# Patient Record
Sex: MALE | Race: BLACK OR AFRICAN AMERICAN | NOT HISPANIC OR LATINO
[De-identification: names, ages, dates, MRNs, and addresses within clinical notes are randomized per-mention and may not be internally consistent; named-entity substitution may affect disease eponyms.]

---

## 2023-01-27 ENCOUNTER — APPOINTMENT (OUTPATIENT)
Dept: VASCULAR SURGERY | Facility: CLINIC | Age: 63
End: 2023-01-27
Payer: MEDICARE

## 2023-01-27 VITALS
HEART RATE: 90 BPM | WEIGHT: 157 LBS | HEIGHT: 67 IN | BODY MASS INDEX: 24.64 KG/M2 | OXYGEN SATURATION: 97 % | DIASTOLIC BLOOD PRESSURE: 73 MMHG | SYSTOLIC BLOOD PRESSURE: 170 MMHG

## 2023-01-27 PROCEDURE — 93926 LOWER EXTREMITY STUDY: CPT

## 2023-01-27 PROCEDURE — 99204 OFFICE O/P NEW MOD 45 MIN: CPT

## 2023-01-27 NOTE — ADDENDUM
[FreeTextEntry1] : This note was written by Art Redmond, acting as a scribe for Dr. Clarisse Lucas.  I, Dr. Clarisse Lucas, have read and attest that all the information, medical decision-making, and discharge instructions within are true and accurate.\par \par I, Dr. Clarisse Lucas, personally performed the evaluation and management (E/M) services for this new patient.  That E/M includes conducting the initial examination, assessing all conditions, and establishing the plan of care.  Today, my ACP, Art Redmond, was here to observe my evaluation and management services for this patient to be followed going forward.

## 2023-01-27 NOTE — PROCEDURE
[FreeTextEntry1] : Arterial duplex of the LLE performed to evaluate for LE perfusion reveals a widely patent CFA/SFA, >75% pop stenosis, occluded PTA, patent CESILIA/peroneal arteries w/adequate flow to foot.

## 2023-01-27 NOTE — HISTORY OF PRESENT ILLNESS
[FreeTextEntry1] : 62yoM w/chronic h/o [id number] (poorly controlled), HTN, HLD, s/p multiple L toe amputations 2y prior which resulted in lateral deviation of the L 1/2nd toes and subsequent ulceration of the medial aspect of the 1st MT head, presents for consultation for treatment.  Pt was admitted to a hospital in [place] for infection of the foot that required 8wks of IV abx via PICC line, he also underwent LLE angiogram/angioplasty of the LLE.\par \par Anupam, he denies drainage from the L foot, and has only been dressing the site w/dry gauze.  Denies fevers/chills/malaise.

## 2023-02-02 ENCOUNTER — RESULT CHARGE (OUTPATIENT)
Age: 63
End: 2023-02-02

## 2023-02-03 ENCOUNTER — OUTPATIENT (OUTPATIENT)
Dept: OUTPATIENT SERVICES | Facility: HOSPITAL | Age: 63
LOS: 1 days | End: 2023-02-03
Payer: MEDICARE

## 2023-02-03 ENCOUNTER — APPOINTMENT (OUTPATIENT)
Dept: VASCULAR SURGERY | Facility: CLINIC | Age: 63
End: 2023-02-03
Payer: MEDICARE

## 2023-02-03 DIAGNOSIS — Z01.818 ENCOUNTER FOR OTHER PREPROCEDURAL EXAMINATION: ICD-10-CM

## 2023-02-03 LAB
ALBUMIN SERPL ELPH-MCNC: 2.9 G/DL — LOW (ref 3.3–5)
ALP SERPL-CCNC: 110 U/L — SIGNIFICANT CHANGE UP (ref 40–120)
ALT FLD-CCNC: 6 U/L — LOW (ref 10–45)
ANION GAP SERPL CALC-SCNC: 6 MMOL/L — SIGNIFICANT CHANGE UP (ref 5–17)
APTT BLD: 31.1 SEC — SIGNIFICANT CHANGE UP (ref 27.5–35.5)
AST SERPL-CCNC: 7 U/L — LOW (ref 10–40)
BASOPHILS # BLD AUTO: 0.06 K/UL — SIGNIFICANT CHANGE UP (ref 0–0.2)
BASOPHILS NFR BLD AUTO: 0.6 % — SIGNIFICANT CHANGE UP (ref 0–2)
BILIRUB SERPL-MCNC: 0.3 MG/DL — SIGNIFICANT CHANGE UP (ref 0.2–1.2)
BUN SERPL-MCNC: 20 MG/DL — SIGNIFICANT CHANGE UP (ref 7–23)
CALCIUM SERPL-MCNC: 8.6 MG/DL — SIGNIFICANT CHANGE UP (ref 8.4–10.5)
CHLORIDE SERPL-SCNC: 94 MMOL/L — LOW (ref 96–108)
CO2 SERPL-SCNC: 28 MMOL/L — SIGNIFICANT CHANGE UP (ref 22–31)
CREAT SERPL-MCNC: 1.22 MG/DL — SIGNIFICANT CHANGE UP (ref 0.5–1.3)
EGFR: 67 ML/MIN/1.73M2 — SIGNIFICANT CHANGE UP
EOSINOPHIL # BLD AUTO: 0.15 K/UL — SIGNIFICANT CHANGE UP (ref 0–0.5)
EOSINOPHIL NFR BLD AUTO: 1.4 % — SIGNIFICANT CHANGE UP (ref 0–6)
GLUCOSE SERPL-MCNC: 381 MG/DL — HIGH (ref 70–99)
HCT VFR BLD CALC: 29 % — LOW (ref 39–50)
HGB BLD-MCNC: 9 G/DL — LOW (ref 13–17)
IMM GRANULOCYTES NFR BLD AUTO: 0.5 % — SIGNIFICANT CHANGE UP (ref 0–0.9)
INR BLD: 1.17 — HIGH (ref 0.88–1.16)
LYMPHOCYTES # BLD AUTO: 29.3 % — SIGNIFICANT CHANGE UP (ref 13–44)
LYMPHOCYTES # BLD AUTO: 3.18 K/UL — SIGNIFICANT CHANGE UP (ref 1–3.3)
MCHC RBC-ENTMCNC: 26.5 PG — LOW (ref 27–34)
MCHC RBC-ENTMCNC: 31 GM/DL — LOW (ref 32–36)
MCV RBC AUTO: 85.5 FL — SIGNIFICANT CHANGE UP (ref 80–100)
MONOCYTES # BLD AUTO: 0.85 K/UL — SIGNIFICANT CHANGE UP (ref 0–0.9)
MONOCYTES NFR BLD AUTO: 7.8 % — SIGNIFICANT CHANGE UP (ref 2–14)
NEUTROPHILS # BLD AUTO: 6.56 K/UL — SIGNIFICANT CHANGE UP (ref 1.8–7.4)
NEUTROPHILS NFR BLD AUTO: 60.4 % — SIGNIFICANT CHANGE UP (ref 43–77)
NRBC # BLD: 0 /100 WBCS — SIGNIFICANT CHANGE UP (ref 0–0)
PLATELET # BLD AUTO: 518 K/UL — HIGH (ref 150–400)
POTASSIUM SERPL-MCNC: 4.4 MMOL/L — SIGNIFICANT CHANGE UP (ref 3.5–5.3)
POTASSIUM SERPL-SCNC: 4.4 MMOL/L — SIGNIFICANT CHANGE UP (ref 3.5–5.3)
PROT SERPL-MCNC: 6.2 G/DL — SIGNIFICANT CHANGE UP (ref 6–8.3)
PROTHROM AB SERPL-ACNC: 13.9 SEC — HIGH (ref 10.5–13.4)
RBC # BLD: 3.39 M/UL — LOW (ref 4.2–5.8)
RBC # FLD: 16.8 % — HIGH (ref 10.3–14.5)
SARS-COV-2 N GENE NPH QL NAA+PROBE: NOT DETECTED
SODIUM SERPL-SCNC: 128 MMOL/L — LOW (ref 135–145)
WBC # BLD: 10.85 K/UL — HIGH (ref 3.8–10.5)
WBC # FLD AUTO: 10.85 K/UL — HIGH (ref 3.8–10.5)

## 2023-02-03 PROCEDURE — 85610 PROTHROMBIN TIME: CPT

## 2023-02-03 PROCEDURE — 93005 ELECTROCARDIOGRAM TRACING: CPT

## 2023-02-03 PROCEDURE — 85730 THROMBOPLASTIN TIME PARTIAL: CPT

## 2023-02-03 PROCEDURE — 99214 OFFICE O/P EST MOD 30 MIN: CPT

## 2023-02-03 PROCEDURE — 80053 COMPREHEN METABOLIC PANEL: CPT

## 2023-02-03 PROCEDURE — 85025 COMPLETE CBC W/AUTO DIFF WBC: CPT

## 2023-02-03 PROCEDURE — 93010 ELECTROCARDIOGRAM REPORT: CPT

## 2023-02-03 RX ORDER — INSULIN LISPRO 100 [IU]/ML
100 INJECTION, SOLUTION INTRAVENOUS; SUBCUTANEOUS
Refills: 0 | Status: ACTIVE | COMMUNITY

## 2023-02-03 RX ORDER — HYDROCORTISONE 1 %
12 CREAM (GRAM) TOPICAL TWICE DAILY
Qty: 1 | Refills: 0 | Status: ACTIVE | COMMUNITY
Start: 2023-02-03 | End: 1900-01-01

## 2023-02-03 RX ORDER — DOCUSATE SODIUM 100 MG/1
100 CAPSULE, LIQUID FILLED ORAL
Refills: 0 | Status: ACTIVE | COMMUNITY

## 2023-02-03 NOTE — ASSESSMENT
[FreeTextEntry1] : 62yoM w/chronic h/o [id number] (poorly controlled), HTN, HLD, s/p multiple L toe amputations 2y prior which resulted in lateral deviation of the L 1/2nd toes and subsequent ulceration of the medial aspect of the 1st MT head, presents for consultation for treatment.  Pt was admitted to a hospital in [place] for infection of the foot that required 8wks of IV abx via PICC line, he also underwent LLE angiogram/angioplasty of the LLE.  Currently, he notes drainage from the L foot, and has only been dressing the site w/dry gauze.  Dr. Lucas placed an unna boot on the LE 1wk prior w/no improvement.  Denies fevers/chills/malaise.\par \par Large, tracking ulcer noted at the L medial forefoot w/exposure of the 1st MT head and MTP joint w/drainage and crepitus.  Wound cleansed w/chlorhexidine/NS and packed w/chlorhexidine wet-->dry, wrapped w/kerlix and ACE.  Discussed options w/pt to undergo LLE angio/PTA/stent in cath lab and plan to amputate the L great toe w/podiatry during this admission.

## 2023-02-03 NOTE — PHYSICAL EXAM
[Normal Thyroid] : the thyroid was normal [Normal Breath Sounds] : Normal breath sounds [Respiratory Effort] : normal respiratory effort [Normal Heart Sounds] : normal heart sounds [Normal Rate and Rhythm] : normal rate and rhythm [2+] : right 2+ [0] : left 0 [1+] : left 1+ [Ankle Swelling (On Exam)] : present [Ankle Swelling On The Left] : of the left ankle [Ankle Swelling On The Right] : mild [Skin Ulcer] : ulcer [Alert] : alert [Calm] : calm [JVD] : no jugular venous distention  [Carotid Bruits] : no carotid bruits [Right Carotid Bruit] : no bruit heard over the right carotid [Left Carotid Bruit] : no bruit heard over the left carotid [Varicose Veins Of Lower Extremities] : not present [] : not present [Abdomen Masses] : No abdominal masses [Abdomen Tenderness] : ~T ~M No abdominal tenderness [Purpura] : no purpura  [Petechiae] : no petechiae [Skin Induration] : no induration [de-identified] : Healthy appearance, NAD [de-identified] : NC/AT, anicteric [de-identified] : FROM throughout, strength 5/5x4, no palpable cords in LEs, no atrophy noted [de-identified] : Open wound at the L medial forefoot w/exposure of the 1st MTP joint, +crepitus, +drainage

## 2023-02-03 NOTE — PROCEDURE
[FreeTextEntry1] : Wound cleansed w/chlorhexidine/NS and packed w/chlorhexidine wet-->dry, wrapped w/kerlix and ACE

## 2023-02-03 NOTE — HISTORY OF PRESENT ILLNESS
[FreeTextEntry1] : 62yoM w/chronic h/o [id number] (poorly controlled), HTN, HLD, s/p multiple L toe amputations 2y prior which resulted in lateral deviation of the L 1/2nd toes and subsequent ulceration of the medial aspect of the 1st MT head, presents for consultation for treatment.  Pt was admitted to a hospital in [place] for infection of the foot that required 8wks of IV abx via PICC line, he also underwent LLE angiogram/angioplasty of the LLE.\par \par Currently, he notes drainage from the L foot, and has only been dressing the site w/dry gauze.  Dr. Lucas placed an unna boot on the LE 1wk prior w/no improvement.  Denies fevers/chills/malaise.

## 2023-02-05 ENCOUNTER — TRANSCRIPTION ENCOUNTER (OUTPATIENT)
Age: 63
End: 2023-02-05

## 2023-02-06 ENCOUNTER — APPOINTMENT (OUTPATIENT)
Dept: VASCULAR SURGERY | Facility: HOSPITAL | Age: 63
End: 2023-02-06

## 2023-02-06 ENCOUNTER — TRANSCRIPTION ENCOUNTER (OUTPATIENT)
Age: 63
End: 2023-02-06

## 2023-02-06 ENCOUNTER — INPATIENT (INPATIENT)
Facility: HOSPITAL | Age: 63
LOS: 7 days | Discharge: HOME HEALTH | DRG: 617 | End: 2023-02-14
Attending: SURGERY | Admitting: SURGERY
Payer: MEDICARE

## 2023-02-06 VITALS — HEIGHT: 67 IN | WEIGHT: 160.94 LBS

## 2023-02-06 LAB
ANION GAP SERPL CALC-SCNC: 11 MMOL/L — SIGNIFICANT CHANGE UP (ref 5–17)
BUN SERPL-MCNC: 19 MG/DL — SIGNIFICANT CHANGE UP (ref 7–23)
CALCIUM SERPL-MCNC: 9.1 MG/DL — SIGNIFICANT CHANGE UP (ref 8.4–10.5)
CHLORIDE SERPL-SCNC: 100 MMOL/L — SIGNIFICANT CHANGE UP (ref 96–108)
CO2 SERPL-SCNC: 25 MMOL/L — SIGNIFICANT CHANGE UP (ref 22–31)
CREAT SERPL-MCNC: 1.16 MG/DL — SIGNIFICANT CHANGE UP (ref 0.5–1.3)
EGFR: 71 ML/MIN/1.73M2 — SIGNIFICANT CHANGE UP
GLUCOSE BLDC GLUCOMTR-MCNC: 107 MG/DL — HIGH (ref 70–99)
GLUCOSE BLDC GLUCOMTR-MCNC: 190 MG/DL — HIGH (ref 70–99)
GLUCOSE BLDC GLUCOMTR-MCNC: 288 MG/DL — HIGH (ref 70–99)
GLUCOSE BLDC GLUCOMTR-MCNC: 350 MG/DL — HIGH (ref 70–99)
GLUCOSE SERPL-MCNC: 332 MG/DL — HIGH (ref 70–99)
HCT VFR BLD CALC: 30.1 % — LOW (ref 39–50)
HGB BLD-MCNC: 9.3 G/DL — LOW (ref 13–17)
INR BLD: 1.27 — HIGH (ref 0.88–1.16)
ISTAT ACTK (ACTIVATED CLOTTING TIME KAOLIN): 143 SEC — HIGH (ref 74–137)
ISTAT ACTK (ACTIVATED CLOTTING TIME KAOLIN): 173 SEC — HIGH (ref 74–137)
ISTAT ACTK (ACTIVATED CLOTTING TIME KAOLIN): 179 SEC — HIGH (ref 74–137)
ISTAT ACTK (ACTIVATED CLOTTING TIME KAOLIN): 215 SEC — HIGH (ref 74–137)
MCHC RBC-ENTMCNC: 26.5 PG — LOW (ref 27–34)
MCHC RBC-ENTMCNC: 30.9 GM/DL — LOW (ref 32–36)
MCV RBC AUTO: 85.8 FL — SIGNIFICANT CHANGE UP (ref 80–100)
NRBC # BLD: 0 /100 WBCS — SIGNIFICANT CHANGE UP (ref 0–0)
PLATELET # BLD AUTO: 491 K/UL — HIGH (ref 150–400)
POTASSIUM SERPL-MCNC: 4.4 MMOL/L — SIGNIFICANT CHANGE UP (ref 3.5–5.3)
POTASSIUM SERPL-SCNC: 4.4 MMOL/L — SIGNIFICANT CHANGE UP (ref 3.5–5.3)
PROTHROM AB SERPL-ACNC: 15.2 SEC — HIGH (ref 10.5–13.4)
RBC # BLD: 3.51 M/UL — LOW (ref 4.2–5.8)
RBC # FLD: 18 % — HIGH (ref 10.3–14.5)
SODIUM SERPL-SCNC: 136 MMOL/L — SIGNIFICANT CHANGE UP (ref 135–145)
WBC # BLD: 9.76 K/UL — SIGNIFICANT CHANGE UP (ref 3.8–10.5)
WBC # FLD AUTO: 9.76 K/UL — SIGNIFICANT CHANGE UP (ref 3.8–10.5)

## 2023-02-06 PROCEDURE — 73620 X-RAY EXAM OF FOOT: CPT | Mod: 26,LT

## 2023-02-06 PROCEDURE — 93010 ELECTROCARDIOGRAM REPORT: CPT

## 2023-02-06 PROCEDURE — 75710 ARTERY X-RAYS ARM/LEG: CPT | Mod: 26,59,GC

## 2023-02-06 PROCEDURE — 75625 CONTRAST EXAM ABDOMINL AORTA: CPT | Mod: 26,GC

## 2023-02-06 PROCEDURE — 76937 US GUIDE VASCULAR ACCESS: CPT | Mod: 26,GC

## 2023-02-06 PROCEDURE — 37226: CPT | Mod: GC

## 2023-02-06 RX ORDER — ASPIRIN/CALCIUM CARB/MAGNESIUM 324 MG
81 TABLET ORAL DAILY
Refills: 0 | Status: DISCONTINUED | OUTPATIENT
Start: 2023-02-07 | End: 2023-02-14

## 2023-02-06 RX ORDER — SODIUM CHLORIDE 9 MG/ML
1000 INJECTION, SOLUTION INTRAVENOUS
Refills: 0 | Status: DISCONTINUED | OUTPATIENT
Start: 2023-02-06 | End: 2023-02-07

## 2023-02-06 RX ORDER — INSULIN LISPRO 100/ML
VIAL (ML) SUBCUTANEOUS ONCE
Refills: 0 | Status: COMPLETED | OUTPATIENT
Start: 2023-02-06 | End: 2023-02-06

## 2023-02-06 RX ORDER — PIPERACILLIN AND TAZOBACTAM 4; .5 G/20ML; G/20ML
3.38 INJECTION, POWDER, LYOPHILIZED, FOR SOLUTION INTRAVENOUS ONCE
Refills: 0 | Status: COMPLETED | OUTPATIENT
Start: 2023-02-06 | End: 2023-02-06

## 2023-02-06 RX ORDER — PIPERACILLIN AND TAZOBACTAM 4; .5 G/20ML; G/20ML
3.38 INJECTION, POWDER, LYOPHILIZED, FOR SOLUTION INTRAVENOUS ONCE
Refills: 0 | Status: DISCONTINUED | OUTPATIENT
Start: 2023-02-06 | End: 2023-02-06

## 2023-02-06 RX ORDER — HEPARIN SODIUM 5000 [USP'U]/ML
5000 INJECTION INTRAVENOUS; SUBCUTANEOUS EVERY 8 HOURS
Refills: 0 | Status: DISCONTINUED | OUTPATIENT
Start: 2023-02-06 | End: 2023-02-08

## 2023-02-06 RX ORDER — DEXTROSE 50 % IN WATER 50 %
25 SYRINGE (ML) INTRAVENOUS ONCE
Refills: 0 | Status: DISCONTINUED | OUTPATIENT
Start: 2023-02-06 | End: 2023-02-07

## 2023-02-06 RX ORDER — DEXTROSE 50 % IN WATER 50 %
15 SYRINGE (ML) INTRAVENOUS ONCE
Refills: 0 | Status: DISCONTINUED | OUTPATIENT
Start: 2023-02-06 | End: 2023-02-07

## 2023-02-06 RX ORDER — GLUCAGON INJECTION, SOLUTION 0.5 MG/.1ML
1 INJECTION, SOLUTION SUBCUTANEOUS ONCE
Refills: 0 | Status: DISCONTINUED | OUTPATIENT
Start: 2023-02-06 | End: 2023-02-07

## 2023-02-06 RX ORDER — DEXTROSE 50 % IN WATER 50 %
12.5 SYRINGE (ML) INTRAVENOUS ONCE
Refills: 0 | Status: DISCONTINUED | OUTPATIENT
Start: 2023-02-06 | End: 2023-02-07

## 2023-02-06 RX ORDER — CLOPIDOGREL BISULFATE 75 MG/1
75 TABLET, FILM COATED ORAL ONCE
Refills: 0 | Status: COMPLETED | OUTPATIENT
Start: 2023-02-06 | End: 2023-02-06

## 2023-02-06 RX ORDER — INSULIN LISPRO 100/ML
VIAL (ML) SUBCUTANEOUS
Refills: 0 | Status: DISCONTINUED | OUTPATIENT
Start: 2023-02-06 | End: 2023-02-07

## 2023-02-06 RX ORDER — ASPIRIN/CALCIUM CARB/MAGNESIUM 324 MG
81 TABLET ORAL ONCE
Refills: 0 | Status: COMPLETED | OUTPATIENT
Start: 2023-02-06 | End: 2023-02-06

## 2023-02-06 RX ORDER — CHLORHEXIDINE GLUCONATE 213 G/1000ML
1 SOLUTION TOPICAL ONCE
Refills: 0 | Status: COMPLETED | OUTPATIENT
Start: 2023-02-06 | End: 2023-02-08

## 2023-02-06 RX ORDER — CLOPIDOGREL BISULFATE 75 MG/1
75 TABLET, FILM COATED ORAL DAILY
Refills: 0 | Status: DISCONTINUED | OUTPATIENT
Start: 2023-02-07 | End: 2023-02-14

## 2023-02-06 RX ORDER — INSULIN LISPRO 100/ML
VIAL (ML) SUBCUTANEOUS AT BEDTIME
Refills: 0 | Status: DISCONTINUED | OUTPATIENT
Start: 2023-02-06 | End: 2023-02-07

## 2023-02-06 RX ORDER — PIPERACILLIN AND TAZOBACTAM 4; .5 G/20ML; G/20ML
3.38 INJECTION, POWDER, LYOPHILIZED, FOR SOLUTION INTRAVENOUS EVERY 8 HOURS
Refills: 0 | Status: DISCONTINUED | OUTPATIENT
Start: 2023-02-06 | End: 2023-02-10

## 2023-02-06 RX ADMIN — CLOPIDOGREL BISULFATE 75 MILLIGRAM(S): 75 TABLET, FILM COATED ORAL at 12:49

## 2023-02-06 RX ADMIN — Medication 6: at 12:41

## 2023-02-06 RX ADMIN — Medication 8: at 10:41

## 2023-02-06 RX ADMIN — PIPERACILLIN AND TAZOBACTAM 200 GRAM(S): 4; .5 INJECTION, POWDER, LYOPHILIZED, FOR SOLUTION INTRAVENOUS at 17:06

## 2023-02-06 RX ADMIN — PIPERACILLIN AND TAZOBACTAM 25 GRAM(S): 4; .5 INJECTION, POWDER, LYOPHILIZED, FOR SOLUTION INTRAVENOUS at 21:07

## 2023-02-06 RX ADMIN — Medication 81 MILLIGRAM(S): at 12:49

## 2023-02-06 RX ADMIN — HEPARIN SODIUM 5000 UNIT(S): 5000 INJECTION INTRAVENOUS; SUBCUTANEOUS at 22:19

## 2023-02-06 NOTE — PROGRESS NOTE ADULT - SUBJECTIVE AND OBJECTIVE BOX
Vascular Surgery Post-Op Note    Procedure: LLE Angio with SFA/Pop Stent    Diagnosis/Indication: PAD    Surgeon: Dr. Avendano    S: Pt has no complaints. Denies CP, SOB, DOMINGUEZ, calf tenderness. Pain controlled with medication. Left foot dressed with kerlix and ACE wrap by podiatry for chronic wound.    O:                              9.3    9.76  )-----------( 491      ( 06 Feb 2023 09:45 )             30.1     02-06    136  |  100  |  19  ----------------------------<  332<H>  4.4   |  25  |  1.16    Ca    9.1      06 Feb 2023 09:45        Gen: NAD, resting comfortably in bed laying flat  C/V: NSR  Pulm: Nonlabored breathing, no respiratory distress  Abd: soft, NT/ND  Extrem: WWP, no calf edema, bilateral biphasic DP and triphasic PT pulses (dressing taken down on left side and re-wrapped). Groin soft, no evidence of hematoma, no drainage, induration.       A/P: 62yMale s/p LLE Angio with SFA/Pop Stent  NPO until 7:30pm then reg diet  Pain/nausea control  DVT ppx: SQH, ASA/Plavix  Dispo plan: [facility] Post-Op Note    Procedure: LLE Angio with SFA/Pop Stent    Diagnosis/Indication: PAD    Surgeon: Dr. Avendano    S: Pt has no complaints. Denies CP, SOB, DOMINGUEZ, calf tenderness. Pain controlled with medication. Left foot dressed with kerlix and ACE wrap by podiatry for chronic wound.    O:  ICU Vital Signs Last 24 Hrs  T(C): --  T(F): --  HR: 84 (06 Feb 2023 18:00) (68 - 84)  BP: 170/87 (06 Feb 2023 18:00) (157/74 - 170/87)  BP(mean): 119 (06 Feb 2023 18:00) (107 - 119)  ABP: --  ABP(mean): --  RR: 17 (06 Feb 2023 18:00) (17 - 18)  SpO2: 97% (06 Feb 2023 18:00) (97% - 98%)    O2 Parameters below as of 06 Feb 2023 18:00  Patient On (Oxygen Delivery Method): room air                                    9.3    9.76  )-----------( 491      ( 06 Feb 2023 09:45 )             30.1     02-06    136  |  100  |  19  ----------------------------<  332<H>  4.4   |  25  |  1.16    Ca    9.1      06 Feb 2023 09:45        Gen: NAD, resting comfortably in bed laying flat  C/V: NSR  Pulm: Nonlabored breathing, no respiratory distress  Abd: soft, NT/ND  Extrem: WWP, no calf edema, bilateral biphasic DP and triphasic PT pulses (dressing taken down on left side and re-wrapped). Groin soft, no evidence of hematoma, no drainage, induration.       A/P: 62yMale s/p LLE Angio with SFA/Pop Stent  NPO until 7:30pm then reg diet  Pain/nausea control  DVT ppx: SQH, ASA/Plavix  Dispo plan: [facility]

## 2023-02-06 NOTE — H&P ADULT - ASSESSMENT
62M with PMH of HTN, HLD, DM, PAD s/p multiple L toe amputations 2 years prior (c/b medial deviation of 1st/2nd toes resulting in ulceration of medial metatarsal head) presents for LLE Angiogram    - s/p LLE Angiogram, SFA/Pop stent  - Admit for LLE wound care  - 6Fr sheath in R groin, f/u 1PM ACT  - Local wound care

## 2023-02-06 NOTE — H&P ADULT - HISTORY OF PRESENT ILLNESS
62M with PMH of HTN, HLD, DM, PAD s/p multiple L toe amputations 2 years prior (c/b medial deviation of 1st/2nd toes resulting in ulceration of medial metatarsal head) presents for LLE Angiogram. He was recently admitted to an OSH where he received 8 week of IV Abx via a PICC line. He was seen in the office and noted drainage form his L foot. Denies fevers/chills, CP/SOB. ROS otherwise negative.

## 2023-02-06 NOTE — BRIEF OPERATIVE NOTE - OPERATION/FINDINGS
R groin CFA access via micropuncture technique under ultrasound guidance. Normal aortoiliac vessels. patent CFA/SFA/PFA. 75% stenosis of distal SFA/proximal popliteal artery. below the knee, AT down to the foot. Peroneal to the ankle. PT does not reach foot. SFA lesion ballooned with 4x40mm [employer] balloon, then stented with [employer] PTX, then ballooned with [employer] 6x40mm balloon. Resolution of stenosis on completion. R groin sheath sutured in place. AT/DP palpable, triphasic PT.

## 2023-02-06 NOTE — PRE-ANESTHESIA EVALUATION ADULT - NSANTHADDINFOFT_GEN_ALL_CORE
Discussed with Dr. Avendano that patients FSG elevated in setting of holding his home insulin and oral meds.  Given that patient with known poorly controlled diabetes, case will be sedation, and patient can be admitted post op, will give insulin/check glucose and proceed.

## 2023-02-06 NOTE — H&P ADULT - NSHPPHYSICALEXAM_GEN_ALL_CORE
Gen: NAD  Resp: Normal respiratory effort  Abd: Soft, NTND  Ext: WWP; L 1st toe large medial metatarsal head ulcer, with loose 1st toe; no purulence, drainage, or cellulitis; post-operativley 2+ AP pulse, triphasic PT, biphasic DP

## 2023-02-06 NOTE — CONSULT NOTE ADULT - ASSESSMENT
62M with PMH of HTN, HLD, DM, PAD s/p multiple L toe amputations 2 years prior (c/b medial deviation of 1st/2nd toes resulting in ulceration of medial metatarsal head) presents for LLE Angiogram. Podiatry consulted to evaluate Left foot wound.    Osteomyelitis of 1st met head     Plan:       Plan pending discussion with attending 62M with PMH of HTN, HLD, DM, PAD s/p multiple L toe amputations 2 years prior (c/b medial deviation of 1st/2nd toes resulting in ulceration of medial metatarsal head) presents for LLE Angiogram. Podiatry consulted to evaluate Left foot wound.    Osteomyelitis of Left 1st met head     Plan:   - Reccommend MRI of Left foot to determine extent of OM  - Will plan for amputation of Left foot (possible TMA, pending MRI results)  - Reviewed x-rays  - C/w antibiotics per primary team  - Pt can heel WBAT to LLE  - Rest of care per primary team    Plan discussed w/ attending & podiatry following

## 2023-02-06 NOTE — CONSULT NOTE ADULT - SUBJECTIVE AND OBJECTIVE BOX
Attending: Dr. Pope     Patient is a 62y old  Male who presents with a chief complaint of LLE Angiogram (06 Feb 2023 12:06)      HPI:  62M with PMH of HTN, HLD, DM, PAD s/p multiple L toe amputations 2 years prior (c/b medial deviation of 1st/2nd toes resulting in ulceration of medial metatarsal head) presents for LLE Angiogram. Podiatry consulted to evaluate Left foot wound. Patient states left foot wound has been present for years and has never healed. He notes occasional drainage. Denies any recent pustular drainage. States he had recent amputation of his left 2nd digit and partial 5th ray a few months ago. He does not recall which hospital. He was discharged with 8 weeks of IV antibiotics. He does not currently follow with a podiatrist. He went to see his vascular doctor (Dr. Avendano) Friday who recommended he gets an amputation following his angiogram due to patient's x-rays showing OM of the Left 1st met head.         PAST MEDICAL & SURGICAL HISTORY:    Home Medications:  Aspirin Enteric Coated 81 mg oral delayed release tablet: 1 tab(s) orally once a day (06 Feb 2023 12:44)  docusate sodium 100 mg oral tablet: 1 tab(s) orally 3 times a day (06 Feb 2023 12:44)  HumaLOG KwikPen 100 units/mL injectable solution:  (06 Feb 2023 12:44)  Tresiba 100 units/mL subcutaneous solution: 50 unit(s) subcutaneous once a day (06 Feb 2023 12:44)    Allergies    Allergy Status Unknown    Intolerances      FAMILY HISTORY:    Social History:       LABS                        9.3    9.76  )-----------( 491      ( 06 Feb 2023 09:45 )             30.1     02-06    136  |  100  |  19  ----------------------------<  332<H>  4.4   |  25  |  1.16    Ca    9.1      06 Feb 2023 09:45      PT/INR - ( 06 Feb 2023 09:59 )   PT: 15.2 sec;   INR: 1.27              Vital Signs Last 24 Hrs  T(C): --  T(F): --  HR: --  BP: --  BP(mean): --  RR: --  SpO2: --        PHYSICAL EXAM  General: NAD, AA0x3    Left Lower Extremity Focused:  Vasc: DP/PT 2/4m, No edema   Derm: 2.7 cm x 2 cm wound with exposed metatarsal head and fibrotic slough with 30% granular 70% fibrotic base, serous drainage with macerated borders, (+)PTB, undermining circumferentially tracks medially and proximally 2 cm   Neuro: Protective sensation diminished  MSK: s/p 2nd digit and 5th partial ray amp, Laterally deviated 1st, 3rd, and 4th digit    RADIOLOGY  < from: Xray Foot AP + Lateral, Left (02.06.23 @ 13:18) >  IMPRESSION: Frontal and lateral views of the left foot demonstrates   dislocation of the first MTP joint. Deformity of the second metatarsal   shaft. Deformity of the third, fourth and fifth metatarsal shafts   extending to the MTP joints. Pes planus. Hindfoot valgus. Arterial   vascular calcification. Fragmentation, sclerosis calcification adjacent   to the base of the fifth possible due to underlying chronic osteomyelitis.    MRI is more sensitive examination for detection of osteomyelitis.    < end of copied text >                         Attending: Dr. Pope     Patient is a 62y old  Male who presents with a chief complaint of LLE Angiogram (06 Feb 2023 12:06)      HPI:  62M with PMH of HTN, HLD, DM, PAD s/p multiple L toe amputations 2 years prior (c/b medial deviation of 1st/2nd toes resulting in ulceration of medial metatarsal head) presents for LLE Angiogram. Podiatry consulted to evaluate Left foot wound. Patient states left foot wound has been present for years and has never healed. He notes occasional drainage. Denies any recent pustular drainage. States he had recent amputation of his left 2nd digit and partial 5th ray a few months ago. He does not recall which hospital. He was discharged with 8 weeks of IV antibiotics. He does not currently follow with a podiatrist. He went to see his vascular doctor (Dr. Avendano) Friday who recommended he gets an amputation following his angiogram due to patient's x-rays showing OM of the Left 1st met head.         PAST MEDICAL & SURGICAL HISTORY:    Home Medications:  Aspirin Enteric Coated 81 mg oral delayed release tablet: 1 tab(s) orally once a day (06 Feb 2023 12:44)  docusate sodium 100 mg oral tablet: 1 tab(s) orally 3 times a day (06 Feb 2023 12:44)  HumaLOG KwikPen 100 units/mL injectable solution:  (06 Feb 2023 12:44)  Tresiba 100 units/mL subcutaneous solution: 50 unit(s) subcutaneous once a day (06 Feb 2023 12:44)    Allergies    Allergy Status Unknown    Intolerances      FAMILY HISTORY:    Social History:       LABS                        9.3    9.76  )-----------( 491      ( 06 Feb 2023 09:45 )             30.1     02-06    136  |  100  |  19  ----------------------------<  332<H>  4.4   |  25  |  1.16    Ca    9.1      06 Feb 2023 09:45      PT/INR - ( 06 Feb 2023 09:59 )   PT: 15.2 sec;   INR: 1.27              Vital Signs Last 24 Hrs  T(C): --  T(F): --  HR: --  BP: --  BP(mean): --  RR: --  SpO2: --        PHYSICAL EXAM  General: NAD, AA0x3    Left Lower Extremity Focused:  Vasc: DP/PT 2/4, No edema or erythema, temp gradient warm to warm   Derm: 2.7 cm x 2 cm wound with exposed metatarsal head and fibrotic slough with 30% granular 70% fibrotic base, serous drainage with macerated borders, (+)PTB, undermining circumferentially tracks medially and proximally 2 cm   Neuro: Protective sensation diminished  MSK: s/p 2nd digit and 5th partial ray amp, Laterally deviated 1st, 3rd, and 4th digit    RADIOLOGY  < from: Xray Foot AP + Lateral, Left (02.06.23 @ 13:18) >  IMPRESSION: Frontal and lateral views of the left foot demonstrates   dislocation of the first MTP joint. Deformity of the second metatarsal   shaft. Deformity of the third, fourth and fifth metatarsal shafts   extending to the MTP joints. Pes planus. Hindfoot valgus. Arterial   vascular calcification. Fragmentation, sclerosis calcification adjacent   to the base of the fifth possible due to underlying chronic osteomyelitis.    MRI is more sensitive examination for detection of osteomyelitis.    < end of copied text >                         Attending: Dr. Lamar    Patient is a 62y old  Male who presents with a chief complaint of LLE Angiogram (06 Feb 2023 12:06)      HPI:  62M with PMH of HTN, HLD, DM, PAD s/p multiple L toe amputations 2 years prior (c/b medial deviation of 1st/2nd toes resulting in ulceration of medial metatarsal head) presents for LLE Angiogram. Podiatry consulted to evaluate Left foot wound. Patient states left foot wound has been present for years and has never healed. He notes occasional drainage. Denies any recent pustular drainage. States he had recent amputation of his left 2nd digit and partial 5th ray a few months ago. He does not recall which hospital. He was discharged with 8 weeks of IV antibiotics. He does not currently follow with a podiatrist. He went to see his vascular doctor (Dr. Avendano) Friday who recommended he gets an amputation following his angiogram due to patient's x-rays showing OM of the Left 1st met head.         PAST MEDICAL & SURGICAL HISTORY:    Home Medications:  Aspirin Enteric Coated 81 mg oral delayed release tablet: 1 tab(s) orally once a day (06 Feb 2023 12:44)  docusate sodium 100 mg oral tablet: 1 tab(s) orally 3 times a day (06 Feb 2023 12:44)  HumaLOG KwikPen 100 units/mL injectable solution:  (06 Feb 2023 12:44)  Tresiba 100 units/mL subcutaneous solution: 50 unit(s) subcutaneous once a day (06 Feb 2023 12:44)    Allergies    Allergy Status Unknown    Intolerances      FAMILY HISTORY:    Social History:       LABS                        9.3    9.76  )-----------( 491      ( 06 Feb 2023 09:45 )             30.1     02-06    136  |  100  |  19  ----------------------------<  332<H>  4.4   |  25  |  1.16    Ca    9.1      06 Feb 2023 09:45      PT/INR - ( 06 Feb 2023 09:59 )   PT: 15.2 sec;   INR: 1.27              Vital Signs Last 24 Hrs  T(C): --  T(F): --  HR: --  BP: --  BP(mean): --  RR: --  SpO2: --        PHYSICAL EXAM  General: NAD, AA0x3    Left Lower Extremity Focused:  Vasc: DP/PT 2/4, No edema or erythema, temp gradient warm to warm   Derm: 2.7 cm x 2 cm wound with exposed metatarsal head and fibrotic slough with 30% granular 70% fibrotic base, serous drainage with macerated borders, (+)PTB, undermining circumferentially tracks medially and proximally 2 cm   Neuro: Protective sensation diminished  MSK: s/p 2nd digit and 5th partial ray amp, Laterally deviated 1st, 3rd, and 4th digit    RADIOLOGY  < from: Xray Foot AP + Lateral, Left (02.06.23 @ 13:18) >  IMPRESSION: Frontal and lateral views of the left foot demonstrates   dislocation of the first MTP joint. Deformity of the second metatarsal   shaft. Deformity of the third, fourth and fifth metatarsal shafts   extending to the MTP joints. Pes planus. Hindfoot valgus. Arterial   vascular calcification. Fragmentation, sclerosis calcification adjacent   to the base of the fifth possible due to underlying chronic osteomyelitis.    MRI is more sensitive examination for detection of osteomyelitis.    < end of copied text >

## 2023-02-07 ENCOUNTER — TRANSCRIPTION ENCOUNTER (OUTPATIENT)
Age: 63
End: 2023-02-07

## 2023-02-07 DIAGNOSIS — E11.51 TYPE 2 DIABETES MELLITUS WITH DIABETIC PERIPHERAL ANGIOPATHY WITHOUT GANGRENE: ICD-10-CM

## 2023-02-07 DIAGNOSIS — I10 ESSENTIAL (PRIMARY) HYPERTENSION: ICD-10-CM

## 2023-02-07 DIAGNOSIS — E78.5 HYPERLIPIDEMIA, UNSPECIFIED: ICD-10-CM

## 2023-02-07 DIAGNOSIS — E11.65 TYPE 2 DIABETES MELLITUS WITH HYPERGLYCEMIA: ICD-10-CM

## 2023-02-07 DIAGNOSIS — E11.628 TYPE 2 DIABETES MELLITUS WITH OTHER SKIN COMPLICATIONS: ICD-10-CM

## 2023-02-07 LAB
A1C WITH ESTIMATED AVERAGE GLUCOSE RESULT: 10.9 % — HIGH (ref 4–5.6)
ANION GAP SERPL CALC-SCNC: 8 MMOL/L — SIGNIFICANT CHANGE UP (ref 5–17)
BUN SERPL-MCNC: 19 MG/DL — SIGNIFICANT CHANGE UP (ref 7–23)
CALCIUM SERPL-MCNC: 8.6 MG/DL — SIGNIFICANT CHANGE UP (ref 8.4–10.5)
CHLORIDE SERPL-SCNC: 101 MMOL/L — SIGNIFICANT CHANGE UP (ref 96–108)
CO2 SERPL-SCNC: 24 MMOL/L — SIGNIFICANT CHANGE UP (ref 22–31)
CREAT SERPL-MCNC: 1.25 MG/DL — SIGNIFICANT CHANGE UP (ref 0.5–1.3)
CRP SERPL-MCNC: 31.4 MG/L — HIGH (ref 0–4)
EGFR: 65 ML/MIN/1.73M2 — SIGNIFICANT CHANGE UP
ERYTHROCYTE [SEDIMENTATION RATE] IN BLOOD: >140 MM/HR — HIGH
ESTIMATED AVERAGE GLUCOSE: 266 MG/DL — HIGH (ref 68–114)
GLUCOSE BLDC GLUCOMTR-MCNC: 172 MG/DL — HIGH (ref 70–99)
GLUCOSE BLDC GLUCOMTR-MCNC: 270 MG/DL — HIGH (ref 70–99)
GLUCOSE BLDC GLUCOMTR-MCNC: 284 MG/DL — HIGH (ref 70–99)
GLUCOSE BLDC GLUCOMTR-MCNC: 290 MG/DL — HIGH (ref 70–99)
GLUCOSE BLDC GLUCOMTR-MCNC: 301 MG/DL — HIGH (ref 70–99)
GLUCOSE SERPL-MCNC: 290 MG/DL — HIGH (ref 70–99)
HCT VFR BLD CALC: 27.5 % — LOW (ref 39–50)
HCV AB S/CO SERPL IA: 0.17 S/CO — SIGNIFICANT CHANGE UP
HCV AB SERPL-IMP: SIGNIFICANT CHANGE UP
HGB BLD-MCNC: 8.5 G/DL — LOW (ref 13–17)
INR BLD: 1.25 — HIGH (ref 0.88–1.16)
MAGNESIUM SERPL-MCNC: 2 MG/DL — SIGNIFICANT CHANGE UP (ref 1.6–2.6)
MCHC RBC-ENTMCNC: 26.6 PG — LOW (ref 27–34)
MCHC RBC-ENTMCNC: 30.9 GM/DL — LOW (ref 32–36)
MCV RBC AUTO: 86.2 FL — SIGNIFICANT CHANGE UP (ref 80–100)
NRBC # BLD: 0 /100 WBCS — SIGNIFICANT CHANGE UP (ref 0–0)
PHOSPHATE SERPL-MCNC: 3.2 MG/DL — SIGNIFICANT CHANGE UP (ref 2.5–4.5)
PLATELET # BLD AUTO: 573 K/UL — HIGH (ref 150–400)
POTASSIUM SERPL-MCNC: 4.5 MMOL/L — SIGNIFICANT CHANGE UP (ref 3.5–5.3)
POTASSIUM SERPL-SCNC: 4.5 MMOL/L — SIGNIFICANT CHANGE UP (ref 3.5–5.3)
PROTHROM AB SERPL-ACNC: 14.9 SEC — HIGH (ref 10.5–13.4)
RBC # BLD: 3.19 M/UL — LOW (ref 4.2–5.8)
RBC # FLD: 17.2 % — HIGH (ref 10.3–14.5)
SARS-COV-2 RNA SPEC QL NAA+PROBE: NEGATIVE — SIGNIFICANT CHANGE UP
SODIUM SERPL-SCNC: 133 MMOL/L — LOW (ref 135–145)
WBC # BLD: 9.62 K/UL — SIGNIFICANT CHANGE UP (ref 3.8–10.5)
WBC # FLD AUTO: 9.62 K/UL — SIGNIFICANT CHANGE UP (ref 3.8–10.5)

## 2023-02-07 PROCEDURE — 93010 ELECTROCARDIOGRAM REPORT: CPT

## 2023-02-07 PROCEDURE — 71045 X-RAY EXAM CHEST 1 VIEW: CPT | Mod: 26

## 2023-02-07 PROCEDURE — 99222 1ST HOSP IP/OBS MODERATE 55: CPT

## 2023-02-07 PROCEDURE — 99232 SBSQ HOSP IP/OBS MODERATE 35: CPT

## 2023-02-07 PROCEDURE — 73720 MRI LWR EXTREMITY W/O&W/DYE: CPT | Mod: 26,LT

## 2023-02-07 RX ORDER — SODIUM CHLORIDE 9 MG/ML
1000 INJECTION, SOLUTION INTRAVENOUS
Refills: 0 | Status: DISCONTINUED | OUTPATIENT
Start: 2023-02-07 | End: 2023-02-14

## 2023-02-07 RX ORDER — INSULIN LISPRO 100/ML
VIAL (ML) SUBCUTANEOUS
Refills: 0 | Status: DISCONTINUED | OUTPATIENT
Start: 2023-02-07 | End: 2023-02-14

## 2023-02-07 RX ORDER — GLUCAGON INJECTION, SOLUTION 0.5 MG/.1ML
1 INJECTION, SOLUTION SUBCUTANEOUS ONCE
Refills: 0 | Status: DISCONTINUED | OUTPATIENT
Start: 2023-02-07 | End: 2023-02-14

## 2023-02-07 RX ORDER — POLYETHYLENE GLYCOL 3350 17 G/17G
17 POWDER, FOR SOLUTION ORAL DAILY
Refills: 0 | Status: DISCONTINUED | OUTPATIENT
Start: 2023-02-07 | End: 2023-02-14

## 2023-02-07 RX ORDER — AMLODIPINE BESYLATE 2.5 MG/1
10 TABLET ORAL DAILY
Refills: 0 | Status: DISCONTINUED | OUTPATIENT
Start: 2023-02-07 | End: 2023-02-14

## 2023-02-07 RX ORDER — DEXTROSE 50 % IN WATER 50 %
15 SYRINGE (ML) INTRAVENOUS ONCE
Refills: 0 | Status: DISCONTINUED | OUTPATIENT
Start: 2023-02-07 | End: 2023-02-14

## 2023-02-07 RX ORDER — INSULIN LISPRO 100/ML
6 VIAL (ML) SUBCUTANEOUS
Refills: 0 | Status: DISCONTINUED | OUTPATIENT
Start: 2023-02-07 | End: 2023-02-08

## 2023-02-07 RX ORDER — INSULIN GLARGINE 100 [IU]/ML
15 INJECTION, SOLUTION SUBCUTANEOUS AT BEDTIME
Refills: 0 | Status: DISCONTINUED | OUTPATIENT
Start: 2023-02-07 | End: 2023-02-08

## 2023-02-07 RX ORDER — LISINOPRIL 2.5 MG/1
20 TABLET ORAL DAILY
Refills: 0 | Status: DISCONTINUED | OUTPATIENT
Start: 2023-02-07 | End: 2023-02-09

## 2023-02-07 RX ORDER — ATORVASTATIN CALCIUM 80 MG/1
40 TABLET, FILM COATED ORAL AT BEDTIME
Refills: 0 | Status: DISCONTINUED | OUTPATIENT
Start: 2023-02-07 | End: 2023-02-14

## 2023-02-07 RX ORDER — DEXTROSE 50 % IN WATER 50 %
25 SYRINGE (ML) INTRAVENOUS ONCE
Refills: 0 | Status: DISCONTINUED | OUTPATIENT
Start: 2023-02-07 | End: 2023-02-14

## 2023-02-07 RX ORDER — INSULIN GLARGINE 100 [IU]/ML
10 INJECTION, SOLUTION SUBCUTANEOUS AT BEDTIME
Refills: 0 | Status: DISCONTINUED | OUTPATIENT
Start: 2023-02-07 | End: 2023-02-07

## 2023-02-07 RX ORDER — LABETALOL HCL 100 MG
10 TABLET ORAL ONCE
Refills: 0 | Status: COMPLETED | OUTPATIENT
Start: 2023-02-07 | End: 2023-02-07

## 2023-02-07 RX ORDER — DEXTROSE 50 % IN WATER 50 %
12.5 SYRINGE (ML) INTRAVENOUS ONCE
Refills: 0 | Status: DISCONTINUED | OUTPATIENT
Start: 2023-02-07 | End: 2023-02-14

## 2023-02-07 RX ADMIN — Medication 6: at 11:57

## 2023-02-07 RX ADMIN — Medication 10 MILLIGRAM(S): at 13:17

## 2023-02-07 RX ADMIN — CLOPIDOGREL BISULFATE 75 MILLIGRAM(S): 75 TABLET, FILM COATED ORAL at 11:16

## 2023-02-07 RX ADMIN — PIPERACILLIN AND TAZOBACTAM 25 GRAM(S): 4; .5 INJECTION, POWDER, LYOPHILIZED, FOR SOLUTION INTRAVENOUS at 13:25

## 2023-02-07 RX ADMIN — ATORVASTATIN CALCIUM 40 MILLIGRAM(S): 80 TABLET, FILM COATED ORAL at 22:15

## 2023-02-07 RX ADMIN — Medication 8: at 16:55

## 2023-02-07 RX ADMIN — PIPERACILLIN AND TAZOBACTAM 25 GRAM(S): 4; .5 INJECTION, POWDER, LYOPHILIZED, FOR SOLUTION INTRAVENOUS at 05:27

## 2023-02-07 RX ADMIN — INSULIN GLARGINE 15 UNIT(S): 100 INJECTION, SOLUTION SUBCUTANEOUS at 22:15

## 2023-02-07 RX ADMIN — PIPERACILLIN AND TAZOBACTAM 25 GRAM(S): 4; .5 INJECTION, POWDER, LYOPHILIZED, FOR SOLUTION INTRAVENOUS at 21:06

## 2023-02-07 RX ADMIN — LISINOPRIL 20 MILLIGRAM(S): 2.5 TABLET ORAL at 16:50

## 2023-02-07 RX ADMIN — Medication 6: at 18:22

## 2023-02-07 RX ADMIN — AMLODIPINE BESYLATE 10 MILLIGRAM(S): 2.5 TABLET ORAL at 13:55

## 2023-02-07 RX ADMIN — Medication 6: at 07:05

## 2023-02-07 RX ADMIN — Medication 81 MILLIGRAM(S): at 11:16

## 2023-02-07 RX ADMIN — Medication 6 UNIT(S): at 18:21

## 2023-02-07 NOTE — PROGRESS NOTE ADULT - SUBJECTIVE AND OBJECTIVE BOX
O/N: SQRESHMA, eat at 7:30pm               ---------------------------------------------------------------------------  PLEASE CHECK WHEN PRESENT:  [  ] Heart Failure  [  ] Acute  [  ] Acute on Chronic  [  ] Chronic  -------------------------------------------------------------------  [  ]Diastolic [HFpEF]  [  ]Systolic [HFrEF]  [  ]Combined [HFpEF & HFrEF]  [  ] afib  [  ] hypertensive heart disease  [  ]Other:  -------------------------------------------------------------------  [ ] Respiratory failure  [ ] Acute cor pulmonale  [ ] Asthma/COPD Exacerbation  [ ] Pleural effusion  [ ] Aspiration pneumonia  -------------------------------------------------------------------  [  ]LEANNE  [  ]ATN  [  ]Reneal Medullary Necrosis  [  ]Renal Cortical Necrosis  [  ]Other Pathological Lesions:    [  ]CKD 1  [  ]CKD 2  [  ]CKD 3  [  ]CKD 4  [  ]CKD 5  [  ]Other  -------------------------------------------------------------------  [  ]Diabetes  [  ] Diabetic PVD Ulcer  [  ] Neuropathic ulcer to DM  [  ] Diabetes with Nephropathy  [  ] Osteomyelitis due to diabetes  --------------------------------------------------------------------  [  ]Malnutrition: See Nutrition Note  [  ]Cachexia  [  ]Other:   [  ]Supplement Ordered:  [  ]Morbid Obesity (BMI >=40]  ---------------------------------------------------------------------  [ ] Sepsis/severe sepsis/septic shock  [ ] UTI  [ ] Pneumonia  -----------------------------------------------------------------------  [ ] Acidosis/alkalosis  [ ] Fluid overload  [ ] Hypokalemia  [ ] Hyperkalemia  [ ] Hypomagnesemia  [ ] Hypophosphatemia  [ ] Hyperphosphatemia  ------------------------------------------------------------------------  [ ] Acute blood loss anemia  [ ] Post op blood loss anemia  [ ] Iron deficiency anemia  [ ] Anemia due to chronic disease  [ ] Hypercoagulable state  ----------------------------------------------------------------------  [ ] Cerebral infarction  [ ] Transient ischemia attack  [ ] Encephalopathy                Assessment and Plan:   Assessment:  · Assessment	  62M with PMH of HTN, HLD, DM, PAD s/p multiple L toe amputations 2 years prior (c/b medial deviation of 1st/2nd toes resulting in ulceration of medial metatarsal head) presents for LLE Angiogram    - s/p LLE Angiogram, SFA/Pop stent  - Admit for LLE wound care  - 6Fr sheath in R groin, f/u 1PM ACT  - Local wound care     O/N: SQH, eat at 7:30pm       Subjective:  Denies right groin swelling or bleeding   Denies pain in right foot at rest     ROS:   Denies Headache, blurred vision, Chest Pain, SOB, Abdominal pain, nausea or vomiting     Social   piperacillin/tazobactam IVPB.. 3.375  aspirin  chewable 81  clopidogrel Tablet 75  heparin   Injectable 5000  piperacillin/tazobactam IVPB.. 3.375      Allergies    Allergy Status Unknown    Intolerances        Vital Signs Last 24 Hrs  T(C): 37 (07 Feb 2023 05:00), Max: 37.3 (06 Feb 2023 18:30)  T(F): 98.6 (07 Feb 2023 05:00), Max: 99.2 (06 Feb 2023 22:26)  HR: 79 (07 Feb 2023 05:23) (68 - 98)  BP: 144/62 (07 Feb 2023 05:23) (144/62 - 174/83)  BP(mean): 96 (07 Feb 2023 05:23) (95 - 119)  RR: 18 (07 Feb 2023 05:23) (17 - 18)  SpO2: 96% (07 Feb 2023 05:23) (95% - 98%)    Parameters below as of 07 Feb 2023 05:23  Patient On (Oxygen Delivery Method): room air      I&O's Summary    06 Feb 2023 07:01  -  07 Feb 2023 07:00  --------------------------------------------------------  IN: 440 mL / OUT: 400 mL / NET: 40 mL        Physical Exam:  General: NAD  Pulmonary: b/l breath sounds   Cardiovascular: s1s2 reg  Abdominal: soft   Extremities: Left toes dressing clean and dry. Left DP (B) PT(T)   right groin soft no hematoma       LABS:                        9.3    9.76  )-----------( 491      ( 06 Feb 2023 09:45 )             30.1     02-06    136  |  100  |  19  ----------------------------<  332<H>  4.4   |  25  |  1.16    Ca    9.1      06 Feb 2023 09:45      PT/INR - ( 06 Feb 2023 09:59 )   PT: 15.2 sec;   INR: 1.27              Radiology and Additional Studies:          ---------------------------------------------------------------------------  PLEASE CHECK WHEN PRESENT:  [  ] Heart Failure  [  ] Acute  [  ] Acute on Chronic  [  ] Chronic  -------------------------------------------------------------------  [  ]Diastolic [HFpEF]  [  ]Systolic [HFrEF]  [  ]Combined [HFpEF & HFrEF]  [  ] afib  [  ] hypertensive heart disease  [  ]Other:  -------------------------------------------------------------------  [ ] Respiratory failure  [ ] Acute cor pulmonale  [ ] Asthma/COPD Exacerbation  [ ] Pleural effusion  [ ] Aspiration pneumonia  -------------------------------------------------------------------  [  ]LEANNE  [  ]ATN  [  ]Reneal Medullary Necrosis  [  ]Renal Cortical Necrosis  [  ]Other Pathological Lesions:    [  ]CKD 1  [  ]CKD 2  [  ]CKD 3  [  ]CKD 4  [  ]CKD 5  [  ]Other  -------------------------------------------------------------------  [  ]Diabetes  [  x] Diabetic PVD Ulcer  [  ] Neuropathic ulcer to DM  [  ] Diabetes with Nephropathy  [  ] Osteomyelitis due to diabetes  --------------------------------------------------------------------  [  ]Malnutrition: See Nutrition Note  [  ]Cachexia  [  ]Other:   [  ]Supplement Ordered:  [  ]Morbid Obesity (BMI >=40]  ---------------------------------------------------------------------  [ ] Sepsis/severe sepsis/septic shock  [ ] UTI  [ ] Pneumonia  -----------------------------------------------------------------------  [ ] Acidosis/alkalosis  [ ] Fluid overload  [ ] Hypokalemia  [ ] Hyperkalemia  [ ] Hypomagnesemia  [ ] Hypophosphatemia  [ ] Hyperphosphatemia  [ x] hyponatremia   ------------------------------------------------------------------------  [ ] Acute blood loss anemia  [ ] Post op blood loss anemia  [ ] Iron deficiency anemia  [x ] Anemia due to chronic disease  [ ] Hypercoagulable state  ----------------------------------------------------------------------  [ ] Cerebral infarction  [ ] Transient ischemia attack  [ ] Encephalopathy                Assessment and Plan:   Assessment:  · Assessment	  62M with PMH of HTN, HLD, DM, PAD s/p multiple L toe amputations 2 years prior (c/b medial deviation of 1st/2nd toes resulting in ulceration of medial metatarsal head) presents for LLE Angiogram      Vascular/PAD:  -Continue ASA + Plavix Plavix new med)   -s/p Left lisa and stent     HTN/HLD:  -consider starting statin   -No home meds for BP       DM:  -Awaiting HgA1c today   - Holding Tresiba   -Holding home Insulin, continue ISS       Anemia:   -will get Iron studies   -will trend Hgb     ID:  -Left foot wounds noted   -appreciate podiatry recs   -MRI pending   -Continue Zosyn       Diet: CCD     Activity:  as tolerated     DVTPPx: SQH     Dispo: pending MRI and podiatry surgical plans

## 2023-02-07 NOTE — CONSULT NOTE ADULT - ASSESSMENT
62 year old man with HTN, HLD, Type 2 DM complicated by PAD, s/p multiple left toe amputations 2 years ago, admitted for LLE angiogram. S/p angiogram on Feb 6th which showed  75% stenosis of distal SFA/proximal popliteal artery. SFA lesion ballooned, then stented.   X ray of left foot also suspicious for osteomyelitis. MRI pending.     # Diabetic foot wound   # Likely osteomyelitis   [ ] f/u MRI, f/u podiatry recs;   [ ] Antibiotics per primary team and podiatry.     # Type 2 Diabetes complicated by Peripheral Artery Disease   Per patient, takes metformin only intermittently and takes tresiba ~once a week even though it's prescribed as a daily medication.   [ ] Lantus 10 units qHS + Sliding scale insulin for now   [ ] f/u total insulin requirement tomorrow, titrate basal-bolus regimen based on daily insulin requirement     # Peripheral artery disease s/p stent   # Post -operative state  Antiplatelet per primary team - on DAPT   OOTB to chair   Encourage incentive spirometry   Recommend miralax qd for bowel regimen    # HLD - takes rosuvastatin 10mg qd per surescripts; atorva 40mg qd while inpatient   # HTN -   Takes amlodipine - benazepril 10/40 at home.   continue amlodipine 10mg qd   [ ] therapeutic interchange for benazepril --> 40mg lisinopril; Recommend starting with lisinopril 20mg qd , can increase to full dose of 40mg qd lisinopril if BP requires.     DVT ppx  - heparin subq   62 year old man with HTN, HLD, Type 2 DM complicated by PAD, s/p multiple left toe amputations 2 years ago, admitted for LLE angiogram. S/p angiogram on Feb 6th which showed  75% stenosis of distal SFA/proximal popliteal artery. SFA lesion ballooned, then stented.   X ray of left foot also suspicious for osteomyelitis. MRI pending.     # Diabetic foot wound   # Likely osteomyelitis   [ ] f/u MRI, f/u podiatry recs;   [ ] Antibiotics per primary team and podiatry.     # Type 2 Diabetes complicated by Peripheral Artery Disease   Per patient, takes metformin only intermittently and takes tresiba ~once a week even though it's prescribed as a daily medication.   [ ] Lantus 10 units qHS + Sliding scale insulin for now   [ ] f/u total insulin requirement tomorrow, titrate basal-bolus regimen based on daily insulin requirement     # Peripheral artery disease s/p stent   # Post -operative state  Antiplatelet per primary team - on DAPT   OOTB to chair   Encourage incentive spirometry   Recommend miralax qd for bowel regimen    # HLD - takes rosuvastatin 10mg qd per surescripts; atorva 40mg qd while inpatient   # HTN -   Prescribed amlodipine/benazepril 10/40 at home (Recently increased from amlodipine/benazepril - 5/40). However, does not take daily, 'i take it every other day'.   continue amlodipine 10mg qd   [ ] therapeutic interchange for benazepril --> 40mg lisinopril; However, given only intermittent compliance outpatient, recommend starting with lisinopril 10mg qd, can give additional 10mg of lisinopril if BP remains elevated 6 hours after first dose of lisinopril. Can eventually increase to full dose of 40mg qd lisinopril if BP requires.     DVT ppx  - heparin subq   62 year old man with HTN, HLD, Type 2 DM complicated by PAD, s/p multiple left toe amputations 2 years ago, admitted for LLE angiogram. S/p angiogram on Feb 6th which showed  75% stenosis of distal SFA/proximal popliteal artery. SFA lesion ballooned, then stented.   X ray of left foot also suspicious for osteomyelitis. MRI pending.     # Diabetic foot wound   # Likely osteomyelitis   [ ] f/u MRI, f/u podiatry recs;   [ ] Antibiotics per primary team and podiatry.     # Type 2 Diabetes complicated by Peripheral Artery Disease   Per patient, takes metformin only intermittently and takes tresiba 50 units qHS ~once a week even though it's prescribed as a daily medication. Takes 10units lispro TID with meals daily.   Outpatient regimen is 80 units of insulin total qd; however, given limited compliance with his home regimen, recommend erring on the side of giving lower dose for now.   [ ] Lantus 15 units qHS + lispro 6 units with meals, + moderate dose Sliding scale insulin for now   [ ] f/u total insulin requirement tomorrow, titrate basal-bolus regimen based on daily insulin requirement     # Peripheral artery disease s/p stent   # Post -operative state  Antiplatelet per primary team - on DAPT   OOTB to chair   Encourage incentive spirometry   Recommend miralax qd for bowel regimen    # HLD - takes rosuvastatin 10mg qd per surescripts; atorva 40mg qd while inpatient   # HTN -   Prescribed amlodipine/benazepril 10/40 at home (Recently increased from amlodipine/benazepril - 5/40). However, does not take daily, 'i take it every other day'.   continue amlodipine 10mg qd   [ ] therapeutic interchange for benazepril --> 40mg lisinopril; However, given only intermittent compliance outpatient, recommend starting with lisinopril 10mg qd, can give additional 10mg of lisinopril if BP remains elevated 6 hours after first dose of lisinopril. Can eventually increase to full dose of 40mg qd lisinopril if BP requires.     DVT ppx  - heparin subq

## 2023-02-07 NOTE — CONSULT NOTE ADULT - SUBJECTIVE AND OBJECTIVE BOX
Admission H&P:  HPI:  62M with PMH of HTN, HLD, DM, PAD s/p multiple L toe amputations 2 years prior (c/b medial deviation of 1st/2nd toes resulting in ulceration of medial metatarsal head) presents for LLE Angiogram. He was recently admitted to an OSH where he received 8 week of IV Abx via a PICC line. He was seen in the office and noted drainage form his L foot. Denies fevers/chills, CP/SOB. ROS otherwise negative. (06 Feb 2023 12:06)    Underwent angiogram on Feb 6th which showed  75% stenosis of distal SFA/proximal popliteal artery. SFA lesion ballooned, then stented.   Admitted to telemetry for further management   At time of evaluation by internal medicine service today, was without nausea, without dysuria. Last BM was on 2/3/2023    PAST MEDICAL & SURGICAL HISTORY:    Home Medications:  amLODIPine 10 mg oral tablet: 1 tab(s) orally once a day (07 Feb 2023 13:18)  Aspirin Enteric Coated 81 mg oral delayed release tablet: 1 tab(s) orally once a day (07 Feb 2023 13:18)  docusate sodium 100 mg oral tablet: 1 tab(s) orally 3 times a day (07 Feb 2023 13:18)  HumaLOG KwikPen 100 units/mL injectable solution:  (07 Feb 2023 13:18)  rosuvastatin 10 mg oral tablet: 1 tab(s) orally once a day (07 Feb 2023 13:18)  Tresiba 100 units/mL subcutaneous solution: 50 unit(s) subcutaneous once a day (07 Feb 2023 13:18) ----- Does not take qd at home, per patient, only takes ~once a week.     Allergies  Allergy Status Unknown    Intolerances    REVIEW OF SYSTEMS:  RESPIRATORY: No cough, No dyspnea  CARDIOVASCULAR: No chest pain,  GASTROINTESTINAL: no diarrhea,   GENITOURINARY: No dysuria,   NEUROLOGICAL: No numbness, or tremors  ALLERGY AND IMMUNOLOGIC: No hives or eczema    Diet, Regular:   Consistent Carbohydrate Evening Snack (CSTCHOSN) (02-06-23 @ 18:07) [Active]      CURRENT MEDICATIONS:   amLODIPine   Tablet 10 milliGRAM(s) Oral daily  aspirin  chewable 81 milliGRAM(s) Oral daily  atorvastatin 40 milliGRAM(s) Oral at bedtime  chlorhexidine 4% Liquid 1 Application(s) Topical once  clopidogrel Tablet 75 milliGRAM(s) Oral daily  dextrose 5%. 1000 milliLiter(s) IV Continuous <Continuous>  dextrose 5%. 1000 milliLiter(s) IV Continuous <Continuous>  dextrose 5%. 1000 milliLiter(s) IV Continuous <Continuous>  dextrose 5%. 1000 milliLiter(s) IV Continuous <Continuous>  dextrose 50% Injectable 25 Gram(s) IV Push Once  dextrose 50% Injectable 12.5 Gram(s) IV Push Once  dextrose 50% Injectable 25 Gram(s) IV Push Once  dextrose 50% Injectable 25 Gram(s) IV Push Once  dextrose 50% Injectable 12.5 Gram(s) IV Push Once  dextrose 50% Injectable 25 Gram(s) IV Push Once  dextrose Oral Gel 15 Gram(s) Oral Once PRN  dextrose Oral Gel 15 Gram(s) Oral Once PRN  glucagon  Injectable 1 milliGRAM(s) IntraMuscular Once  glucagon  Injectable 1 milliGRAM(s) IntraMuscular Once  heparin   Injectable 5000 Unit(s) SubCutaneous every 8 hours  insulin glargine Injectable (LANTUS) 10 Unit(s) SubCutaneous at bedtime  insulin lispro (ADMELOG) corrective regimen sliding scale   SubCutaneous three times a day before meals  insulin lispro (ADMELOG) corrective regimen sliding scale   SubCutaneous at bedtime  piperacillin/tazobactam IVPB.. 3.375 Gram(s) IV Intermittent every 8 hours  polyethylene glycol 3350 17 Gram(s) Oral daily    VITAL SIGNS, INS/OUTS (last 24 hours):  Vital Signs Last 24 Hrs  T(C): 36.6 (07 Feb 2023 13:46), Max: 37.3 (06 Feb 2023 18:30)  T(F): 97.9 (07 Feb 2023 13:46), Max: 99.2 (06 Feb 2023 22:26)  HR: 76 (07 Feb 2023 13:55) (68 - 98)  BP: 175/81 (07 Feb 2023 13:55) (144/62 - 186/96)  BP(mean): 116 (07 Feb 2023 13:55) (95 - 120)  RR: 19 (07 Feb 2023 12:43) (17 - 20)  SpO2: 98% (07 Feb 2023 12:43) (95% - 99%)    Parameters below as of 07 Feb 2023 12:43  Patient On (Oxygen Delivery Method): room air      I&O's Summary    06 Feb 2023 07:01  -  07 Feb 2023 07:00  --------------------------------------------------------  IN: 440 mL / OUT: 400 mL / NET: 40 mL    07 Feb 2023 07:01  -  07 Feb 2023 15:09  --------------------------------------------------------  IN: 180 mL / OUT: 550 mL / NET: -370 mL      PHYSICAL EXAM:  Gen: Reclining in bed at time of exam, appears stated age  HEENT: NCAT, MMM, clear OP  Neck: supple, trachea at midline  CV: RRR, +S1/S2  Pulm: adequate respiratory effort, no increase in work of breathing  Abd: soft, ND  Skin: warm and dry,  Ext: Left foot dressed in clean, dry, dressing, wrapped in ACE bandage   right foot WWP  Neuro: AOx3, speaking in full sentences   Psych: affect and behavior appropriate, pleasant at time of interview    BASIC LABS:                        8.5    9.62  )-----------( 573      ( 07 Feb 2023 11:27 )             27.5     02-07    133<L>  |  101  |  19  ----------------------------<  290<H>  4.5   |  24  |  1.25    Ca    8.6      07 Feb 2023 11:27  Phos  3.2     02-07  Mg     2.0     02-07      PT/INR - ( 06 Feb 2023 09:59 )   PT: 15.2 sec;   INR: 1.27              CAPILLARY BLOOD GLUCOSE      POCT Blood Glucose.: 290 mg/dL (07 Feb 2023 11:52)  POCT Blood Glucose.: 270 mg/dL (07 Feb 2023 06:45)  POCT Blood Glucose.: 190 mg/dL (06 Feb 2023 22:04)  POCT Blood Glucose.: 107 mg/dL (06 Feb 2023 16:25)      OTHER LABS:        MICRODATA:      IMAGING:    EKG:    #Diet - Diet, Regular:   Consistent Carbohydrate Evening Snack (CSTCHOSN) (02-06-23 @ 18:07) [Active]        #DVT PPx -

## 2023-02-07 NOTE — PROGRESS NOTE ADULT - SUBJECTIVE AND OBJECTIVE BOX
Subjective: NILAY ON. Pt seen and examined at bedside. Pt not amenable to TMA, only amenable to partial 1st ray amp. Risks and benefits were discussed, however pt threatened to leave if we tried to discuss TMA.     ROS:   Denies Headache, blurred vision, Chest Pain, SOB, Abdominal pain, nausea or vomiting     Social   piperacillin/tazobactam IVPB.. 3.375  amLODIPine   Tablet 10  aspirin  chewable 81  clopidogrel Tablet 75  heparin   Injectable 5000  piperacillin/tazobactam IVPB.. 3.375      Allergies    Allergy Status Unknown    Intolerances        Vital Signs Last 24 Hrs  T(C): 36.2 (07 Feb 2023 10:13), Max: 37.3 (06 Feb 2023 18:30)  T(F): 97.2 (07 Feb 2023 10:13), Max: 99.2 (06 Feb 2023 22:26)  HR: 83 (07 Feb 2023 12:43) (68 - 98)  BP: 169/73 (07 Feb 2023 12:43) (144/62 - 174/83)  BP(mean): 119 (07 Feb 2023 12:43) (95 - 119)  RR: 19 (07 Feb 2023 12:43) (17 - 20)  SpO2: 98% (07 Feb 2023 12:43) (95% - 99%)    Parameters below as of 07 Feb 2023 12:43  Patient On (Oxygen Delivery Method): room air      I&O's Summary    06 Feb 2023 07:01  -  07 Feb 2023 07:00  --------------------------------------------------------  IN: 440 mL / OUT: 400 mL / NET: 40 mL    07 Feb 2023 07:01  -  07 Feb 2023 13:42  --------------------------------------------------------  IN: 180 mL / OUT: 550 mL / NET: -370 mL        Physical Exam:  General: NAD, AA0x3  Left Lower Extremity Focused:  Vasc: DP/PT 2/4, No edema or erythema, temp gradient warm to warm   Derm: 2.7 cm x 2 cm wound with exposed metatarsal head and fibrotic slough with 30% granular 70% fibrotic base, serous drainage with macerated borders, (+)PTB, undermining circumferentially tracks medially and proximally 2 cm   Neuro: Protective sensation diminished  MSK: s/p 2nd digit and 5th partial ray amp, Laterally deviated 1st, 3rd, and 4th digit      LABS:                        8.5    9.62  )-----------( 573      ( 07 Feb 2023 11:27 )             27.5     02-07    133<L>  |  101  |  19  ----------------------------<  290<H>  4.5   |  24  |  1.25    Ca    8.6      07 Feb 2023 11:27  Phos  3.2     02-07  Mg     2.0     02-07      PT/INR - ( 06 Feb 2023 09:59 )   PT: 15.2 sec;   INR: 1.27              Radiology:  < from: Xray Foot AP + Lateral, Left (02.06.23 @ 13:18) >  IMPRESSION: Frontal and lateral views of the left foot demonstrates   dislocation of the first MTP joint. Deformity of the second metatarsal   shaft. Deformity of the third, fourth and fifth metatarsal shafts   extending to the MTP joints. Pes planus. Hindfoot valgus. Arterial   vascular calcification. Fragmentation, sclerosis calcification adjacent   to the base of the fifth possible due to underlying chronic osteomyelitis.    MRI is more sensitive examination for detection of osteomyelitis.    < end of copied text >      Microbiology:

## 2023-02-07 NOTE — PROGRESS NOTE ADULT - ASSESSMENT
62M with PMH of HTN, HLD, DM, PAD s/p multiple L toe amputations 2 years prior (c/b medial deviation of 1st/2nd toes resulting in ulceration of medial metatarsal head) presents for LLE Angiogram. Podiatry consulted to evaluate Left foot wound. Xrays demonstrate dislocation of 1st MTPJ and deformity of the 2nd met shaft. Also notes deformity of the 3rd, 4th and 5th met shafts extending to the MTPJ's. Sclerosis of the base of the 5th may be d/t chronic OM.     Osteomyelitis of Left 1st met head     Plan:   - Recommend MRI of Left foot to determine extent of OM  - Will plan for amputation of Left foot (possible TMA, pending MRI results)  - C/w antibiotics per primary team  - Pt can heel WBAT to LLE  - Rest of care per primary team    Plan discussed w/ Attending & Podiatry following

## 2023-02-08 ENCOUNTER — RESULT REVIEW (OUTPATIENT)
Age: 63
End: 2023-02-08

## 2023-02-08 ENCOUNTER — TRANSCRIPTION ENCOUNTER (OUTPATIENT)
Age: 63
End: 2023-02-08

## 2023-02-08 LAB
ANION GAP SERPL CALC-SCNC: 10 MMOL/L — SIGNIFICANT CHANGE UP (ref 5–17)
APPEARANCE UR: CLEAR — SIGNIFICANT CHANGE UP
APTT BLD: 29.9 SEC — SIGNIFICANT CHANGE UP (ref 27.5–35.5)
BACTERIA # UR AUTO: PRESENT /HPF
BILIRUB UR-MCNC: NEGATIVE — SIGNIFICANT CHANGE UP
BLD GP AB SCN SERPL QL: NEGATIVE — SIGNIFICANT CHANGE UP
BLD GP AB SCN SERPL QL: NEGATIVE — SIGNIFICANT CHANGE UP
BUN SERPL-MCNC: 17 MG/DL — SIGNIFICANT CHANGE UP (ref 7–23)
CALCIUM SERPL-MCNC: 8.4 MG/DL — SIGNIFICANT CHANGE UP (ref 8.4–10.5)
CHLORIDE SERPL-SCNC: 105 MMOL/L — SIGNIFICANT CHANGE UP (ref 96–108)
CO2 SERPL-SCNC: 23 MMOL/L — SIGNIFICANT CHANGE UP (ref 22–31)
COLOR SPEC: YELLOW — SIGNIFICANT CHANGE UP
CREAT SERPL-MCNC: 1.25 MG/DL — SIGNIFICANT CHANGE UP (ref 0.5–1.3)
DIFF PNL FLD: ABNORMAL
EGFR: 65 ML/MIN/1.73M2 — SIGNIFICANT CHANGE UP
EPI CELLS # UR: SIGNIFICANT CHANGE UP /HPF (ref 0–5)
GLUCOSE BLDC GLUCOMTR-MCNC: 131 MG/DL — HIGH (ref 70–99)
GLUCOSE BLDC GLUCOMTR-MCNC: 132 MG/DL — HIGH (ref 70–99)
GLUCOSE BLDC GLUCOMTR-MCNC: 133 MG/DL — HIGH (ref 70–99)
GLUCOSE BLDC GLUCOMTR-MCNC: 157 MG/DL — HIGH (ref 70–99)
GLUCOSE BLDC GLUCOMTR-MCNC: 175 MG/DL — HIGH (ref 70–99)
GLUCOSE BLDC GLUCOMTR-MCNC: 290 MG/DL — HIGH (ref 70–99)
GLUCOSE SERPL-MCNC: 245 MG/DL — HIGH (ref 70–99)
GLUCOSE UR QL: 100
GRAM STN FLD: SIGNIFICANT CHANGE UP
GRAM STN FLD: SIGNIFICANT CHANGE UP
HCT VFR BLD CALC: 27.3 % — LOW (ref 39–50)
HGB BLD-MCNC: 8.6 G/DL — LOW (ref 13–17)
KETONES UR-MCNC: NEGATIVE — SIGNIFICANT CHANGE UP
LEUKOCYTE ESTERASE UR-ACNC: NEGATIVE — SIGNIFICANT CHANGE UP
MAGNESIUM SERPL-MCNC: 2 MG/DL — SIGNIFICANT CHANGE UP (ref 1.6–2.6)
MCHC RBC-ENTMCNC: 26.4 PG — LOW (ref 27–34)
MCHC RBC-ENTMCNC: 31.5 GM/DL — LOW (ref 32–36)
MCV RBC AUTO: 83.7 FL — SIGNIFICANT CHANGE UP (ref 80–100)
NITRITE UR-MCNC: NEGATIVE — SIGNIFICANT CHANGE UP
NRBC # BLD: 0 /100 WBCS — SIGNIFICANT CHANGE UP (ref 0–0)
PH UR: 6.5 — SIGNIFICANT CHANGE UP (ref 5–8)
PHOSPHATE SERPL-MCNC: 3.8 MG/DL — SIGNIFICANT CHANGE UP (ref 2.5–4.5)
PLATELET # BLD AUTO: 539 K/UL — HIGH (ref 150–400)
POTASSIUM SERPL-MCNC: 4.4 MMOL/L — SIGNIFICANT CHANGE UP (ref 3.5–5.3)
POTASSIUM SERPL-SCNC: 4.4 MMOL/L — SIGNIFICANT CHANGE UP (ref 3.5–5.3)
PROT UR-MCNC: 100 MG/DL
RBC # BLD: 3.26 M/UL — LOW (ref 4.2–5.8)
RBC # FLD: 17.2 % — HIGH (ref 10.3–14.5)
RBC CASTS # UR COMP ASSIST: < 5 /HPF — SIGNIFICANT CHANGE UP
RH IG SCN BLD-IMP: POSITIVE — SIGNIFICANT CHANGE UP
RH IG SCN BLD-IMP: POSITIVE — SIGNIFICANT CHANGE UP
SODIUM SERPL-SCNC: 138 MMOL/L — SIGNIFICANT CHANGE UP (ref 135–145)
SP GR SPEC: 1.02 — SIGNIFICANT CHANGE UP (ref 1–1.03)
SPECIMEN SOURCE: SIGNIFICANT CHANGE UP
SPECIMEN SOURCE: SIGNIFICANT CHANGE UP
UROBILINOGEN FLD QL: 1 E.U./DL — SIGNIFICANT CHANGE UP
WBC # BLD: 7.9 K/UL — SIGNIFICANT CHANGE UP (ref 3.8–10.5)
WBC # FLD AUTO: 7.9 K/UL — SIGNIFICANT CHANGE UP (ref 3.8–10.5)
WBC UR QL: < 5 /HPF — SIGNIFICANT CHANGE UP

## 2023-02-08 PROCEDURE — 88311 DECALCIFY TISSUE: CPT | Mod: 26

## 2023-02-08 PROCEDURE — 88307 TISSUE EXAM BY PATHOLOGIST: CPT | Mod: 26

## 2023-02-08 PROCEDURE — 88304 TISSUE EXAM BY PATHOLOGIST: CPT | Mod: 26

## 2023-02-08 RX ORDER — INSULIN LISPRO 100/ML
8 VIAL (ML) SUBCUTANEOUS
Refills: 0 | Status: DISCONTINUED | OUTPATIENT
Start: 2023-02-08 | End: 2023-02-14

## 2023-02-08 RX ORDER — HYDRALAZINE HCL 50 MG
5 TABLET ORAL ONCE
Refills: 0 | Status: COMPLETED | OUTPATIENT
Start: 2023-02-08 | End: 2023-02-08

## 2023-02-08 RX ORDER — HEPARIN SODIUM 5000 [USP'U]/ML
5000 INJECTION INTRAVENOUS; SUBCUTANEOUS EVERY 8 HOURS
Refills: 0 | Status: DISCONTINUED | OUTPATIENT
Start: 2023-02-09 | End: 2023-02-09

## 2023-02-08 RX ORDER — LISINOPRIL 2.5 MG/1
20 TABLET ORAL ONCE
Refills: 0 | Status: COMPLETED | OUTPATIENT
Start: 2023-02-08 | End: 2023-02-08

## 2023-02-08 RX ORDER — SODIUM CHLORIDE 9 MG/ML
1000 INJECTION INTRAMUSCULAR; INTRAVENOUS; SUBCUTANEOUS
Refills: 0 | Status: DISCONTINUED | OUTPATIENT
Start: 2023-02-08 | End: 2023-02-08

## 2023-02-08 RX ORDER — HYDROMORPHONE HYDROCHLORIDE 2 MG/ML
1 INJECTION INTRAMUSCULAR; INTRAVENOUS; SUBCUTANEOUS ONCE
Refills: 0 | Status: DISCONTINUED | OUTPATIENT
Start: 2023-02-08 | End: 2023-02-09

## 2023-02-08 RX ORDER — LISINOPRIL 2.5 MG/1
40 TABLET ORAL DAILY
Refills: 0 | Status: DISCONTINUED | OUTPATIENT
Start: 2023-02-09 | End: 2023-02-14

## 2023-02-08 RX ORDER — INSULIN GLARGINE 100 [IU]/ML
24 INJECTION, SOLUTION SUBCUTANEOUS AT BEDTIME
Refills: 0 | Status: DISCONTINUED | OUTPATIENT
Start: 2023-02-08 | End: 2023-02-14

## 2023-02-08 RX ADMIN — INSULIN GLARGINE 24 UNIT(S): 100 INJECTION, SOLUTION SUBCUTANEOUS at 22:06

## 2023-02-08 RX ADMIN — PIPERACILLIN AND TAZOBACTAM 25 GRAM(S): 4; .5 INJECTION, POWDER, LYOPHILIZED, FOR SOLUTION INTRAVENOUS at 05:06

## 2023-02-08 RX ADMIN — Medication 5 MILLIGRAM(S): at 16:06

## 2023-02-08 RX ADMIN — ATORVASTATIN CALCIUM 40 MILLIGRAM(S): 80 TABLET, FILM COATED ORAL at 22:07

## 2023-02-08 RX ADMIN — SODIUM CHLORIDE 75 MILLILITER(S): 9 INJECTION INTRAMUSCULAR; INTRAVENOUS; SUBCUTANEOUS at 06:19

## 2023-02-08 RX ADMIN — CHLORHEXIDINE GLUCONATE 1 APPLICATION(S): 213 SOLUTION TOPICAL at 13:31

## 2023-02-08 RX ADMIN — LISINOPRIL 20 MILLIGRAM(S): 2.5 TABLET ORAL at 05:11

## 2023-02-08 RX ADMIN — Medication 6: at 06:54

## 2023-02-08 RX ADMIN — PIPERACILLIN AND TAZOBACTAM 25 GRAM(S): 4; .5 INJECTION, POWDER, LYOPHILIZED, FOR SOLUTION INTRAVENOUS at 13:42

## 2023-02-08 RX ADMIN — PIPERACILLIN AND TAZOBACTAM 25 GRAM(S): 4; .5 INJECTION, POWDER, LYOPHILIZED, FOR SOLUTION INTRAVENOUS at 20:48

## 2023-02-08 RX ADMIN — AMLODIPINE BESYLATE 10 MILLIGRAM(S): 2.5 TABLET ORAL at 05:11

## 2023-02-08 RX ADMIN — Medication 2: at 13:29

## 2023-02-08 RX ADMIN — LISINOPRIL 20 MILLIGRAM(S): 2.5 TABLET ORAL at 21:26

## 2023-02-08 NOTE — DIETITIAN INITIAL EVALUATION ADULT - PERSON TAUGHT/METHOD
DM diet education provided. Handouts given both with written materials + Photo of plate model./verbal instruction/written material/teach back - (Patient repeats in own words)/patient instructed

## 2023-02-08 NOTE — BRIEF OPERATIVE NOTE - NSICDXBRIEFPOSTOP_GEN_ALL_CORE_FT
POST-OP DIAGNOSIS:  Osteomyelitis of left foot 08-Feb-2023 19:48:22  Macie Lucas  
POST-OP DIAGNOSIS:  PAD (peripheral artery disease) 06-Feb-2023 11:58:02  Edgar Hammer

## 2023-02-08 NOTE — DIETITIAN INITIAL EVALUATION ADULT - OTHER CALCULATIONS
5'7''  pounds +-10%  Wt 160 pounds BMI 25 %[id number]  current body wt used for energy calculations as pt falls within % IBW  adjust for age, Pending OR; fluids per team d/t prior low sodium

## 2023-02-08 NOTE — PRE-ANESTHESIA EVALUATION ADULT - NSANTHOSAYNRD_GEN_A_CORE
No. KEELEY screening performed.  STOP BANG Legend: 0-2 = LOW Risk; 3-4 = INTERMEDIATE Risk; 5-8 = HIGH Risk
No. KEELEY screening performed.  STOP BANG Legend: 0-2 = LOW Risk; 3-4 = INTERMEDIATE Risk; 5-8 = HIGH Risk

## 2023-02-08 NOTE — DIETITIAN INITIAL EVALUATION ADULT - NS FNS DIET ORDER
Diet, NPO after Midnight:      NPO Start Date: 07-Feb-2023,   NPO Start Time: 23:59 (02-07-23 @ 19:37)

## 2023-02-08 NOTE — PRE-ANESTHESIA EVALUATION ADULT - NSANTHAIRWAYFT_ENT_ALL_CORE
Infectious disease Consult Note      Patient: Sue Kehr  : 1949  Acct#:  [id number]     Date:  2019    Assessment:   Leukocytosis likely steroids related. Aspiration pneumonia treated  . Shock resolved . Cholecystitis status post cholecystectomy tube  grew Candida non-albicans and one colony of ESBL Klebsiella on culture . SBO S/p right colectomy with ileocolic anastomosis,open cholecystectomy and excision of small bowel diverticulum 5/15 . Single positive blood culture on 5/10 STAPHYLOCOCCUS SPECIES, COAGULASE NEGATIVE likely contamination     Ecoli Emphysematous cystitis initially treatedtreated     Aspiration pneumonia of right lower lobe initially     Yeast growth on sputum culture suspect contamination     MRSA carrier    Urinary retention     Anemia GI bleed followed by GI     PCN allergy      History of CVA with left hemiparesis              Recommendations:     D/C IV meropenem  . Follow blood cultures from  negative to date . Follow CBC and renal function  . Continue supportive care . Subjective:       History of Present Illness  Patient is a 78 y.o.  female admitted with Sepsis due to urinary tract infection   who is seen in consult for the same . She presented w dysuria and lower abd pain for around a week PRIOR TO ADMISSION associated with vomiting ,was found hypotensive with leukocytosis . CT suggested emphysematous cystitis,possible gastric outlet obstruction. CXR showed a right lower infiltrate. High CA-19-9,CEA  Allergy to [facility] w SOB   Urine culture  grew ESCHERICHIA COLI resistant to Ampicillin ,sensitive to all other tested ABXS . Blood cultures negative . Mycoplasma IGM 0.97   YEAST MODERATE GROWTH on sputum culture   S/P EGD   with reported esophagitis. GB US Findings suggesting acute cholecystitis. HIIDA showed absent gallbladder filling.    She was extubated on   Status post cholecystectomy tube  by interventional radiology,  cultures on 4/22 grew Candida non-albicans and one colony of ESBL Klebsiella. PICC line was placed   Tagged RBC scan  was negative . CT abdomen 5/5 showed no fluid collection ,Bowel obstruction which is suspected to be caused by an internal hernia. NG tube was placed under fluoroscopy 5/9. She had a small bowel follow-through 5/9, developed respiratory failure with hypoxia, tachycardia and tachypnea was transferred to ICU placed on BiPAP, blood pressure on the low side required Levophed,WBC up to 28.5    code status was changed to [facility] on 5/13. S/p right colectomy with ileocolic anastomosis,open cholecystectomy and excision of small bowel diverticulum 5/15 . Interval history :  She was extubated 5/20. She remains on TPN, she was started on clear liquid diet ,not eating much ,abdominal pain persists better, no reported fever . Past Medical History:   Diagnosis Date    Abnormal computed tomography of cervical spine     sclerotic bone appearance     CVA (cerebral vascular accident) ([address]     left  side weakness    GERD (gastroesophageal reflux disease)     Hypertension     Paraproteinemia     Weight loss       Past Surgical History:   Procedure Laterality Date    CHOLECYSTECTOMY N/A 5/15/2019    CHOLECYSTECTOMY performed by Feli Moise DO at [facility]  4/14/2019         LAPAROSCOPY N/A 5/15/2019    LAPAROSCOPY EXPLORATORY CONVERTED TO EXPLORATORY LAPAROTOMY/ RIGHT COLON RESECTION AND ANASTAMOSIS/ OPEN CHOLECYSTECTOMY/ EXTENSIVE LYSIS OF ADHESIONS performed by Feli Moise DO at [facility] 10  07/2011    Pacemaker is Medtronic Revo (compatible). Leads placed in 1995 are NOT MRI compatible. Placed at [address]. V's per Dr. Felice Aviles can not have an MRI.     UPPER GASTROINTESTINAL ENDOSCOPY N/A 4/16/2019    EGD ESOPHAGOGASTRODUODENOSCOPY @ BEDSIDE  ICU 2002 performed by Jason Meng MD at [facility] OR          Admission Meds  No current facility-administered medications on file prior to encounter. Current Outpatient Medications on File Prior to Encounter   Medication Sig Dispense Refill    oxyCODONE-acetaminophen (PERCOCET) 5-325 MG per tablet Take 1 tablet by mouth every 6 hours as needed for Pain.  senna-docusate (PERICOLACE) 8.6-50 MG per tablet Take 1 tablet by mouth 2 times daily      budesonide-formoterol (SYMBICORT) 160-4.5 MCG/ACT AERO Inhale 2 puffs into the lungs 2 times daily      sucralfate (CARAFATE) 1 GM/10ML suspension Take 1 g by mouth 4 times daily       traZODone (DESYREL) 50 MG tablet Take 50 mg by mouth nightly      tiZANidine (ZANAFLEX) 2 MG tablet Take 2 mg by mouth every 8 hours as needed (left knee)       aspirin 81 MG tablet Take 81 mg by mouth daily      clopidogrel (PLAVIX) 75 MG tablet TAKE 1 TABLET DAILY 30 tablet 2    DOCQLACE 100 MG capsule TAKE 1 CAPSULE BY MOUTH IN THE MORNING & IN THE EVENING -DRINK PLENTYOF WATER WHILE TAKING THIS MEDICINE 30 capsule 1    amLODIPine (NORVASC) 10 MG tablet Take 10 mg by mouth daily.  LORazepam (ATIVAN) 0.5 MG tablet Take 0.5 mg by mouth every 6 hours as needed for Anxiety.  therapeutic multivitamin-minerals (THERAGRAN-M) tablet Take 1 tablet by mouth daily.  omeprazole (PRILOSEC) 20 MG capsule Take 20 mg by mouth daily.  venlafaxine (EFFEXOR) 37.5 MG tablet Take 37.5 mg by mouth 3 times daily.  Misc. Devices [facility] [address] [facility] wheelchair with left fupper extremity support  Dx: stroke with left hemiparesis.  1 each 0           Allergies  Allergies   Allergen Reactions    Penicillins Shortness Of Breath and Rash    Amoxicillin         Social   Social History     Tobacco Use    Smoking status: Current Some Day Smoker     Packs/day: 1.00     Years: 30.00     Pack years: 30.00    Smokeless tobacco: Never Used   Substance Use Topics    Alcohol use: Not Currently     Alcohol/week: 16.8 oz     Types: 28 Glasses of wine per week History reviewed. No pertinent family history. Review of Systems  Other than above 10 systems reviewed negative    Tolerating antibiotics. Physical Exam  BP (!) 95/39   Pulse 101   Temp 97.8 °F (36.6 °C) (Axillary)   Resp 25   Ht 5' (1.524 m)   Wt 102 lb 4.7 oz (46.4 kg)   SpO2 98%   BMI 19.98 kg/m²           General Appearance: Awake, oriented ,not under distress  . Skin: warm and dry, no rash or erythema  Head: normocephalic and atraumatic  Eyes: pupils equal, round  Neck: neck supple and non tender   Pulmonary/Chest: coarse to auscultation bilaterally- with  rhonchi  Cardiovascular: normal rate, regular rhythm, normal S1 and S2, no murmurs. Abdomen: soft,surgical INCISION INTACT ,NO ERYTHEMA  ,MORGAN drain serous   Extremities: no cyanosis, clubbing   Edema   PICC line in place       Data Review:    Recent Labs     05/21/19 0417 05/22/19 0451 05/22/19 1958 05/23/19 0430   WBC 20.7*  --  18.7*  --  18.9*   HGB 10.9*   < > 11.0* 12.2 11.3*   HCT 33.0*   < > 33.4* 37.5 34.5*   MCV 86.9  --  87.4  --  88.0     --  246  --  235    < > = values in this interval not displayed. Recent Labs     05/21/19 0417 05/22/19 0451 05/23/19 0430    135 133*   K 4.4 4.6 4.7   CL 97* 95* 98   CO2 32* 31 27   PHOS 3.0 3.7  --    BUN 27* 33* 38*   CREATININE <0.40* <0.40* <0.40*     No results for input(s): AST, ALT, ALB, BILIDIR, BILITOT, ALKPHOS in the last 72 hours. No results for input(s): LIPASE, AMYLASE in the last 72 hours.   Recent Labs     05/21/19 0417 05/22/19 0451   PROTIME 16.1* 15.0*   INR 1.3 1.2       Imaging Studies:                           All appropriate imaging studies and reports reviewed: Yes       Ct Abdomen Pelvis Wo Contrast Additional Contrast? Oral    Result Date: 4/14/2019  EXAMINATION: CT OF THE ABDOMEN AND PELVIS WITHOUT CONTRAST 4/14/2019 7:34 pm TECHNIQUE: CT of the abdomen and pelvis was performed without the administration of intravenous contrast. Multiplanar reformatted images are provided for review. Dose modulation, iterative reconstruction, and/or weight based adjustment of the mA/kV was utilized to reduce the radiation dose to as low as reasonably achievable. COMPARISON: 04/11/2019 HISTORY: ORDERING SYSTEM PROVIDED HISTORY: ABDOMINAL PAIN TECHNOLOGIST PROVIDED HISTORY: Water soluble contrast only please Ordering Physician Provided Reason for Exam: Abdominal pain - Vented patient. Contrast given via nurse through NG tube. Acuity: Unknown Type of Exam: Unknown Relevant Medical/Surgical History: Hx - Sepsis due to urinary tract infection. FINDINGS: Lower Chest: New moderate layering bilateral pleural effusions with bilateral lower lobe atelectasis. Organs: Limited evaluation due lack of intravenous contrast.  Cholelithiasis redemonstrated. No gallbladder wall thickening or biliary ductal dilatation. Scattered tiny hypodense lesions in the liver are too small to characterize but statistically represent benign cysts or hemangiomas and appear unchanged. The pancreas, spleen, adrenal glands, and kidneys are unremarkable. There is no hydronephrosis or urinary tract calculus. GI/Bowel: The stomach is distended. Enteric tube is in place. No contrast is seen distal to the pylorus and there is contrast reflux into the distal esophagus. There is no evidence of bowel obstruction. The appendix is not definitely visualized. No focal pericecal inflammatory changes are evident. Pelvis: The urinary bladder is decompressed by Tran catheter. No pelvic mass is seen. Peritoneum/Retroperitoneum: Small amount of free fluid in the pelvic cavity. No free air or focal fluid collection. No abnormal lymph node. Normal abdominal aortic caliber. Moderate calcific atherosclerosis. Bones/Soft Tissues: No acute osseous abnormality. Diffuse anasarca. Moderate degenerative changes in the lumbar spine.      1. Distended, contrast filled stomach with reflux of contrast projects within the acetabulum. No focal soft tissue abnormality is seen. Left knee, two views: The knee is flexed. No acute osseous abnormality. No joint effusion. Joint spaces are not optimally profiled but no significant arthritic changes are apparent. Left tib-fib, three views: There is evidence of a remote healed distal tibia fracture. No acute fracture or dislocation is seen. No focal soft tissue abnormality. No acute osseous abnormality of the left femur. No acute osseous abnormality of the left knee. No acute osseous abnormality of the left tib-fib. Healed remote distal tibia fracture. Marked osteopenia, likely due to disuse. Xr Knee Left (1-2 Views)    Result Date: 3/27/2019  EXAMINATION: 4 XRAY VIEWS OF THE LEFT FEMUR; 2 XRAY VIEWS OF THE LEFT KNEE; 3 XRAY VIEWS OF THE LEFT TIBIA AND FIBULA 3/27/2019 7:00 pm COMPARISON: Left hip 11/14/2015. HISTORY: ORDERING SYSTEM PROVIDED HISTORY: pain TECHNOLOGIST PROVIDED HISTORY: pain Ordering Physician Provided Reason for Exam: pt twisted wrong, lt knee pain, unable to straighten knee. Acuity: Acute Type of Exam: Initial FINDINGS: Left femur, four views: The bones are diffusely osteopenic. No acute fracture deformity. The hip joint is maintained. The femoral head projects within the acetabulum. No focal soft tissue abnormality is seen. Left knee, two views: The knee is flexed. No acute osseous abnormality. No joint effusion. Joint spaces are not optimally profiled but no significant arthritic changes are apparent. Left tib-fib, three views: There is evidence of a remote healed distal tibia fracture. No acute fracture or dislocation is seen. No focal soft tissue abnormality. No acute osseous abnormality of the left femur. No acute osseous abnormality of the left knee. No acute osseous abnormality of the left tib-fib. Healed remote distal tibia fracture. Marked osteopenia, likely due to disuse.      Xr Knee Left (3 Views)    Result Date: 3/30/2019  EXAMINATION: 3 XRAY VIEWS OF THE LEFT KNEE 3/30/2019 10:58 am COMPARISON: March 27, 2018 HISTORY: ORDERING SYSTEM PROVIDED HISTORY: F/u L knee pain after cast TECHNOLOGIST PROVIDED HISTORY: AP, oblique, lateral F/u L knee pain after cast FINDINGS: Marked osteopenia. Interval casting, which degrades fine osseous detail question cortical offset in the lateral femoral metaphysis. No malalignment identified. No significant joint effusion. Interval casting. Question nondisplaced fracture in the lateral femoral metaphysis. Osteopenia. Xr Tibia Fibula Left (2 Views)    Result Date: 3/27/2019  EXAMINATION: 4 XRAY VIEWS OF THE LEFT FEMUR; 2 XRAY VIEWS OF THE LEFT KNEE; 3 XRAY VIEWS OF THE LEFT TIBIA AND FIBULA 3/27/2019 7:00 pm COMPARISON: Left hip 11/14/2015. HISTORY: ORDERING SYSTEM PROVIDED HISTORY: pain TECHNOLOGIST PROVIDED HISTORY: pain Ordering Physician Provided Reason for Exam: pt twisted wrong, lt knee pain, unable to straighten knee. Acuity: Acute Type of Exam: Initial FINDINGS: Left femur, four views: The bones are diffusely osteopenic. No acute fracture deformity. The hip joint is maintained. The femoral head projects within the acetabulum. No focal soft tissue abnormality is seen. Left knee, two views: The knee is flexed. No acute osseous abnormality. No joint effusion. Joint spaces are not optimally profiled but no significant arthritic changes are apparent. Left tib-fib, three views: There is evidence of a remote healed distal tibia fracture. No acute fracture or dislocation is seen. No focal soft tissue abnormality. No acute osseous abnormality of the left femur. No acute osseous abnormality of the left knee. No acute osseous abnormality of the left tib-fib. Healed remote distal tibia fracture. Marked osteopenia, likely due to disuse.      Xr Abdomen (kub) (single Ap View)    Result Date: 4/14/2019  EXAMINATION: SINGLE SUPINE XRAY VIEW(S) OF THE ABDOMEN 4/14/2019 7:39 am COMPARISON: CT abdomen and pelvis  film from 11 April 2019 HISTORY: [address] [facility] [address]: Abd Distention TECHNOLOGIST PROVIDED HISTORY: Abd Distention Ordering Physician Provided Reason for Exam: Abdominal distention. Pt was moving around in the bed. Best films at present time. Acuity: Acute Type of Exam: Initial Additional signs and symptoms: Abdominal distention. Pt was moving around in the bed. Best films at present time. FINDINGS: Portable view time stamped at 748 hours demonstrates an intestinal tube terminating in the midportion of a gaseous Spike dilated stomach. Densities are present over the stomach likely medication. Bipolar pacemaker is in situ with intact leads. Heart size is top-normal, stable. Gaseous distension of the stomach and loop of bowel in the upper mid abdomen is noted but there is gas and fecal material in the rectum. Gastric outlet obstruction or proximal small bowel partial obstruction is suspected. No free air is noted. Midline city is present over the pelvis likely a monitor or CT small bore catheter. Vascular calcification is present in the pelvis. Persistent preferential gaseous distension of the stomach although there is some gas in the upper abdomen and gas and fecal material present in the rectosigmoid. Findings suggest gastric outlet obstruction. Ct Abdomen Pelvis W Iv Contrast    Result Date: 4/11/2019  EXAMINATION: CT OF THE ABDOMEN AND PELVIS WITH CONTRAST 4/11/2019 5:14 pm TECHNIQUE: CT of the abdomen and pelvis was performed with the administration of intravenous contrast. Multiplanar reformatted images are provided for review. Dose modulation, iterative reconstruction, and/or weight based adjustment of the mA/kV was utilized to reduce the radiation dose to as low as reasonably achievable. COMPARISON: None.  HISTORY: ORDERING SYSTEM PROVIDED HISTORY: Abdominal pain TECHNOLOGIST PROVIDED HISTORY: IV Only Contrast Ordering Physician Provided Reason for Exam: patient c/o abd pain for an hour FINDINGS: Lower Chest: Trace pleural fluid bilaterally is noted. Indwelling cardiac pacemaker is present. Heart size is normal.  Coronary artery calcifications are evident. The esophagus is significantly dilated and fluid-filled. No paraesophageal adenopathy is evident. Organs: The spleen, pancreas, and adrenals are unremarkable. The liver contains hypodensities, likely cysts. The gallbladder is distended and contains a solitary gallstone. The kidneys excrete contrast bilaterally. Extrarenal collecting systems are noted. The ureters are mildly dilated down to the urinary bladder without evidence of intraluminal or ureteral obstructing calculi. GI/Bowel: Marked distention of the stomach is noted; this continues to the pylorus with appearance suggesting partial gastric outlet obstruction. Fluid is present in some small bowel loops and colon distal to the stomach. No small bowel obstruction. Pelvis: Marked distention of the urinary bladder is noted. There is air in the urinary bladder wall, likely related to emphysematous cystitis. Distended ureters may be related to reflux or infection. No free pelvic fluid, pelvic or inguinal adenopathy is noted. Peritoneum/Retroperitoneum: No aortic aneurysm. Mild to moderate plaque is noted in the infrarenal abdominal aorta and both proximal iliac arteries. Shotty lymph nodes are present around the aorta in the upper abdomen. No mesenteric adenopathy is noted. Bones/Soft Tissues: Degenerative changes are present in the hips and lower lumbar facets. Estimated biologic radiation dose for this procedure:258.77 mGy/cm2.     1. Dilatation of the thoracic esophagus filled with fluid. No obstructive process is noted. No adjacent enlarged lymph nodes. 2. Trace pleural fluid. 3. Marked distention of the stomach. This appears to extend to the pylorus. Partial gastric outlet obstruction is not excluded.  4. Bilateral dilated ureters without obstructing calculi. Urinary bladder is markedly distended with bladder wall air suggesting emphysematous cystitis. Dilatation of the ureters may be related to reflux or infection. 5. Atherosclerotic disease. 6. Other findings as above. Critical results were called by Dr. Abigail Sanchez MD to [address] on 4/11/2019 at 17:37. Xr Chest Portable    Result Date: 4/16/2019  EXAMINATION: SINGLE XRAY VIEW OF THE CHEST 4/16/2019 6:54 am COMPARISON: 04/15/2019, 610 hours HISTORY: ORDERING SYSTEM PROVIDED HISTORY: ETT placement TECHNOLOGIST PROVIDED HISTORY: ETT placement Ordering Physician Provided Reason for Exam: on vent Acuity: Acute Type of Exam: Initial 76-year-old female on ventilator; check endotracheal tube placement FINDINGS: Portable AP upright view of the chest. Endotracheal tube distal tip overlying the mid trachea approximately 4.1 cm above the level of the ron. Enteric tube traverses the GE junction with distal tip excluded from the field of view. Left subclavian approach cardiac pacemaker device distal lead tips relatively stable in position. Right internal jugular approach central venous catheter distal tip overlying the high right atrium, stable. Cardiac monitor leads overlie the chest. Atherosclerotic calcification of the thoracic aorta. Slight stable volume loss of the left hemithorax. No pneumothorax. No free air. Dense retrocardiac/left basilar airspace consolidation and small left-sided pleural effusion. Stable mild focal opacity at the right mid lung zone. Underlying COPD. Stable mild pulmonary vascular congestion and left-sided predominant parahilar opacity. Visualized osseous structures remain unchanged. 1. Stable multifocal airspace disease as detailed above with dense retrocardiac/left basilar airspace consolidation and small left-sided pleural effusion. Mild pulmonary vascular congestion. Findings may represent edema or multifocal pneumonia. 2. Underlying COPD.  3. Tubes and line as detailed above. Xr Chest Portable    Result Date: 4/15/2019  EXAMINATION: SINGLE XRAY VIEW OF THE CHEST 4/15/2019 6:47 am COMPARISON: 14 April 2019 HISTORY: ORDERING SYSTEM PROVIDED HISTORY: ETT placement TECHNOLOGIST PROVIDED HISTORY: ETT placement Ordering Physician Provided Reason for Exam: on vent Acuity: Acute Type of Exam: Initial FINDINGS: AP portable view of the chest time stamped at 612 hours demonstrates overlying cardiac monitoring electrodes. Endotracheal tube terminates 4 cm above the ron. Bipolar pacemaker enters from the left with intact leads in appropriate positions. Intestinal tube extends beyond the fundus of the stomach, tip not included. Right internal jugular catheter terminates at the cavoatrial junction. Heart size is normal.  Aortic arch is calcified. There is interval improvement in vascular congestion with resolution of perihilar opacities. Some bibasilar opacities remain. No extrapleural air is noted. Osseous structures are stable. Interval improvement in vascular congestion and bilateral opacities consistent with resolving pulmonary edema. Tubes and lines as above. Xr Chest Portable    Result Date: 4/14/2019  EXAMINATION: SINGLE XRAY VIEW OF THE CHEST 4/14/2019 7:57 am COMPARISON: Portable chest 04/13/2019. HISTORY: ORDERING SYSTEM PROVIDED HISTORY: Intubation TECHNOLOGIST PROVIDED HISTORY: Intubation Ordering Physician Provided Reason for Exam: intubation Acuity: Acute Type of Exam: Initial Additional signs and symptoms: intubation FINDINGS: Endotracheal tube terminates over the midthoracic trachea. Dual-chamber pacemaker leads appear unchanged in position. Right IJ approach central venous catheter unchanged in position. Heart size not substantially changed. Perihilar and basilar opacities further increased. Left pleural effusion increased in size.      Findings may reflect pulmonary edema, progressed from yesterday's exam.  Left pleural effusion increased in size. Xr Chest Portable    Result Date: 4/12/2019  EXAMINATION: SINGLE XRAY VIEW OF THE CHEST 4/12/2019 5:26 am COMPARISON: November 14, 2015. HISTORY: ORDERING SYSTEM PROVIDED HISTORY: line placement TECHNOLOGIST PROVIDED HISTORY: line placement Ordering Physician Provided Reason for Exam: New right side line placement. Acuity: Acute Type of Exam: Initial Additional signs and symptoms: New right side line placement. FINDINGS: Stable left pectoral trans venous cardiac pacer device. New right IJ central venous catheter with tip near the superior atrial caval junction. Normal lung volume. No new consolidation. Curvilinear radiopacity projecting over the right upper lobe likely represents artifact from a skin fold. No pleural effusion or pneumothorax. Stable cardiomediastinal silhouette and great vessels with redemonstration of atherosclerotic thoracic aorta. New right IJ central venous catheter with tip near the superior atrial caval junction. No pneumothorax. No new consolidation. Thank you for allowing me to participate in the care of your patient. Please feel free to contact me with any questions or concerns.      Binu Bullard MD hyomental distance >3 cm, mouth opening 3 FB.

## 2023-02-08 NOTE — PROGRESS NOTE ADULT - ASSESSMENT
62M with PMH of HTN, HLD, DM, PAD s/p multiple L toe amputations 2 years prior (c/b medial deviation of 1st/2nd toes resulting in ulceration of medial metatarsal head) presents for LLE Angiogram. Podiatry consulted to evaluate Left foot wound. Xrays demonstrate dislocation of 1st MTPJ and deformity of the 2nd met shaft. Also notes deformity of the 3rd, 4th and 5th met shafts extending to the MTPJ's. Sclerosis of the base of the 5th may be d/t chronic OM. MRI on 2/7 showed OM of L 1st metatarsal/hallux and 5th metatarsal.      Osteomyelitis of Left 1st ray     Plan:   - Plan for Transmetatarsal amputation today. Added on for 2/8/23 after 4:30 pm   - Please make sure pt is medically optimized for sx  - Keep NPO until after procedure.   - C/w antibiotics per primary team  - Pt can heel WBAT to LLE  - Rest of care per primary team    Plan discussed w/ Attending & Podiatry following

## 2023-02-08 NOTE — PROGRESS NOTE ADULT - ASSESSMENT
62M with PMH of HTN, HLD, DM, PAD s/p multiple L toe amputations 2 years prior (c/b medial deviation of 1st/2nd toes resulting in ulceration of medial metatarsal head) presents for LLE Angiogram. Podiatry consulted to evaluate Left foot wound. Xrays demonstrate dislocation of 1st MTPJ and deformity of the 2nd met shaft. Also notes deformity of the 3rd, 4th and 5th met shafts extending to the MTPJ's. Sclerosis of the base of the 5th may be d/t chronic OM. MRI on 2/7 showed OM of L 1st metatarsal/hallux and 5th metatarsal.      Osteomyelitis of Left 1st ray     Plan:   - Plan for Transmetatarsal amputation today, 2/8/22  - Informed consent was obtained. Risks/alternatives were discussed with patient thoroughly and patient expressed verbal understanding.   - Please ensure patient is medically optimized  - Keep NPO until after procedure.   - Please hold anticoagulation until tomorrow  - C/w antibiotics per primary team  - Pt can heel WBAT to LLE  - Rest of care per primary team    Plan discussed w/ Attending & Podiatry following 62M with PMH of HTN, HLD, DM, PAD s/p multiple L toe amputations 2 years prior (c/b medial deviation of 1st/2nd toes resulting in ulceration of medial metatarsal head) presents for LLE Angiogram. Podiatry consulted to evaluate Left foot wound. Xrays demonstrate dislocation of 1st MTPJ and deformity of the 2nd met shaft. Also notes deformity of the 3rd, 4th and 5th met shafts extending to the MTPJ's. Sclerosis of the base of the 5th may be d/t chronic OM. MRI on 2/7 showed OM of L 1st metatarsal/hallux and 5th metatarsal.      Osteomyelitis of Left 1st ray     Plan:   - Plan for Transmetatarsal amputation today, 2/8/22  - Informed consent was obtained. Risks/alternatives were discussed with patient thoroughly and patient expressed verbal understanding.   - Please ensure patient is medically optimized  - Keep NPO until after procedure.   - C/w antibiotics per primary team  - Pt can heel WBAT to LLE  - Rest of care per primary team    Plan discussed w/ Attending & Podiatry following

## 2023-02-08 NOTE — DIETITIAN INITIAL EVALUATION ADULT - OTHER INFO
62M with PMH of HTN, HLD, DM, PAD s/p multiple L toe amputations 2 years prior (c/b medial deviation of 1st/2nd toes resulting in ulceration of medial metatarsal head) presents for LLE Angiogram 2/6. MRI on 2/7 showed OM of L 1st metatarsal/hallux and 5th metatarsal. Now pending possible OR with podiatry for Transmetatarsal amputation.     Pt seen this AM on 5UR. NPO at this time. PTA reports eating 3meals/day vs snacking through out the day. Reports regular diet. Reports some prior DM diet educaiton in the past however reports having CGM x3 weeks which he has never used 2/2 not knowing how. Pt also reports use of insulin PTA however not fully understanding how to use this either. Additionally pt reports being unable to read (pt does have wife, however unsure if she can read), assume affecting use of CGM/meds PTA. Reports High BG PTA; last 3 POCT 290 172 284, , A1c 10.9%. Other Labs of note Na WDL-elevated prior, K Mg Phos WDL. Current orders for LANTUS at bedtime, ADMELOG pre meals and corrective and meals/bedtime. Noted consulted pending for case management for diabetes services at home. Likely to also benefit from CDE consult. Pt thinks he has lost wt in setting of uncontrolled DM; UBW per pt 157 pounds, however overall is consistent with admit wt 160 pounds. No pain. BM+ 2/6. Korey 20, No edema, no pressure ulcers, +L foot wounds.   Please see below for nutritions recommendations-Spoke about pt with team/provided Recs.

## 2023-02-08 NOTE — DIETITIAN INITIAL EVALUATION ADULT - WEIGHT FOR BMI (LBS)
[facility]  [address] [facility] [address]  Phone: [phone]    01/30/19    Evon Varela  [address] [facility] [address]      Dear Adele Barnard will mame one more refill extension for your Zoloft prescription.    However, if you wish to have further refills you will need to schedule an appointment to    be seen.  You can schedule an appointment on [facility] or call [phone] for     assistance.       Sincerely,      Phu Lea APRN CNP             160

## 2023-02-08 NOTE — BRIEF OPERATIVE NOTE - OPERATION/FINDINGS
Pt given pre-op local anesthetic in an ankle block fashion consisting of 19 ccs of 1% lidocaine plain and 0.5% marcain plain in a 1:1 fashion. Made curved fishmouth incision around the midfoot using #15 blade. Removed soft tissue from metatarsal bones. Using sagittal saw, removed distal 1st-4th metatarsal bones. Removed plantar soft tissue from distal metatarsals. Removed forefoot bone and soft tissue and sent to pathology. Copiously irrigated surgical site using pulse lavage and 3 L normal saline. Using clean rongeur, took 2 pieces of 1st metatarsal and sent for culture and pathology. Using clean rongeur, took 2 pieces of 5th metatarsal and sent for culture and pathology. Placed penrose drain in the plantar surgical site. Created plantar flap and partially stapled skin closed, leaving opening laterally to allow for drainage through the penrose. Placed thrombin and surgicel into the surgical area. Made plantatr Pt given pre-op local anesthetic in an ankle block fashion consisting of 19 ccs of 1% lidocaine plain and 0.5% marcain plain in a 1:1 fashion. Made curved fishmouth incision around the midfoot using #15 blade. Removed soft tissue from metatarsal bones. Using sagittal saw, removed distal 1st-4th metatarsal bones. Removed plantar soft tissue from distal metatarsals. Removed forefoot bone and soft tissue and sent to pathology. Copiously irrigated surgical site using pulse lavage and 3 L normal saline. Using clean rongeur, took 2 pieces of 1st metatarsal and sent for culture and pathology. Using clean rongeur, took 2 pieces of 5th metatarsal and sent for culture and pathology. Placed penrose drain in the plantar surgical site. Created plantar flap and partially stapled skin closed, leaving opening laterally to allow for drainage through the penrose. Placed thrombin and surgicel into the surgical area. Dressed with 4x8 gauze, abd, kerlix, and ACE

## 2023-02-08 NOTE — PROGRESS NOTE ADULT - SUBJECTIVE AND OBJECTIVE BOX
Patient is a 62y old  Male who presents with a chief complaint of LLE Angiogram (08 Feb 2023 05:18)      INTERVAL HPI/ OVERNIGHT EVENTS. Patient seen and examined bedside with attending. Discussed MRI results and plan for amputation with patient and wife.       LABS                        8.6    7.90  )-----------( 539      ( 08 Feb 2023 07:48 )             27.3     02-08    138  |  105  |  17  ----------------------------<  245<H>  4.4   |  23  |  1.25    Ca    8.4      08 Feb 2023 07:48  Phos  3.8     02-08  Mg     2.0     02-08      PT/INR - ( 07 Feb 2023 22:44 )   PT: 14.9 sec;   INR: 1.25          PTT - ( 08 Feb 2023 07:48 )  PTT:29.9 sec    ICU Vital Signs Last 24 Hrs  T(C): 37.2 (08 Feb 2023 10:26), Max: 37.4 (07 Feb 2023 22:46)  T(F): 98.9 (08 Feb 2023 10:26), Max: 99.4 (07 Feb 2023 22:46)  HR: 78 (08 Feb 2023 09:09) (74 - 88)  BP: 163/74 (08 Feb 2023 09:09) (148/69 - 186/96)  BP(mean): 109 (08 Feb 2023 06:32) (99 - 120)  ABP: --  ABP(mean): --  RR: 17 (08 Feb 2023 09:09) (17 - 19)  SpO2: 99% (08 Feb 2023 09:09) (95% - 100%)    O2 Parameters below as of 08 Feb 2023 09:09  Patient On (Oxygen Delivery Method): room air        RADIOLOGY  < from: MR Foot w/wo IV Cont, Left (02.07.23 @ 20:23) >  IMPRESSION:  1.  Soft tissue wounds are present with osteomyelitis of the 1st   metatarsal and 1st proximal phalanx and remaining 5th metatarsal.  2.  Marrow changes involving the resected margin of the 2nd metatarsal   and 3rd metatarsal phalangeal joint osteotomy site may be reactive or   reflect early osteomyelitis.  3.  Slight edema involving the distal lateral margin of the cuboid may be   reactive or reflect additional infection with the adjacent 5th metatarsal   changes.  4.  Small subcutaneous fluid collection/abscess is present decompressing   from the 1st metatarsophalangeal joint dorsally near the 2nd metatarsal   stump.  5.  Small subcutaneous fluid collection/abscess extends along the   abductor digit minimi muscle proximal to the 5th metatarsal soft tissue   wounds.  < end of copied text >        PHYSICAL EXAM  Left Lower Extremity Focused:  Vasc: DP/PT 2/4, No edema or erythema, temp gradient warm to warm   Derm: 2.7 cm x 2 cm wound with exposed metatarsal head and fibrotic slough with 30% granular 70% fibrotic base, serous drainage with macerated borders, (+)PTB, undermining circumferentially tracks medially and proximally 2 cm   Neuro: Protective sensation diminished  MSK: s/p 2nd digit and 5th partial ray amp, Laterally deviated 1st, 3rd, and 4th digit

## 2023-02-08 NOTE — PRE-ANESTHESIA EVALUATION ADULT - NSANTHPEFT_GEN_ALL_CORE
General: Appearance is consistent with chronological age. No abnormal facies.  Airway:  See Mallampati score  EENT: Anicteric sclera; oropharynx clear, moist mucus membranes  Cardiovascular:  Regular rate and rhythm  Respiratory: Unlabored breathing  Neurological: Awake and alert, moves all extremities  Constitutional: MET>4   L foot with dry gauze wrapping
General: AAOx3, NAD  Eyes: The sclera and conjunctiva normal, pupils equal in size.  ENT: The ears and nose were normal in appearance; oropharynx clear, moist mucus membranes.  Neck: The appearance of the neck was normal, with no gross masses or nodules.  Respiratory: Unlabored, no retractions.  Neurological: No focal deficit, moves all extremities.  Exercise Tolerance:  limited bec of osteo in foot

## 2023-02-08 NOTE — BRIEF OPERATIVE NOTE - NSICDXBRIEFPROCEDURE_GEN_ALL_CORE_FT
PROCEDURES:  Left foot transmetatarsal amputation 08-Feb-2023 19:47:57  Macie Lucas  
PROCEDURES:  Angiogram, extremity, left 06-Feb-2023 11:57:44  Edgar Hammer

## 2023-02-08 NOTE — PROGRESS NOTE ADULT - SUBJECTIVE AND OBJECTIVE BOX
POST OP NOTE    MONI PERLA  MRN-[id number]    Procedure: Left foot TMA with delayed primary closure  Surgeon: Dr. Lamar  Assistants: Macie Lucas, PGY-1    Patient tolerated procedure well without incident.  Patient transferred to PACU in stable condition. Patient denies pain to his foot at this time. Denies n/v/f/c/sob.        PE / Post Op Check:       GEN: NAD, AAOx3, resting comfortably with pain controlled      LE Focused: No strikethrough is appreciated on surgical dressings.  Dressings were dry, clean, and intact.  No neuromuscular deficits appreciated.

## 2023-02-08 NOTE — PROGRESS NOTE ADULT - ASSESSMENT
62M with PMH of HTN, HLD, DM, PAD s/p multiple L toe amputations 2 years prior (c/b medial deviation of 1st/2nd toes resulting in ulceration of medial metatarsal head) presents for LLE Angiogram. Podiatry consulted to evaluate Left foot wound. Xrays demonstrate dislocation of 1st MTPJ and deformity of the 2nd met shaft. Also notes deformity of the 3rd, 4th and 5th met shafts extending to the MTPJ's. Sclerosis of the base of the 5th may be d/t chronic OM. MRI on 2/7 showed OM of L 1st metatarsal/hallux and 5th metatarsal. Pt is now s/p L foot TMA with delayed primary closure (2/8).     Osteomyelitis of Left 1st ray     Plan:   - Pt may resume diet  - F/u OR cultures/pathology  - F/u post op x-rays  - Keep NPO until after procedure.   - C/w antibiotics per primary team  - Pt is non-WB to the LLE  - Rest of care per primary team    Plan discussed w/ Attending & Podiatry following

## 2023-02-08 NOTE — BRIEF OPERATIVE NOTE - NSICDXBRIEFPREOP_GEN_ALL_CORE_FT
PRE-OP DIAGNOSIS:  PAD (peripheral artery disease) 06-Feb-2023 11:57:53  Edgar Hammer  
PRE-OP DIAGNOSIS:  Osteomyelitis of left foot 08-Feb-2023 19:48:09  Macie Lucas

## 2023-02-08 NOTE — PROGRESS NOTE ADULT - SUBJECTIVE AND OBJECTIVE BOX
O/N: covid neg, MRI done                   ---------------------------------------------------------------------------  PLEASE CHECK WHEN PRESENT:  [  ] Heart Failure  [  ] Acute  [  ] Acute on Chronic  [  ] Chronic  -------------------------------------------------------------------  [  ]Diastolic [HFpEF]  [  ]Systolic [HFrEF]  [  ]Combined [HFpEF & HFrEF]  [  ] afib  [  ] hypertensive heart disease  [  ]Other:  -------------------------------------------------------------------  [ ] Respiratory failure  [ ] Acute cor pulmonale  [ ] Asthma/COPD Exacerbation  [ ] Pleural effusion  [ ] Aspiration pneumonia  -------------------------------------------------------------------  [  ]LEANNE  [  ]ATN  [  ]Reneal Medullary Necrosis  [  ]Renal Cortical Necrosis  [  ]Other Pathological Lesions:    [  ]CKD 1  [  ]CKD 2  [  ]CKD 3  [  ]CKD 4  [  ]CKD 5  [  ]Other  -------------------------------------------------------------------  [  ]Diabetes  [  x] Diabetic PVD Ulcer  [  ] Neuropathic ulcer to DM  [  ] Diabetes with Nephropathy  [  ] Osteomyelitis due to diabetes  --------------------------------------------------------------------  [  ]Malnutrition: See Nutrition Note  [  ]Cachexia  [  ]Other:   [  ]Supplement Ordered:  [  ]Morbid Obesity (BMI >=40]  ---------------------------------------------------------------------  [ ] Sepsis/severe sepsis/septic shock  [ ] UTI  [ ] Pneumonia  -----------------------------------------------------------------------  [ ] Acidosis/alkalosis  [ ] Fluid overload  [ ] Hypokalemia  [ ] Hyperkalemia  [ ] Hypomagnesemia  [ ] Hypophosphatemia  [ ] Hyperphosphatemia  [ x] hyponatremia   ------------------------------------------------------------------------  [ ] Acute blood loss anemia  [ ] Post op blood loss anemia  [ ] Iron deficiency anemia  [x ] Anemia due to chronic disease  [ ] Hypercoagulable state  ----------------------------------------------------------------------  [ ] Cerebral infarction  [ ] Transient ischemia attack  [ ] Encephalopathy                Assessment and Plan:   Assessment:  · Assessment	  62M with PMH of HTN, HLD, DM, PAD s/p multiple L toe amputations 2 years prior (c/b medial deviation of 1st/2nd toes resulting in ulceration of medial metatarsal head) presents for LLE Angiogram      Vascular/PAD:  -Continue ASA + Plavix Plavix new med)   -s/p Left lisa and stent     HTN/HLD:  -consider starting statin   -No home meds for BP       DM:  -Awaiting HgA1c today   - Holding Tresiba   -Holding home Insulin, continue ISS       Anemia:   -will get Iron studies   -will trend Hgb     ID:  -Left foot wounds noted   -appreciate podiatry recs   -MRI pending   -Continue Zosyn       Diet: CCD     Activity:  as tolerated     DVTPPx: SQH     Dispo: pending MRI and podiatry surgical plans          O/N: covid neg, MRI done         S: Patient does not have any complaints    O: Examined in bed resting comfortably     ROS: Denies headache, blurred vision, chest pain, SOB, abdominal pain, nausea or vomiting.         piperacillin/tazobactam IVPB.. 3.375  amLODIPine   Tablet 10  aspirin  chewable 81  clopidogrel Tablet 75  heparin   Injectable 5000  lisinopril 20  piperacillin/tazobactam IVPB.. 3.375      Allergies    Allergy Status Unknown    Intolerances        Vital Signs Last 24 Hrs  T(C): 36.7 (08 Feb 2023 04:13), Max: 37.4 (07 Feb 2023 22:46)  T(F): 98.1 (08 Feb 2023 04:13), Max: 99.4 (07 Feb 2023 22:46)  HR: 83 (08 Feb 2023 06:32) (74 - 88)  BP: 164/76 (08 Feb 2023 06:32) (148/69 - 186/96)  BP(mean): 109 (08 Feb 2023 06:32) (99 - 120)  RR: 18 (08 Feb 2023 05:07) (18 - 20)  SpO2: 99% (08 Feb 2023 05:07) (95% - 100%)    Parameters below as of 08 Feb 2023 05:07  Patient On (Oxygen Delivery Method): room air      I&O's Summary    07 Feb 2023 07:01  -  08 Feb 2023 07:00  --------------------------------------------------------  IN: 520 mL / OUT: 1600 mL / NET: -1080 mL        Physical Exam:  General: alert and awake, NAD  Pulmonary: no respiratory distress  Cardiovascular: RRR  Abdominal: soft  Extremities: LLE dressing C/D/I, daily change by podiatry      LABS:                        8.5    9.62  )-----------( 573      ( 07 Feb 2023 11:27 )             27.5     02-07    133<L>  |  101  |  19  ----------------------------<  290<H>  4.5   |  24  |  1.25    Ca    8.6      07 Feb 2023 11:27  Phos  3.2     02-07  Mg     2.0     02-07      PT/INR - ( 07 Feb 2023 22:44 )   PT: 14.9 sec;   INR: 1.25              Radiology and Additional Studies:        ---------------------------------------------------------------------------  PLEASE CHECK WHEN PRESENT:  [  ] Heart Failure  [  ] Acute  [  ] Acute on Chronic  [  ] Chronic  -------------------------------------------------------------------  [  ]Diastolic [HFpEF]  [  ]Systolic [HFrEF]  [  ]Combined [HFpEF & HFrEF]  [  ] afib  [  ] hypertensive heart disease  [  ]Other:  -------------------------------------------------------------------  [ ] Respiratory failure  [ ] Acute cor pulmonale  [ ] Asthma/COPD Exacerbation  [ ] Pleural effusion  [ ] Aspiration pneumonia  -------------------------------------------------------------------  [  ]LEANNE  [  ]ATN  [  ]Reneal Medullary Necrosis  [  ]Renal Cortical Necrosis  [  ]Other Pathological Lesions:    [  ]CKD 1  [  ]CKD 2  [  ]CKD 3  [  ]CKD 4  [  ]CKD 5  [  ]Other  -------------------------------------------------------------------  [x ]Diabetes  [  x] Diabetic PVD Ulcer  [  ] Neuropathic ulcer to DM  [  ] Diabetes with Nephropathy  [ x ] Osteomyelitis due to diabetes  --------------------------------------------------------------------  [  ]Malnutrition: See Nutrition Note  [  ]Cachexia  [  ]Other:   [  ]Supplement Ordered:  [  ]Morbid Obesity (BMI >=40]  ---------------------------------------------------------------------  [ ] Sepsis/severe sepsis/septic shock  [ ] UTI  [ ] Pneumonia  -----------------------------------------------------------------------  [ ] Acidosis/alkalosis  [ ] Fluid overload  [ ] Hypokalemia  [ ] Hyperkalemia  [ ] Hypomagnesemia  [ ] Hypophosphatemia  [ ] Hyperphosphatemia  [ x] hyponatremia   ------------------------------------------------------------------------  [ ] Acute blood loss anemia  [ ] Post op blood loss anemia  [ ] Iron deficiency anemia  [x ] Anemia due to chronic disease  [ ] Hypercoagulable state  ----------------------------------------------------------------------  [ ] Cerebral infarction  [ ] Transient ischemia attack  [ ] Encephalopathy                Assessment and Plan:   Assessment:  · Assessment	  62M with PMH of HTN, HLD, DM, PAD s/p multiple L toe amputations 2 years prior (c/b medial deviation of 1st/2nd toes resulting in ulceration of medial metatarsal head) presents for LLE Angiogram      Vascular/PAD:  -Continue ASA + Plavix (Plavix new med)   -s/p Left lisa and stent     HTN/HLD:  -Started atorvastatin   -Started on lisinopril      DM:  -HgA1c=10.9  - Holding Tresiba   - lantus 15u HS, continue ISS       Anemia:   -Hgb 8.5    ID:  -Left foot wounds noted   -appreciate podiatry recs   -MRI pending read  -Continue Zosyn       Diet: CCD /NPO p mn for possible OR with podiatry    Activity:  as tolerated     DVTPPx: SQH     Dispo: pending MRI review and podiatry surgical plans

## 2023-02-08 NOTE — PROGRESS NOTE ADULT - SUBJECTIVE AND OBJECTIVE BOX
PRE-OP NOTE    Pre-Op Diagnosis: Osteomyelitis of left foot  Planned Procedure: Left foot transmetatarsal amputation  Scheduled Date / Time: 2/8/2023  Indication: Osteomyelitis of left foot  Labs:                       8.6    7.90  )-----------( 539      ( 08 Feb 2023 07:48 )             27.3   02-08    138  |  105  |  17  ----------------------------<  245<H>  4.4   |  23  |  1.25    Ca    8.4      08 Feb 2023 07:48  Phos  3.8     02-08  Mg     2.0     02-08      Vitals: Vital Signs Last 24 Hrs  T(C): 36.1 (08 Feb 2023 13:58), Max: 37.4 (07 Feb 2023 22:46)  T(F): 96.9 (08 Feb 2023 13:58), Max: 99.4 (07 Feb 2023 22:46)  HR: 73 (08 Feb 2023 13:27) (73 - 88)  BP: 175/85 (08 Feb 2023 13:27) (148/69 - 181/74)  BP(mean): 109 (08 Feb 2023 06:32) (99 - 114)  RR: 18 (08 Feb 2023 13:27) (17 - 19)  SpO2: 100% (08 Feb 2023 13:27) (95% - 100%)    Parameters below as of 08 Feb 2023 13:27  Patient On (Oxygen Delivery Method): room air      PE:   Official CXR reading: (On Chart)  Official EKG reading: (On Chart)  Type and Cross/Screen: (on chart)  NPO after MN  Antibiotics: zosyn  Operative Consent (on chart)

## 2023-02-08 NOTE — DIETITIAN INITIAL EVALUATION ADULT - PERTINENT MEDS FT
MEDICATIONS  (STANDING):  amLODIPine   Tablet 10 milliGRAM(s) Oral daily  aspirin  chewable 81 milliGRAM(s) Oral daily  atorvastatin 40 milliGRAM(s) Oral at bedtime  clopidogrel Tablet 75 milliGRAM(s) Oral daily  dextrose 5%. 1000 milliLiter(s) (50 mL/Hr) IV Continuous <Continuous>  dextrose 5%. 1000 milliLiter(s) (100 mL/Hr) IV Continuous <Continuous>  dextrose 50% Injectable 25 Gram(s) IV Push once  dextrose 50% Injectable 12.5 Gram(s) IV Push once  dextrose 50% Injectable 25 Gram(s) IV Push once  glucagon  Injectable 1 milliGRAM(s) IntraMuscular once  heparin   Injectable 5000 Unit(s) SubCutaneous every 8 hours  insulin glargine Injectable (LANTUS) 24 Unit(s) SubCutaneous at bedtime  insulin lispro (ADMELOG) corrective regimen sliding scale   SubCutaneous Before meals and at bedtime  insulin lispro Injectable (ADMELOG) 8 Unit(s) SubCutaneous three times a day before meals  lisinopril 20 milliGRAM(s) Oral daily  piperacillin/tazobactam IVPB.. 3.375 Gram(s) IV Intermittent every 8 hours  polyethylene glycol 3350 17 Gram(s) Oral daily  sodium chloride 0.9%. 1000 milliLiter(s) (75 mL/Hr) IV Continuous <Continuous>    MEDICATIONS  (PRN):  dextrose Oral Gel 15 Gram(s) Oral once PRN Blood Glucose LESS THAN 70 milliGRAM(s)/deciliter

## 2023-02-08 NOTE — DIETITIAN INITIAL EVALUATION ADULT - ADD RECOMMEND
1. Monitor PO intake/appetite, GI distress, diet tolerance, labs, weights.  2. Honor pt food preferences as able.  3. CDE consult.  4. RD to remain available for additional nutrition interventions as needed.  * Moderate Nutrition Risk.

## 2023-02-08 NOTE — PRE-OP CHECKLIST - BOWEL PREP
11/07/19 1004   Team Meeting   Meeting Type Daily Rounds   Team Members Present   Team Members Present Physician;Nurse;;; Occupational Therapist   Physician Team Member Dr Bonilla Barger MD; Moses [address]   Nursing Team Member Grace Cazares, MARIOLA   Care Management Team Member Sudha Whittaker, MS, [facility]   Social Work Team Member Jordana Santiago [facility]   OT Team Member Michael Zimmer, [place]   Patient/Family Present   Patient Present No   Patient's Family Present No     Medication adjustment  Medication compliant today  Refused breakfast, help and feeding self  Appears scared, intermittently yelling and screaming with care  Awoke early, slept in bed  Sitting in chair with one to one calmly  Slight improvement since weekend in presentation  Family reported that PT was not doing well during visit on Tuesday  Potential D/C later next week  n/a

## 2023-02-08 NOTE — DIETITIAN INITIAL EVALUATION ADULT - PERTINENT LABORATORY DATA
02-08    138  |  105  |  17  ----------------------------<  245<H>  4.4   |  23  |  1.25    Ca    8.4      08 Feb 2023 07:48  Phos  3.8     02-08  Mg     2.0     02-08    POCT Blood Glucose.: 175 mg/dL (02-08-23 @ 12:21)  A1C with Estimated Average Glucose Result: 10.9 % (02-07-23 @ 11:27)

## 2023-02-08 NOTE — PRE-ANESTHESIA EVALUATION ADULT - NSANTHPMHFT_GEN_ALL_CORE
62M with PMH of HTN, HLD, DM, PAD s/p multiple L toe amputations 2 years prior (c/b medial deviation of 1st/2nd toes resulting in ulceration of medial metatarsal head) presents for LLE Angiogram. He was recently admitted to an OSH where he received 8 week of IV Abx via a PICC line. He was seen in the office and noted drainage form his L foot. Denies fevers/chills, CP/SOB. ROS otherwise negative.  Underwent angiogram on Feb 6th which showed  75% stenosis of distal SFA/proximal popliteal artery. SFA lesion ballooned, then stented.   Admitted to telemetry for further management
62 with chronic LLE wound (s/p multiple toe amp, debriedments, IV abx) for LLE angiogram, possible angioplasty/stent.     PMH:  IDDM(2)  LLE Ulcer  HTN  HLD    PSH:  L toe amp/multiple  LLE angio/stents    NKDA

## 2023-02-09 LAB
ANION GAP SERPL CALC-SCNC: 8 MMOL/L — SIGNIFICANT CHANGE UP (ref 5–17)
BUN SERPL-MCNC: 17 MG/DL — SIGNIFICANT CHANGE UP (ref 7–23)
CALCIUM SERPL-MCNC: 8.5 MG/DL — SIGNIFICANT CHANGE UP (ref 8.4–10.5)
CHLORIDE SERPL-SCNC: 103 MMOL/L — SIGNIFICANT CHANGE UP (ref 96–108)
CO2 SERPL-SCNC: 22 MMOL/L — SIGNIFICANT CHANGE UP (ref 22–31)
CREAT SERPL-MCNC: 1.31 MG/DL — HIGH (ref 0.5–1.3)
EGFR: 61 ML/MIN/1.73M2 — SIGNIFICANT CHANGE UP
GLUCOSE BLDC GLUCOMTR-MCNC: 135 MG/DL — HIGH (ref 70–99)
GLUCOSE BLDC GLUCOMTR-MCNC: 189 MG/DL — HIGH (ref 70–99)
GLUCOSE BLDC GLUCOMTR-MCNC: 227 MG/DL — HIGH (ref 70–99)
GLUCOSE BLDC GLUCOMTR-MCNC: 237 MG/DL — HIGH (ref 70–99)
GLUCOSE BLDC GLUCOMTR-MCNC: 303 MG/DL — HIGH (ref 70–99)
GLUCOSE SERPL-MCNC: 137 MG/DL — HIGH (ref 70–99)
HCT VFR BLD CALC: 25.3 % — LOW (ref 39–50)
HGB BLD-MCNC: 8 G/DL — LOW (ref 13–17)
MAGNESIUM SERPL-MCNC: 1.7 MG/DL — SIGNIFICANT CHANGE UP (ref 1.6–2.6)
MCHC RBC-ENTMCNC: 27 PG — SIGNIFICANT CHANGE UP (ref 27–34)
MCHC RBC-ENTMCNC: 31.6 GM/DL — LOW (ref 32–36)
MCV RBC AUTO: 85.5 FL — SIGNIFICANT CHANGE UP (ref 80–100)
NRBC # BLD: 0 /100 WBCS — SIGNIFICANT CHANGE UP (ref 0–0)
PHOSPHATE SERPL-MCNC: 2.3 MG/DL — LOW (ref 2.5–4.5)
PLATELET # BLD AUTO: 588 K/UL — HIGH (ref 150–400)
POTASSIUM SERPL-MCNC: 4.2 MMOL/L — SIGNIFICANT CHANGE UP (ref 3.5–5.3)
POTASSIUM SERPL-SCNC: 4.2 MMOL/L — SIGNIFICANT CHANGE UP (ref 3.5–5.3)
RBC # BLD: 2.96 M/UL — LOW (ref 4.2–5.8)
RBC # FLD: 17.2 % — HIGH (ref 10.3–14.5)
SODIUM SERPL-SCNC: 133 MMOL/L — LOW (ref 135–145)
WBC # BLD: 13.81 K/UL — HIGH (ref 3.8–10.5)
WBC # FLD AUTO: 13.81 K/UL — HIGH (ref 3.8–10.5)

## 2023-02-09 PROCEDURE — 99231 SBSQ HOSP IP/OBS SF/LOW 25: CPT

## 2023-02-09 PROCEDURE — 73630 X-RAY EXAM OF FOOT: CPT | Mod: 26,LT

## 2023-02-09 PROCEDURE — 99232 SBSQ HOSP IP/OBS MODERATE 35: CPT

## 2023-02-09 RX ORDER — HEPARIN SODIUM 5000 [USP'U]/ML
5000 INJECTION INTRAVENOUS; SUBCUTANEOUS EVERY 8 HOURS
Refills: 0 | Status: DISCONTINUED | OUTPATIENT
Start: 2023-02-09 | End: 2023-02-09

## 2023-02-09 RX ORDER — OXYCODONE AND ACETAMINOPHEN 5; 325 MG/1; MG/1
1 TABLET ORAL EVERY 4 HOURS
Refills: 0 | Status: DISCONTINUED | OUTPATIENT
Start: 2023-02-09 | End: 2023-02-10

## 2023-02-09 RX ORDER — MAGNESIUM SULFATE 500 MG/ML
1 VIAL (ML) INJECTION ONCE
Refills: 0 | Status: COMPLETED | OUTPATIENT
Start: 2023-02-09 | End: 2023-02-09

## 2023-02-09 RX ADMIN — CLOPIDOGREL BISULFATE 75 MILLIGRAM(S): 75 TABLET, FILM COATED ORAL at 11:03

## 2023-02-09 RX ADMIN — Medication 8 UNIT(S): at 08:30

## 2023-02-09 RX ADMIN — LISINOPRIL 40 MILLIGRAM(S): 2.5 TABLET ORAL at 04:14

## 2023-02-09 RX ADMIN — ATORVASTATIN CALCIUM 40 MILLIGRAM(S): 80 TABLET, FILM COATED ORAL at 21:15

## 2023-02-09 RX ADMIN — Medication 8 UNIT(S): at 16:45

## 2023-02-09 RX ADMIN — Medication 8 UNIT(S): at 14:59

## 2023-02-09 RX ADMIN — Medication 8: at 06:34

## 2023-02-09 RX ADMIN — PIPERACILLIN AND TAZOBACTAM 25 GRAM(S): 4; .5 INJECTION, POWDER, LYOPHILIZED, FOR SOLUTION INTRAVENOUS at 12:32

## 2023-02-09 RX ADMIN — AMLODIPINE BESYLATE 10 MILLIGRAM(S): 2.5 TABLET ORAL at 04:14

## 2023-02-09 RX ADMIN — Medication 100 GRAM(S): at 11:04

## 2023-02-09 RX ADMIN — PIPERACILLIN AND TAZOBACTAM 25 GRAM(S): 4; .5 INJECTION, POWDER, LYOPHILIZED, FOR SOLUTION INTRAVENOUS at 05:56

## 2023-02-09 RX ADMIN — Medication 81 MILLIGRAM(S): at 11:04

## 2023-02-09 RX ADMIN — Medication 4: at 16:44

## 2023-02-09 RX ADMIN — Medication 2: at 22:15

## 2023-02-09 RX ADMIN — INSULIN GLARGINE 24 UNIT(S): 100 INJECTION, SOLUTION SUBCUTANEOUS at 22:16

## 2023-02-09 RX ADMIN — PIPERACILLIN AND TAZOBACTAM 25 GRAM(S): 4; .5 INJECTION, POWDER, LYOPHILIZED, FOR SOLUTION INTRAVENOUS at 21:15

## 2023-02-09 NOTE — PROGRESS NOTE ADULT - ASSESSMENT
62M with PMH of HTN, HLD, DM, PAD s/p multiple L toe amputations 2 years prior (c/b medial deviation of 1st/2nd toes resulting in ulceration of medial metatarsal head) presents for LLE Angiogram. Podiatry consulted to evaluate Left foot wound. Xrays demonstrate dislocation of 1st MTPJ and deformity of the 2nd met shaft. Also notes deformity of the 3rd, 4th and 5th met shafts extending to the MTPJ's. Sclerosis of the base of the 5th may be d/t chronic OM. MRI on 2/7 showed OM of L 1st metatarsal/hallux and 5th metatarsal. Pt is s/p L foot TMA with delayed primary closure on 2/8.     Plan:   - Dry sterile Dressing applied to foot  - Post op x-rays reviewed  - F/u OR cultures/pathology  - C/w antibiotics per primary team  - Pt can WBAT to L heel   - Rest of care per primary team    Plan discussed w/ Attending & Podiatry following

## 2023-02-09 NOTE — PROGRESS NOTE ADULT - ASSESSMENT
62 year old man with HTN, HLD, Type 2 DM complicated by PAD, s/p multiple left toe amputations 2 years ago, admitted for LLE angiogram. S/p angiogram on Feb 6th which showed  75% stenosis of distal SFA/proximal popliteal artery. SFA lesion ballooned, then stented.   X ray of left foot also suspicious for osteomyelitis. MRI pending.     # Diabetic foot wound   # Likely osteomyelitis   s/p toe amputation   agree with empiric abx, zosyn  f/u OR cultures, pathology --margins     # Type 2 Diabetes complicated by Peripheral Artery Disease   Per patient, takes metformin only intermittently and takes tresiba 50 units qHS ~once a week even though it's prescribed as a daily medication. Takes 10units lispro TID with meals daily.   Outpatient regimen is 80 units of insulin total qd; however, given limited compliance with his home regimen, recommend erring on the side of giving lower dose for now.   [ ] Lantus 24 units qHS + lispro 8 units with meals, + moderate dose Sliding scale insulin for now   [ ] f/u total insulin requirement tomorrow, titrate basal-bolus regimen based on daily insulin requirement     # Peripheral artery disease s/p stent   # Post -operative state  Antiplatelet per primary team - on DAPT   OOTB to chair   Encourage incentive spirometry   Recommend miralax qd for bowel regimen    # HLD - takes rosuvastatin 10mg qd per surescripts; atorva 40mg qd while inpatient   # HTN -   Prescribed amlodipine/benazepril 10/40 at home (Recently increased from amlodipine/benazepril - 5/40). However, does not take daily, 'i take it every other day'.   continue amlodipine 10mg qd   [ ] therapeutic interchange for benazepril --> 40mg lisinopril ;     DVT ppx  - heparin subq

## 2023-02-09 NOTE — PROGRESS NOTE ADULT - SUBJECTIVE AND OBJECTIVE BOX
Patient is a 62y old  Male who presents with a chief complaint of LLE Angiogram (09 Feb 2023 09:39)    INTERVAL EVENTS:  - underwent toe amputation yesterday   - tolerating regular texture diet today   - no new complaints     SUBJECTIVE:  Patient was seen and examined at bedside.  Review of systems: No CP, dyspnea, nausea or vomiting, dysuria    Diet, Regular:   Consistent Carbohydrate Evening Snack (CSTCHOSN) (02-06-23 @ 18:07) [Active]    MEDICATIONS:  MEDICATIONS  (STANDING):  amLODIPine   Tablet 10 milliGRAM(s) Oral daily  aspirin  chewable 81 milliGRAM(s) Oral daily  atorvastatin 40 milliGRAM(s) Oral at bedtime  clopidogrel Tablet 75 milliGRAM(s) Oral daily  dextrose 5%. 1000 milliLiter(s) (50 mL/Hr) IV Continuous <Continuous>  dextrose 5%. 1000 milliLiter(s) (100 mL/Hr) IV Continuous <Continuous>  dextrose 50% Injectable 25 Gram(s) IV Push once  dextrose 50% Injectable 12.5 Gram(s) IV Push once  dextrose 50% Injectable 25 Gram(s) IV Push once  glucagon  Injectable 1 milliGRAM(s) IntraMuscular once  insulin glargine Injectable (LANTUS) 24 Unit(s) SubCutaneous at bedtime  insulin lispro (ADMELOG) corrective regimen sliding scale   SubCutaneous Before meals and at bedtime  insulin lispro Injectable (ADMELOG) 8 Unit(s) SubCutaneous three times a day before meals  lisinopril 40 milliGRAM(s) Oral daily  piperacillin/tazobactam IVPB.. 3.375 Gram(s) IV Intermittent every 8 hours  polyethylene glycol 3350 17 Gram(s) Oral daily    MEDICATIONS  (PRN):  dextrose Oral Gel 15 Gram(s) Oral once PRN Blood Glucose LESS THAN 70 milliGRAM(s)/deciliter  oxycodone    5 mG/acetaminophen 325 mG 1 Tablet(s) Oral every 4 hours PRN Moderate Pain (4 - 6)      Allergies    cefepime (Rash; Hives)  ciprofloxacin (Hives; Rash)    Intolerances        OBJECTIVE:  Vital Signs Last 24 Hrs  T(C): 37.2 (09 Feb 2023 22:32), Max: 37.4 (09 Feb 2023 03:56)  T(F): 99 (09 Feb 2023 22:32), Max: 99.4 (09 Feb 2023 03:56)  HR: 82 (09 Feb 2023 20:39) (82 - 108)  BP: 144/65 (09 Feb 2023 20:39) (131/69 - 172/77)  BP(mean): 93 (09 Feb 2023 16:44) (93 - 101)  RR: 17 (09 Feb 2023 20:39) (16 - 18)  SpO2: 94% (09 Feb 2023 20:39) (94% - 97%)    Parameters below as of 09 Feb 2023 20:39  Patient On (Oxygen Delivery Method): room air      I&O's Summary    08 Feb 2023 07:01  -  09 Feb 2023 07:00  --------------------------------------------------------  IN: 0 mL / OUT: 1425 mL / NET: -1425 mL    09 Feb 2023 07:01  -  10 Feb 2023 01:24  --------------------------------------------------------  IN: 560 mL / OUT: 650 mL / NET: -90 mL        PHYSICAL EXAM:  Gen: Reclining in bed at time of exam, appears stated age  HEENT: NCAT, MMM, clear OP  Neck: supple, trachea at midline  CV: RRR, +S1/S2  Pulm: adequate respiratory effort, no increase in work of breathing  Abd: soft, ND  Skin: warm and dry,  Ext: Left foot dressed in clean, dry, dressing,   right foot WWP  Neuro: AOx3, speaking in full sentences   Psych: affect and behavior appropriate, pleasant at time of interview    LABS:                        8.0    13.81 )-----------( 588      ( 09 Feb 2023 10:19 )             25.3     02-09    133<L>  |  103  |  17  ----------------------------<  137<H>  4.2   |  22  |  1.31<H>    Ca    8.5      09 Feb 2023 10:19  Phos  2.3     02-09  Mg     1.7     02-09        PTT - ( 08 Feb 2023 07:48 )  PTT:29.9 sec  CAPILLARY BLOOD GLUCOSE      POCT Blood Glucose.: 189 mg/dL (09 Feb 2023 22:06)  POCT Blood Glucose.: 237 mg/dL (09 Feb 2023 16:43)  POCT Blood Glucose.: 135 mg/dL (09 Feb 2023 11:29)  POCT Blood Glucose.: 227 mg/dL (09 Feb 2023 08:15)  POCT Blood Glucose.: 303 mg/dL (09 Feb 2023 06:29)        MICRODATA:    Culture - Tissue with Gram Stain (collected 08 Feb 2023 20:00)  Source: .Tissue 5th  metacarpal or spec  Gram Stain (08 Feb 2023 20:48):    No organisms seen    No WBC's seen.  Preliminary Report (09 Feb 2023 09:16):    No growth to date.    Culture - Tissue with Gram Stain (collected 08 Feb 2023 20:00)  Source: .Tissue 1st metacarpal or spec  Gram Stain (08 Feb 2023 20:26):    Test cannot be performed on this type of specimen.  Preliminary Report (09 Feb 2023 11:04):    No growth to date.        RADIOLOGY/OTHER STUDIES:

## 2023-02-09 NOTE — PROGRESS NOTE ADULT - SUBJECTIVE AND OBJECTIVE BOX
O/N: Left TMA penrose in, held PM HSQ dose - will resume in AM (8am), PT ordered - NWB on L Foot, 's added lisinopril 20mg  2/8: plan for OR after 430pm w/podiatryJustinus increased to 24/8, MRI +1st   metatarsal and 1st proximal phalanx and remaining 5th metatarsal OM                      ---------------------------------------------------------------------------  PLEASE CHECK WHEN PRESENT:  [  ] Heart Failure  [  ] Acute  [  ] Acute on Chronic  [  ] Chronic  -------------------------------------------------------------------  [  ]Diastolic [HFpEF]  [  ]Systolic [HFrEF]  [  ]Combined [HFpEF & HFrEF]  [  ] afib  [  ] hypertensive heart disease  [  ]Other:  -------------------------------------------------------------------  [ ] Respiratory failure  [ ] Acute cor pulmonale  [ ] Asthma/COPD Exacerbation  [ ] Pleural effusion  [ ] Aspiration pneumonia  -------------------------------------------------------------------  [  ]LEANNE  [  ]ATN  [  ]Reneal Medullary Necrosis  [  ]Renal Cortical Necrosis  [  ]Other Pathological Lesions:    [  ]CKD 1  [  ]CKD 2  [  ]CKD 3  [  ]CKD 4  [  ]CKD 5  [  ]Other  -------------------------------------------------------------------  [x ]Diabetes  [  x] Diabetic PVD Ulcer  [  ] Neuropathic ulcer to DM  [  ] Diabetes with Nephropathy  [ x ] Osteomyelitis due to diabetes  --------------------------------------------------------------------  [  ]Malnutrition: See Nutrition Note  [  ]Cachexia  [  ]Other:   [  ]Supplement Ordered:  [  ]Morbid Obesity (BMI >=40]  ---------------------------------------------------------------------  [ ] Sepsis/severe sepsis/septic shock  [ ] UTI  [ ] Pneumonia  -----------------------------------------------------------------------  [ ] Acidosis/alkalosis  [ ] Fluid overload  [ ] Hypokalemia  [ ] Hyperkalemia  [ ] Hypomagnesemia  [ ] Hypophosphatemia  [ ] Hyperphosphatemia  [ x] hyponatremia   ------------------------------------------------------------------------  [ ] Acute blood loss anemia  [ ] Post op blood loss anemia  [ ] Iron deficiency anemia  [x ] Anemia due to chronic disease  [ ] Hypercoagulable state  ----------------------------------------------------------------------  [ ] Cerebral infarction  [ ] Transient ischemia attack  [ ] Encephalopathy                Assessment and Plan:   Assessment:  · Assessment	  62M with PMH of HTN, HLD, DM, PAD s/p multiple L toe amputations 2 years prior (c/b medial deviation of 1st/2nd toes resulting in ulceration of medial metatarsal head) presents for LLE Angiogram, now s/p Left TMA      Vascular/PAD:  -Continue ASA + Plavix (Plavix new med)   -s/p Left angio and stent   -s/p L TMA by podiatry 2/8, penrose in, partially closed    HTN/HLD:  -Started atorvastatin   -Started on lisinopril      DM:  -HgA1c=10.9  - Holding Tresiba   - lantus 15u HS, continue ISS       Anemia:   -Hgb 8.5    ID:  -Left foot wounds noted   -appreciate podiatry recs   -Continue Zosyn       Diet: CCD    Activity:  as tolerated, NWB on left foot    DVTPPx: SQH     Dispo: PT eval today, NWB on left foot     O/N: Left TMA, penrose in, held PM HSQ dose - will resume in AM (8am), PT ordered - NWB on L Foot, 's added lisinopril 20mg  2/8: plan for OR after 430pm w/podiatry, Lantus increased to 24/8, MRI +1st   metatarsal and 1st proximal phalanx and remaining 5th metatarsal OM    Subjective:  This morning patient is in a pleasant mood. Denies pain in left TMA at rest. Dressing in place   Denies CP, SOB     ROS:   Denies Headache, blurred vision, Chest Pain, SOB, Abdominal pain, nausea or vomiting     Social   piperacillin/tazobactam IVPB.. 3.375  amLODIPine   Tablet 10  aspirin  chewable 81  clopidogrel Tablet 75  heparin   Injectable 5000  lisinopril 40  piperacillin/tazobactam IVPB.. 3.375      Allergies    cefepime (Rash; Hives)  ciprofloxacin (Hives; Rash)    Intolerances        Vital Signs Last 24 Hrs  T(C): 37.4 (09 Feb 2023 03:56), Max: 37.6 (08 Feb 2023 19:38)  T(F): 99.4 (09 Feb 2023 03:56), Max: 99.7 (08 Feb 2023 19:38)  HR: 104 (09 Feb 2023 03:59) (68 - 104)  BP: 172/77 (09 Feb 2023 03:59) (133/66 - 189/81)  BP(mean): 111 (08 Feb 2023 21:05) (92 - 114)  RR: 18 (09 Feb 2023 03:59) (16 - 24)  SpO2: 96% (09 Feb 2023 03:59) (95% - 100%)    Parameters below as of 09 Feb 2023 03:59  Patient On (Oxygen Delivery Method): room air      I&O's Summary    08 Feb 2023 07:01  -  09 Feb 2023 07:00  --------------------------------------------------------  IN: 0 mL / OUT: 1425 mL / NET: -1425 mL        Physical Exam:  General: NAD  Pulmonary: b/l breath sounds   Cardiovascular: s1s2 reg   Abdominal: soft   Extremities: Lt TMA dressing clean and dry   Right groin soft and without hematoma     LABS:                        8.6    7.90  )-----------( 539      ( 08 Feb 2023 07:48 )             27.3     02-08    138  |  105  |  17  ----------------------------<  245<H>  4.4   |  23  |  1.25    Ca    8.4      08 Feb 2023 07:48  Phos  3.8     02-08  Mg     2.0     02-08      PT/INR - ( 07 Feb 2023 22:44 )   PT: 14.9 sec;   INR: 1.25          PTT - ( 08 Feb 2023 07:48 )  PTT:29.9 sec    Radiology and Additional Studies:          ---------------------------------------------------------------------------  PLEASE CHECK WHEN PRESENT:  [  ] Heart Failure  [  ] Acute  [  ] Acute on Chronic  [  ] Chronic  -------------------------------------------------------------------  [  ]Diastolic [HFpEF]  [  ]Systolic [HFrEF]  [  ]Combined [HFpEF & HFrEF]  [  ] afib  [  ] hypertensive heart disease  [  ]Other:  -------------------------------------------------------------------  [ ] Respiratory failure  [ ] Acute cor pulmonale  [ ] Asthma/COPD Exacerbation  [ ] Pleural effusion  [ ] Aspiration pneumonia  -------------------------------------------------------------------  [  ]LEANNE  [  ]ATN  [  ]Reneal Medullary Necrosis  [  ]Renal Cortical Necrosis  [  ]Other Pathological Lesions:    [  ]CKD 1  [  ]CKD 2  [  ]CKD 3  [  ]CKD 4  [  ]CKD 5  [  ]Other  -------------------------------------------------------------------  [x ]Diabetes  [  x] Diabetic PVD Ulcer  [  ] Neuropathic ulcer to DM  [  ] Diabetes with Nephropathy  [ x ] Osteomyelitis due to diabetes  --------------------------------------------------------------------  [  ]Malnutrition: See Nutrition Note  [  ]Cachexia  [  ]Other:   [  ]Supplement Ordered:  [  ]Morbid Obesity (BMI >=40]  ---------------------------------------------------------------------  [ ] Sepsis/severe sepsis/septic shock  [ ] UTI  [ ] Pneumonia  -----------------------------------------------------------------------  [ ] Acidosis/alkalosis  [ ] Fluid overload  [ ] Hypokalemia  [ ] Hyperkalemia  [ ] Hypomagnesemia  [ ] Hypophosphatemia  [ ] Hyperphosphatemia  [ x] hyponatremia   ------------------------------------------------------------------------  [ ] Acute blood loss anemia  [ ] Post op blood loss anemia  [ ] Iron deficiency anemia  [x ] Anemia due to chronic disease  [ ] Hypercoagulable state  ----------------------------------------------------------------------  [ ] Cerebral infarction  [ ] Transient ischemia attack  [ ] Encephalopathy                Assessment and Plan:   Assessment:  · Assessment	  62M with PMH of HTN, HLD, DM, PAD s/p multiple L toe amputations 2 years prior (c/b medial deviation of 1st/2nd toes resulting in ulceration of medial metatarsal head) presents for LLE Angiogram, now s/p Left TMA      Vascular/PAD:  -Continue ASA + Plavix (Plavix new med)   -s/p Left angio and stent   -s/p L TMA by podiatry 2/8, penrose in, partially closed    HTN/HLD:  -Started atorvastatin   -Started on lisinopril      DM:  -HgA1c=10.9  - Holding Tresiba   - lantus 24u HS, Lispro 8 TID continue ISS       Anemia:   -Hgb 8.5    ID:  -Left foot wounds noted   -appreciate podiatry recs   -Continue Zosyn       Diet: CCD    Activity:  as tolerated, NWB on left foot    DVTPPx: SQH after am CBC     Dispo: PT eval today, NWB on left foot today and heel weight bearing 2/10

## 2023-02-09 NOTE — PROGRESS NOTE ADULT - SUBJECTIVE AND OBJECTIVE BOX
Patient is a 62y old  Male who presents with a chief complaint of LLE Angiogram (09 Feb 2023 05:29)      INTERVAL HPI/ OVERNIGHT EVENTS. Pt seen and examined bedside. Pt is POD1 s/p Left foot TMA. Pt is in good spirits. Denies pain to the foot. Dressing is C/D/I.       LABS                        8.6    7.90  )-----------( 539      ( 08 Feb 2023 07:48 )             27.3     02-08    138  |  105  |  17  ----------------------------<  245<H>  4.4   |  23  |  1.25    Ca    8.4      08 Feb 2023 07:48  Phos  3.8     02-08  Mg     2.0     02-08      PT/INR - ( 07 Feb 2023 22:44 )   PT: 14.9 sec;   INR: 1.25          PTT - ( 08 Feb 2023 07:48 )  PTT:29.9 sec    ICU Vital Signs Last 24 Hrs  T(C): 37.4 (09 Feb 2023 08:32), Max: 37.6 (08 Feb 2023 19:38)  T(F): 99.3 (09 Feb 2023 08:32), Max: 99.7 (08 Feb 2023 19:38)  HR: 108 (09 Feb 2023 08:32) (68 - 108)  BP: 131/69 (09 Feb 2023 08:32) (131/69 - 189/81)  BP(mean): 94 (09 Feb 2023 08:32) (92 - 114)  ABP: --  ABP(mean): --  RR: 18 (09 Feb 2023 08:32) (16 - 24)  SpO2: 95% (09 Feb 2023 08:32) (95% - 100%)    O2 Parameters below as of 09 Feb 2023 08:32  Patient On (Oxygen Delivery Method): room air            RADIOLOGY  < from: MR Foot w/wo IV Cont, Left (02.07.23 @ 20:23) >  IMPRESSION:  1.  Soft tissue wounds are present with osteomyelitis of the 1st   metatarsal and 1st proximal phalanx and remaining 5th metatarsal.  2.  Marrow changes involving the resected margin of the 2nd metatarsal   and 3rd metatarsal phalangeal joint osteotomy site may be reactive or   reflect early osteomyelitis.  3.  Slight edema involving the distal lateral margin of the cuboid may be   reactive or reflect additional infection with the adjacent 5th metatarsal   changes.  4.  Small subcutaneous fluid collection/abscess is present decompressing   from the 1st metatarsophalangeal joint dorsally near the 2nd metatarsal   stump.  5.  Small subcutaneous fluid collection/abscess extends along the   abductor digit minimi muscle proximal to the 5th metatarsal soft tissue   wounds.  < end of copied text >      PHYSICAL EXAM  Lower Extremity Focused  Vasc: DP/PT 2/4, No edema or erythema, temp gradient warm to warm   Derm: TMA site partially closed with staples in tact and no signs of dehisence, no pus or serous drainage,   Neuro: Protective sensation diminished  MSK: TMA

## 2023-02-10 LAB
ANION GAP SERPL CALC-SCNC: 11 MMOL/L — SIGNIFICANT CHANGE UP (ref 5–17)
BASOPHILS # BLD AUTO: 0.06 K/UL — SIGNIFICANT CHANGE UP (ref 0–0.2)
BASOPHILS NFR BLD AUTO: 0.4 % — SIGNIFICANT CHANGE UP (ref 0–2)
BUN SERPL-MCNC: 20 MG/DL — SIGNIFICANT CHANGE UP (ref 7–23)
CALCIUM SERPL-MCNC: 8.4 MG/DL — SIGNIFICANT CHANGE UP (ref 8.4–10.5)
CHLORIDE SERPL-SCNC: 106 MMOL/L — SIGNIFICANT CHANGE UP (ref 96–108)
CO2 SERPL-SCNC: 20 MMOL/L — LOW (ref 22–31)
CREAT SERPL-MCNC: 1.68 MG/DL — HIGH (ref 0.5–1.3)
EGFR: 45 ML/MIN/1.73M2 — LOW
EOSINOPHIL # BLD AUTO: 0.15 K/UL — SIGNIFICANT CHANGE UP (ref 0–0.5)
EOSINOPHIL NFR BLD AUTO: 1 % — SIGNIFICANT CHANGE UP (ref 0–6)
GLUCOSE BLDC GLUCOMTR-MCNC: 136 MG/DL — HIGH (ref 70–99)
GLUCOSE BLDC GLUCOMTR-MCNC: 151 MG/DL — HIGH (ref 70–99)
GLUCOSE BLDC GLUCOMTR-MCNC: 163 MG/DL — HIGH (ref 70–99)
GLUCOSE BLDC GLUCOMTR-MCNC: 306 MG/DL — HIGH (ref 70–99)
GLUCOSE BLDC GLUCOMTR-MCNC: 99 MG/DL — SIGNIFICANT CHANGE UP (ref 70–99)
GLUCOSE SERPL-MCNC: 124 MG/DL — HIGH (ref 70–99)
HCT VFR BLD CALC: 26.3 % — LOW (ref 39–50)
HGB BLD-MCNC: 8.2 G/DL — LOW (ref 13–17)
IMM GRANULOCYTES NFR BLD AUTO: 0.6 % — SIGNIFICANT CHANGE UP (ref 0–0.9)
LYMPHOCYTES # BLD AUTO: 13.8 % — SIGNIFICANT CHANGE UP (ref 13–44)
LYMPHOCYTES # BLD AUTO: 2.16 K/UL — SIGNIFICANT CHANGE UP (ref 1–3.3)
MAGNESIUM SERPL-MCNC: 2.1 MG/DL — SIGNIFICANT CHANGE UP (ref 1.6–2.6)
MCHC RBC-ENTMCNC: 26.9 PG — LOW (ref 27–34)
MCHC RBC-ENTMCNC: 31.2 GM/DL — LOW (ref 32–36)
MCV RBC AUTO: 86.2 FL — SIGNIFICANT CHANGE UP (ref 80–100)
MONOCYTES # BLD AUTO: 1.21 K/UL — HIGH (ref 0–0.9)
MONOCYTES NFR BLD AUTO: 7.7 % — SIGNIFICANT CHANGE UP (ref 2–14)
NEUTROPHILS # BLD AUTO: 11.96 K/UL — HIGH (ref 1.8–7.4)
NEUTROPHILS NFR BLD AUTO: 76.5 % — SIGNIFICANT CHANGE UP (ref 43–77)
NRBC # BLD: 0 /100 WBCS — SIGNIFICANT CHANGE UP (ref 0–0)
PHOSPHATE SERPL-MCNC: 3.3 MG/DL — SIGNIFICANT CHANGE UP (ref 2.5–4.5)
PLATELET # BLD AUTO: 577 K/UL — HIGH (ref 150–400)
POTASSIUM SERPL-MCNC: 3.8 MMOL/L — SIGNIFICANT CHANGE UP (ref 3.5–5.3)
POTASSIUM SERPL-SCNC: 3.8 MMOL/L — SIGNIFICANT CHANGE UP (ref 3.5–5.3)
RBC # BLD: 3.05 M/UL — LOW (ref 4.2–5.8)
RBC # FLD: 17.4 % — HIGH (ref 10.3–14.5)
SODIUM SERPL-SCNC: 137 MMOL/L — SIGNIFICANT CHANGE UP (ref 135–145)
WBC # BLD: 15.36 K/UL — HIGH (ref 3.8–10.5)
WBC # FLD AUTO: 15.36 K/UL — HIGH (ref 3.8–10.5)

## 2023-02-10 PROCEDURE — 99232 SBSQ HOSP IP/OBS MODERATE 35: CPT

## 2023-02-10 PROCEDURE — 99222 1ST HOSP IP/OBS MODERATE 55: CPT

## 2023-02-10 RX ORDER — PIPERACILLIN AND TAZOBACTAM 4; .5 G/20ML; G/20ML
3.38 INJECTION, POWDER, LYOPHILIZED, FOR SOLUTION INTRAVENOUS EVERY 8 HOURS
Refills: 0 | Status: DISCONTINUED | OUTPATIENT
Start: 2023-02-10 | End: 2023-02-11

## 2023-02-10 RX ORDER — HEPARIN SODIUM 5000 [USP'U]/ML
5000 INJECTION INTRAVENOUS; SUBCUTANEOUS EVERY 8 HOURS
Refills: 0 | Status: DISCONTINUED | OUTPATIENT
Start: 2023-02-10 | End: 2023-02-14

## 2023-02-10 RX ORDER — PIPERACILLIN AND TAZOBACTAM 4; .5 G/20ML; G/20ML
3.38 INJECTION, POWDER, LYOPHILIZED, FOR SOLUTION INTRAVENOUS EVERY 8 HOURS
Refills: 0 | Status: DISCONTINUED | OUTPATIENT
Start: 2023-02-10 | End: 2023-02-10

## 2023-02-10 RX ORDER — ACETAMINOPHEN 500 MG
1000 TABLET ORAL EVERY 6 HOURS
Refills: 0 | Status: DISCONTINUED | OUTPATIENT
Start: 2023-02-10 | End: 2023-02-14

## 2023-02-10 RX ADMIN — Medication 8 UNIT(S): at 09:05

## 2023-02-10 RX ADMIN — Medication 8 UNIT(S): at 13:02

## 2023-02-10 RX ADMIN — Medication 8 UNIT(S): at 17:27

## 2023-02-10 RX ADMIN — PIPERACILLIN AND TAZOBACTAM 25 GRAM(S): 4; .5 INJECTION, POWDER, LYOPHILIZED, FOR SOLUTION INTRAVENOUS at 13:38

## 2023-02-10 RX ADMIN — Medication 8: at 11:45

## 2023-02-10 RX ADMIN — Medication 2: at 21:47

## 2023-02-10 RX ADMIN — Medication 2: at 17:26

## 2023-02-10 RX ADMIN — PIPERACILLIN AND TAZOBACTAM 25 GRAM(S): 4; .5 INJECTION, POWDER, LYOPHILIZED, FOR SOLUTION INTRAVENOUS at 06:01

## 2023-02-10 RX ADMIN — LISINOPRIL 40 MILLIGRAM(S): 2.5 TABLET ORAL at 06:01

## 2023-02-10 RX ADMIN — CLOPIDOGREL BISULFATE 75 MILLIGRAM(S): 75 TABLET, FILM COATED ORAL at 11:43

## 2023-02-10 RX ADMIN — INSULIN GLARGINE 24 UNIT(S): 100 INJECTION, SOLUTION SUBCUTANEOUS at 21:48

## 2023-02-10 RX ADMIN — AMLODIPINE BESYLATE 10 MILLIGRAM(S): 2.5 TABLET ORAL at 06:01

## 2023-02-10 RX ADMIN — ATORVASTATIN CALCIUM 40 MILLIGRAM(S): 80 TABLET, FILM COATED ORAL at 21:47

## 2023-02-10 RX ADMIN — Medication 81 MILLIGRAM(S): at 11:42

## 2023-02-10 RX ADMIN — PIPERACILLIN AND TAZOBACTAM 25 GRAM(S): 4; .5 INJECTION, POWDER, LYOPHILIZED, FOR SOLUTION INTRAVENOUS at 21:18

## 2023-02-10 NOTE — PROGRESS NOTE ADULT - SUBJECTIVE AND OBJECTIVE BOX
Patient is a 62y old  Male who presents with a chief complaint of LLE Angiogram (10 Feb 2023 05:25)      INTERVAL HPI/ OVERNIGHT EVENTS. Pt seen and examined bedside. Denies any pain to the foot.       LABS                        8.0    13.81 )-----------( 588      ( 09 Feb 2023 10:19 )             25.3     02-09    133<L>  |  103  |  17  ----------------------------<  137<H>  4.2   |  22  |  1.31<H>    Ca    8.5      09 Feb 2023 10:19  Phos  2.3     02-09  Mg     1.7     02-09      PTT - ( 08 Feb 2023 07:48 )  PTT:29.9 sec    ICU Vital Signs Last 24 Hrs  T(C): 36.7 (10 Feb 2023 04:21), Max: 37.4 (09 Feb 2023 08:32)  T(F): 98.1 (10 Feb 2023 04:21), Max: 99.3 (09 Feb 2023 08:32)  HR: 86 (10 Feb 2023 05:59) (80 - 108)  BP: 154/73 (10 Feb 2023 05:59) (131/69 - 155/70)  BP(mean): 93 (09 Feb 2023 16:44) (93 - 101)  ABP: --  ABP(mean): --  RR: 17 (10 Feb 2023 05:59) (16 - 18)  SpO2: 94% (10 Feb 2023 05:59) (94% - 97%)    O2 Parameters below as of 10 Feb 2023 05:59  Patient On (Oxygen Delivery Method): room air            RADIOLOGY  < from: MR Foot w/wo IV Cont, Left (02.07.23 @ 20:23) >  IMPRESSION:  1.  Soft tissue wounds are present with osteomyelitis of the 1st   metatarsal and 1st proximal phalanx and remaining 5th metatarsal.  2.  Marrow changes involving the resected margin of the 2nd metatarsal   and 3rd metatarsal phalangeal joint osteotomy site may be reactive or   reflect early osteomyelitis.  3.  Slight edema involving the distal lateral margin of the cuboid may be   reactive or reflect additional infection with the adjacent 5th metatarsal   changes.  4.  Small subcutaneous fluid collection/abscess is present decompressing   from the 1st metatarsophalangeal joint dorsally near the 2nd metatarsal   stump.  5.  Small subcutaneous fluid collection/abscess extends along the   abductor digit minimi muscle proximal to the 5th metatarsal soft tissue   wounds.  < end of copied text >      MICROBIOLOGY    Culture - Tissue with Gram Stain (collected 08 Feb 2023 20:00)  Source: .Tissue 5th  metacarpal or spec  Gram Stain (08 Feb 2023 20:48):    No organisms seen    No WBC's seen.  Preliminary Report (09 Feb 2023 09:16):    No growth to date.    Culture - Tissue with Gram Stain (collected 08 Feb 2023 20:00)  Source: .Tissue 1st metacarpal or spec  Gram Stain (08 Feb 2023 20:26):    Test cannot be performed on this type of specimen.  Preliminary Report (09 Feb 2023 11:04):    No growth to date.        PHYSICAL EXAM  Left Lower Extremity Focused  Vasc: DP/PT 2/4, No edema or erythema, temp gradient warm to warm   Derm: TMA site partially closed with staples in tact and no signs of dehiscence, sanguinous drainage penrose in place, no pus or serous drainage,   Neuro: Protective sensation diminished  MSK: TMA

## 2023-02-10 NOTE — PHYSICAL THERAPY INITIAL EVALUATION ADULT - ADDITIONAL COMMENTS
Pt lives with his wife and grandson in an elevator access apt. At baseline, ambulates independently with no DME. Owns a SC.

## 2023-02-10 NOTE — PROGRESS NOTE ADULT - ASSESSMENT
62M with PMH of HTN, HLD, DM, PAD s/p multiple L toe amputations 2 years prior (c/b medial deviation of 1st/2nd toes resulting in ulceration of medial metatarsal head) presents for LLE Angiogram. Podiatry consulted to evaluate Left foot wound. Xrays demonstrate dislocation of 1st MTPJ and deformity of the 2nd met shaft. Also notes deformity of the 3rd, 4th and 5th met shafts extending to the MTPJ's. Sclerosis of the base of the 5th may be d/t chronic OM. MRI on 2/7 showed OM of L 1st metatarsal/hallux and 5th metatarsal. Pt is s/p L foot TMA with delayed primary closure on 2/8.     Plan:   - Recommend ID consult for residual OM   - Dry sterile Dressing applied to foot  - F/u OR cultures/pathology (currently NGTD)  - C/w antibiotics per primary team  - Pt can WBAT to L heel in surgical shoe  - Rest of care per primary team    Plan discussed w/ Attending & Podiatry following

## 2023-02-10 NOTE — PHYSICAL THERAPY INITIAL EVALUATION ADULT - GENERAL OBSERVATIONS, REHAB EVAL
Pt received semi supine, +L foot ace bandage C/D/I, +heplock, +telemetry, +pulse ox, NAD, agreeable to PT. LLE surgical shoe donned for mobility.

## 2023-02-10 NOTE — CONSULT NOTE ADULT - ASSESSMENT
62M h/o DM, PAD s/p toe amputation, L toe OM s/p two rounds of IV abx x 6 weeks in 2022, HTN, HLD, p/w LLE angiogram for worsening L foot ulcer/OM, s/p LLE angio and SFA/pop stent on 2/6 and TMA on 2/9.  OR culture growing E. faecalis and staph lugdunensis and staph epi.  Case d/w Dr. Lamar - there is residual OM and will require 6 weeks of IV abx.  Of note, patient is having LEANNE now.    - stop zosyn  - start daptomycin 600mg IV q24h  - f/u OR culture and susceptibility   - check CK  - Place single lumen PICC line   - Duration of antibiotics is 6 weeks ( 2/9 - 3/22)  - Weekly labs: CMP, CBC, ESR, CRP, CK faxed to ID office at [phone]  - Patient to follow up with Dr. davila in 2 weeks ([address] [place] [address], [phone]), ID office will call patient to schedule       Team 2 will follow you.  Case d/w primary team.    Sabrina Davila MD, MS  Infectious Disease attending  work cell [phone]   For any questions during evening/weekend/holiday, please page ID on call

## 2023-02-10 NOTE — PHYSICAL THERAPY INITIAL EVALUATION ADULT - PERTINENT HX OF CURRENT PROBLEM, REHAB EVAL
62M with PMH of HTN, HLD, DM, PAD s/p multiple L toe amputations 2 years prior (c/b medial deviation of 1st/2nd toes resulting in ulceration of medial metatarsal head) presents for LLE Angiogram, now s/p Left TMA

## 2023-02-10 NOTE — PROGRESS NOTE ADULT - SUBJECTIVE AND OBJECTIVE BOX
Patient is a 62y old  Male who presents with a chief complaint of LLE Angiogram (10 Feb 2023 21:53)    INTERVAL EVENTS:  tolerating regular texture diet   no nausea,   no dysuria   had a firm bowel movement today    SUBJECTIVE:  Patient was seen and examined at bedside.  Review of systems: No CP, dyspnea, nausea or vomiting, dysuria, LE edema.     Diet, Regular:   Consistent Carbohydrate Evening Snack (CSTCHOSN) (02-06-23 @ 18:07) [Active]    MEDICATIONS:  MEDICATIONS  (STANDING):  amLODIPine   Tablet 10 milliGRAM(s) Oral daily  aspirin  chewable 81 milliGRAM(s) Oral daily  atorvastatin 40 milliGRAM(s) Oral at bedtime  clopidogrel Tablet 75 milliGRAM(s) Oral daily  dextrose 5%. 1000 milliLiter(s) (50 mL/Hr) IV Continuous <Continuous>  dextrose 5%. 1000 milliLiter(s) (100 mL/Hr) IV Continuous <Continuous>  dextrose 50% Injectable 25 Gram(s) IV Push once  dextrose 50% Injectable 12.5 Gram(s) IV Push once  dextrose 50% Injectable 25 Gram(s) IV Push once  glucagon  Injectable 1 milliGRAM(s) IntraMuscular once  heparin   Injectable 5000 Unit(s) SubCutaneous every 8 hours  insulin glargine Injectable (LANTUS) 24 Unit(s) SubCutaneous at bedtime  insulin lispro (ADMELOG) corrective regimen sliding scale   SubCutaneous Before meals and at bedtime  insulin lispro Injectable (ADMELOG) 8 Unit(s) SubCutaneous three times a day before meals  lisinopril 40 milliGRAM(s) Oral daily  piperacillin/tazobactam IVPB.. 3.375 Gram(s) IV Intermittent every 8 hours  polyethylene glycol 3350 17 Gram(s) Oral daily    MEDICATIONS  (PRN):  acetaminophen     Tablet .. 1000 milliGRAM(s) Oral every 6 hours PRN Temp greater or equal to 38C (100.4F), Mild Pain (1 - 3), Moderate Pain (4 - 6)  dextrose Oral Gel 15 Gram(s) Oral once PRN Blood Glucose LESS THAN 70 milliGRAM(s)/deciliter    Allergies    cefepime (Rash; Hives)  ciprofloxacin (Hives; Rash)    Intolerances        OBJECTIVE:  Vital Signs Last 24 Hrs  T(C): 36.8 (10 Feb 2023 22:04), Max: 37.1 (10 Feb 2023 13:24)  T(F): 98.3 (10 Feb 2023 22:04), Max: 98.8 (10 Feb 2023 13:24)  HR: 80 (11 Feb 2023 00:26) (80 - 94)  BP: 137/65 (11 Feb 2023 00:26) (137/65 - 154/73)  BP(mean): 94 (11 Feb 2023 00:26) (89 - 98)  RR: 18 (11 Feb 2023 00:26) (17 - 18)  SpO2: 95% (11 Feb 2023 00:26) (94% - 96%)    Parameters below as of 11 Feb 2023 00:26  Patient On (Oxygen Delivery Method): room air      I&O's Summary    09 Feb 2023 07:01  -  10 Feb 2023 07:00  --------------------------------------------------------  IN: 560 mL / OUT: 1050 mL / NET: -490 mL    10 Feb 2023 07:01  -  11 Feb 2023 01:35  --------------------------------------------------------  IN: 0 mL / OUT: 1350 mL / NET: -1350 mL    PHYSICAL EXAM:  Gen: Reclining in bed at time of exam, appears stated age  HEENT: NCAT, MMM, clear OP  Neck: supple, trachea at midline  CV: RRR, +S1/S2  Pulm: adequate respiratory effort, no increase in work of breathing  Abd: soft, ND  Skin: warm and dry,  Ext: Left foot dressed in clean, dry, dressing,   right foot WWP  Neuro: AOx3, speaking in full sentences   Psych: affect and behavior appropriate, pleasant at time of interview      LABS:                        8.2    15.36 )-----------( 577      ( 10 Feb 2023 07:29 )             26.3     02-10    137  |  106  |  20  ----------------------------<  124<H>  3.8   |  20<L>  |  1.68<H>    Ca    8.4      10 Feb 2023 07:29  Phos  3.3     02-10  Mg     2.1     02-10          CAPILLARY BLOOD GLUCOSE      POCT Blood Glucose.: 163 mg/dL (10 Feb 2023 21:24)  POCT Blood Glucose.: 99 mg/dL (10 Feb 2023 18:46)  POCT Blood Glucose.: 151 mg/dL (10 Feb 2023 17:07)  POCT Blood Glucose.: 306 mg/dL (10 Feb 2023 11:38)  POCT Blood Glucose.: 136 mg/dL (10 Feb 2023 06:27)        MICRODATA:    Culture - Tissue with Gram Stain (collected 08 Feb 2023 20:00)  Source: .Tissue 5th  metacarpal or spec  Gram Stain (08 Feb 2023 20:48):    No organisms seen    No WBC's seen.  Preliminary Report (10 Feb 2023 12:40):    Rare Enterococcus faecalis    Rare Staphylococcus lugdunensis    Rare Staphylococcus epidermidis    Susceptibility to follow.    Culture - Tissue with Gram Stain (collected 08 Feb 2023 20:00)  Source: .Tissue 1st metacarpal or spec  Gram Stain (08 Feb 2023 20:26):    Test cannot be performed on this type of specimen.  Preliminary Report (09 Feb 2023 11:04):    No growth to date.        RADIOLOGY/OTHER STUDIES:

## 2023-02-10 NOTE — PROGRESS NOTE ADULT - SUBJECTIVE AND OBJECTIVE BOX
O/N: CHOLO PEMBERTON                                                          ---------------------------------------------------------------------------  PLEASE CHECK WHEN PRESENT:  [  ] Heart Failure  [  ] Acute  [  ] Acute on Chronic  [  ] Chronic  -------------------------------------------------------------------  [  ]Diastolic [HFpEF]  [  ]Systolic [HFrEF]  [  ]Combined [HFpEF & HFrEF]  [  ] afib  [  ] hypertensive heart disease  [  ]Other:  -------------------------------------------------------------------  [ ] Respiratory failure  [ ] Acute cor pulmonale  [ ] Asthma/COPD Exacerbation  [ ] Pleural effusion  [ ] Aspiration pneumonia  -------------------------------------------------------------------  [  ]LEANNE  [  ]ATN  [  ]Reneal Medullary Necrosis  [  ]Renal Cortical Necrosis  [  ]Other Pathological Lesions:    [  ]CKD 1  [  ]CKD 2  [  ]CKD 3  [  ]CKD 4  [  ]CKD 5  [  ]Other  -------------------------------------------------------------------  [x ]Diabetes  [  x] Diabetic PVD Ulcer  [  ] Neuropathic ulcer to DM  [  ] Diabetes with Nephropathy  [ x ] Osteomyelitis due to diabetes  --------------------------------------------------------------------  [  ]Malnutrition: See Nutrition Note  [  ]Cachexia  [  ]Other:   [  ]Supplement Ordered:  [  ]Morbid Obesity (BMI >=40]  ---------------------------------------------------------------------  [ ] Sepsis/severe sepsis/septic shock  [ ] UTI  [ ] Pneumonia  -----------------------------------------------------------------------  [ ] Acidosis/alkalosis  [ ] Fluid overload  [ ] Hypokalemia  [ ] Hyperkalemia  [ ] Hypomagnesemia  [ ] Hypophosphatemia  [ ] Hyperphosphatemia  [ x] hyponatremia   ------------------------------------------------------------------------  [ ] Acute blood loss anemia  [ ] Post op blood loss anemia  [ ] Iron deficiency anemia  [x ] Anemia due to chronic disease  [ ] Hypercoagulable state  ----------------------------------------------------------------------  [ ] Cerebral infarction  [ ] Transient ischemia attack  [ ] Encephalopathy                Assessment and Plan:   62M with PMH of HTN, HLD, DM, PAD s/p multiple L toe amputations 2 years prior (c/b medial deviation of 1st/2nd toes resulting in ulceration of medial metatarsal head) presents for LLE Angiogram, now s/p Left TMA      Vascular/PAD:  -Continue ASA + Plavix (Plavix new med)   -s/p Left angio and stent   -s/p L TMA by podiatry 2/8, penrose in, partially closed    HTN/HLD:  -Started atorvastatin   -Started on lisinopril      DM:  -HgA1c=10.9  - Holding Tresiba   - lantus 24u HS, Lispro 8 TID continue ISS       Anemia:   -Hgb 8.5    ID:  -Left foot wounds noted   -appreciate podiatry recs   -Continue Zosyn       Diet: CCD    Activity:  as tolerated, NWB on left foot    DVTPPx: SQH after am CBC     Dispo: PT eval today, NWB on left foot today and heel weight bearing 2/10     O/N: NILAY, VSS      SUBJECTIVE:  Doing well this AM. NAEON. Denies n/v. Denies cp/sob. Pain is well controlled. Tolerating diet. Plan for ambulation w/ PT      MEDICATIONS  (STANDING):  amLODIPine   Tablet 10 milliGRAM(s) Oral daily  aspirin  chewable 81 milliGRAM(s) Oral daily  atorvastatin 40 milliGRAM(s) Oral at bedtime  clopidogrel Tablet 75 milliGRAM(s) Oral daily  dextrose 5%. 1000 milliLiter(s) (100 mL/Hr) IV Continuous <Continuous>  dextrose 5%. 1000 milliLiter(s) (50 mL/Hr) IV Continuous <Continuous>  dextrose 50% Injectable 25 Gram(s) IV Push once  dextrose 50% Injectable 12.5 Gram(s) IV Push once  dextrose 50% Injectable 25 Gram(s) IV Push once  glucagon  Injectable 1 milliGRAM(s) IntraMuscular once  insulin glargine Injectable (LANTUS) 24 Unit(s) SubCutaneous at bedtime  insulin lispro (ADMELOG) corrective regimen sliding scale   SubCutaneous Before meals and at bedtime  insulin lispro Injectable (ADMELOG) 8 Unit(s) SubCutaneous three times a day before meals  lisinopril 40 milliGRAM(s) Oral daily  piperacillin/tazobactam IVPB.. 3.375 Gram(s) IV Intermittent every 8 hours  polyethylene glycol 3350 17 Gram(s) Oral daily    MEDICATIONS  (PRN):  dextrose Oral Gel 15 Gram(s) Oral once PRN Blood Glucose LESS THAN 70 milliGRAM(s)/deciliter  oxycodone    5 mG/acetaminophen 325 mG 1 Tablet(s) Oral every 4 hours PRN Moderate Pain (4 - 6)      Vital Signs Last 24 Hrs  T(C): 36.7 (10 Feb 2023 04:21), Max: 37.4 (09 Feb 2023 08:32)  T(F): 98.1 (10 Feb 2023 04:21), Max: 99.3 (09 Feb 2023 08:32)  HR: 86 (10 Feb 2023 05:59) (80 - 108)  BP: 154/73 (10 Feb 2023 05:59) (131/69 - 155/70)  BP(mean): 93 (09 Feb 2023 16:44) (93 - 101)  RR: 17 (10 Feb 2023 05:59) (16 - 18)  SpO2: 94% (10 Feb 2023 05:59) (94% - 97%)    Parameters below as of 10 Feb 2023 05:59  Patient On (Oxygen Delivery Method): room air        Physical Exam:  General: NAD, resting comfortably in bed  Pulmonary: Nonlabored breathing, no respiratory distress  Cardiovascular: NSR  Abdominal: soft, NT/ND  Extremities: WWP, normal strength, wound dressing clean/dry/intact   Neuro: A/O x 3, CNs II-XII grossly intact, no focal deficits    I&O's Summary    09 Feb 2023 07:01  -  10 Feb 2023 07:00  --------------------------------------------------------  IN: 560 mL / OUT: 1050 mL / NET: -490 mL        LABS:                        8.0    13.81 )-----------( 588      ( 09 Feb 2023 10:19 )             25.3     02-09    133<L>  |  103  |  17  ----------------------------<  137<H>  4.2   |  22  |  1.31<H>    Ca    8.5      09 Feb 2023 10:19  Phos  2.3     02-09  Mg     1.7     02-09      PTT - ( 08 Feb 2023 07:48 )  PTT:29.9 sec    CAPILLARY BLOOD GLUCOSE      POCT Blood Glucose.: 136 mg/dL (10 Feb 2023 06:27)  POCT Blood Glucose.: 189 mg/dL (09 Feb 2023 22:06)  POCT Blood Glucose.: 237 mg/dL (09 Feb 2023 16:43)  POCT Blood Glucose.: 135 mg/dL (09 Feb 2023 11:29)  POCT Blood Glucose.: 227 mg/dL (09 Feb 2023 08:15)    ---------------------------------------------------------------------------  PLEASE CHECK WHEN PRESENT:  [  ] Heart Failure  [  ] Acute  [  ] Acute on Chronic  [  ] Chronic  -------------------------------------------------------------------  [  ]Diastolic [HFpEF]  [  ]Systolic [HFrEF]  [  ]Combined [HFpEF & HFrEF]  [  ] afib  [  ] hypertensive heart disease  [  ]Other:  -------------------------------------------------------------------  [ ] Respiratory failure  [ ] Acute cor pulmonale  [ ] Asthma/COPD Exacerbation  [ ] Pleural effusion  [ ] Aspiration pneumonia  -------------------------------------------------------------------  [  ]LEANNE  [  ]ATN  [  ]Reneal Medullary Necrosis  [  ]Renal Cortical Necrosis  [  ]Other Pathological Lesions:    [  ]CKD 1  [  ]CKD 2  [  ]CKD 3  [  ]CKD 4  [  ]CKD 5  [  ]Other  -------------------------------------------------------------------  [x ]Diabetes  [  x] Diabetic PVD Ulcer  [  ] Neuropathic ulcer to DM  [  ] Diabetes with Nephropathy  [ x ] Osteomyelitis due to diabetes  --------------------------------------------------------------------  [  ]Malnutrition: See Nutrition Note  [  ]Cachexia  [  ]Other:   [  ]Supplement Ordered:  [  ]Morbid Obesity (BMI >=40]  ---------------------------------------------------------------------  [ ] Sepsis/severe sepsis/septic shock  [ ] UTI  [ ] Pneumonia  -----------------------------------------------------------------------  [ ] Acidosis/alkalosis  [ ] Fluid overload  [ ] Hypokalemia  [ ] Hyperkalemia  [ ] Hypomagnesemia  [ ] Hypophosphatemia  [ ] Hyperphosphatemia  [ x] hyponatremia   ------------------------------------------------------------------------  [ ] Acute blood loss anemia  [ ] Post op blood loss anemia  [ ] Iron deficiency anemia  [x ] Anemia due to chronic disease  [ ] Hypercoagulable state  ----------------------------------------------------------------------  [ ] Cerebral infarction  [ ] Transient ischemia attack  [ ] Encephalopathy                Assessment and Plan:   62M with PMH of HTN, HLD, DM, PAD s/p multiple L toe amputations 2 years prior (c/b medial deviation of 1st/2nd toes resulting in ulceration of medial metatarsal head) presents for LLE Angiogram, now s/p Left TMA      Vascular/PAD:  -Continue ASA + Plavix (Plavix new med)   -s/p Left angio and stent   -s/p L TMA by podiatry 2/8, penrose in, partially closed    HTN/HLD:  -Started atorvastatin   -Started on lisinopril      DM:  -HgA1c=10.9  - Holding Tresiba   - lantus 24u HS, Lispro 8 TID continue ISS       Anemia:   -Hgb 8.5    ID:  -Left foot wounds noted   -appreciate podiatry recs   -Continue Zosyn       Diet: CCD    Activity:  as tolerated, NWB on left foot    DVTPPx: SQH after am CBC     Dispo: PT eval today w/ heel weight bearing

## 2023-02-10 NOTE — CONSULT NOTE ADULT - SUBJECTIVE AND OBJECTIVE BOX
INFECTIOUS DISEASES INITIAL CONSULT NOTE    HPI:  62M h/o DM, PAD s/p toe amputation, L toe OM s/p two rounds of IV abx x 6 weeks in 2022, HTN, HLD, p/w LLE angiogram for worsening L foot ulcer.  He has been having L toe ulcer/OM in the past 4 years and in 2022, he received two rounds of IV abx 6 weeks course to treat toe OM (most recently aztreonam) at [facility].  His toe infection initially improved then worsened, so he decided to get a second opinion.  He came in for LLE angio on 2/6 and received SFA/pop stent.  He was seen by podiatry and MRI L foot showed OM of 1st and 5th toe as well as abscess.  He underwent TMA on 2/8.  In order to save ambulatory function, unable to achieve complete source control and he still has residual OM.  He has been on zosyn.  OR culture growing E. faecalis and staph lugdunensis.  ID was consulted for management of OM.   He denied fever/chills, n/v/d, abdominal pain.        PAST MEDICAL & SURGICAL HISTORY:  as above       Review of Systems:   Constitutional, eyes, ENT, cardiovascular, respiratory, gastrointestinal, genitourinary, integumentary, neurological, psychiatric and heme/lymph are otherwise negative other than noted above       ANTIBIOTICS:  MEDICATIONS  (STANDING):  amLODIPine   Tablet 10 milliGRAM(s) Oral daily  aspirin  chewable 81 milliGRAM(s) Oral daily  atorvastatin 40 milliGRAM(s) Oral at bedtime  clopidogrel Tablet 75 milliGRAM(s) Oral daily  dextrose 5%. 1000 milliLiter(s) (50 mL/Hr) IV Continuous <Continuous>  dextrose 5%. 1000 milliLiter(s) (100 mL/Hr) IV Continuous <Continuous>  dextrose 50% Injectable 25 Gram(s) IV Push once  dextrose 50% Injectable 12.5 Gram(s) IV Push once  dextrose 50% Injectable 25 Gram(s) IV Push once  glucagon  Injectable 1 milliGRAM(s) IntraMuscular once  heparin   Injectable 5000 Unit(s) SubCutaneous every 8 hours  insulin glargine Injectable (LANTUS) 24 Unit(s) SubCutaneous at bedtime  insulin lispro (ADMELOG) corrective regimen sliding scale   SubCutaneous Before meals and at bedtime  insulin lispro Injectable (ADMELOG) 8 Unit(s) SubCutaneous three times a day before meals  lisinopril 40 milliGRAM(s) Oral daily  piperacillin/tazobactam IVPB.. 3.375 Gram(s) IV Intermittent every 8 hours  polyethylene glycol 3350 17 Gram(s) Oral daily    MEDICATIONS  (PRN):  acetaminophen     Tablet .. 1000 milliGRAM(s) Oral every 6 hours PRN Temp greater or equal to 38C (100.4F), Mild Pain (1 - 3), Moderate Pain (4 - 6)  dextrose Oral Gel 15 Gram(s) Oral once PRN Blood Glucose LESS THAN 70 milliGRAM(s)/deciliter      Allergies    cefepime (Rash; Hives)  ciprofloxacin (Hives; Rash)    Intolerances        SOCIAL HISTORY:  Lives with his wife and bambion.  Unemployed.  Denied toxic habits.        FAMILY HISTORY:   no FH leading to current infection    Vital Signs Last 24 Hrs  T(C): 37 (10 Feb 2023 17:55), Max: 37.2 (09 Feb 2023 22:32)  T(F): 98.6 (10 Feb 2023 17:55), Max: 99 (09 Feb 2023 22:32)  HR: 86 (10 Feb 2023 20:36) (80 - 94)  BP: 147/67 (10 Feb 2023 20:36) (138/62 - 155/70)  BP(mean): 97 (10 Feb 2023 20:36) (89 - 98)  RR: 18 (10 Feb 2023 20:36) (17 - 18)  SpO2: 95% (10 Feb 2023 20:36) (94% - 96%)    Parameters below as of 10 Feb 2023 20:36  Patient On (Oxygen Delivery Method): room air        02-09-23 @ 07:01  -  02-10-23 @ 07:00  --------------------------------------------------------  IN: 560 mL / OUT: 1050 mL / NET: -490 mL    02-10-23 @ 07:01  -  02-10-23 @ 21:54  --------------------------------------------------------  IN: 0 mL / OUT: 1350 mL / NET: -1350 mL        PHYSICAL EXAM:  Constitutional: alert, NAD  Eyes: the sclera and conjunctiva were normal.   ENT: the ears and nose were normal in appearance.   Neck: the appearance of the neck was normal and the neck was supple.   Pulmonary: no respiratory distress and lungs were clear to auscultation bilaterally.   Heart: heart rate was normal and rhythm regular, normal S1 and S2  Vascular:. there was no peripheral edema  Abdomen: normal bowel sounds, soft, non-tender  Ext: L foot s/p TMA       LABS:                        8.2    15.36 )-----------( 577      ( 10 Feb 2023 07:29 )             26.3     02-10    137  |  106  |  20  ----------------------------<  124<H>  3.8   |  20<L>  |  1.68<H>    Ca    8.4      10 Feb 2023 07:29  Phos  3.3     02-10  Mg     2.1     02-10            MICROBIOLOGY:  2/8/23 1st metacarpal: ngtd  2/8/23 5th metacarpal: E. faecalis, staph lugdunensis, staph epidermidis      RADIOLOGY & ADDITIONAL STUDIES:  X-ray L foot 2/9  IMPRESSION: Frontal, lateral and oblique views of the left foot   demonstrates transmetatarsal amputation of the first through fifth digits   with overlying surgical staples.    MR foot L 2/7/23    IMPRESSION:  1.  Soft tissue wounds are present with osteomyelitis of the 1st   metatarsal and 1st proximal phalanx and remaining 5th metatarsal.  2.  Marrow changes involving the resected margin of the 2nd metatarsal   and 3rd metatarsal phalangeal joint osteotomy site may be reactive or   reflect early osteomyelitis.  3.  Slight edema involving the distal lateral margin of the cuboid may be   reactive or reflect additional infection with the adjacent 5th metatarsal   changes.  4.  Small subcutaneous fluid collection/abscess is present decompressing   from the 1st metatarsophalangeal joint dorsally near the 2nd metatarsal   stump.  5.  Small subcutaneous fluid collection/abscess extends along the   abductor digit minimi muscle proximal to the 5th metatarsal soft tissue   wounds.

## 2023-02-11 LAB
-  AMPICILLIN: SIGNIFICANT CHANGE UP
-  CLINDAMYCIN: SIGNIFICANT CHANGE UP
-  CLINDAMYCIN: SIGNIFICANT CHANGE UP
-  DAPTOMYCIN: SIGNIFICANT CHANGE UP
-  ERYTHROMYCIN: SIGNIFICANT CHANGE UP
-  ERYTHROMYCIN: SIGNIFICANT CHANGE UP
-  LINEZOLID: SIGNIFICANT CHANGE UP
-  OXACILLIN: SIGNIFICANT CHANGE UP
-  OXACILLIN: SIGNIFICANT CHANGE UP
-  RIFAMPIN: SIGNIFICANT CHANGE UP
-  RIFAMPIN: SIGNIFICANT CHANGE UP
-  TRIMETHOPRIM/SULFAMETHOXAZOLE: SIGNIFICANT CHANGE UP
-  TRIMETHOPRIM/SULFAMETHOXAZOLE: SIGNIFICANT CHANGE UP
-  VANCOMYCIN: SIGNIFICANT CHANGE UP
CULTURE RESULTS: SIGNIFICANT CHANGE UP
GLUCOSE BLDC GLUCOMTR-MCNC: 104 MG/DL — HIGH (ref 70–99)
GLUCOSE BLDC GLUCOMTR-MCNC: 177 MG/DL — HIGH (ref 70–99)
GLUCOSE BLDC GLUCOMTR-MCNC: 180 MG/DL — HIGH (ref 70–99)
GLUCOSE BLDC GLUCOMTR-MCNC: 216 MG/DL — HIGH (ref 70–99)
METHOD TYPE: SIGNIFICANT CHANGE UP
ORGANISM # SPEC MICROSCOPIC CNT: SIGNIFICANT CHANGE UP
SPECIMEN SOURCE: SIGNIFICANT CHANGE UP

## 2023-02-11 PROCEDURE — 99232 SBSQ HOSP IP/OBS MODERATE 35: CPT

## 2023-02-11 PROCEDURE — 99233 SBSQ HOSP IP/OBS HIGH 50: CPT

## 2023-02-11 RX ORDER — DAPTOMYCIN 500 MG/10ML
600 INJECTION, POWDER, LYOPHILIZED, FOR SOLUTION INTRAVENOUS EVERY 24 HOURS
Refills: 0 | Status: DISCONTINUED | OUTPATIENT
Start: 2023-02-12 | End: 2023-02-14

## 2023-02-11 RX ORDER — DAPTOMYCIN 500 MG/10ML
INJECTION, POWDER, LYOPHILIZED, FOR SOLUTION INTRAVENOUS
Refills: 0 | Status: DISCONTINUED | OUTPATIENT
Start: 2023-02-11 | End: 2023-02-14

## 2023-02-11 RX ORDER — DAPTOMYCIN 500 MG/10ML
600 INJECTION, POWDER, LYOPHILIZED, FOR SOLUTION INTRAVENOUS ONCE
Refills: 0 | Status: COMPLETED | OUTPATIENT
Start: 2023-02-11 | End: 2023-02-11

## 2023-02-11 RX ADMIN — Medication 81 MILLIGRAM(S): at 11:38

## 2023-02-11 RX ADMIN — CLOPIDOGREL BISULFATE 75 MILLIGRAM(S): 75 TABLET, FILM COATED ORAL at 11:38

## 2023-02-11 RX ADMIN — INSULIN GLARGINE 24 UNIT(S): 100 INJECTION, SOLUTION SUBCUTANEOUS at 22:08

## 2023-02-11 RX ADMIN — Medication 8 UNIT(S): at 17:39

## 2023-02-11 RX ADMIN — PIPERACILLIN AND TAZOBACTAM 25 GRAM(S): 4; .5 INJECTION, POWDER, LYOPHILIZED, FOR SOLUTION INTRAVENOUS at 05:29

## 2023-02-11 RX ADMIN — Medication 2: at 17:39

## 2023-02-11 RX ADMIN — DAPTOMYCIN 124 MILLIGRAM(S): 500 INJECTION, POWDER, LYOPHILIZED, FOR SOLUTION INTRAVENOUS at 09:31

## 2023-02-11 RX ADMIN — ATORVASTATIN CALCIUM 40 MILLIGRAM(S): 80 TABLET, FILM COATED ORAL at 22:08

## 2023-02-11 RX ADMIN — Medication 8 UNIT(S): at 13:07

## 2023-02-11 RX ADMIN — Medication 4: at 13:07

## 2023-02-11 RX ADMIN — AMLODIPINE BESYLATE 10 MILLIGRAM(S): 2.5 TABLET ORAL at 05:37

## 2023-02-11 RX ADMIN — LISINOPRIL 40 MILLIGRAM(S): 2.5 TABLET ORAL at 05:37

## 2023-02-11 RX ADMIN — Medication 2: at 22:09

## 2023-02-11 RX ADMIN — Medication 8 UNIT(S): at 08:30

## 2023-02-11 NOTE — PROGRESS NOTE ADULT - SUBJECTIVE AND OBJECTIVE BOX
Patient is a 62y old  Male who presents with a chief complaint of LLE Angiogram (11 Feb 2023 06:55)      INTERVAL HPI/ OVERNIGHT EVENTS. Pt seen and examined bedside. Denies any pain to the foot, but endorsed pain with dressing change. Penrose drain pulled without complication.       LABS                        8.2    15.36 )-----------( 577      ( 10 Feb 2023 07:29 )             26.3     02-10    137  |  106  |  20  ----------------------------<  124<H>  3.8   |  20<L>  |  1.68<H>    Ca    8.4      10 Feb 2023 07:29  Phos  3.3     02-10  Mg     2.1     02-10          ICU Vital Signs Last 24 Hrs  T(C): 37.2 (11 Feb 2023 04:23), Max: 37.2 (11 Feb 2023 04:23)  T(F): 99 (11 Feb 2023 04:23), Max: 99 (11 Feb 2023 04:23)  HR: 89 (11 Feb 2023 08:32) (80 - 89)  BP: 145/67 (11 Feb 2023 08:32) (122/60 - 151/68)  BP(mean): 97 (11 Feb 2023 08:32) (87 - 98)  ABP: --  ABP(mean): --  RR: 18 (11 Feb 2023 08:32) (18 - 18)  SpO2: 97% (11 Feb 2023 08:32) (95% - 97%)    O2 Parameters below as of 11 Feb 2023 08:32  Patient On (Oxygen Delivery Method): room air      RADIOLOGY  < from: Xray Foot AP + Lateral + Oblique, Left (02.09.23 @ 01:06) >  IMPRESSION: Frontal, lateral and oblique views of the left foot   demonstrates transmetatarsal amputation of the first through fifth digits   with overlying surgical staples.  < end of copied text >    MICROBIOLOGY  Culture - Tissue with Gram Stain (collected 08 Feb 2023 20:00)  Source: .Tissue 5th  metacarpal or spec  Gram Stain (08 Feb 2023 20:48):    No organisms seen    No WBC's seen.  Preliminary Report (10 Feb 2023 12:40):    Rare Enterococcus faecalis    Rare Staphylococcus lugdunensis    Rare Staphylococcus epidermidis    Susceptibility to follow.  Culture - Tissue with Gram Stain (collected 08 Feb 2023 20:00)  Source: .Tissue 1st metacarpal or spec  Gram Stain (08 Feb 2023 20:26):    Test cannot be performed on this type of specimen.  Preliminary Report (09 Feb 2023 11:04):    No growth to date.      PHYSICAL EXAM  Lower Extremity Focused  Left Lower Extremity Focused  Vasc: DP/PT 2/4, No edema or erythema, temp gradient warm to warm   Derm: TMA site partially closed with staples in tact and no signs of dehiscence, no pus or serous drainage,   Neuro: Protective sensation diminished  MSK: TMA

## 2023-02-11 NOTE — PROGRESS NOTE ADULT - ASSESSMENT
62M with PMH of HTN, HLD, DM, PAD s/p multiple L toe amputations 2 years prior (c/b medial deviation of 1st/2nd toes resulting in ulceration of medial metatarsal head) presents for LLE Angiogram. Podiatry consulted to evaluate Left foot wound. Xrays demonstrate dislocation of 1st MTPJ and deformity of the 2nd met shaft. Also notes deformity of the 3rd, 4th and 5th met shafts extending to the MTPJ's. Sclerosis of the base of the 5th may be d/t chronic OM. MRI on 2/7 showed OM of L 1st metatarsal/hallux and 5th metatarsal. Pt is s/p L foot TMA with delayed primary closure on 2/8. Clean margin of 5th metatarsal growing e faecalis, staph lugdunesis, & staph epidermidis. Clean margin of 1st met showing NGTD. Pt pending PICC for 6 weeks IV antibiotics.     Plan:   - Penrose drain removed without complication. Dry sterile dressing consisting of packing, gauze, ABD, kerlix and ACE applied to foot.   - C/w antibiotics per ID. - recommending 6 week IV course for residual OM    - F/u OR final cultures/path  - Pt can WBAT to L heel in surgical shoe  - Rest of care per primary team    Plan discussed w/ Attending & Podiatry following    Discharge dressing instructions: Cleanse wound with normal sailine daily, pat dry with sterile guaze, pack open part of incision with 1/4 inch iodoform packing, apply betadine to macerated edges, cover with dry sterile gauze, ABD pad, wrap with Kerlix and light ACE bandage.     Patient should follow up with Dr. Aldair Lamar within 1 week of discharge.    Office information:          [place] Address- [address], [place] [address] Phone: [phone]

## 2023-02-11 NOTE — PROGRESS NOTE ADULT - ASSESSMENT
Assessment and Plan:   62M with PMH of HTN, HLD, DM, PAD s/p multiple L toe amputations 2 years prior (c/b medial deviation of 1st/2nd toes resulting in ulceration of medial metatarsal head) presents for LLE Angiogram, now s/p Left TMA      Vascular/PAD:  -Continue ASA + Plavix (Plavix new med)   -s/p Left angio and stent   -s/p L TMA by podiatry 2/8, penrose in, partially closed  - Penrose drain removal by podiatry today    HTN/HLD:  -Started atorvastatin   -Started on lisinopril      DM:  -HgA1c=10.9  - Holding Tresiba   - lantus 24u HS, Lispro 8 TID continue ISS       Anemia:   -Hgb 8.5    ID:  -Left foot wounds noted   -appreciate podiatry recs   -Change zosyn to daptomycin per ID until 3/22  -Plan for PICC when able and f/u w/ID outpatient    Diet: CCD    Activity:  as tolerated, NWB on left foot    DVTPPx: SQH after am CBC     Dispo: PT->rolling walker

## 2023-02-11 NOTE — PROGRESS NOTE ADULT - SUBJECTIVE AND OBJECTIVE BOX
INFECTIOUS DISEASES CONSULT FOLLOW-UP NOTE    INTERVAL HPI/OVERNIGHT EVENTS:  no event overnight  patient feels well  denied n/v/d, abdominal pain     ROS:   Constitutional, eyes, ENT, cardiovascular, respiratory, gastrointestinal, genitourinary, integumentary, neurological, psychiatric and heme/lymph are otherwise negative other than noted above       ANTIBIOTICS/RELEVANT:    MEDICATIONS  (STANDING):  amLODIPine   Tablet 10 milliGRAM(s) Oral daily  aspirin  chewable 81 milliGRAM(s) Oral daily  atorvastatin 40 milliGRAM(s) Oral at bedtime  clopidogrel Tablet 75 milliGRAM(s) Oral daily  DAPTOmycin IVPB      dextrose 5%. 1000 milliLiter(s) (50 mL/Hr) IV Continuous <Continuous>  dextrose 5%. 1000 milliLiter(s) (100 mL/Hr) IV Continuous <Continuous>  dextrose 50% Injectable 25 Gram(s) IV Push once  dextrose 50% Injectable 12.5 Gram(s) IV Push once  dextrose 50% Injectable 25 Gram(s) IV Push once  glucagon  Injectable 1 milliGRAM(s) IntraMuscular once  heparin   Injectable 5000 Unit(s) SubCutaneous every 8 hours  insulin glargine Injectable (LANTUS) 24 Unit(s) SubCutaneous at bedtime  insulin lispro (ADMELOG) corrective regimen sliding scale   SubCutaneous Before meals and at bedtime  insulin lispro Injectable (ADMELOG) 8 Unit(s) SubCutaneous three times a day before meals  lisinopril 40 milliGRAM(s) Oral daily  polyethylene glycol 3350 17 Gram(s) Oral daily    MEDICATIONS  (PRN):  acetaminophen     Tablet .. 1000 milliGRAM(s) Oral every 6 hours PRN Temp greater or equal to 38C (100.4F), Mild Pain (1 - 3), Moderate Pain (4 - 6)  dextrose Oral Gel 15 Gram(s) Oral once PRN Blood Glucose LESS THAN 70 milliGRAM(s)/deciliter        Vital Signs Last 24 Hrs  T(C): 36.9 (11 Feb 2023 14:21), Max: 37.2 (11 Feb 2023 04:23)  T(F): 98.5 (11 Feb 2023 14:21), Max: 99 (11 Feb 2023 04:23)  HR: 87 (11 Feb 2023 11:37) (80 - 89)  BP: 162/72 (11 Feb 2023 11:37) (122/60 - 162/72)  BP(mean): 104 (11 Feb 2023 11:37) (87 - 104)  RR: 18 (11 Feb 2023 11:37) (18 - 18)  SpO2: 96% (11 Feb 2023 11:37) (95% - 97%)    Parameters below as of 11 Feb 2023 11:37  Patient On (Oxygen Delivery Method): room air        02-10-23 @ 07:01  -  02-11-23 @ 07:00  --------------------------------------------------------  IN: 0 mL / OUT: 2175 mL / NET: -2175 mL    02-11-23 @ 07:01  -  02-11-23 @ 15:29  --------------------------------------------------------  IN: 600 mL / OUT: 0 mL / NET: 600 mL      PHYSICAL EXAM:  Constitutional: alert, NAD  Eyes: the sclera and conjunctiva were normal.   ENT: the ears and nose were normal in appearance.   Neck: the appearance of the neck was normal and the neck was supple.   Pulmonary: no respiratory distress and lungs were clear to auscultation bilaterally.   Heart: heart rate was normal and rhythm regular, normal S1 and S2  Vascular:. there was no peripheral edema  Abdomen: normal bowel sounds, soft, non-tender  Ext: L foot s/p TMA, wrapped with dressing         LABS:                        8.2    15.36 )-----------( 577      ( 10 Feb 2023 07:29 )             26.3     02-10    137  |  106  |  20  ----------------------------<  124<H>  3.8   |  20<L>  |  1.68<H>    Ca    8.4      10 Feb 2023 07:29  Phos  3.3     02-10  Mg     2.1     02-10            MICROBIOLOGY:  2/8/23 1st metacarpal: ngtd  2/8/23 5th metacarpal: E. faecalis (S to amp, linezolid, R to vanc), staph lugdunensis (S to ox, vanc), staph epidermidis (R to ox)      RADIOLOGY & ADDITIONAL STUDIES:  X-ray L foot 2/9  IMPRESSION: Frontal, lateral and oblique views of the left foot   demonstrates transmetatarsal amputation of the first through fifth digits   with overlying surgical staples.    MR foot L 2/7/23    IMPRESSION:  1.  Soft tissue wounds are present with osteomyelitis of the 1st   metatarsal and 1st proximal phalanx and remaining 5th metatarsal.  2.  Marrow changes involving the resected margin of the 2nd metatarsal   and 3rd metatarsal phalangeal joint osteotomy site may be reactive or   reflect early osteomyelitis.  3.  Slight edema involving the distal lateral margin of the cuboid may be   reactive or reflect additional infection with the adjacent 5th metatarsal   changes.  4.  Small subcutaneous fluid collection/abscess is present decompressing   from the 1st metatarsophalangeal joint dorsally near the 2nd metatarsal   stump.  5.  Small subcutaneous fluid collection/abscess extends along the   abductor digit minimi muscle proximal to the 5th metatarsal soft tissue   wounds.

## 2023-02-11 NOTE — PROGRESS NOTE ADULT - ASSESSMENT
62M h/o DM, PAD s/p toe amputation, L toe OM s/p two rounds of IV abx x 6 weeks in 2022, HTN, HLD, p/w LLE angiogram for worsening L foot ulcer/OM, s/p LLE angio and SFA/pop stent on 2/6 and TMA on 2/9.  OR culture growing E. faecalis and staph lugdunensis and staph epi.  Case d/w Dr. Lamar - there is residual OM and will require 6 weeks of IV abx.  Of note, patient is having LEANNE now.    - cont daptomycin 600mg IV q24h to cover staph spp and E. faecalis  - f/u dapto susceptibility for E. faecalis  - check CK  - Place single lumen PICC line   - Duration of antibiotics is 6 weeks ( 2/9 - 3/22)  - Weekly labs: CMP, CBC, ESR, CRP, CK faxed to ID office at [phone]  - Patient to follow up with Dr. davila in 2 weeks ([address] [place] [address], [phone]), ID office will call patient to schedule       Team 2 will follow you.  Case d/w primary team.    Sabrina Davila MD, MS  Infectious Disease attending  work cell [phone]   For any questions during evening/weekend/holiday, please page ID on call

## 2023-02-11 NOTE — PROGRESS NOTE ADULT - SUBJECTIVE AND OBJECTIVE BOX
O/N: NILAY< VSS refusing labs and sqh                                                ---------------------------------------------------------------------------  PLEASE CHECK WHEN PRESENT:  [  ] Heart Failure  [  ] Acute  [  ] Acute on Chronic  [  ] Chronic  -------------------------------------------------------------------  [  ]Diastolic [HFpEF]  [  ]Systolic [HFrEF]  [  ]Combined [HFpEF & HFrEF]  [  ] afib  [  ] hypertensive heart disease  [  ]Other:  -------------------------------------------------------------------  [ ] Respiratory failure  [ ] Acute cor pulmonale  [ ] Asthma/COPD Exacerbation  [ ] Pleural effusion  [ ] Aspiration pneumonia  -------------------------------------------------------------------  [  ]LEANNE  [  ]ATN  [  ]Reneal Medullary Necrosis  [  ]Renal Cortical Necrosis  [  ]Other Pathological Lesions:    [  ]CKD 1  [  ]CKD 2  [  ]CKD 3  [  ]CKD 4  [  ]CKD 5  [  ]Other  -------------------------------------------------------------------  [x ]Diabetes  [  x] Diabetic PVD Ulcer  [  ] Neuropathic ulcer to DM  [  ] Diabetes with Nephropathy  [ x ] Osteomyelitis due to diabetes  --------------------------------------------------------------------  [  ]Malnutrition: See Nutrition Note  [  ]Cachexia  [  ]Other:   [  ]Supplement Ordered:  [  ]Morbid Obesity (BMI >=40]  ---------------------------------------------------------------------  [ ] Sepsis/severe sepsis/septic shock  [ ] UTI  [ ] Pneumonia  -----------------------------------------------------------------------  [ ] Acidosis/alkalosis  [ ] Fluid overload  [ ] Hypokalemia  [ ] Hyperkalemia  [ ] Hypomagnesemia  [ ] Hypophosphatemia  [ ] Hyperphosphatemia  [ x] hyponatremia   ------------------------------------------------------------------------  [ ] Acute blood loss anemia  [ ] Post op blood loss anemia  [ ] Iron deficiency anemia  [x ] Anemia due to chronic disease  [ ] Hypercoagulable state  ----------------------------------------------------------------------  [ ] Cerebral infarction  [ ] Transient ischemia attack  [ ] Encephalopathy                Assessment and Plan:   62M with PMH of HTN, HLD, DM, PAD s/p multiple L toe amputations 2 years prior (c/b medial deviation of 1st/2nd toes resulting in ulceration of medial metatarsal head) presents for LLE Angiogram, now s/p Left TMA      Vascular/PAD:  -Continue ASA + Plavix (Plavix new med)   -s/p Left angio and stent   -s/p L TMA by podiatry 2/8, penrose in, partially closed    HTN/HLD:  -Started atorvastatin   -Started on lisinopril      DM:  -HgA1c=10.9  - Holding Tresiba   - lantus 24u HS, Lispro 8 TID continue ISS       Anemia:   -Hgb 8.5    ID:  -Left foot wounds noted   -appreciate podiatry recs   -Continue Zosyn       Diet: CCD    Activity:  as tolerated, NWB on left foot    DVTPPx: SQH after am CBC     Dispo: PT->rolling walker       O/N: NILAY< VSS refusing labs and sqh    Subjective: This morning, he feels well; his pain is well-controlled. Tolerating a regular diet without nausea or vomiting. No acute complaints.       Constitutional: (-) fever, (-) chills  Eyes/ENT: (-) blurry vision, (-) epistaxis, (-) sore throat  Cardiovascular: (-) chest pain, (-) syncope  Respiratory: (-) cough, (-) shortness of breath  Gastrointestinal: (-) abdominal pain, (-) vomiting, (-) diarrhea  Musculoskeletal: (-) neck pain, (-) back pain, (-) joint pain  Integumentary: (-) rash, (-) edema  Neurological: (-) headache, (-) altered mental status  Psychiatric: (-) hallucinations,   Allergic/Immunologic: (-) pruritus     Vital Signs Last 24 Hrs  T(C): 36.4 (11 Feb 2023 10:50), Max: 37.2 (11 Feb 2023 04:23)  T(F): 97.6 (11 Feb 2023 10:50), Max: 99 (11 Feb 2023 04:23)  HR: 87 (11 Feb 2023 11:37) (80 - 89)  BP: 162/72 (11 Feb 2023 11:37) (122/60 - 162/72)  BP(mean): 104 (11 Feb 2023 11:37) (87 - 104)  RR: 18 (11 Feb 2023 11:37) (18 - 18)  SpO2: 96% (11 Feb 2023 11:37) (95% - 97%)    Parameters below as of 11 Feb 2023 11:37  Patient On (Oxygen Delivery Method): room air        I&O's Summary    10 Feb 2023 07:01  -  11 Feb 2023 07:00  --------------------------------------------------------  IN: 0 mL / OUT: 2175 mL / NET: -2175 mL    11 Feb 2023 07:01  -  11 Feb 2023 11:44  --------------------------------------------------------  IN: 360 mL / OUT: 0 mL / NET: 360 mL        Neuro: Alert and Oriented X 4. No apparent focal neural deficits  HEENT: NCAT. Mucous membranes moist. EOMI  Respiratory:  No respiratory distress  Cardiovascular: Non tachy/fernando  Gastrointestinal: soft, NT/ND. No rebound/guarding  Extremities: LLE wrapped w/ACE per podiatry. No apparent saturation of dressing.      LABS:                        8.2    15.36 )-----------( 577      ( 10 Feb 2023 07:29 )             26.3     02-10    137  |  106  |  20  ----------------------------<  124<H>  3.8   |  20<L>  |  1.68<H>    Ca    8.4      10 Feb 2023 07:29  Phos  3.3     02-10  Mg     2.1     02-10            CAPILLARY BLOOD GLUCOSE      POCT Blood Glucose.: 104 mg/dL (11 Feb 2023 06:21)  POCT Blood Glucose.: 163 mg/dL (10 Feb 2023 21:24)  POCT Blood Glucose.: 99 mg/dL (10 Feb 2023 18:46)  POCT Blood Glucose.: 151 mg/dL (10 Feb 2023 17:07)      RADIOLOGY & ADDITIONAL TESTS:        ---------------------------------------------------------------------------  PLEASE CHECK WHEN PRESENT:  [  ] Heart Failure  [  ] Acute  [  ] Acute on Chronic  [  ] Chronic  -------------------------------------------------------------------  [  ]Diastolic [HFpEF]  [  ]Systolic [HFrEF]  [  ]Combined [HFpEF & HFrEF]  [  ] afib  [  ] hypertensive heart disease  [  ]Other:  -------------------------------------------------------------------  [ ] Respiratory failure  [ ] Acute cor pulmonale  [ ] Asthma/COPD Exacerbation  [ ] Pleural effusion  [ ] Aspiration pneumonia  -------------------------------------------------------------------  [  ]LEANNE  [  ]ATN  [  ]Reneal Medullary Necrosis  [  ]Renal Cortical Necrosis  [  ]Other Pathological Lesions:    [  ]CKD 1  [  ]CKD 2  [  ]CKD 3  [  ]CKD 4  [  ]CKD 5  [  ]Other  -------------------------------------------------------------------  [x ]Diabetes  [  x] Diabetic PVD Ulcer  [  ] Neuropathic ulcer to DM  [  ] Diabetes with Nephropathy  [ x ] Osteomyelitis due to diabetes  --------------------------------------------------------------------  [  ]Malnutrition: See Nutrition Note  [  ]Cachexia  [  ]Other:   [  ]Supplement Ordered:  [  ]Morbid Obesity (BMI >=40]  ---------------------------------------------------------------------  [ ] Sepsis/severe sepsis/septic shock  [ ] UTI  [ ] Pneumonia  -----------------------------------------------------------------------  [ ] Acidosis/alkalosis  [ ] Fluid overload  [ ] Hypokalemia  [ ] Hyperkalemia  [ ] Hypomagnesemia  [ ] Hypophosphatemia  [ ] Hyperphosphatemia  [ x] hyponatremia   ------------------------------------------------------------------------  [ ] Acute blood loss anemia  [ ] Post op blood loss anemia  [ ] Iron deficiency anemia  [x ] Anemia due to chronic disease  [ ] Hypercoagulable state  ----------------------------------------------------------------------  [ ] Cerebral infarction  [ ] Transient ischemia attack  [ ] Encephalopathy

## 2023-02-11 NOTE — PROGRESS NOTE ADULT - ASSESSMENT
62 year old man with HTN, HLD, Type 2 DM complicated by PAD, s/p multiple left toe amputations 2 years ago, admitted for LLE angiogram. S/p angiogram on Feb 6th which showed  75% stenosis of distal SFA/proximal popliteal artery. SFA lesion ballooned, then stented.   X ray of left foot also suspicious for osteomyelitis. MRI pending.     # Diabetic foot wound   # Likely osteomyelitis   s/p toe amputation   Daptomycin as per ID. Will require PICC and ABX through 3/22.  f/u OR cultures, pathology --margins     # Type 2 Diabetes complicated by Peripheral Artery Disease   Per patient, takes metformin only intermittently and takes tresiba 50 units qHS ~once a week even though it's prescribed as a daily medication. Takes 10units lispro TID with meals daily.   Outpatient regimen is 80 units of insulin total qd; however, given limited compliance with his home regimen, recommend erring on the side of giving lower dose for now.   [ ] Lantus 24 units qHS + lispro 8 units with meals, + moderate dose Sliding scale insulin for now   [ ] Would not adjust regimen further at this point.   [ ] FS at 151, 99, 163, 104    # Peripheral artery disease s/p stent   # Post -operative state  Antiplatelet per primary team - on DAPT   OOTB to chair   Encourage incentive spirometry   Recommend miralax qd for bowel regimen    # HLD - takes rosuvastatin 10mg qd per surescripts; atorva 40mg qd while inpatient   # HTN -   Prescribed amlodipine/benazepril 10/40 at home (Recently increased from amlodipine/benazepril - 5/40). However, does not take daily, 'i take it every other day'.   continue amlodipine 10mg qd   [ ] therapeutic interchange for benazepril --> 40mg lisinopril ;     DVT ppx  - heparin subq

## 2023-02-11 NOTE — PROGRESS NOTE ADULT - SUBJECTIVE AND OBJECTIVE BOX
Patient is a 62y old  Male who presents with a chief complaint of LLE Angiogram (11 Feb 2023 10:36)      SUBJECTIVE / OVERNIGHT EVENTS:  Reports no complaints.  Feels tired.    MEDICATIONS  (STANDING):  amLODIPine   Tablet 10 milliGRAM(s) Oral daily  aspirin  chewable 81 milliGRAM(s) Oral daily  atorvastatin 40 milliGRAM(s) Oral at bedtime  clopidogrel Tablet 75 milliGRAM(s) Oral daily  DAPTOmycin IVPB      dextrose 5%. 1000 milliLiter(s) (100 mL/Hr) IV Continuous <Continuous>  dextrose 5%. 1000 milliLiter(s) (50 mL/Hr) IV Continuous <Continuous>  dextrose 50% Injectable 25 Gram(s) IV Push once  dextrose 50% Injectable 12.5 Gram(s) IV Push once  dextrose 50% Injectable 25 Gram(s) IV Push once  glucagon  Injectable 1 milliGRAM(s) IntraMuscular once  heparin   Injectable 5000 Unit(s) SubCutaneous every 8 hours  insulin glargine Injectable (LANTUS) 24 Unit(s) SubCutaneous at bedtime  insulin lispro (ADMELOG) corrective regimen sliding scale   SubCutaneous Before meals and at bedtime  insulin lispro Injectable (ADMELOG) 8 Unit(s) SubCutaneous three times a day before meals  lisinopril 40 milliGRAM(s) Oral daily  polyethylene glycol 3350 17 Gram(s) Oral daily    MEDICATIONS  (PRN):  acetaminophen     Tablet .. 1000 milliGRAM(s) Oral every 6 hours PRN Temp greater or equal to 38C (100.4F), Mild Pain (1 - 3), Moderate Pain (4 - 6)  dextrose Oral Gel 15 Gram(s) Oral once PRN Blood Glucose LESS THAN 70 milliGRAM(s)/deciliter      CAPILLARY BLOOD GLUCOSE      POCT Blood Glucose.: 104 mg/dL (11 Feb 2023 06:21)  POCT Blood Glucose.: 163 mg/dL (10 Feb 2023 21:24)  POCT Blood Glucose.: 99 mg/dL (10 Feb 2023 18:46)  POCT Blood Glucose.: 151 mg/dL (10 Feb 2023 17:07)    I&O's Summary    10 Feb 2023 07:01  -  11 Feb 2023 07:00  --------------------------------------------------------  IN: 0 mL / OUT: 2175 mL / NET: -2175 mL    11 Feb 2023 07:01  -  11 Feb 2023 12:28  --------------------------------------------------------  IN: 360 mL / OUT: 0 mL / NET: 360 mL        PHYSICAL EXAM:  Vital Signs Last 24 Hrs  T(C): 36.4 (11 Feb 2023 10:50), Max: 37.2 (11 Feb 2023 04:23)  T(F): 97.6 (11 Feb 2023 10:50), Max: 99 (11 Feb 2023 04:23)  HR: 87 (11 Feb 2023 11:37) (80 - 89)  BP: 162/72 (11 Feb 2023 11:37) (122/60 - 162/72)  BP(mean): 104 (11 Feb 2023 11:37) (87 - 104)  RR: 18 (11 Feb 2023 11:37) (18 - 18)  SpO2: 96% (11 Feb 2023 11:37) (95% - 97%)    Parameters below as of 11 Feb 2023 11:37  Patient On (Oxygen Delivery Method): room air      GENERAL: NAD, well-developed  HEAD:  Atraumatic, Normocephalic  EYES: EOMI, PERRLA, conjunctiva and sclera clear  NECK: Supple, No JVD  CHEST/LUNG: Clear to auscultation bilaterally; No wheeze  HEART: Regular rate and rhythm; No murmurs, rubs, or gallops  ABDOMEN: Soft, Nontender, Nondistended; Bowel sounds present  EXTREMITIES:   Left foot dressed in clean, dry, dressing,   PSYCH: AAOx3  NEUROLOGY: non-focal  SKIN: No rashes or lesions    LABS:                        8.2    15.36 )-----------( 577      ( 10 Feb 2023 07:29 )             26.3     02-10    137  |  106  |  20  ----------------------------<  124<H>  3.8   |  20<L>  |  1.68<H>    Ca    8.4      10 Feb 2023 07:29  Phos  3.3     02-10  Mg     2.1     02-10                RADIOLOGY & ADDITIONAL TESTS:    Imaging Personally Reviewed:    Consultant(s) Notes Reviewed:      Care Discussed with Consultants/Other Providers:

## 2023-02-12 LAB
-  DAPTOMYCIN: SIGNIFICANT CHANGE UP
ANION GAP SERPL CALC-SCNC: 10 MMOL/L — SIGNIFICANT CHANGE UP (ref 5–17)
BUN SERPL-MCNC: 18 MG/DL — SIGNIFICANT CHANGE UP (ref 7–23)
CALCIUM SERPL-MCNC: 9 MG/DL — SIGNIFICANT CHANGE UP (ref 8.4–10.5)
CHLORIDE SERPL-SCNC: 108 MMOL/L — SIGNIFICANT CHANGE UP (ref 96–108)
CK SERPL-CCNC: 91 U/L — SIGNIFICANT CHANGE UP (ref 30–200)
CO2 SERPL-SCNC: 23 MMOL/L — SIGNIFICANT CHANGE UP (ref 22–31)
CREAT SERPL-MCNC: 1.62 MG/DL — HIGH (ref 0.5–1.3)
EGFR: 47 ML/MIN/1.73M2 — LOW
GLUCOSE BLDC GLUCOMTR-MCNC: 114 MG/DL — HIGH (ref 70–99)
GLUCOSE BLDC GLUCOMTR-MCNC: 119 MG/DL — HIGH (ref 70–99)
GLUCOSE BLDC GLUCOMTR-MCNC: 147 MG/DL — HIGH (ref 70–99)
GLUCOSE BLDC GLUCOMTR-MCNC: 151 MG/DL — HIGH (ref 70–99)
GLUCOSE SERPL-MCNC: 109 MG/DL — HIGH (ref 70–99)
HCT VFR BLD CALC: 26.7 % — LOW (ref 39–50)
HGB BLD-MCNC: 8.3 G/DL — LOW (ref 13–17)
MAGNESIUM SERPL-MCNC: 2.2 MG/DL — SIGNIFICANT CHANGE UP (ref 1.6–2.6)
MCHC RBC-ENTMCNC: 26.1 PG — LOW (ref 27–34)
MCHC RBC-ENTMCNC: 31.1 GM/DL — LOW (ref 32–36)
MCV RBC AUTO: 84 FL — SIGNIFICANT CHANGE UP (ref 80–100)
METHOD TYPE: SIGNIFICANT CHANGE UP
NRBC # BLD: 0 /100 WBCS — SIGNIFICANT CHANGE UP (ref 0–0)
ORGANISM # SPEC MICROSCOPIC CNT: SIGNIFICANT CHANGE UP
PHOSPHATE SERPL-MCNC: 3.4 MG/DL — SIGNIFICANT CHANGE UP (ref 2.5–4.5)
PLATELET # BLD AUTO: 665 K/UL — HIGH (ref 150–400)
POTASSIUM SERPL-MCNC: 4.3 MMOL/L — SIGNIFICANT CHANGE UP (ref 3.5–5.3)
POTASSIUM SERPL-SCNC: 4.3 MMOL/L — SIGNIFICANT CHANGE UP (ref 3.5–5.3)
RBC # BLD: 3.18 M/UL — LOW (ref 4.2–5.8)
RBC # FLD: 18 % — HIGH (ref 10.3–14.5)
SODIUM SERPL-SCNC: 141 MMOL/L — SIGNIFICANT CHANGE UP (ref 135–145)
WBC # BLD: 12.45 K/UL — HIGH (ref 3.8–10.5)
WBC # FLD AUTO: 12.45 K/UL — HIGH (ref 3.8–10.5)

## 2023-02-12 PROCEDURE — 99232 SBSQ HOSP IP/OBS MODERATE 35: CPT

## 2023-02-12 RX ADMIN — AMLODIPINE BESYLATE 10 MILLIGRAM(S): 2.5 TABLET ORAL at 06:39

## 2023-02-12 RX ADMIN — Medication 8 UNIT(S): at 17:28

## 2023-02-12 RX ADMIN — Medication 8 UNIT(S): at 08:38

## 2023-02-12 RX ADMIN — DAPTOMYCIN 124 MILLIGRAM(S): 500 INJECTION, POWDER, LYOPHILIZED, FOR SOLUTION INTRAVENOUS at 08:03

## 2023-02-12 RX ADMIN — Medication 8 UNIT(S): at 12:45

## 2023-02-12 RX ADMIN — Medication 2: at 12:44

## 2023-02-12 RX ADMIN — ATORVASTATIN CALCIUM 40 MILLIGRAM(S): 80 TABLET, FILM COATED ORAL at 22:46

## 2023-02-12 RX ADMIN — Medication 81 MILLIGRAM(S): at 11:57

## 2023-02-12 RX ADMIN — INSULIN GLARGINE 24 UNIT(S): 100 INJECTION, SOLUTION SUBCUTANEOUS at 22:46

## 2023-02-12 RX ADMIN — CLOPIDOGREL BISULFATE 75 MILLIGRAM(S): 75 TABLET, FILM COATED ORAL at 11:57

## 2023-02-12 RX ADMIN — LISINOPRIL 40 MILLIGRAM(S): 2.5 TABLET ORAL at 06:39

## 2023-02-12 NOTE — PROGRESS NOTE ADULT - ASSESSMENT
62M with PMH of HTN, HLD, DM, PAD s/p multiple L toe amputations 2 years prior (c/b medial deviation of 1st/2nd toes resulting in ulceration of medial metatarsal head) presents for LLE Angiogram, now s/p Left TMA      Vascular/PAD:  -Continue ASA + Plavix (Plavix new med)   -s/p Left angio and stent   -s/p L TMA by podiatry 2/8, penrose in, partially closed  - Penrose drain removal by podiatry 2/11    HTN/HLD:  -Started atorvastatin   -Started on lisinopril      DM:  -HgA1c=10.9  - Holding Tresiba   - lantus 24u HS, Lispro 8 TID continue ISS       Anemia:   -Hgb 8.5    ID:  -Left foot wounds noted   -appreciate podiatry recs   -Change zosyn to daptomycin per ID until 3/22  -Plan for PICC when able and f/u w/ID outpatient    Diet: CCD    Activity:  as tolerated, NWB on left foot    DVTPPx: SQH after am CBC     Dispo: PT->rolling walker        62M with PMH of HTN, HLD, DM, PAD s/p multiple L toe amputations 2 years prior (c/b medial deviation of 1st/2nd toes resulting in ulceration of medial metatarsal head) presents for LLE Angiogram, now s/p Left TMA      Vascular/PAD:  -Continue ASA + Plavix (Plavix new med)   -s/p Left angio and stent   -s/p L TMA by podiatry 2/8  - Penrose drain removaled by podiatry on 2/11    HTN/HLD:  -Started atorvastatin   -Started on lisinopril      DM:  -HgA1c=10.9  - Holding Tresiba   - lantus 24u HS, Lispro 8 TID continue ISS       Anemia:   -Hgb 8.5    ID:  -Left foot wounds noted   -appreciate podiatry recs   -Continue daptomycin per ID until 3/22  -Plan for PICC tomorrow and f/u w/ID outpatient    Diet: CCD    Activity:  as tolerated, NWB on left foot    DVTPPx: SQH     Dispo: PT->rolling walker

## 2023-02-12 NOTE — PROGRESS NOTE ADULT - SUBJECTIVE AND OBJECTIVE BOX
ON: NILAY, VSS, refusing SQH      ---------------------------------------------------------------------------  PLEASE CHECK WHEN PRESENT:  [  ] Heart Failure  [  ] Acute  [  ] Acute on Chronic  [  ] Chronic  -------------------------------------------------------------------  [  ]Diastolic [HFpEF]  [  ]Systolic [HFrEF]  [  ]Combined [HFpEF & HFrEF]  [  ] afib  [  ] hypertensive heart disease  [  ]Other:  -------------------------------------------------------------------  [ ] Respiratory failure  [ ] Acute cor pulmonale  [ ] Asthma/COPD Exacerbation  [ ] Pleural effusion  [ ] Aspiration pneumonia  -------------------------------------------------------------------  [  ]LEANNE  [  ]ATN  [  ]Reneal Medullary Necrosis  [  ]Renal Cortical Necrosis  [  ]Other Pathological Lesions:    [  ]CKD 1  [  ]CKD 2  [  ]CKD 3  [  ]CKD 4  [  ]CKD 5  [  ]Other  -------------------------------------------------------------------  [x ]Diabetes  [  x] Diabetic PVD Ulcer  [  ] Neuropathic ulcer to DM  [  ] Diabetes with Nephropathy  [ x ] Osteomyelitis due to diabetes  --------------------------------------------------------------------  [  ]Malnutrition: See Nutrition Note  [  ]Cachexia  [  ]Other:   [  ]Supplement Ordered:  [  ]Morbid Obesity (BMI >=40]  ---------------------------------------------------------------------  [ ] Sepsis/severe sepsis/septic shock  [ ] UTI  [ ] Pneumonia  -----------------------------------------------------------------------  [ ] Acidosis/alkalosis  [ ] Fluid overload  [ ] Hypokalemia  [ ] Hyperkalemia  [ ] Hypomagnesemia  [ ] Hypophosphatemia  [ ] Hyperphosphatemia  [ x] hyponatremia   ------------------------------------------------------------------------  [ ] Acute blood loss anemia  [ ] Post op blood loss anemia  [ ] Iron deficiency anemia  [x ] Anemia due to chronic disease  [ ] Hypercoagulable state  ----------------------------------------------------------------------  [ ] Cerebral infarction  [ ] Transient ischemia attack  [ ] Encephalopathy

## 2023-02-12 NOTE — PROGRESS NOTE ADULT - SUBJECTIVE AND OBJECTIVE BOX
Patient is a 62y old  Male who presents with a chief complaint of LLE Angiogram (12 Feb 2023 06:27)      INTERVAL HPI/ OVERNIGHT EVENTS. Pt seen and examined bedside. No acute pedal complaints this morning.       LABS                        8.3    12.45 )-----------( 665      ( 12 Feb 2023 05:30 )             26.7     02-12    141  |  108  |  18  ----------------------------<  109<H>  4.3   |  23  |  1.62<H>    Ca    9.0      12 Feb 2023 05:30  Phos  3.4     02-12  Mg     2.2     02-12          ICU Vital Signs Last 24 Hrs  T(C): 36.8 (12 Feb 2023 06:12), Max: 36.9 (11 Feb 2023 14:21)  T(F): 98.3 (12 Feb 2023 06:12), Max: 98.5 (11 Feb 2023 14:21)  HR: 80 (12 Feb 2023 05:29) (78 - 87)  BP: 154/73 (12 Feb 2023 05:29) (141/72 - 162/72)  BP(mean): 105 (12 Feb 2023 05:29) (97 - 105)  ABP: --  ABP(mean): --  RR: 18 (12 Feb 2023 05:29) (18 - 18)  SpO2: 95% (12 Feb 2023 05:29) (95% - 98%)    O2 Parameters below as of 12 Feb 2023 05:29  Patient On (Oxygen Delivery Method): room air        RADIOLOGY  < from: Xray Foot AP + Lateral + Oblique, Left (02.09.23 @ 01:06) >  IMPRESSION: Frontal, lateral and oblique views of the left foot   demonstrates transmetatarsal amputation of the first through fifth digits   with overlying surgical staples.  < end of copied text >    MICROBIOLOGY  Culture - Tissue with Gram Stain (02.08.23 @ 20:00)    -  Ampicillin: S <=2 Predicts results to ampicillin/sulbactam, amoxacillin-clavulanate and  piperacillin-tazobactam.    -  Clindamycin: R >4    -  Clindamycin: R >4    -  Daptomycin: S 0.5    -  Erythromycin: R >4    -  Erythromycin: R >4    -  Linezolid: S <=0.5    -  Linezolid: S 1    -  Linezolid: S 2    -  Oxacillin: R >2    -  Oxacillin: S 1    -  Rifampin: S <=1 Should not be used as monotherapy    -  Rifampin: S <=1 Should not be used as monotherapy    -  Trimethoprim/Sulfamethoxazole: R >2/38    -  Trimethoprim/Sulfamethoxazole: S <=0.5/9.5    -  Vancomycin: S 2    -  Vancomycin: S 2    -  Vancomycin: R >16    Gram Stain:   No organisms seen  No WBC's seen.    Specimen Source: .Tissue 5th  metacarpal or spec    Culture Results:   Rare Enterococcus faecalis (vancomycin resistant) .... Result called to  and read back byGretel Mendez RN  02/11/2023 12:58:17  Rare Staphylococcus lugdunensis  Rare Staphylococcus epidermidis    Organism Identification: Staphylococcus lugdunensis  Staphylococcus epidermidis  Enterococcus faecalis (vancomycin resistant)    Organism: Staphylococcus lugdunensis    Organism: Staphylococcus epidermidis    Organism: Enterococcus faecalis (vancomycin resistant)    Method Type: FRANCHESCA    Method Type: FRANCHESCA    Method Type: FRANCHESCA      PHYSICAL EXAM  Left Lower Extremity Focused  Vasc: DP/PT 2/4, No edema or erythema, temp gradient warm to warm   Derm: TMA site partially closed with staples in tact and no signs of dehiscence, no pus or serous drainage, sanginous drainage noted on dressing   Neuro: Protective sensation diminished  MSK: TMA

## 2023-02-12 NOTE — PROGRESS NOTE ADULT - ASSESSMENT
62M with PMH of HTN, HLD, DM, PAD s/p multiple L toe amputations 2 years prior (c/b medial deviation of 1st/2nd toes resulting in ulceration of medial metatarsal head) presents for LLE Angiogram. Podiatry consulted to evaluate Left foot wound. Xrays demonstrate dislocation of 1st MTPJ and deformity of the 2nd met shaft. Also notes deformity of the 3rd, 4th and 5th met shafts extending to the MTPJ's. Sclerosis of the base of the 5th may be d/t chronic OM. MRI on 2/7 showed OM of L 1st metatarsal/hallux and 5th metatarsal. Pt is s/p L foot TMA with delayed primary closure on 2/8. Clean margin of 5th metatarsal growing e faecalis, staph lugdunesis, & staph epidermidis. Clean margin of 1st met showing NGTD. Pt pending PICC for 6 weeks IV antibiotics.     Plan:   - Dry sterile dressing consisting of packing, gauze, ABD, kerlix and ACE applied to foot.   - C/w antibiotics per ID. - recommending 6 week IV antibiotic (daptomycin) course for residual OM    - F/u OR final cultures/path  - Pt can WBAT to L heel in surgical shoe  - Rest of care per primary team    Plan discussed w/ Attending & Podiatry following    Discharge dressing instructions: Cleanse wound with normal saline daily, pat dry with sterile guaze, pack open part of incision with 1/4 inch iodoform packing, apply betadine to macerated edges, cover with dry sterile gauze, ABD pad, wrap with Kerlix and light ACE bandage.     Patient should follow up with Dr. Aldair Lamar within 1 week of discharge.    Office information:          [place] Address- [address] [place] [address]. [address], [place] [address] Phone: [phone] 62M with PMH of HTN, HLD, DM, PAD s/p multiple L toe amputations 2 years prior (c/b medial deviation of 1st/2nd toes resulting in ulceration of medial metatarsal head) presents for LLE Angiogram. Podiatry consulted to evaluate Left foot wound. Xrays demonstrate dislocation of 1st MTPJ and deformity of the 2nd met shaft. Also notes deformity of the 3rd, 4th and 5th met shafts extending to the MTPJ's. Sclerosis of the base of the 5th may be d/t chronic OM. MRI on 2/7 showed OM of L 1st metatarsal/hallux and 5th metatarsal. Pt is s/p L foot TMA with delayed primary closure on 2/8. Clean margin of 5th metatarsal growing e faecalis, staph lugdunesis, & staph epidermidis. Clean margin of 1st met showing NGTD. Pt pending PICC for 6 weeks IV antibiotics. WBC downtrending, but still elevated at 12.45. VSS.    Plan:   - Dry sterile dressing consisting of packing, gauze, ABD, kerlix and ACE applied to foot.   - C/w antibiotics per ID. - recommending 6 week IV antibiotic (daptomycin) course for residual OM    - F/u OR final cultures/path  - Pt can WBAT to L heel in surgical shoe  - Rest of care per primary team    Plan discussed w/ Attending & Podiatry following    Discharge dressing instructions: Cleanse wound with normal saline daily, pat dry with sterile guaze, pack open part of incision with 1/4 inch iodoform packing, apply betadine to macerated edges, cover with dry sterile gauze, ABD pad, wrap with Kerlix and light ACE bandage.     Patient should follow up with Dr. Aldair Lamar within 1 week of discharge.    Office information:          [place] Address- [address]. [address], [place] [address] Phone: [phone]

## 2023-02-12 NOTE — PROGRESS NOTE ADULT - ASSESSMENT
62M h/o DM, PAD s/p toe amputation, L toe OM s/p two rounds of IV abx x 6 weeks in 2022, HTN, HLD, p/w LLE angiogram for worsening L foot ulcer/OM, s/p LLE angio and SFA/pop stent on 2/6 and TMA on 2/9.  OR culture growing E. faecalis and staph lugdunensis and staph epi.  Case d/w Dr. Lamar - there is residual OM and will require 6 weeks of IV abx.  Of note, patient is having LEANNE now.    - cont daptomycin 600mg IV q24h to cover staph spp and E. faecalis  - Place single lumen PICC line   - Duration of antibiotics is 6 weeks ( 2/9 - 3/22)  - Weekly labs: CMP, CBC, ESR, CRP, CK faxed to ID office at [phone]  - Patient to follow up with Dr. davila in 2 weeks ([address] [place] [address], [phone]), ID office will call patient to schedule       Team 2 will follow you.  Case d/w primary team.    Sabrina Davila MD, MS  Infectious Disease attending  work cell [phone]   For any questions during evening/weekend/holiday, please page ID on call

## 2023-02-12 NOTE — PROGRESS NOTE ADULT - SUBJECTIVE AND OBJECTIVE BOX
INFECTIOUS DISEASES CONSULT FOLLOW-UP NOTE    INTERVAL HPI/OVERNIGHT EVENTS:  No event overnight  patient feels well    ROS:   Constitutional, eyes, ENT, cardiovascular, respiratory, gastrointestinal, genitourinary, integumentary, neurological, psychiatric and heme/lymph are otherwise negative other than noted above       ANTIBIOTICS/RELEVANT:    MEDICATIONS  (STANDING):  amLODIPine   Tablet 10 milliGRAM(s) Oral daily  aspirin  chewable 81 milliGRAM(s) Oral daily  atorvastatin 40 milliGRAM(s) Oral at bedtime  clopidogrel Tablet 75 milliGRAM(s) Oral daily  DAPTOmycin IVPB      DAPTOmycin IVPB 600 milliGRAM(s) IV Intermittent every 24 hours  dextrose 5%. 1000 milliLiter(s) (50 mL/Hr) IV Continuous <Continuous>  dextrose 5%. 1000 milliLiter(s) (100 mL/Hr) IV Continuous <Continuous>  dextrose 50% Injectable 12.5 Gram(s) IV Push once  dextrose 50% Injectable 25 Gram(s) IV Push once  dextrose 50% Injectable 25 Gram(s) IV Push once  glucagon  Injectable 1 milliGRAM(s) IntraMuscular once  heparin   Injectable 5000 Unit(s) SubCutaneous every 8 hours  insulin glargine Injectable (LANTUS) 24 Unit(s) SubCutaneous at bedtime  insulin lispro (ADMELOG) corrective regimen sliding scale   SubCutaneous Before meals and at bedtime  insulin lispro Injectable (ADMELOG) 8 Unit(s) SubCutaneous three times a day before meals  lisinopril 40 milliGRAM(s) Oral daily  polyethylene glycol 3350 17 Gram(s) Oral daily    MEDICATIONS  (PRN):  acetaminophen     Tablet .. 1000 milliGRAM(s) Oral every 6 hours PRN Temp greater or equal to 38C (100.4F), Mild Pain (1 - 3), Moderate Pain (4 - 6)  dextrose Oral Gel 15 Gram(s) Oral once PRN Blood Glucose LESS THAN 70 milliGRAM(s)/deciliter        Vital Signs Last 24 Hrs  T(C): 36.8 (12 Feb 2023 18:13), Max: 36.9 (11 Feb 2023 22:01)  T(F): 98.3 (12 Feb 2023 18:13), Max: 98.4 (11 Feb 2023 22:01)  HR: 88 (12 Feb 2023 20:10) (78 - 90)  BP: 167/79 (12 Feb 2023 20:10) (148/70 - 167/79)  BP(mean): 114 (12 Feb 2023 20:10) (100 - 114)  RR: 17 (12 Feb 2023 20:10) (16 - 18)  SpO2: 97% (12 Feb 2023 20:10) (95% - 98%)    Parameters below as of 12 Feb 2023 20:10  Patient On (Oxygen Delivery Method): room air        02-11-23 @ 07:01  -  02-12-23 @ 07:00  --------------------------------------------------------  IN: 780 mL / OUT: 1700 mL / NET: -920 mL    02-12-23 @ 07:01  -  02-12-23 @ 21:17  --------------------------------------------------------  IN: 660 mL / OUT: 500 mL / NET: 160 mL      PHYSICAL EXAM:  Constitutional: alert, NAD  Eyes: the sclera and conjunctiva were normal.   ENT: the ears and nose were normal in appearance.   Neck: the appearance of the neck was normal and the neck was supple.   Pulmonary: no respiratory distress and lungs were clear to auscultation bilaterally.   Heart: heart rate was normal and rhythm regular, normal S1 and S2  Vascular:. there was no peripheral edema  Abdomen: normal bowel sounds, soft, non-tender  Ext: L foot s/p TMA, wrapped with dressing           LABS:                        8.3    12.45 )-----------( 665      ( 12 Feb 2023 05:30 )             26.7     02-12    141  |  108  |  18  ----------------------------<  109<H>  4.3   |  23  |  1.62<H>    Ca    9.0      12 Feb 2023 05:30  Phos  3.4     02-12  Mg     2.2     02-12            MICROBIOLOGY:  2/8/23 1st metacarpal: E. faecalis  2/8/23 5th metacarpal: E. faecalis (S to amp, linezolid, dapto, R to vanc), staph lugdunensis (S to ox, vanc), staph epidermidis (R to ox)      RADIOLOGY & ADDITIONAL STUDIES:  X-ray L foot 2/9  IMPRESSION: Frontal, lateral and oblique views of the left foot   demonstrates transmetatarsal amputation of the first through fifth digits   with overlying surgical staples.    MR foot L 2/7/23    IMPRESSION:  1.  Soft tissue wounds are present with osteomyelitis of the 1st   metatarsal and 1st proximal phalanx and remaining 5th metatarsal.  2.  Marrow changes involving the resected margin of the 2nd metatarsal   and 3rd metatarsal phalangeal joint osteotomy site may be reactive or   reflect early osteomyelitis.  3.  Slight edema involving the distal lateral margin of the cuboid may be   reactive or reflect additional infection with the adjacent 5th metatarsal   changes.  4.  Small subcutaneous fluid collection/abscess is present decompressing   from the 1st metatarsophalangeal joint dorsally near the 2nd metatarsal   stump.  5.  Small subcutaneous fluid collection/abscess extends along the   abductor digit minimi muscle proximal to the 5th metatarsal soft tissue   wounds.

## 2023-02-13 ENCOUNTER — TRANSCRIPTION ENCOUNTER (OUTPATIENT)
Age: 63
End: 2023-02-13

## 2023-02-13 LAB
-  AMPICILLIN: SIGNIFICANT CHANGE UP
-  LINEZOLID: SIGNIFICANT CHANGE UP
-  VANCOMYCIN: SIGNIFICANT CHANGE UP
GLUCOSE BLDC GLUCOMTR-MCNC: 125 MG/DL — HIGH (ref 70–99)
GLUCOSE BLDC GLUCOMTR-MCNC: 149 MG/DL — HIGH (ref 70–99)
GLUCOSE BLDC GLUCOMTR-MCNC: 149 MG/DL — HIGH (ref 70–99)
GLUCOSE BLDC GLUCOMTR-MCNC: 153 MG/DL — HIGH (ref 70–99)
GLUCOSE BLDC GLUCOMTR-MCNC: 218 MG/DL — HIGH (ref 70–99)
METHOD TYPE: SIGNIFICANT CHANGE UP
METHOD TYPE: SIGNIFICANT CHANGE UP

## 2023-02-13 PROCEDURE — 99232 SBSQ HOSP IP/OBS MODERATE 35: CPT

## 2023-02-13 PROCEDURE — 76937 US GUIDE VASCULAR ACCESS: CPT | Mod: 26,59

## 2023-02-13 PROCEDURE — 36569 INSJ PICC 5 YR+ W/O IMAGING: CPT

## 2023-02-13 RX ORDER — SODIUM CHLORIDE 9 MG/ML
10 INJECTION INTRAMUSCULAR; INTRAVENOUS; SUBCUTANEOUS
Refills: 0 | Status: DISCONTINUED | OUTPATIENT
Start: 2023-02-13 | End: 2023-02-14

## 2023-02-13 RX ORDER — SODIUM CHLORIDE 9 MG/ML
10 INJECTION INTRAMUSCULAR; INTRAVENOUS; SUBCUTANEOUS
Qty: 740 | Refills: 0
Start: 2023-02-13 | End: 2023-03-21

## 2023-02-13 RX ORDER — LABETALOL HCL 100 MG
10 TABLET ORAL ONCE
Refills: 0 | Status: COMPLETED | OUTPATIENT
Start: 2023-02-13 | End: 2023-02-13

## 2023-02-13 RX ORDER — DAPTOMYCIN 500 MG/10ML
600 INJECTION, POWDER, LYOPHILIZED, FOR SOLUTION INTRAVENOUS
Qty: 37 | Refills: 0
Start: 2023-02-13 | End: 2023-03-21

## 2023-02-13 RX ADMIN — Medication 4: at 22:08

## 2023-02-13 RX ADMIN — Medication 81 MILLIGRAM(S): at 11:18

## 2023-02-13 RX ADMIN — AMLODIPINE BESYLATE 10 MILLIGRAM(S): 2.5 TABLET ORAL at 06:28

## 2023-02-13 RX ADMIN — Medication 2: at 07:30

## 2023-02-13 RX ADMIN — INSULIN GLARGINE 24 UNIT(S): 100 INJECTION, SOLUTION SUBCUTANEOUS at 22:08

## 2023-02-13 RX ADMIN — LISINOPRIL 40 MILLIGRAM(S): 2.5 TABLET ORAL at 06:27

## 2023-02-13 RX ADMIN — CLOPIDOGREL BISULFATE 75 MILLIGRAM(S): 75 TABLET, FILM COATED ORAL at 11:16

## 2023-02-13 RX ADMIN — Medication 8 UNIT(S): at 08:57

## 2023-02-13 RX ADMIN — Medication 10 MILLIGRAM(S): at 19:59

## 2023-02-13 RX ADMIN — DAPTOMYCIN 124 MILLIGRAM(S): 500 INJECTION, POWDER, LYOPHILIZED, FOR SOLUTION INTRAVENOUS at 07:29

## 2023-02-13 RX ADMIN — ATORVASTATIN CALCIUM 40 MILLIGRAM(S): 80 TABLET, FILM COATED ORAL at 21:16

## 2023-02-13 RX ADMIN — Medication 8 UNIT(S): at 12:28

## 2023-02-13 RX ADMIN — Medication 8 UNIT(S): at 17:34

## 2023-02-13 RX ADMIN — POLYETHYLENE GLYCOL 3350 17 GRAM(S): 17 POWDER, FOR SOLUTION ORAL at 11:16

## 2023-02-13 NOTE — DISCHARGE NOTE PROVIDER - NSDCCPTREATMENT_GEN_ALL_CORE_FT
PRINCIPAL PROCEDURE  Procedure: Angiogram, extremity, left  Findings and Treatment:       SECONDARY PROCEDURE  Procedure: Left foot transmetatarsal amputation  Findings and Treatment:

## 2023-02-13 NOTE — DISCHARGE NOTE PROVIDER - NSDCCPCAREPLAN_GEN_ALL_CORE_FT
PRINCIPAL DISCHARGE DIAGNOSIS  Diagnosis: Osteomyelitis  Assessment and Plan of Treatment:       SECONDARY DISCHARGE DIAGNOSES  Diagnosis: DM (diabetes mellitus), type 2 with peripheral vascular complications  Assessment and Plan of Treatment:     Diagnosis: Hyperlipidemia  Assessment and Plan of Treatment:     Diagnosis: Diabetic foot infection  Assessment and Plan of Treatment:     Diagnosis: LEANNE (acute kidney injury)  Assessment and Plan of Treatment:     Diagnosis: Hypertension  Assessment and Plan of Treatment:     Diagnosis: Osteomyelitis  Assessment and Plan of Treatment:     Diagnosis: PAD (peripheral artery disease)  Assessment and Plan of Treatment:     Diagnosis: Postoperative state  Assessment and Plan of Treatment:     Diagnosis: Essential thrombocytosis  Assessment and Plan of Treatment:     Diagnosis: Leukocytosis  Assessment and Plan of Treatment:     Diagnosis: Anemia of chronic disease  Assessment and Plan of Treatment:     Diagnosis: Hyponatremia  Assessment and Plan of Treatment:     Diagnosis: Hypophosphatemia  Assessment and Plan of Treatment:     Diagnosis: Diabetes mellitus with hyperglycemia  Assessment and Plan of Treatment:

## 2023-02-13 NOTE — DISCHARGE NOTE PROVIDER - NSDCFUSCHEDAPPT_GEN_ALL_CORE_FT
Maryellen Avendano  [facility]  VASCULAR [address]  Scheduled Appointment: 02/23/2023     Sabrina Davila  [facility]  INFDISEASE [address]  Scheduled Appointment: 02/23/2023    Maryellen Avendano  [facility]  VASCULAR [address]  Scheduled Appointment: 02/23/2023

## 2023-02-13 NOTE — PROGRESS NOTE ADULT - ASSESSMENT
62M h/o DM, PAD s/p toe amputation, L toe OM s/p two rounds of IV abx x 6 weeks in 2022, HTN, HLD, p/w LLE angiogram for worsening L foot ulcer/OM, s/p LLE angio and SFA/pop stent on 2/6 and TMA on 2/9.  OR culture 5th MT growing E. faecalis and staph lugdunensis and staph epi.  1st MT growing E. faecalis and another bacteria - ID to follow.  Case d/w Dr. Lamar - there is residual OM and will require 6 weeks of IV abx.  Of note, patient is having LEANNE now.    - cont daptomycin 600mg IV q24h to cover staph spp and E. faecalis  - f/u ID of another bacteria from 5th MT  - Place single lumen PICC line   - Duration of antibiotics is 6 weeks ( 2/9 - 3/22)  - Weekly labs: CMP, CBC, ESR, CRP, CK faxed to ID office at [phone]  - Patient to follow up with Dr. davila in 2 weeks ([address] [place] [address], [phone]), ID office will call patient to schedule       Team 2 will follow you.  Case d/w primary team.    Sabrina Davila MD, MS  Infectious Disease attending  work cell [phone]   For any questions during evening/weekend/holiday, please page ID on call

## 2023-02-13 NOTE — PROGRESS NOTE ADULT - ASSESSMENT
62M with PMH of HTN, HLD, DM, PAD s/p multiple L toe amputations 2 years prior (c/b medial deviation of 1st/2nd toes resulting in ulceration of medial metatarsal head) presents for LLE Angiogram, now s/p Left TMA    Vascular/PAD:  -Continue ASA + Plavix (Plavix new med)   -s/p Left angio and stent   -s/p L TMA by podiatry 2/8  - Penrose drain removed by podiatry on 2/11    HTN/HLD:  -Started atorvastatin   -Started on lisinopril      DM:  - HgA1c=10.9  - Holding Tresiba   - lantus 24u HS, Lispro 8 TID continue ISS       Anemia:   -Hgb 8.5    ID:  -Left foot wounds noted   -appreciate podiatry recs   -Continue daptomycin per ID until 3/22  -Plan for PICC today 2/13 and f/u w/ID outpatient    Diet: CCD    Activity:  as tolerated, NWB on left foot    DVTPPx: SQH     Dispo: PT->rolling walker          62M with PMH of HTN, HLD, DM, PAD s/p multiple L toe amputations 2 years prior (c/b medial deviation of 1st/2nd toes resulting in ulceration of medial metatarsal head) presents for LLE Angiogram, now s/p Left TMA    Vascular/PAD:  -Continue ASA + Plavix (Plavix new med)   -s/p Left angio and stent   -s/p L TMA by podiatry 2/8  -Penrose drain removed by podiatry on 2/11  -Follow up home wound care recs from podiatry     HTN/HLD:  -Started atorvastatin   -Started on lisinopril    DM:  - HgA1c=10.9  - Holding Tresiba   - lantus 24u HS, Lispro 8 TID continue ISS     Anemia:   -Hgb 8.5    ID:  -Left foot wounds noted   -appreciate podiatry recs   -Continue daptomycin per ID until 3/22  -Plan for PICC today 2/13 and f/u w/ID outpatient    Diet: CCD    Activity:  as tolerated, NWB on left foot    DVTPPx: SQH     Dispo: PT->rolling walker

## 2023-02-13 NOTE — DISCHARGE NOTE PROVIDER - NSDCHHHOMEBOUNDOTHER_GEN_ALL_CORE_FT
not bed bound. will need assistance in wound care dressing at home to help prevent infection of wound.

## 2023-02-13 NOTE — DISCHARGE NOTE PROVIDER - CARE PROVIDER_API CALL
Maryellen Avendano)  Surgery; Vascular Surgery  [address], [place] [address]  Phone: [phone]  Fax: [phone]  Follow Up Time:

## 2023-02-13 NOTE — PROGRESS NOTE ADULT - SUBJECTIVE AND OBJECTIVE BOX
INFECTIOUS DISEASES CONSULT FOLLOW-UP NOTE    INTERVAL HPI/OVERNIGHT EVENTS:  no event overnight  patient feels well      ROS:   Constitutional, eyes, ENT, cardiovascular, respiratory, gastrointestinal, genitourinary, integumentary, neurological, psychiatric and heme/lymph are otherwise negative other than noted above       ANTIBIOTICS/RELEVANT:    MEDICATIONS  (STANDING):  amLODIPine   Tablet 10 milliGRAM(s) Oral daily  aspirin  chewable 81 milliGRAM(s) Oral daily  atorvastatin 40 milliGRAM(s) Oral at bedtime  clopidogrel Tablet 75 milliGRAM(s) Oral daily  DAPTOmycin IVPB      DAPTOmycin IVPB 600 milliGRAM(s) IV Intermittent every 24 hours  dextrose 5%. 1000 milliLiter(s) (50 mL/Hr) IV Continuous <Continuous>  dextrose 5%. 1000 milliLiter(s) (100 mL/Hr) IV Continuous <Continuous>  dextrose 50% Injectable 25 Gram(s) IV Push once  dextrose 50% Injectable 12.5 Gram(s) IV Push once  dextrose 50% Injectable 25 Gram(s) IV Push once  glucagon  Injectable 1 milliGRAM(s) IntraMuscular once  heparin   Injectable 5000 Unit(s) SubCutaneous every 8 hours  insulin glargine Injectable (LANTUS) 24 Unit(s) SubCutaneous at bedtime  insulin lispro (ADMELOG) corrective regimen sliding scale   SubCutaneous Before meals and at bedtime  insulin lispro Injectable (ADMELOG) 8 Unit(s) SubCutaneous three times a day before meals  lisinopril 40 milliGRAM(s) Oral daily  polyethylene glycol 3350 17 Gram(s) Oral daily    MEDICATIONS  (PRN):  acetaminophen     Tablet .. 1000 milliGRAM(s) Oral every 6 hours PRN Temp greater or equal to 38C (100.4F), Mild Pain (1 - 3), Moderate Pain (4 - 6)  dextrose Oral Gel 15 Gram(s) Oral once PRN Blood Glucose LESS THAN 70 milliGRAM(s)/deciliter        Vital Signs Last 24 Hrs  T(C): 36.6 (13 Feb 2023 10:23), Max: 36.8 (12 Feb 2023 18:13)  T(F): 97.8 (13 Feb 2023 10:23), Max: 98.3 (12 Feb 2023 18:13)  HR: 94 (13 Feb 2023 08:58) (82 - 94)  BP: 158/76 (13 Feb 2023 08:58) (156/70 - 170/79)  BP(mean): 109 (13 Feb 2023 08:58) (101 - 114)  RR: 18 (13 Feb 2023 08:58) (16 - 18)  SpO2: 98% (13 Feb 2023 08:58) (95% - 98%)    Parameters below as of 13 Feb 2023 08:58  Patient On (Oxygen Delivery Method): room air        02-12-23 @ 07:01  -  02-13-23 @ 07:00  --------------------------------------------------------  IN: 660 mL / OUT: 1300 mL / NET: -640 mL    02-13-23 @ 07:01  -  02-13-23 @ 13:32  --------------------------------------------------------  IN: 240 mL / OUT: 600 mL / NET: -360 mL      PHYSICAL EXAM:  Constitutional: alert, NAD  Eyes: the sclera and conjunctiva were normal.   ENT: the ears and nose were normal in appearance.   Neck: the appearance of the neck was normal and the neck was supple.   Pulmonary: no respiratory distress and lungs were clear to auscultation bilaterally.   Heart: heart rate was normal and rhythm regular, normal S1 and S2  Vascular:. there was no peripheral edema  Abdomen: normal bowel sounds, soft, non-tender  Ext: L foot s/p TMA, wrapped with dressing           LABS:                        8.3    12.45 )-----------( 665      ( 12 Feb 2023 05:30 )             26.7     02-12    141  |  108  |  18  ----------------------------<  109<H>  4.3   |  23  |  1.62<H>    Ca    9.0      12 Feb 2023 05:30  Phos  3.4     02-12  Mg     2.2     02-12            MICROBIOLOGY:  2/8/23 1st metacarpal: E. faecalis  2/8/23 5th metacarpal: E. faecalis (S to amp, linezolid, dapto, R to vanc), staph lugdunensis (S to ox, vanc), staph epidermidis (R to ox)      RADIOLOGY & ADDITIONAL STUDIES:  X-ray L foot 2/9  IMPRESSION: Frontal, lateral and oblique views of the left foot   demonstrates transmetatarsal amputation of the first through fifth digits   with overlying surgical staples.    MR foot L 2/7/23    IMPRESSION:  1.  Soft tissue wounds are present with osteomyelitis of the 1st   metatarsal and 1st proximal phalanx and remaining 5th metatarsal.  2.  Marrow changes involving the resected margin of the 2nd metatarsal   and 3rd metatarsal phalangeal joint osteotomy site may be reactive or   reflect early osteomyelitis.  3.  Slight edema involving the distal lateral margin of the cuboid may be   reactive or reflect additional infection with the adjacent 5th metatarsal   changes.  4.  Small subcutaneous fluid collection/abscess is present decompressing   from the 1st metatarsophalangeal joint dorsally near the 2nd metatarsal   stump.  5.  Small subcutaneous fluid collection/abscess extends along the   abductor digit minimi muscle proximal to the 5th metatarsal soft tissue   wounds.

## 2023-02-13 NOTE — PROGRESS NOTE ADULT - ASSESSMENT
62 year old man with HTN, HLD, Type 2 DM complicated by PAD, s/p multiple left toe amputations 2 years ago, admitted for LLE angiogram. S/p angiogram on Feb 6th which showed  75% stenosis of distal SFA/proximal popliteal artery. SFA lesion ballooned, then stented.   X ray of left foot also suspicious for osteomyelitis. MRI pending.     # Diabetic foot wound   # Osteomyelitis of left foot  s/p toe amputation   Daptomycin as per ID. Will require PICC and ABX through 3/22.  f/u OR cultures, pathology --margins     # Type 2 Diabetes complicated by Peripheral Artery Disease   Per patient, takes metformin only intermittently and takes tresiba 50 units qHS ~once a week even though it's prescribed as a daily medication. Takes 10units lispro TID with meals daily.   Outpatient regimen is 80 units of insulin total qd; however, given limited compliance with his home regimen, recommend erring on the side of giving lower dose for now.   [ ] Lantus 24 units qHS + lispro 8 units with meals, + moderate dose Sliding scale insulin for now   Morning Fingersticks ~ low 100s. Nighttime lantus at adequate dose     # Peripheral artery disease s/p stent   # Post -operative state  Antiplatelet per primary team - on DAPT   OOTB to chair   Encourage incentive spirometry   Recommend miralax qd for bowel regimen    # HLD - takes rosuvastatin 10mg qd per surescripts; atorva 40mg qd while inpatient   # HTN -   Prescribed amlodipine/benazepril 10/40 at home (Recently increased from amlodipine/benazepril - 5/40). However, does not take daily, 'i take it every other day'.   continue amlodipine 10mg qd   [ ] therapeutic interchange for benazepril --> 40mg lisinopril ;     # Acute Kidney Injury  Cr increased >0.3 in 48 hours ; Meets KDIGO definition of LEANNE   Continue to trend Cr while inpatient   Creatinine, Serum: 1.62 mg/dL (02-12-23 @ 05:30)  Creatinine, Serum: 1.68 mg/dL (02-10-23 @ 07:29)  Creatinine, Serum: 1.31 mg/dL (02-09-23 @ 10:19)    DVT ppx  - heparin subq

## 2023-02-13 NOTE — DISCHARGE NOTE PROVIDER - NSDCFUADDINST_GEN_ALL_CORE_FT
FOLLOW UP: Dr. Avendano in 1 week. Your appointment has been made for _______. Call the office at  with any questions.  WOUND CARE: You may shower; soap and water over incision sites. Do not scrub. Pat dry when done.   Left transmetatarsal amputation- Cleanse wound with normal saline daily, pat dry with sterile guaze, pack open part of incision with 1/4 inch iodoform packing, apply betadine to macerated edges, cover with dry sterile gauze, ABD pad, wrap with Kerlix and light ACE bandage.   PICC care:  *Please look at the site every day for signs of infection (increased redness or pain, swelling, odor, yellow or bloody discharge, warm to touch, fever).  *You may shower; wash the area gently with warm, soapy water.  *Keep the insertion site clean and dry otherwise.  *Avoid swimming, baths, hot tubs; do not submerge yourself in water.  *Make sure to keep the IV attached securely to your body to prevent pulling or removal.   ACTIVITY: Ambulate as tolerated, but no heavy lifting (>10lbs) or strenuous exercise.  Call the office if you experience increasing pain, redness, swelling or drainage from incision sites/wounds, or temperature >101.4F.   NEW MEDICATIONS: Lantus 24units every night before bed  Lispro 8u before meals three times a day  ADDITIONAL FOLLOW UP AFTER DISCHARGE: follow up with your endocrinologist for better diabetic control in 1 week  follow up with your primary care physician per routine  DISCHARGE DESTINATION: home with VNS FOLLOW UP: Dr. Avendano in 1 week. Your appointment has been made for 2/23 at 1:45pm. Call the office at  with any questions.  WOUND CARE: You may shower; soap and water over incision sites. Do not scrub. Pat dry when done.   Left transmetatarsal amputation- Cleanse wound with normal saline daily, pat dry with sterile guaze, pack open part of incision with 1/4 inch iodoform packing, apply betadine to macerated edges, cover with dry sterile gauze, ABD pad, wrap with Kerlix and light ACE bandage.   PICC care:  *Please look at the site every day for signs of infection (increased redness or pain, swelling, odor, yellow or bloody discharge, warm to touch, fever).  *You may shower; wash the area gently with warm, soapy water.  *Keep the insertion site clean and dry otherwise.  *Avoid swimming, baths, hot tubs; do not submerge yourself in water.  *Make sure to keep the IV attached securely to your body to prevent pulling or removal.   ACTIVITY: Ambulate as tolerated, but no heavy lifting (>10lbs) or strenuous exercise.  Call the office if you experience increasing pain, redness, swelling or drainage from incision sites/wounds, or temperature >101.4F.   NEW MEDICATIONS: Lantus 24units every night before bed  Lispro 8u before meals three times a day  ADDITIONAL FOLLOW UP AFTER DISCHARGE: follow up with your endocrinologist for better diabetic control in 1 week as we changed your medications  follow up with your primary care physician per routine  DISCHARGE DESTINATION: home with VNS

## 2023-02-13 NOTE — PROGRESS NOTE ADULT - ASSESSMENT
62M with PMH of HTN, HLD, DM, PAD s/p multiple L toe amputations 2 years prior (c/b medial deviation of 1st/2nd toes resulting in ulceration of medial metatarsal head) presents for LLE Angiogram. Podiatry consulted to evaluate Left foot wound. Xrays demonstrate dislocation of 1st MTPJ and deformity of the 2nd met shaft. Also notes deformity of the 3rd, 4th and 5th met shafts extending to the MTPJ's. Sclerosis of the base of the 5th may be d/t chronic OM. MRI on 2/7 showed OM of L 1st metatarsal/hallux and 5th metatarsal.     Of note, Pt is s/p L foot TMA with delayed primary closure on 2/8. Clean margin of 1st and 5th metatarsal growing Vanc resistant e faecalis, staph lugdunesis, & staph epidermidis. Pt pending PICC for 6 weeks IV antibiotics.     Plan:   - Dry sterile dressing consisting of packing, gauze, ABD, kerlix and ACE applied to foot.   - C/w antibiotics per ID. - recommending 6 week IV antibiotic (daptomycin) course for residual OM (till 3/22/23)  - F/u OR final cultures/path  - Pt can WBAT to L heel in surgical shoe  - Rest of care per primary team    Plan discussed w/ Attending & Podiatry following    Discharge dressing instructions: Cleanse wound with normal saline daily, pat dry with sterile guaze, pack open part of incision with 1/4 inch iodoform packing, apply betadine to macerated edges, cover with dry sterile gauze, ABD pad, wrap with Kerlix and light ACE bandage.     Patient should follow up with Dr. Aldair Lamar within 1 week of discharge.    Office information:          [place] Address- [address], [place] [address] Phone: [phone]

## 2023-02-13 NOTE — PROGRESS NOTE ADULT - SUBJECTIVE AND OBJECTIVE BOX
Patient is a 62y old  Male who presents with a chief complaint of LLE Angiogram (14 Feb 2023 05:35)    INTERVAL EVENTS:  - Tolerating regular texture diet   - No new complaints today   - PICC line placement today     SUBJECTIVE:  Patient was seen and examined at bedside.  Review of systems: No CP, dyspnea, nausea or vomiting, dysuria,     Diet, Regular:   Consistent Carbohydrate Evening Snack (CSTCHOSN) (02-06-23 @ 18:07) [Active]    MEDICATIONS:  MEDICATIONS  (STANDING):  amLODIPine   Tablet 10 milliGRAM(s) Oral daily  aspirin  chewable 81 milliGRAM(s) Oral daily  atorvastatin 40 milliGRAM(s) Oral at bedtime  clopidogrel Tablet 75 milliGRAM(s) Oral daily  DAPTOmycin IVPB      DAPTOmycin IVPB 600 milliGRAM(s) IV Intermittent every 24 hours  dextrose 5%. 1000 milliLiter(s) (50 mL/Hr) IV Continuous <Continuous>  dextrose 5%. 1000 milliLiter(s) (100 mL/Hr) IV Continuous <Continuous>  dextrose 50% Injectable 25 Gram(s) IV Push once  dextrose 50% Injectable 12.5 Gram(s) IV Push once  dextrose 50% Injectable 25 Gram(s) IV Push once  glucagon  Injectable 1 milliGRAM(s) IntraMuscular once  heparin   Injectable 5000 Unit(s) SubCutaneous every 8 hours  insulin glargine Injectable (LANTUS) 24 Unit(s) SubCutaneous at bedtime  insulin lispro (ADMELOG) corrective regimen sliding scale   SubCutaneous Before meals and at bedtime  insulin lispro Injectable (ADMELOG) 8 Unit(s) SubCutaneous three times a day before meals  lisinopril 40 milliGRAM(s) Oral daily  polyethylene glycol 3350 17 Gram(s) Oral daily    MEDICATIONS  (PRN):  acetaminophen     Tablet .. 1000 milliGRAM(s) Oral every 6 hours PRN Temp greater or equal to 38C (100.4F), Mild Pain (1 - 3), Moderate Pain (4 - 6)  dextrose Oral Gel 15 Gram(s) Oral once PRN Blood Glucose LESS THAN 70 milliGRAM(s)/deciliter  sodium chloride 0.9% lock flush 10 milliLiter(s) IV Push every 1 hour PRN Pre/post blood products, medications, blood draw, and to maintain line patency    Allergies    cefepime (Rash; Hives)  ciprofloxacin (Hives; Rash)    Intolerances        OBJECTIVE:  Vital Signs Last 24 Hrs  T(C): 36.5 (14 Feb 2023 04:52), Max: 37.2 (13 Feb 2023 13:40)  T(F): 97.7 (14 Feb 2023 04:52), Max: 98.9 (13 Feb 2023 13:40)  HR: 82 (14 Feb 2023 05:40) (80 - 94)  BP: 165/75 (14 Feb 2023 05:40) (129/79 - 183/86)  BP(mean): 108 (14 Feb 2023 05:40) (96 - 124)  RR: 18 (14 Feb 2023 05:40) (18 - 18)  SpO2: 98% (14 Feb 2023 05:40) (97% - 100%)    Parameters below as of 14 Feb 2023 05:40  Patient On (Oxygen Delivery Method): room air      I&O's Summary    12 Feb 2023 07:01  -  13 Feb 2023 07:00  --------------------------------------------------------  IN: 660 mL / OUT: 1300 mL / NET: -640 mL    13 Feb 2023 07:01  -  14 Feb 2023 06:02  --------------------------------------------------------  IN: 560 mL / OUT: 1925 mL / NET: -1365 mL        PHYSICAL EXAM:  Gen: Reclining in bed at time of exam, appears stated age  HEENT: NCAT, MMM, clear OP  Neck: supple, trachea at midline  CV: RRR, +S1/S2  Pulm: adequate respiratory effort, no increase in work of breathing  Abd: soft, ND  Skin: warm and dry,   Ext: Left foot dressed in clean, dry, dressing, right shin without pitting edema   Neuro: AOx3, speaking in full sentences  Psych: affect and behavior appropriate, pleasant at time of interview    LABS:      CAPILLARY BLOOD GLUCOSE      POCT Blood Glucose.: 218 mg/dL (13 Feb 2023 21:54)  POCT Blood Glucose.: 149 mg/dL (13 Feb 2023 17:16)  POCT Blood Glucose.: 149 mg/dL (13 Feb 2023 11:48)  POCT Blood Glucose.: 125 mg/dL (13 Feb 2023 08:55)  POCT Blood Glucose.: 153 mg/dL (13 Feb 2023 06:36)        MICRODATA:      RADIOLOGY/OTHER STUDIES:

## 2023-02-13 NOTE — DISCHARGE NOTE PROVIDER - HOSPITAL COURSE
62M with PMH of HTN, HLD, DM, PAD s/p multiple L toe amputations 2 years prior (c/b medial deviation of 1st/2nd toes resulting in ulceration of medial metatarsal head) presents for LLE Angiogram. He was recently admitted to an OSH where he received 8 week of IV Abx via a PICC line. He was seen in the office and noted drainage form his L foot. Denies fevers/chills, CP/SOB. ROS otherwise negative. He presented to the hospital for a LLE angiogram.  and admitted to the vascular surgery team post op. 62M with PMH of HTN, HLD, DM, PAD s/p multiple L toe amputations 2 years prior (c/b medial deviation of 1st/2nd toes resulting in ulceration of medial metatarsal head) presents for LLE Angiogram. He was recently admitted to an OSH where he received 8 week of IV Abx via a PICC line. He was seen in the office and noted drainage form his L foot. Denies fevers/chills, CP/SOB. ROS otherwise negative. He presented to the hospital for a LLE angiogram.  and admitted to the vascular surgery team post op.       Discharge dressing instructions: Cleanse wound with normal saline daily, pat dry with sterile guaze, pack open part of incision with 1/4 inch iodoform packing, apply betadine to macerated edges, cover with dry sterile gauze, ABD pad, wrap with Kerlix and light ACE bandage. 62M with PMH of HTN, HLD, DM, PAD s/p multiple L toe amputations 2 years prior (c/b medial deviation of 1st/2nd toes resulting in ulceration of medial metatarsal head) presents for LLE Angiogram. He was recently admitted to an OSH where he received 8 week of IV Abx via a PICC line. He was seen in the office and noted drainage form his L foot. Denies fevers/chills, CP/SOB. ROS otherwise negative. He presented to the hospital for a LLE angiogram.      During hospital course s/p LLE angiogram angioplasty and stent of SFA/pop 2/6 then s/p Left TMA with penrose done by podiatry 2/8.  Penrose removed.  PT eval recs no skilled needs.  Wound cx growing VRE.  ID recs daptomycin x6 weeks.  PICC line placed.  His postoperative course was unremarkable with advancement of diet, passing trial of void, and pain control. On day of discharge patient was stable to be d/c'd home with VNS.

## 2023-02-13 NOTE — DISCHARGE NOTE PROVIDER - NSDCMRMEDTOKEN_GEN_ALL_CORE_FT
amLODIPine 10 mg oral tablet: 1 tab(s) orally once a day  Aspirin Enteric Coated 81 mg oral delayed release tablet: 1 tab(s) orally once a day  DAPTOmycin 500 mg intravenous injection: 600 milligram(s) intravenously every 24 hours   docusate sodium 100 mg oral tablet: 1 tab(s) orally 3 times a day  heparin flush 10 units/mL intravenous solution: 3 milliliter(s) intravenously once a day   HumaLOG KwikPen 100 units/mL injectable solution:   Normal Saline Flush 0.9% injectable solution: 10 milliliter(s) injectable 1 to 2 times a day   rosuvastatin 10 mg oral tablet: 1 tab(s) orally once a day  Tresiba 100 units/mL subcutaneous solution: 50 unit(s) subcutaneous once a day  weekly labs: CMP, CBC, ESR, CRP, CK. fax results to Dr. Mercado&#x27;s office at [phone].:    amLODIPine 10 mg oral tablet: 1 tab(s) orally once a day  Aspirin Enteric Coated 81 mg oral delayed release tablet: 1 tab(s) orally once a day  daily administration kit:   DAPTOmycin 500 mg intravenous injection: 600 milligram(s) intravenously every 24 hours   docusate sodium 100 mg oral tablet: 1 tab(s) orally 3 times a day  heparin flush 10 units/mL intravenous solution: 3 milliliter(s) intravenously once a day   HumaLOG KwikPen 100 units/mL injectable solution:   Normal Saline Flush 0.9% injectable solution: 10 milliliter(s) injectable 1 to 2 times a day   rosuvastatin 10 mg oral tablet: 1 tab(s) orally once a day  Tresiba 100 units/mL subcutaneous solution: 50 unit(s) subcutaneous once a day  weekly labs: CMP, CBC, ESR, CRP, CK. fax results to Dr. Mercado&#x27;s office at [phone].:    amlodipine-benazepril 10 mg-40 mg oral capsule: 1 cap(s) orally once a day  Aspirin Enteric Coated 81 mg oral delayed release tablet: 1 tab(s) orally once a day  clopidogrel 75 mg oral tablet: 1 tab(s) orally once a day  daily administration kit:   DAPTOmycin 500 mg intravenous injection: 600 milligram(s) intravenously every 24 hours   docusate sodium 100 mg oral tablet: 1 tab(s) orally 3 times a day  heparin flush 10 units/mL intravenous solution: 3 milliliter(s) intravenously once a day   Insulin Lispro Nilesh KwikPen 100 units/mL injectable solution: 8 unit(s) injectable 3 times a day (before meals)   Normal Saline Flush 0.9% injectable solution: 10 milliliter(s) injectable 1 to 2 times a day   rosuvastatin 10 mg oral tablet: 1 tab(s) orally once a day  weekly labs: CMP, CBC, ESR, CRP, CK. fax results to Dr. Mercado&#x27;s office at [phone].:    amlodipine-benazepril 10 mg-40 mg oral capsule: 1 cap(s) orally once a day  Aspirin Enteric Coated 81 mg oral delayed release tablet: 1 tab(s) orally once a day  clopidogrel 75 mg oral tablet: 1 tab(s) orally once a day  daily administration kit:   DAPTOmycin 500 mg intravenous injection: 600 milligram(s) intravenously every 24 hours   docusate sodium 100 mg oral tablet: 1 tab(s) orally 3 times a day  heparin flush 10 units/mL intravenous solution: 3 milliliter(s) intravenously once a day   Insulin Lispro Nilesh KwikPen 100 units/mL injectable solution: 8 unit(s) injectable 3 times a day (before meals)   Lantus Solostar Pen 100 units/mL subcutaneous solution: 24 unit(s) subcutaneous once a day (at bedtime)   Normal Saline Flush 0.9% injectable solution: 10 milliliter(s) injectable 1 to 2 times a day   rosuvastatin 10 mg oral tablet: 1 tab(s) orally once a day  test strips (per patient&#x27;s insurance): 1 application subcutaneously 4 times a day. ** Compatible with patient&#x27;s glucometer **  weekly labs: CMP, CBC, ESR, CRP, CK. fax results to Dr. Mercado&#x27;s office at [phone].: 1 unit(s) orally once a day

## 2023-02-13 NOTE — DISCHARGE NOTE PROVIDER - NSDCFUADDAPPT_GEN_ALL_CORE_FT
Please follow up with Dr. Avendano in 2 weeks. You may call the office at [phone] to schedule your appointment.

## 2023-02-13 NOTE — PROGRESS NOTE ADULT - SUBJECTIVE AND OBJECTIVE BOX
ON: NILAY, VSS, refuses SQH     Subjective:       MEDICATIONS  (STANDING):  amLODIPine   Tablet 10 milliGRAM(s) Oral daily  aspirin  chewable 81 milliGRAM(s) Oral daily  atorvastatin 40 milliGRAM(s) Oral at bedtime  clopidogrel Tablet 75 milliGRAM(s) Oral daily  DAPTOmycin IVPB      DAPTOmycin IVPB 600 milliGRAM(s) IV Intermittent every 24 hours  dextrose 5%. 1000 milliLiter(s) (50 mL/Hr) IV Continuous <Continuous>  dextrose 5%. 1000 milliLiter(s) (100 mL/Hr) IV Continuous <Continuous>  dextrose 50% Injectable 25 Gram(s) IV Push once  dextrose 50% Injectable 12.5 Gram(s) IV Push once  dextrose 50% Injectable 25 Gram(s) IV Push once  glucagon  Injectable 1 milliGRAM(s) IntraMuscular once  heparin   Injectable 5000 Unit(s) SubCutaneous every 8 hours  insulin glargine Injectable (LANTUS) 24 Unit(s) SubCutaneous at bedtime  insulin lispro (ADMELOG) corrective regimen sliding scale   SubCutaneous Before meals and at bedtime  insulin lispro Injectable (ADMELOG) 8 Unit(s) SubCutaneous three times a day before meals  lisinopril 40 milliGRAM(s) Oral daily  polyethylene glycol 3350 17 Gram(s) Oral daily    MEDICATIONS  (PRN):  acetaminophen     Tablet .. 1000 milliGRAM(s) Oral every 6 hours PRN Temp greater or equal to 38C (100.4F), Mild Pain (1 - 3), Moderate Pain (4 - 6)  dextrose Oral Gel 15 Gram(s) Oral once PRN Blood Glucose LESS THAN 70 milliGRAM(s)/deciliter      Allergies    cefepime (Rash; Hives)  ciprofloxacin (Hives; Rash)    Intolerances          Vital Signs Last 24 Hrs  T(C): 36.8 (12 Feb 2023 22:13), Max: 36.8 (12 Feb 2023 06:12)  T(F): 98.2 (12 Feb 2023 22:13), Max: 98.3 (12 Feb 2023 06:12)  HR: 83 (13 Feb 2023 00:24) (80 - 90)  BP: 156/70 (13 Feb 2023 00:24) (153/70 - 167/79)  BP(mean): 101 (13 Feb 2023 00:24) (101 - 114)  RR: 16 (13 Feb 2023 00:24) (16 - 18)  SpO2: 96% (13 Feb 2023 00:24) (95% - 98%)    Parameters below as of 13 Feb 2023 00:24  Patient On (Oxygen Delivery Method): room air      CAPILLARY BLOOD GLUCOSE      POCT Blood Glucose.: 147 mg/dL (12 Feb 2023 21:35)  POCT Blood Glucose.: 119 mg/dL (12 Feb 2023 16:50)  POCT Blood Glucose.: 151 mg/dL (12 Feb 2023 11:48)  POCT Blood Glucose.: 114 mg/dL (12 Feb 2023 06:44)      02-11 @ 07:01  -  02-12 @ 07:00  --------------------------------------------------------  IN: 780 mL / OUT: 1700 mL / NET: -920 mL    02-12 @ 07:01  -  02-13 @ 04:50  --------------------------------------------------------  IN: 660 mL / OUT: 800 mL / NET: -140 mL        Physical Exam:    Neuro: Alert and Oriented X 4. No apparent focal neural deficits  HEENT: NCAT. Mucous membranes moist. EOMI  Respiratory:  No respiratory distress  Cardiovascular: Non tachy/fernando  Gastrointestinal: soft, NT/ND. No rebound/guarding  Extremities: LLE wrapped w/ACE per podiatry. No apparent saturation of dressing    .LABS:                        8.3    12.45 )-----------( 665      ( 12 Feb 2023 05:30 )             26.7     02-12    141  |  108  |  18  ----------------------------<  109<H>  4.3   |  23  |  1.62<H>    Ca    9.0      12 Feb 2023 05:30  Phos  3.4     02-12  Mg     2.2     02-12                ---------------------------------------------------------------------------  PLEASE CHECK WHEN PRESENT:  [  ] Heart Failure  [  ] Acute  [  ] Acute on Chronic  [  ] Chronic  -------------------------------------------------------------------  [  ]Diastolic [HFpEF]  [  ]Systolic [HFrEF]  [  ]Combined [HFpEF & HFrEF]  [  ] afib  [  ] hypertensive heart disease  [  ]Other:  -------------------------------------------------------------------  [ ] Respiratory failure  [ ] Acute cor pulmonale  [ ] Asthma/COPD Exacerbation  [ ] Pleural effusion  [ ] Aspiration pneumonia  -------------------------------------------------------------------  [  ]LEANNE  [  ]ATN  [  ]Reneal Medullary Necrosis  [  ]Renal Cortical Necrosis  [  ]Other Pathological Lesions:    [  ]CKD 1  [  ]CKD 2  [  ]CKD 3  [  ]CKD 4  [  ]CKD 5  [  ]Other  -------------------------------------------------------------------  [x ]Diabetes  [  x] Diabetic PVD Ulcer  [  ] Neuropathic ulcer to DM  [  ] Diabetes with Nephropathy  [ x ] Osteomyelitis due to diabetes  --------------------------------------------------------------------  [  ]Malnutrition: See Nutrition Note  [  ]Cachexia  [  ]Other:   [  ]Supplement Ordered:  [  ]Morbid Obesity (BMI >=40]  ---------------------------------------------------------------------  [ ] Sepsis/severe sepsis/septic shock  [ ] UTI  [ ] Pneumonia  -----------------------------------------------------------------------  [ ] Acidosis/alkalosis  [ ] Fluid overload  [ ] Hypokalemia  [ ] Hyperkalemia  [ ] Hypomagnesemia  [ ] Hypophosphatemia  [ ] Hyperphosphatemia  [ x] hyponatremia   ------------------------------------------------------------------------  [ ] Acute blood loss anemia  [ ] Post op blood loss anemia  [ ] Iron deficiency anemia  [x ] Anemia due to chronic disease  [ ] Hypercoagulable state  ----------------------------------------------------------------------  [ ] Cerebral infarction  [ ] Transient ischemia attack  [ ] Encephalopathy               ON: NILAY, VSS, refuses SQH     Subjective:   Patient seen and examined on AM rounds with vascular fellow. No acute events overnight. This AM, patient is feeling well. No active complaints. Pending PICC placement today for hopeful discharge soon.     MEDICATIONS  (STANDING):  amLODIPine   Tablet 10 milliGRAM(s) Oral daily  aspirin  chewable 81 milliGRAM(s) Oral daily  atorvastatin 40 milliGRAM(s) Oral at bedtime  clopidogrel Tablet 75 milliGRAM(s) Oral daily  DAPTOmycin IVPB      DAPTOmycin IVPB 600 milliGRAM(s) IV Intermittent every 24 hours  dextrose 5%. 1000 milliLiter(s) (50 mL/Hr) IV Continuous <Continuous>  dextrose 5%. 1000 milliLiter(s) (100 mL/Hr) IV Continuous <Continuous>  dextrose 50% Injectable 25 Gram(s) IV Push once  dextrose 50% Injectable 12.5 Gram(s) IV Push once  dextrose 50% Injectable 25 Gram(s) IV Push once  glucagon  Injectable 1 milliGRAM(s) IntraMuscular once  heparin   Injectable 5000 Unit(s) SubCutaneous every 8 hours  insulin glargine Injectable (LANTUS) 24 Unit(s) SubCutaneous at bedtime  insulin lispro (ADMELOG) corrective regimen sliding scale   SubCutaneous Before meals and at bedtime  insulin lispro Injectable (ADMELOG) 8 Unit(s) SubCutaneous three times a day before meals  lisinopril 40 milliGRAM(s) Oral daily  polyethylene glycol 3350 17 Gram(s) Oral daily    MEDICATIONS  (PRN):  acetaminophen     Tablet .. 1000 milliGRAM(s) Oral every 6 hours PRN Temp greater or equal to 38C (100.4F), Mild Pain (1 - 3), Moderate Pain (4 - 6)  dextrose Oral Gel 15 Gram(s) Oral once PRN Blood Glucose LESS THAN 70 milliGRAM(s)/deciliter      Allergies    cefepime (Rash; Hives)  ciprofloxacin (Hives; Rash)    Intolerances    Vital Signs Last 24 Hrs  T(C): 36.8 (12 Feb 2023 22:13), Max: 36.8 (12 Feb 2023 06:12)  T(F): 98.2 (12 Feb 2023 22:13), Max: 98.3 (12 Feb 2023 06:12)  HR: 83 (13 Feb 2023 00:24) (80 - 90)  BP: 156/70 (13 Feb 2023 00:24) (153/70 - 167/79)  BP(mean): 101 (13 Feb 2023 00:24) (101 - 114)  RR: 16 (13 Feb 2023 00:24) (16 - 18)  SpO2: 96% (13 Feb 2023 00:24) (95% - 98%)    Parameters below as of 13 Feb 2023 00:24  Patient On (Oxygen Delivery Method): room air      CAPILLARY BLOOD GLUCOSE      POCT Blood Glucose.: 147 mg/dL (12 Feb 2023 21:35)  POCT Blood Glucose.: 119 mg/dL (12 Feb 2023 16:50)  POCT Blood Glucose.: 151 mg/dL (12 Feb 2023 11:48)  POCT Blood Glucose.: 114 mg/dL (12 Feb 2023 06:44)      02-11 @ 07:01  -  02-12 @ 07:00  --------------------------------------------------------  IN: 780 mL / OUT: 1700 mL / NET: -920 mL    02-12 @ 07:01  -  02-13 @ 04:50  --------------------------------------------------------  IN: 660 mL / OUT: 800 mL / NET: -140 mL        Physical Exam:    Neuro: Alert and Oriented X 4. No apparent focal neural deficits  HEENT: NCAT. Mucous membranes moist. EOMI  Respiratory:  No respiratory distress  Cardiovascular: Non tachy/fernando  Gastrointestinal: soft, NT/ND. No rebound/guarding  Extremities: LLE wrapped w/ACE per podiatry. No apparent saturation of dressing    .LABS:                        8.3    12.45 )-----------( 665      ( 12 Feb 2023 05:30 )             26.7     02-12    141  |  108  |  18  ----------------------------<  109<H>  4.3   |  23  |  1.62<H>    Ca    9.0      12 Feb 2023 05:30  Phos  3.4     02-12  Mg     2.2     02-12    ---------------------------------------------------------------------------  PLEASE CHECK WHEN PRESENT:  [  ] Heart Failure  [  ] Acute  [  ] Acute on Chronic  [  ] Chronic  -------------------------------------------------------------------  [  ]Diastolic [HFpEF]  [  ]Systolic [HFrEF]  [  ]Combined [HFpEF & HFrEF]  [  ] afib  [  ] hypertensive heart disease  [  ]Other:  -------------------------------------------------------------------  [ ] Respiratory failure  [ ] Acute cor pulmonale  [ ] Asthma/COPD Exacerbation  [ ] Pleural effusion  [ ] Aspiration pneumonia  -------------------------------------------------------------------  [  ]LEANNE  [  ]ATN  [  ]Reneal Medullary Necrosis  [  ]Renal Cortical Necrosis  [  ]Other Pathological Lesions:    [  ]CKD 1  [  ]CKD 2  [  ]CKD 3  [  ]CKD 4  [  ]CKD 5  [  ]Other  -------------------------------------------------------------------  [x ]Diabetes  [  x] Diabetic PVD Ulcer  [  ] Neuropathic ulcer to DM  [  ] Diabetes with Nephropathy  [ x ] Osteomyelitis due to diabetes  --------------------------------------------------------------------  [  ]Malnutrition: See Nutrition Note  [  ]Cachexia  [  ]Other:   [  ]Supplement Ordered:  [  ]Morbid Obesity (BMI >=40]  ---------------------------------------------------------------------  [ ] Sepsis/severe sepsis/septic shock  [ ] UTI  [ ] Pneumonia  -----------------------------------------------------------------------  [ ] Acidosis/alkalosis  [ ] Fluid overload  [ ] Hypokalemia  [ ] Hyperkalemia  [ ] Hypomagnesemia  [ ] Hypophosphatemia  [ ] Hyperphosphatemia  [ x] hyponatremia   ------------------------------------------------------------------------  [ ] Acute blood loss anemia  [ ] Post op blood loss anemia  [ ] Iron deficiency anemia  [x ] Anemia due to chronic disease  [ ] Hypercoagulable state  ----------------------------------------------------------------------  [ ] Cerebral infarction  [ ] Transient ischemia attack  [ ] Encephalopathy

## 2023-02-13 NOTE — PROGRESS NOTE ADULT - SUBJECTIVE AND OBJECTIVE BOX
Subjective: NILAY ON. Pt seen and examined at bedside. Pt endorses to pedal pain during dressing changes today.     ROS:   Denies Headache, blurred vision, Chest Pain, SOB, Abdominal pain, nausea or vomiting     Social   DAPTOmycin IVPB   DAPTOmycin IVPB 600  amLODIPine   Tablet 10  aspirin  chewable 81  clopidogrel Tablet 75  DAPTOmycin IVPB   DAPTOmycin IVPB 600  heparin   Injectable 5000  lisinopril 40      Allergies    cefepime (Rash; Hives)  ciprofloxacin (Hives; Rash)    Intolerances        Vital Signs Last 24 Hrs  T(C): 36.3 (13 Feb 2023 05:28), Max: 36.8 (12 Feb 2023 18:13)  T(F): 97.4 (13 Feb 2023 05:28), Max: 98.3 (12 Feb 2023 18:13)  HR: 94 (13 Feb 2023 08:58) (82 - 94)  BP: 158/76 (13 Feb 2023 08:58) (153/70 - 170/79)  BP(mean): 109 (13 Feb 2023 08:58) (101 - 114)  RR: 18 (13 Feb 2023 08:58) (16 - 18)  SpO2: 98% (13 Feb 2023 08:58) (95% - 98%)    Parameters below as of 13 Feb 2023 08:58  Patient On (Oxygen Delivery Method): room air      I&O's Summary    12 Feb 2023 07:01  -  13 Feb 2023 07:00  --------------------------------------------------------  IN: 660 mL / OUT: 1300 mL / NET: -640 mL        Physical Exam:  Left Lower Extremity Focused  Vasc: DP/PT 2/4, No edema or erythema, temp gradient warm to warm   Derm: s/p TMA of L foot. TMA site partially closed with staples in tact and no signs of dehiscence, no pus or serous drainage, sanginous drainage noted on dressing. Lateral aspect  of site packed with iodoform packing.   Neuro: Protective sensation diminished  MSK: TMA       LABS:                        8.3    12.45 )-----------( 665      ( 12 Feb 2023 05:30 )             26.7     02-12    141  |  108  |  18  ----------------------------<  109<H>  4.3   |  23  |  1.62<H>    Ca    9.0      12 Feb 2023 05:30  Phos  3.4     02-12  Mg     2.2     02-12          Radiology:  < from: Xray Foot AP + Lateral + Oblique, Left (02.09.23 @ 01:06) >  IMPRESSION: Frontal, lateral and oblique views of the left foot   demonstrates transmetatarsal amputation of the first through fifth digits   with overlying surgical staples.    < end of copied text >    Microbiology:  Culture - Tissue with Gram Stain (02.08.23 @ 20:00)    Gram Stain:   No organisms seen  No WBC's seen.    -  Ampicillin: S <=2 Predicts results to ampicillin/sulbactam, amoxacillin-clavulanate and  piperacillin-tazobactam.    -  Clindamycin: R >4    -  Clindamycin: R >4    -  Daptomycin: S 1.5    -  Daptomycin: S 0.5    -  Erythromycin: R >4    -  Erythromycin: R >4    -  Linezolid: S <=0.5    -  Linezolid: S 1    -  Linezolid: S 2    -  Oxacillin: R >2    -  Oxacillin: S 1    -  Rifampin: S <=1 Should not be used as monotherapy    -  Rifampin: S <=1 Should not be used as monotherapy    -  Trimethoprim/Sulfamethoxazole: R >2/38    -  Trimethoprim/Sulfamethoxazole: S <=0.5/9.5    -  Vancomycin: S 2    -  Vancomycin: S 2    -  Vancomycin: R >16    Specimen Source: .Tissue 5th  metacarpal or spec    Culture Results:   Rare Enterococcus faecalis (vancomycin resistant) .... Result called to  and read back byGretel Mendez RN  02/11/2023 12:58:17  Rare Staphylococcus lugdunensis  Rare Staphylococcus epidermidis    Organism Identification: Staphylococcus lugdunensis  Staphylococcus epidermidis  Enterococcus faecalis (vancomycin resistant)  Enterococcus faecalis (vancomycin resistant)    Organism: Staphylococcus lugdunensis    Organism: Staphylococcus epidermidis    Organism: Enterococcus faecalis (vancomycin resistant)    Organism: Enterococcus faecalis (vancomycin resistant)    Method Type: ETEST    Method Type: FRANCHESCA    Method Type: FRANCHESCA    Method Type: FRANCHESCA

## 2023-02-14 ENCOUNTER — TRANSCRIPTION ENCOUNTER (OUTPATIENT)
Age: 63
End: 2023-02-14

## 2023-02-14 VITALS — TEMPERATURE: 97 F

## 2023-02-14 DIAGNOSIS — M86.9 OSTEOMYELITIS, UNSPECIFIED: ICD-10-CM

## 2023-02-14 DIAGNOSIS — N17.9 ACUTE KIDNEY FAILURE, UNSPECIFIED: ICD-10-CM

## 2023-02-14 LAB
-  DAPTOMYCIN: SIGNIFICANT CHANGE UP
CULTURE RESULTS: SIGNIFICANT CHANGE UP
GLUCOSE BLDC GLUCOMTR-MCNC: 139 MG/DL — HIGH (ref 70–99)
GLUCOSE BLDC GLUCOMTR-MCNC: 195 MG/DL — HIGH (ref 70–99)
GLUCOSE BLDC GLUCOMTR-MCNC: 200 MG/DL — HIGH (ref 70–99)
METHOD TYPE: SIGNIFICANT CHANGE UP
ORGANISM # SPEC MICROSCOPIC CNT: SIGNIFICANT CHANGE UP
SPECIMEN SOURCE: SIGNIFICANT CHANGE UP

## 2023-02-14 PROCEDURE — 87635 SARS-COV-2 COVID-19 AMP PRB: CPT

## 2023-02-14 PROCEDURE — C1874: CPT

## 2023-02-14 PROCEDURE — C1725: CPT

## 2023-02-14 PROCEDURE — 73720 MRI LWR EXTREMITY W/O&W/DYE: CPT

## 2023-02-14 PROCEDURE — 85027 COMPLETE CBC AUTOMATED: CPT

## 2023-02-14 PROCEDURE — 84100 ASSAY OF PHOSPHORUS: CPT

## 2023-02-14 PROCEDURE — 80048 BASIC METABOLIC PNL TOTAL CA: CPT

## 2023-02-14 PROCEDURE — 99232 SBSQ HOSP IP/OBS MODERATE 35: CPT

## 2023-02-14 PROCEDURE — 86140 C-REACTIVE PROTEIN: CPT

## 2023-02-14 PROCEDURE — 73630 X-RAY EXAM OF FOOT: CPT

## 2023-02-14 PROCEDURE — 83036 HEMOGLOBIN GLYCOSYLATED A1C: CPT

## 2023-02-14 PROCEDURE — 97116 GAIT TRAINING THERAPY: CPT

## 2023-02-14 PROCEDURE — 85347 COAGULATION TIME ACTIVATED: CPT

## 2023-02-14 PROCEDURE — 86900 BLOOD TYPING SEROLOGIC ABO: CPT

## 2023-02-14 PROCEDURE — 81001 URINALYSIS AUTO W/SCOPE: CPT

## 2023-02-14 PROCEDURE — 82962 GLUCOSE BLOOD TEST: CPT

## 2023-02-14 PROCEDURE — 93005 ELECTROCARDIOGRAM TRACING: CPT

## 2023-02-14 PROCEDURE — 83735 ASSAY OF MAGNESIUM: CPT

## 2023-02-14 PROCEDURE — 85730 THROMBOPLASTIN TIME PARTIAL: CPT

## 2023-02-14 PROCEDURE — 85652 RBC SED RATE AUTOMATED: CPT

## 2023-02-14 PROCEDURE — 87075 CULTR BACTERIA EXCEPT BLOOD: CPT

## 2023-02-14 PROCEDURE — C1894: CPT

## 2023-02-14 PROCEDURE — 73620 X-RAY EXAM OF FOOT: CPT

## 2023-02-14 PROCEDURE — 71045 X-RAY EXAM CHEST 1 VIEW: CPT

## 2023-02-14 PROCEDURE — 86901 BLOOD TYPING SEROLOGIC RH(D): CPT

## 2023-02-14 PROCEDURE — C1887: CPT

## 2023-02-14 PROCEDURE — 85610 PROTHROMBIN TIME: CPT

## 2023-02-14 PROCEDURE — 76000 FLUOROSCOPY <1 HR PHYS/QHP: CPT

## 2023-02-14 PROCEDURE — A9585: CPT

## 2023-02-14 PROCEDURE — 88311 DECALCIFY TISSUE: CPT

## 2023-02-14 PROCEDURE — C1769: CPT

## 2023-02-14 PROCEDURE — 87186 SC STD MICRODIL/AGAR DIL: CPT

## 2023-02-14 PROCEDURE — 82550 ASSAY OF CK (CPK): CPT

## 2023-02-14 PROCEDURE — 86850 RBC ANTIBODY SCREEN: CPT

## 2023-02-14 PROCEDURE — 88307 TISSUE EXAM BY PATHOLOGIST: CPT

## 2023-02-14 PROCEDURE — 97161 PT EVAL LOW COMPLEX 20 MIN: CPT

## 2023-02-14 PROCEDURE — 86803 HEPATITIS C AB TEST: CPT

## 2023-02-14 PROCEDURE — 88304 TISSUE EXAM BY PATHOLOGIST: CPT

## 2023-02-14 PROCEDURE — 87181 SC STD AGAR DILUTION PER AGT: CPT

## 2023-02-14 PROCEDURE — 87070 CULTURE OTHR SPECIMN AEROBIC: CPT

## 2023-02-14 PROCEDURE — 36573 INSJ PICC RS&I 5 YR+: CPT

## 2023-02-14 PROCEDURE — 36415 COLL VENOUS BLD VENIPUNCTURE: CPT

## 2023-02-14 RX ORDER — INSULIN GLARGINE 100 [IU]/ML
50 INJECTION, SOLUTION SUBCUTANEOUS
Qty: 720 | Refills: 0 | DISCHARGE
Start: 2023-02-14 | End: 2023-03-15

## 2023-02-14 RX ORDER — INSULIN LISPRO 100/ML
8 VIAL (ML) SUBCUTANEOUS
Qty: 720 | Refills: 0
Start: 2023-02-14 | End: 2023-03-15

## 2023-02-14 RX ORDER — INSULIN DEGLUDEC 100 U/ML
50 INJECTION, SOLUTION SUBCUTANEOUS
Qty: 0 | Refills: 0 | DISCHARGE

## 2023-02-14 RX ORDER — AMLODIPINE BESYLATE AND BENAZEPRIL HYDROCHLORIDE 10; 20 MG/1; MG/1
1 CAPSULE ORAL
Qty: 0 | Refills: 0 | DISCHARGE

## 2023-02-14 RX ORDER — INSULIN LISPRO 100/ML
10 VIAL (ML) SUBCUTANEOUS
Qty: 720 | Refills: 0 | DISCHARGE
Start: 2023-02-14 | End: 2023-03-15

## 2023-02-14 RX ORDER — INSULIN GLARGINE 100 [IU]/ML
24 INJECTION, SOLUTION SUBCUTANEOUS
Qty: 720 | Refills: 0
Start: 2023-02-14 | End: 2023-03-15

## 2023-02-14 RX ORDER — AMLODIPINE BESYLATE 2.5 MG/1
1 TABLET ORAL
Qty: 0 | Refills: 0 | DISCHARGE

## 2023-02-14 RX ORDER — CLOPIDOGREL BISULFATE 75 MG/1
1 TABLET, FILM COATED ORAL
Qty: 0 | Refills: 0 | DISCHARGE
Start: 2023-02-14

## 2023-02-14 RX ORDER — CHLORHEXIDINE GLUCONATE 213 G/1000ML
1 SOLUTION TOPICAL DAILY
Refills: 0 | Status: DISCONTINUED | OUTPATIENT
Start: 2023-02-14 | End: 2023-02-14

## 2023-02-14 RX ORDER — INSULIN LISPRO 100/ML
0 VIAL (ML) SUBCUTANEOUS
Qty: 0 | Refills: 0 | DISCHARGE

## 2023-02-14 RX ADMIN — DAPTOMYCIN 124 MILLIGRAM(S): 500 INJECTION, POWDER, LYOPHILIZED, FOR SOLUTION INTRAVENOUS at 05:36

## 2023-02-14 RX ADMIN — LISINOPRIL 40 MILLIGRAM(S): 2.5 TABLET ORAL at 05:37

## 2023-02-14 RX ADMIN — AMLODIPINE BESYLATE 10 MILLIGRAM(S): 2.5 TABLET ORAL at 05:36

## 2023-02-14 RX ADMIN — Medication 8 UNIT(S): at 12:54

## 2023-02-14 RX ADMIN — Medication 2: at 12:53

## 2023-02-14 RX ADMIN — Medication 81 MILLIGRAM(S): at 12:19

## 2023-02-14 RX ADMIN — CLOPIDOGREL BISULFATE 75 MILLIGRAM(S): 75 TABLET, FILM COATED ORAL at 12:19

## 2023-02-14 RX ADMIN — CHLORHEXIDINE GLUCONATE 1 APPLICATION(S): 213 SOLUTION TOPICAL at 11:14

## 2023-02-14 RX ADMIN — Medication 8 UNIT(S): at 09:06

## 2023-02-14 NOTE — PROGRESS NOTE ADULT - SUBJECTIVE AND OBJECTIVE BOX
Subjective: NILAY ON. Pt seen and examined at bedside. Pt did not have any pedal complaints today.    ROS:   Denies Headache, blurred vision, Chest Pain, SOB, Abdominal pain, nausea or vomiting     Social   DAPTOmycin IVPB   DAPTOmycin IVPB 600  amLODIPine   Tablet 10  aspirin  chewable 81  clopidogrel Tablet 75  DAPTOmycin IVPB   DAPTOmycin IVPB 600  heparin   Injectable 5000  lisinopril 40      Allergies    cefepime (Rash; Hives)  ciprofloxacin (Hives; Rash)    Intolerances        Vital Signs Last 24 Hrs  T(C): 36.5 (14 Feb 2023 04:52), Max: 37.2 (13 Feb 2023 13:40)  T(F): 97.7 (14 Feb 2023 04:52), Max: 98.9 (13 Feb 2023 13:40)  HR: 76 (14 Feb 2023 06:34) (76 - 94)  BP: 163/76 (14 Feb 2023 06:34) (129/79 - 183/86)  BP(mean): 109 (14 Feb 2023 06:34) (96 - 124)  RR: 18 (14 Feb 2023 06:34) (18 - 18)  SpO2: 97% (14 Feb 2023 06:34) (97% - 100%)    Parameters below as of 14 Feb 2023 06:34  Patient On (Oxygen Delivery Method): room air      I&O's Summary    13 Feb 2023 07:01  -  14 Feb 2023 07:00  --------------------------------------------------------  IN: 560 mL / OUT: 1925 mL / NET: -1365 mL        Physical Exam:  Left Lower Extremity Focused  Vasc: DP/PT 2/4, No edema or erythema, temp gradient warm to warm   Derm: s/p TMA of L foot. TMA site partially closed with staples in tact and no signs of dehiscence, no pus or serous drainage, sanginous drainage noted on dressing. Lateral aspect  of site packed with iodoform packing.   Neuro: Protective sensation diminished  MSK: TMA       LABS:              Radiology:  < from: Xray Foot AP + Lateral + Oblique, Left (02.09.23 @ 01:06) >  IMPRESSION: Frontal, lateral and oblique views of the left foot   demonstrates transmetatarsal amputation of the first through fifth digits   with overlying surgical staples.    < end of copied text >      Microbiology:  Culture - Tissue with Gram Stain (02.08.23 @ 20:00)    -  Clindamycin: R >4    -  Clindamycin: R >4    -  Daptomycin: S 1.5    -  Daptomycin: S 0.5    -  Erythromycin: R >4    -  Erythromycin: R >4    -  Linezolid: S <=0.5    -  Linezolid: S 1    -  Linezolid: S 2    -  Oxacillin: R >2    -  Oxacillin: S 1    -  Rifampin: S <=1 Should not be used as monotherapy    -  Rifampin: S <=1 Should not be used as monotherapy    -  Trimethoprim/Sulfamethoxazole: R >2/38    -  Trimethoprim/Sulfamethoxazole: S <=0.5/9.5    -  Vancomycin: S 2    Gram Stain:   No organisms seen  No WBC's seen.    -  Ampicillin: S <=2 Predicts results to ampicillin/sulbactam, amoxacillin-clavulanate and  piperacillin-tazobactam.    -  Vancomycin: S 2    -  Vancomycin: R >16    Specimen Source: .Tissue 5th  metacarpal or spec    Culture Results:   Rare Enterococcus faecalis (vancomycin resistant) .... Result called to  and read back byGretel Mendez RN  02/11/2023 12:58:17  Rare Staphylococcus lugdunensis  Rare Staphylococcus epidermidis    Organism Identification: Staphylococcus lugdunensis  Staphylococcus epidermidis  Enterococcus faecalis (vancomycin resistant)  Enterococcus faecalis (vancomycin resistant)    Organism: Staphylococcus lugdunensis    Organism: Staphylococcus epidermidis    Organism: Enterococcus faecalis (vancomycin resistant)    Organism: Enterococcus faecalis (vancomycin resistant)    Method Type: ETEST    Method Type: FRANCHESCA    Method Type: FRANCHESCA    Method Type: FRANCHESCA

## 2023-02-14 NOTE — PROGRESS NOTE ADULT - SUBJECTIVE AND OBJECTIVE BOX
PAULA    Subjective:   Patient seen on rounds with chief resident. The patient is resting comfortably in bed. Pain is well managed. Tolerating diet. Patient is voiding adequately, passing gas and having bowel movements.     ROS:   Denies nausea/vomiting, chest pain/shortness of breath, calf pain, neck pain, headache or changes in vision.    Social   DAPTOmycin IVPB   DAPTOmycin IVPB 600  amLODIPine   Tablet 10  aspirin  chewable 81  clopidogrel Tablet 75  DAPTOmycin IVPB   DAPTOmycin IVPB 600  heparin   Injectable 5000  lisinopril 40      Allergies    cefepime (Rash; Hives)  ciprofloxacin (Hives; Rash)    Intolerances        Vital Signs Last 24 Hrs  T(C): 36.5 (14 Feb 2023 04:52), Max: 37.2 (13 Feb 2023 13:40)  T(F): 97.7 (14 Feb 2023 04:52), Max: 98.9 (13 Feb 2023 13:40)  HR: 82 (14 Feb 2023 04:35) (80 - 94)  BP: 161/72 (14 Feb 2023 04:35) (129/79 - 183/86)  BP(mean): 104 (14 Feb 2023 04:35) (96 - 124)  RR: 18 (14 Feb 2023 04:35) (18 - 18)  SpO2: 98% (14 Feb 2023 04:35) (97% - 100%)    Parameters below as of 14 Feb 2023 04:35  Patient On (Oxygen Delivery Method): room air      I&O's Summary    12 Feb 2023 07:01  -  13 Feb 2023 07:00  --------------------------------------------------------  IN: 660 mL / OUT: 1300 mL / NET: -640 mL    13 Feb 2023 07:01  -  14 Feb 2023 05:35  --------------------------------------------------------  IN: 510 mL / OUT: 1525 mL / NET: -1015 mL        Physical Exam:  General: Patient is doing well and lying in bed comfortably  Constitutional: alert and oriented   Pulm: Nonlabored breathing, no respiratory distress  CV: Regular rate and rhythm, normal sinus rhythm  Abd:  soft, nontender, nondistended. No rebound, no guarding.   Extremities: warm, well perfused, no edema bilaterally  Pulses:   Right:                                                                          Left:  FEM [ ]2+ [ ]1+ [ ]doppler                                             FEM [ ]2+ [ ]1+ [ ]doppler    POP [ ]2+ [ ]1+ [ ]doppler                                             POP [ ]2+ [ ]1+ [ ]doppler    DP [ ]2+ [ ]1+ [ ]doppler                                                DP [ ]2+ [ ]1+ [ ]doppler  PT[ ]2+ [ ]1+ [ ]doppler                                                  PT [ ]2+ [ ]1+ [ ]doppler      LABS:        PLEASE CHECK WHEN PRESENT:  [  ] Heart Failure  [  ] Acute  [  ] Acute on Chronic  [  ] Chronic  -------------------------------------------------------------------  [  ]Diastolic [HFpEF]  [  ]Systolic [HFrEF]  [  ]Combined [HFpEF & HFrEF]  [  ] afib  [  ] hypertensive heart disease  [  ]Other:  -------------------------------------------------------------------  [ ] Respiratory failure  [ ] Acute cor pulmonale  [ ] Asthma/COPD Exacerbation  [ ] Pleural effusion  [ ] Aspiration pneumonia  -------------------------------------------------------------------  [  ]LEANNE  [  ]ATN  [  ]Reneal Medullary Necrosis  [  ]Renal Cortical Necrosis  [  ]Other Pathological Lesions:    [  ]CKD 1  [  ]CKD 2  [  ]CKD 3  [  ]CKD 4  [  ]CKD 5  [  ]Other  -------------------------------------------------------------------  [x ]Diabetes  [  x] Diabetic PVD Ulcer  [  ] Neuropathic ulcer to DM  [  ] Diabetes with Nephropathy  [ x ] Osteomyelitis due to diabetes  --------------------------------------------------------------------  [  ]Malnutrition: See Nutrition Note  [  ]Cachexia  [  ]Other:   [  ]Supplement Ordered:  [  ]Morbid Obesity (BMI >=40]  ---------------------------------------------------------------------  [ ] Sepsis/severe sepsis/septic shock  [ ] UTI  [ ] Pneumonia  -----------------------------------------------------------------------  [ ] Acidosis/alkalosis  [ ] Fluid overload  [ ] Hypokalemia  [ ] Hyperkalemia  [ ] Hypomagnesemia  [ ] Hypophosphatemia  [ ] Hyperphosphatemia  [ x] hyponatremia   ------------------------------------------------------------------------  [ ] Acute blood loss anemia  [ ] Post op blood loss anemia  [ ] Iron deficiency anemia  [x ] Anemia due to chronic disease  [ ] Hypercoagulable state  ----------------------------------------------------------------------  [ ] Cerebral infarction  [ ] Transient ischemia attack  [ ] Encephalopathy                Assessment and Plan:   · Assessment	  62M with PMH of HTN, HLD, DM, PAD s/p multiple L toe amputations 2 years prior (c/b medial deviation of 1st/2nd toes resulting in ulceration of medial metatarsal head) presents for LLE Angiogram, now s/p Left TMA    Vascular/PAD:  -Continue ASA + Plavix (Plavix new med)   -s/p Left angio and stent   -s/p L TMA by podiatry 2/8  -Penrose drain removed by podiatry on 2/11  -Follow up home wound care recs from podiatry     HTN/HLD:  -Started atorvastatin   -Started on lisinopril    DM:  - HgA1c=10.9  - Holding Tresiba   - lantus 24u HS, Lispro 8 TID continue ISS     Anemia:   -Hgb 8.5    ID:  -Left foot wounds noted   -appreciate podiatry recs   -Continue daptomycin per ID until 3/22  -s/p picc on 2/13 and f/u w/ID outpatient    Diet: CCD    Activity:  as tolerated, NWB on left foot    DVTPPx: SQH     Dispo: PT->rolling walker   MAYRAEON    Subjective:   resting in bed. denies pain in left foot at rest, Denies CP, SOB   denies right groin swelling     ROS:   Denies nausea/vomiting, chest pain/shortness of breath, calf pain, neck pain, headache or changes in vision.    Social   DAPTOmycin IVPB   DAPTOmycin IVPB 600  amLODIPine   Tablet 10  aspirin  chewable 81  clopidogrel Tablet 75  DAPTOmycin IVPB   DAPTOmycin IVPB 600  heparin   Injectable 5000  lisinopril 40      Allergies    cefepime (Rash; Hives)  ciprofloxacin (Hives; Rash)    Intolerances        Vital Signs Last 24 Hrs  T(C): 36.5 (14 Feb 2023 04:52), Max: 37.2 (13 Feb 2023 13:40)  T(F): 97.7 (14 Feb 2023 04:52), Max: 98.9 (13 Feb 2023 13:40)  HR: 82 (14 Feb 2023 04:35) (80 - 94)  BP: 161/72 (14 Feb 2023 04:35) (129/79 - 183/86)  BP(mean): 104 (14 Feb 2023 04:35) (96 - 124)  RR: 18 (14 Feb 2023 04:35) (18 - 18)  SpO2: 98% (14 Feb 2023 04:35) (97% - 100%)    Parameters below as of 14 Feb 2023 04:35  Patient On (Oxygen Delivery Method): room air      I&O's Summary    12 Feb 2023 07:01  -  13 Feb 2023 07:00  --------------------------------------------------------  IN: 660 mL / OUT: 1300 mL / NET: -640 mL    13 Feb 2023 07:01  -  14 Feb 2023 05:35  --------------------------------------------------------  IN: 510 mL / OUT: 1525 mL / NET: -1015 mL        Physical Exam:  General: NAD  Pulm: b/l breath sounds   CV: s1s2 reg  Abd: soft   Extremities:Left TMA dressing clean and dry   Right groin soft         LABS:        PLEASE CHECK WHEN PRESENT:  [  ] Heart Failure  [  ] Acute  [  ] Acute on Chronic  [  ] Chronic  -------------------------------------------------------------------  [  ]Diastolic [HFpEF]  [  ]Systolic [HFrEF]  [  ]Combined [HFpEF & HFrEF]  [  ] afib  [ x ] hypertensive heart disease  [  ]Other:  -------------------------------------------------------------------  [ ] Respiratory failure  [ ] Acute cor pulmonale  [ ] Asthma/COPD Exacerbation  [ ] Pleural effusion  [ ] Aspiration pneumonia  -------------------------------------------------------------------  [x  ]LEANNE  [  ]ATN  [  ]Reneal Medullary Necrosis  [  ]Renal Cortical Necrosis  [  ]Other Pathological Lesions:    [  ]CKD 1  [  ]CKD 2  [  ]CKD 3  [  ]CKD 4  [  ]CKD 5  [  ]Other  -------------------------------------------------------------------  [x ]Diabetes  [  x] Diabetic PVD Ulcer  [  ] Neuropathic ulcer to DM  [  ] Diabetes with Nephropathy  [ x ] Osteomyelitis due to diabetes  --------------------------------------------------------------------  [  ]Malnutrition: See Nutrition Note  [  ]Cachexia  [  ]Other:   [  ]Supplement Ordered:  [  ]Morbid Obesity (BMI >=40]  ---------------------------------------------------------------------  [ ] Sepsis/severe sepsis/septic shock  [ ] UTI  [ ] Pneumonia  -----------------------------------------------------------------------  [ ] Acidosis/alkalosis  [ ] Fluid overload  [ ] Hypokalemia  [ ] Hyperkalemia  [ ] Hypomagnesemia  [ ] Hypophosphatemia  [ ] Hyperphosphatemia  [ x] hyponatremia   ------------------------------------------------------------------------  [ ] Acute blood loss anemia  [ ] Post op blood loss anemia  [ ] Iron deficiency anemia  [x ] Anemia due to chronic disease  [ ] Hypercoagulable state  ----------------------------------------------------------------------  [ ] Cerebral infarction  [ ] Transient ischemia attack  [ ] Encephalopathy                Assessment and Plan:   · Assessment	  62M with PMH of HTN, HLD, DM, PAD s/p multiple L toe amputations 2 years prior (c/b medial deviation of 1st/2nd toes resulting in ulceration of medial metatarsal head) presents for LLE Angiogram, now s/p Left TMA    Vascular/PAD:  -Continue ASA + Plavix (Plavix new med)   -s/p Left angio and stent   -s/p L TMA by podiatry 2/8  -Penrose drain removed by podiatry on 2/11  -Follow up home wound care recs from podiatry     HTN/HLD:  -Started atorvastatin   -Started on lisinopril    DM:  - HgA1c=10.9  - Holding Tresiba   - lantus 24u HS, Lispro 8 TID continue ISS     Anemia of chronic dx:   -Hgb 8.5  -will monitor     ID:  -Left foot wounds noted   -appreciate podiatry recs   -Continue daptomycin per ID until 3/22  -s/p picc on 2/13 and f/u w/ID outpatient    Diet: CCD    Activity:  as tolerated, NWB on left foot    DVTPPx: SQH     Dispo: PT->rolling walker

## 2023-02-14 NOTE — PROGRESS NOTE ADULT - ASSESSMENT
62M with PMH of HTN, HLD, DM, PAD s/p multiple L toe amputations 2 years prior (c/b medial deviation of 1st/2nd toes resulting in ulceration of medial metatarsal head) presents for LLE Angiogram. Podiatry consulted to evaluate Left foot wound. Xrays demonstrate dislocation of 1st MTPJ and deformity of the 2nd met shaft. Also notes deformity of the 3rd, 4th and 5th met shafts extending to the MTPJ's. Sclerosis of the base of the 5th may be d/t chronic OM. MRI on 2/7 showed OM of L 1st metatarsal/hallux and 5th metatarsal.     Of note, Pt is s/p L foot TMA with delayed primary closure on 2/8. Clean margin of 1st and 5th metatarsal growing Vanc resistant e faecalis, staph lugdunesis, & staph epidermidis. Pt pending PICC for 6 weeks IV antibiotics.     Plan:   - Dry sterile dressing consisting of packing, gauze, ABD, kerlix and ACE applied to foot.   - C/w antibiotics per ID. - recommending 6 week IV antibiotic (daptomycin) course for residual OM (till 3/22/23)  - F/u OR final cultures/path  - Pt can WBAT to L heel in surgical shoe  - Rest of care per primary team    Plan discussed w/ Attending & Podiatry following    Discharge dressing instructions: Cleanse wound with normal saline daily, pat dry with sterile guaze, pack open part of incision with 1/4 inch iodoform packing, apply betadine to macerated edges, cover with dry sterile gauze, ABD pad, wrap with Kerlix and light ACE bandage.     Patient should follow up with Dr. Aldair Lamar within 1 week of discharge.    Office information:          [place] Address- [address], [place] [address] Phone: [phone]

## 2023-02-14 NOTE — PROGRESS NOTE ADULT - ASSESSMENT
62M h/o DM, PAD s/p toe amputation, L toe OM s/p two rounds of IV abx x 6 weeks in 2022, HTN, HLD, p/w LLE angiogram for worsening L foot ulcer/OM, s/p LLE angio and SFA/pop stent on 2/6 and TMA on 2/9.  OR culture 5th MT growing E. faecalis and staph lugdunensis and staph epi.  1st MT grew E. faecalis, all covered by dapto. Case d/w Dr. Lamar - there is residual OM and will require 6 weeks of IV abx.      - cont daptomycin 600mg IV q24h to cover staph spp and E. faecalis  - Duration of antibiotics is 6 weeks ( 2/9 - 3/22)  - Weekly labs: CMP, CBC, ESR, CRP, CK faxed to ID office at [phone]  - Patient to follow up with Dr. davila in 2 weeks ([address] [place] [address], [phone]), ID office will call patient to schedule       Thank you for your consult.  Please re-consult us or call us with questions.  Case d/w primary team.    Sabrina Davila MD, MS  Infectious Disease attending  work cell [phone]  For any questions during evening/weekend/holiday, please page ID on call

## 2023-02-14 NOTE — PROGRESS NOTE ADULT - SUBJECTIVE AND OBJECTIVE BOX
INFECTIOUS DISEASES CONSULT FOLLOW-UP NOTE    INTERVAL HPI/OVERNIGHT EVENTS:  no event overnight  he feels well, has no complaints    ROS:   Constitutional, eyes, ENT, cardiovascular, respiratory, gastrointestinal, genitourinary, integumentary, neurological, psychiatric and heme/lymph are otherwise negative other than noted above       ANTIBIOTICS/RELEVANT:    MEDICATIONS  (STANDING):  amLODIPine   Tablet 10 milliGRAM(s) Oral daily  aspirin  chewable 81 milliGRAM(s) Oral daily  atorvastatin 40 milliGRAM(s) Oral at bedtime  chlorhexidine 2% Cloths 1 Application(s) Topical daily  clopidogrel Tablet 75 milliGRAM(s) Oral daily  DAPTOmycin IVPB      DAPTOmycin IVPB 600 milliGRAM(s) IV Intermittent every 24 hours  dextrose 5%. 1000 milliLiter(s) (50 mL/Hr) IV Continuous <Continuous>  dextrose 5%. 1000 milliLiter(s) (100 mL/Hr) IV Continuous <Continuous>  dextrose 50% Injectable 25 Gram(s) IV Push once  dextrose 50% Injectable 12.5 Gram(s) IV Push once  dextrose 50% Injectable 25 Gram(s) IV Push once  glucagon  Injectable 1 milliGRAM(s) IntraMuscular once  heparin   Injectable 5000 Unit(s) SubCutaneous every 8 hours  insulin glargine Injectable (LANTUS) 24 Unit(s) SubCutaneous at bedtime  insulin lispro (ADMELOG) corrective regimen sliding scale   SubCutaneous Before meals and at bedtime  insulin lispro Injectable (ADMELOG) 8 Unit(s) SubCutaneous three times a day before meals  lisinopril 40 milliGRAM(s) Oral daily  polyethylene glycol 3350 17 Gram(s) Oral daily    MEDICATIONS  (PRN):  acetaminophen     Tablet .. 1000 milliGRAM(s) Oral every 6 hours PRN Temp greater or equal to 38C (100.4F), Mild Pain (1 - 3), Moderate Pain (4 - 6)  dextrose Oral Gel 15 Gram(s) Oral once PRN Blood Glucose LESS THAN 70 milliGRAM(s)/deciliter  sodium chloride 0.9% lock flush 10 milliLiter(s) IV Push every 1 hour PRN Pre/post blood products, medications, blood draw, and to maintain line patency        Vital Signs Last 24 Hrs  T(C): 36.2 (14 Feb 2023 09:40), Max: 37.2 (13 Feb 2023 13:40)  T(F): 97.1 (14 Feb 2023 09:40), Max: 98.9 (13 Feb 2023 13:40)  HR: 91 (14 Feb 2023 08:38) (76 - 91)  BP: 160/74 (14 Feb 2023 08:38) (129/79 - 183/86)  BP(mean): 106 (14 Feb 2023 08:38) (96 - 124)  RR: 18 (14 Feb 2023 08:38) (18 - 18)  SpO2: 97% (14 Feb 2023 08:38) (97% - 100%)    Parameters below as of 14 Feb 2023 08:38  Patient On (Oxygen Delivery Method): room air        02-13-23 @ 07:01  -  02-14-23 @ 07:00  --------------------------------------------------------  IN: 560 mL / OUT: 1925 mL / NET: -1365 mL    02-14-23 @ 07:01  -  02-14-23 @ 13:01  --------------------------------------------------------  IN: 240 mL / OUT: 250 mL / NET: -10 mL      PHYSICAL EXAM:  Constitutional: alert, NAD  Eyes: the sclera and conjunctiva were normal.   ENT: the ears and nose were normal in appearance.   Neck: the appearance of the neck was normal and the neck was supple.   Pulmonary: no respiratory distress and lungs were clear to auscultation bilaterally.   Heart: heart rate was normal and rhythm regular, normal S1 and S2  Vascular:. there was no peripheral edema  Abdomen: normal bowel sounds, soft, non-tender  Ext: L foot s/p TMA, wrapped with dressing             LABS:                MICROBIOLOGY:  2/8/23 1st metacarpal: E. faecalis (S to dapto, amp, linez)  2/8/23 5th metacarpal: E. faecalis (S to amp, linezolid, dapto, R to vanc), staph lugdunensis (S to ox, vanc), staph epidermidis (R to ox)      RADIOLOGY & ADDITIONAL STUDIES:  X-ray L foot 2/9  IMPRESSION: Frontal, lateral and oblique views of the left foot   demonstrates transmetatarsal amputation of the first through fifth digits   with overlying surgical staples.    MR foot L 2/7/23    IMPRESSION:  1.  Soft tissue wounds are present with osteomyelitis of the 1st   metatarsal and 1st proximal phalanx and remaining 5th metatarsal.  2.  Marrow changes involving the resected margin of the 2nd metatarsal   and 3rd metatarsal phalangeal joint osteotomy site may be reactive or   reflect early osteomyelitis.  3.  Slight edema involving the distal lateral margin of the cuboid may be   reactive or reflect additional infection with the adjacent 5th metatarsal   changes.  4.  Small subcutaneous fluid collection/abscess is present decompressing   from the 1st metatarsophalangeal joint dorsally near the 2nd metatarsal   stump.  5.  Small subcutaneous fluid collection/abscess extends along the   abductor digit minimi muscle proximal to the 5th metatarsal soft tissue   wounds.

## 2023-02-14 NOTE — PROGRESS NOTE ADULT - PROVIDER SPECIALTY LIST ADULT
Podiatry
Vascular Surgery
Hospitalist
Infectious Disease
Infectious Disease
Podiatry
Vascular Surgery
Vascular Surgery
Hospitalist
Hospitalist
Podiatry
Vascular Surgery
Hospitalist
Infectious Disease
Infectious Disease
Podiatry
Vascular Surgery
Hospitalist

## 2023-02-14 NOTE — CHART NOTE - NSCHARTNOTEFT_GEN_A_CORE
Admitting Diagnosis:   Patient is a 62y old  Male who presents with a chief complaint of LLE Angiogram (14 Feb 2023 07:35)      PAST MEDICAL & SURGICAL HISTORY:      Current Nutrition Order:  Regular, Consistent Carbohydrate with evening snack      PO Intake: Good (%) [   ]  Fair (50-75%) [   ] Poor (<25%) [   ]  --Please See Below    GI Issues:   WDL per flow sheets   Miralax ordered     Pain:  none per flow sheets     Skin Integrity:  No edema/pressure ulcers, Korey 20  *Sx site noted     Labs:         CAPILLARY BLOOD GLUCOSE      POCT Blood Glucose.: 200 mg/dL (14 Feb 2023 09:02)  POCT Blood Glucose.: 139 mg/dL (14 Feb 2023 06:57)  POCT Blood Glucose.: 218 mg/dL (13 Feb 2023 21:54)  POCT Blood Glucose.: 149 mg/dL (13 Feb 2023 17:16)  POCT Blood Glucose.: 149 mg/dL (13 Feb 2023 11:48)      Medications:  MEDICATIONS  (STANDING):  amLODIPine   Tablet 10 milliGRAM(s) Oral daily  aspirin  chewable 81 milliGRAM(s) Oral daily  atorvastatin 40 milliGRAM(s) Oral at bedtime  chlorhexidine 2% Cloths 1 Application(s) Topical daily  clopidogrel Tablet 75 milliGRAM(s) Oral daily  DAPTOmycin IVPB      DAPTOmycin IVPB 600 milliGRAM(s) IV Intermittent every 24 hours  dextrose 5%. 1000 milliLiter(s) (50 mL/Hr) IV Continuous <Continuous>  dextrose 5%. 1000 milliLiter(s) (100 mL/Hr) IV Continuous <Continuous>  dextrose 50% Injectable 25 Gram(s) IV Push once  dextrose 50% Injectable 12.5 Gram(s) IV Push once  dextrose 50% Injectable 25 Gram(s) IV Push once  glucagon  Injectable 1 milliGRAM(s) IntraMuscular once  heparin   Injectable 5000 Unit(s) SubCutaneous every 8 hours  insulin glargine Injectable (LANTUS) 24 Unit(s) SubCutaneous at bedtime  insulin lispro (ADMELOG) corrective regimen sliding scale   SubCutaneous Before meals and at bedtime  insulin lispro Injectable (ADMELOG) 8 Unit(s) SubCutaneous three times a day before meals  lisinopril 40 milliGRAM(s) Oral daily  polyethylene glycol 3350 17 Gram(s) Oral daily    MEDICATIONS  (PRN):  acetaminophen     Tablet .. 1000 milliGRAM(s) Oral every 6 hours PRN Temp greater or equal to 38C (100.4F), Mild Pain (1 - 3), Moderate Pain (4 - 6)  dextrose Oral Gel 15 Gram(s) Oral once PRN Blood Glucose LESS THAN 70 milliGRAM(s)/deciliter  sodium chloride 0.9% lock flush 10 milliLiter(s) IV Push every 1 hour PRN Pre/post blood products, medications, blood draw, and to maintain line patency      5'7''  pounds +-10%  Wt 160 pounds BMI 25 %[id number]    Weight Change: Based on most recent EMR wt     Estimated energy needs:   Current body wt used for energy calculations as pt falls within % IBW  Adjust for age, OR; fluids per team d/t prior low sodium    Estimated Energy Needs From (monica/kg)	25  Estimated Energy Needs To (monica/kg)	30  Estimated Energy Needs Calculated From (monica/kg)	1812  Estimated Energy Needs Calculated To (monica/kg)	2175    Estimated Protein Needs From (g/kg)	1  Estimated Protein Needs To (g/kg)	1.2  Estimated Protein Needs Calculated From (g/kg)	72.5  Estimated Protein Needs Calculated To (g/kg)	87      Subjective: 62M with PMH of HTN, HLD, DM, PAD s/p multiple L toe amputations 2 years prior (c/b medial deviation of 1st/2nd toes resulting in ulceration of medial metatarsal head) presents for LLE Angiogram 2/6. MRI on 2/7 showed OM of L 1st metatarsal/hallux and 5th metatarsal. S/p OR with podiatry for Transmetatarsal amputation 2/8. Penrose drain d/c 2/11. PICC placed 2/13 for 6 weeks of IV ABX. D/c pending.     Pt seen this AM on 5UR. Reports eating during admission, however did not state exact %PO intake or provide recall. Pt however able to recall some CHO exmaples- RD had provided diet education during time of initial visit (Pt had Reported prior some DM diet educaiton in the past however reports having CGM x3 weeks which he has never used 2/2 not knowing how. Pt also reports use of insulin PTA however not fully understanding how to use this either. Additionally pt reports being unable to read; pt does have wife, however unsure if she can read - assume affecting use of CGM/meds PTA). Agreeable for additional education today, However is likely to benefit from CDE/Endo consult as well.  Please see below for nutritions recommendations-Spoke about pt with team/provided Recs.    Prior PES: Inadequate Energy Intake RT intake<EER AEB NPO  >> d/c, on PO diet   New PES: Altered nutrition Related Labs RT limited prior education, limited knowledge on how to manage DM AEB A1c 10.9%   Goal: Labs to Begin to Normalize     Recommendations:  1. CONSCHO Diet.  2. Monitor %PO intake, diet tolerance.  3. Labs: monitor BMP, CBC, glucose, lytes, trend renal indices, LFTs, POCT.  4. CDE/Endo consult.  5. Monitor Skin, Wt, Lab, Pain, GI, GOC.  6. RD to remain available for additional nutrition interventions as needed.     Education:  - Reviewed DM diet education this AM. Provided Outpatient RD contact info.  - Pt seemed motivated to make change s/p d/c.   - Pt would benefit from further Medical Nutrition Therapy after discharge. Recommend to follow up with [facility] for continuity of care. Email [email or web address] or call [phone].     Risk Level: High [ X  ] Moderate [   ] Low [   ]

## 2023-02-14 NOTE — DISCHARGE NOTE NURSING/CASE MANAGEMENT/SOCIAL WORK - NSDCFUADDAPPT_GEN_ALL_CORE_FT
Please follow up with Dr. Avendano in 2 weeks. You may call the office at [phone] to schedule your appointment.

## 2023-02-14 NOTE — PROGRESS NOTE ADULT - REASON FOR ADMISSION
LLE Angiogram

## 2023-02-14 NOTE — DISCHARGE NOTE NURSING/CASE MANAGEMENT/SOCIAL WORK - PATIENT PORTAL LINK FT
You can access the FollowMyHealth Patient Portal offered by [facility] by registering at the following website: [email or web address]. By joining [facility]’s FollowMyHealth portal, you will also be able to view your health information using other applications (apps) compatible with our system.

## 2023-02-15 RX ORDER — CLOPIDOGREL BISULFATE 75 MG/1
75 TABLET, FILM COATED ORAL DAILY
Qty: 90 | Refills: 3 | Status: ACTIVE | COMMUNITY
Start: 2023-02-15 | End: 1900-01-01

## 2023-02-23 ENCOUNTER — APPOINTMENT (OUTPATIENT)
Dept: INFECTIOUS DISEASE | Facility: CLINIC | Age: 63
End: 2023-02-23
Payer: MEDICARE

## 2023-02-23 ENCOUNTER — APPOINTMENT (OUTPATIENT)
Dept: VASCULAR SURGERY | Facility: CLINIC | Age: 63
End: 2023-02-23
Payer: MEDICARE

## 2023-02-23 VITALS
WEIGHT: 159.25 LBS | HEIGHT: 67 IN | HEART RATE: 79 BPM | DIASTOLIC BLOOD PRESSURE: 73 MMHG | SYSTOLIC BLOOD PRESSURE: 162 MMHG | OXYGEN SATURATION: 100 % | BODY MASS INDEX: 24.99 KG/M2 | TEMPERATURE: 96.4 F

## 2023-02-23 VITALS — SYSTOLIC BLOOD PRESSURE: 169 MMHG | HEART RATE: 74 BPM | DIASTOLIC BLOOD PRESSURE: 74 MMHG

## 2023-02-23 DIAGNOSIS — Z78.9 OTHER SPECIFIED HEALTH STATUS: ICD-10-CM

## 2023-02-23 PROCEDURE — 93926 LOWER EXTREMITY STUDY: CPT

## 2023-02-23 PROCEDURE — 99212 OFFICE O/P EST SF 10 MIN: CPT

## 2023-02-23 PROCEDURE — 99214 OFFICE O/P EST MOD 30 MIN: CPT

## 2023-02-23 NOTE — PHYSICAL EXAM
[General Appearance - Alert] : alert [General Appearance - In No Acute Distress] : in no acute distress [Sclera] : the sclera and conjunctiva were normal [Outer Ear] : the ears and nose were normal in appearance [Neck Appearance] : the appearance of the neck was normal [] : no respiratory distress [Auscultation Breath Sounds / Voice Sounds] : lungs were clear to auscultation bilaterally [Heart Rate And Rhythm] : heart rate was normal and rhythm regular [Heart Sounds] : normal S1 and S2 [Bowel Sounds] : normal bowel sounds [Abdomen Soft] : soft [Abdomen Tenderness] : non-tender [FreeTextEntry1] : s/p L TMA, wound closed, small area of superficial open wound at the corner, no erythema or drainage

## 2023-02-23 NOTE — ASSESSMENT
[FreeTextEntry1] : 62M h/o DM, PAD s/p toe amputation, L toe OM s/p two rounds of IV abx x 6 weeks in 2022 c/b worsening OM s/p LLE angio and SFA/pop stent on 2/6/23, s/p L TMA on 2/9/23 by Dr. Lamar currently on IV daptomycin (2/9/23-3/23/23), HTN, HLD, p/w management of residual OM of L TMA site.\par \par L TMA site looks good w/o e/o infection.  He hasn't seen Dr. Lamar - info given to schedule f/u appointment.  I will reach out to him as well.  Discussed the importance of glycemic control in order to avoid recurrent infection.  Patient has appointment with PMD next week.\par \par - cont daptomycin 600mg IV q24h to cover staph spp and E. faecalis\par - Duration of antibiotics is 6 weeks ( 2/9 - 3/22)\par - continue VNS wound care \par - f/u Dr. Avendano for PVD management\par - f/u Dr. Lamar for L TMA site management\par - RTC 3 weeks\par - PMD: Samir Rincon MD (phone [phone], fax [phone])\par \par

## 2023-02-23 NOTE — HISTORY OF PRESENT ILLNESS
[FreeTextEntry1] : 62M h/o DM, PAD s/p toe amputation, L toe OM s/p two rounds of IV abx x 6 weeks in 2022 c/b worsening OM s/p LLE angio and SFA/pop stent on 2/6/23, s/p L TMA on 2/9/23 by Dr. Lamar currently on IV daptomycin (2/9/23-3/23/23), HTN, HLD, p/w management of residual OM of L TMA site.\par \par Patient was admitted to [facility] from 2/6/23 - 2/14/23 for LLE angiogram for worsening L foot ulcer/OM.   In 2022, he received 2 rounds of IV abx 6 weeks course to treat toe OM (most recent aztreonam) at [facility]) however his toe infection worsened after abx course.  He switched care to [facility] and admitted for angio.  He underwent LLE angio and SFA/pop stent on 2/6 and L foot TMA on 2/9.  OR culture 5th MT grew E. faecalis and staph lugdunensis and staph epi.  1st MT grew E. faecalis, all covered by dapto. Case d/w Dr. Lamar - there is residual OM.  He was sent home with 6 weeks of IV dapto (end 3/23/22).\par \par Today he reports feeing well, denied any side effects of dapto.  Denied fever/chills, n/v/d, abdominal pain.  L foot healing well, still has staples, no erythema/drainage.  He hasn't seen Dr. Lamar yet and doesn't have appointment. He is seeing Dr. Avendano today.\par  [de-identified] : unemployed [de-identified] :  [de-identified] : wife

## 2023-02-23 NOTE — DATA REVIEWED
[FreeTextEntry1] : 2/19/23 lab notable for\par 8.52 > 8 < 714\par \par Cr 1.21 \par \par CRP <3\par \par MICROBIOLOGY:\par 2/8/23 1st metacarpal: E. faecalis (S to dapto, amp, linez)\par 2/8/23 5th metacarpal: E. faecalis (S to amp, linezolid, dapto, R to vanc), staph lugdunensis (S to ox, vanc), staph epidermidis (R to ox)\par \par \par RADIOLOGY & ADDITIONAL STUDIES:\par X-ray L foot 2/9\par IMPRESSION: Frontal, lateral and oblique views of the left foot \par demonstrates transmetatarsal amputation of the first through fifth digits \par with overlying surgical staples.\par \par MR foot L 2/7/23\par \par IMPRESSION:\par 1.  Soft tissue wounds are present with osteomyelitis of the 1st \par metatarsal and 1st proximal phalanx and remaining 5th metatarsal.\par 2.  Marrow changes involving the resected margin of the 2nd metatarsal \par and 3rd metatarsal phalangeal joint osteotomy site may be reactive or \par reflect early osteomyelitis.\par 3.  Slight edema involving the distal lateral margin of the cuboid may be \par reactive or reflect additional infection with the adjacent 5th metatarsal \par changes.\par 4.  Small subcutaneous fluid collection/abscess is present decompressing \par from the 1st metatarsophalangeal joint dorsally near the 2nd metatarsal \par stump.\par 5.  Small subcutaneous fluid collection/abscess extends along the \par abductor digit minimi muscle proximal to the 5th metatarsal soft tissue \par wounds.\par

## 2023-02-24 DIAGNOSIS — E11.69 TYPE 2 DIABETES MELLITUS WITH OTHER SPECIFIED COMPLICATION: ICD-10-CM

## 2023-02-24 DIAGNOSIS — E87.1 HYPO-OSMOLALITY AND HYPONATREMIA: ICD-10-CM

## 2023-02-24 DIAGNOSIS — E11.621 TYPE 2 DIABETES MELLITUS WITH FOOT ULCER: ICD-10-CM

## 2023-02-24 DIAGNOSIS — E11.65 TYPE 2 DIABETES MELLITUS WITH HYPERGLYCEMIA: ICD-10-CM

## 2023-02-24 DIAGNOSIS — N17.9 ACUTE KIDNEY FAILURE, UNSPECIFIED: ICD-10-CM

## 2023-02-24 DIAGNOSIS — L97.529 NON-PRESSURE CHRONIC ULCER OF OTHER PART OF LEFT FOOT WITH UNSPECIFIED SEVERITY: ICD-10-CM

## 2023-02-24 DIAGNOSIS — D63.8 ANEMIA IN OTHER CHRONIC DISEASES CLASSIFIED ELSEWHERE: ICD-10-CM

## 2023-02-24 DIAGNOSIS — Z79.4 LONG TERM (CURRENT) USE OF INSULIN: ICD-10-CM

## 2023-02-24 DIAGNOSIS — Z89.422 ACQUIRED ABSENCE OF OTHER LEFT TOE(S): ICD-10-CM

## 2023-02-24 DIAGNOSIS — Z88.1 ALLERGY STATUS TO OTHER ANTIBIOTIC AGENTS STATUS: ICD-10-CM

## 2023-02-24 DIAGNOSIS — I70.203 UNSPECIFIED ATHEROSCLEROSIS OF NATIVE ARTERIES OF EXTREMITIES, BILATERAL LEGS: ICD-10-CM

## 2023-02-24 DIAGNOSIS — E11.51 TYPE 2 DIABETES MELLITUS WITH DIABETIC PERIPHERAL ANGIOPATHY WITHOUT GANGRENE: ICD-10-CM

## 2023-02-24 DIAGNOSIS — I10 ESSENTIAL (PRIMARY) HYPERTENSION: ICD-10-CM

## 2023-02-24 DIAGNOSIS — M86.672 OTHER CHRONIC OSTEOMYELITIS, LEFT ANKLE AND FOOT: ICD-10-CM

## 2023-02-24 DIAGNOSIS — E78.5 HYPERLIPIDEMIA, UNSPECIFIED: ICD-10-CM

## 2023-03-07 NOTE — DISCUSSION/SUMMARY
[FreeTextEntry1] : 62yoM w/chronic, uncontrolled [id number] and PVD, s/p PTA at an outside facility, recently presented to our office w/exposed 1st MT head w/drainage, now s/p LLE PTA/stent  of SFA w/Juan Alberto, followed by L LELA by Dr. Lamar (podiatry) on 02/08/2023.  Pt returns today for evaluation of his LE circulation, he will f/u w/podiatry for evaluation of the surgical site this coming week, but reports no issues of pain/drainage/redness in the foot.  He is ambulating w/cane support w/o issue.\par \par Leg well-perfused and wound well-approximated w/staples in place, no erythema/drainage.  Pt followed by ID for VRE coverage, pt receiving daptomycin x 6wks via PICC line.  LLE arterial duplex performed today to evaluate LE perfusion reveals widely patent L SFA PTA/stent.  Wound dressed w/xeroform/dry gauze/ACE bandage today.  Instructed pt to f/u w/podiatry next week for post-op check, and continue to walk daily.  If no issues arise sooner, pt will RTO in 6mos for surveillance of the LLE.

## 2023-03-07 NOTE — PHYSICAL EXAM
[JVD] : no jugular venous distention  [Carotid Bruits] : no carotid bruits [Right Carotid Bruit] : no bruit heard over the right carotid [Left Carotid Bruit] : no bruit heard over the left carotid [Varicose Veins Of Lower Extremities] : not present [] : not present [Abdomen Masses] : No abdominal masses [Abdomen Tenderness] : ~T ~M No abdominal tenderness [Purpura] : no purpura  [Petechiae] : no petechiae [Skin Ulcer] : no ulcer [Skin Induration] : no induration [de-identified] : Healthy appearance, NAD [de-identified] : NC/AT, anicteric [de-identified] : FROM throughout, strength 5/5x4, no palpable cords in LEs, no atrophy noted [de-identified] : L TMA site healing well w/staples intact, no erythema/drainage/dehiscence

## 2023-03-07 NOTE — ADDENDUM
[FreeTextEntry1] : This note was written by Art Redmond, acting as a scribe for Dr. Maryellen Avendano.  I, Dr. Maryellen Avendano, have read and attest that all the information, medical decision-making, and discharge instructions within are true and accurate.\par \par I, Dr. Maryellen Avendano, personally performed the evaluation and management (E/M) services for this established patient who presents today with (an) existing condition(s).  That E/M includes conducting the examination, assessing all conditions, and (re)establishing/reinforcing a plan of care.  Today, my ACP, Art Redmond, was here to observe my evaluation and management services for this condition to be followed going forward.

## 2023-03-07 NOTE — REASON FOR VISIT
[de-identified] : LLE PTA/stent  of SFA [de-identified] : 02/06/2023 [de-identified] : 17 [de-identified] : 3 [de-identified] : 62yoM w/chronic, uncontrolled [id number] and PVD, s/p PTA at an outside facility, recently presented to our office w/exposed 1st MT head w/drainage, now s/p LLE PTA/stent  of SFA w/Juan Alberto, followed by L LELA by Dr. Lamar (podiatry) on 02/08/2023.  Pt returns today for evaluation of his LE circulation, he will f/u w/podiatry for evaluation of the surgical site this coming week, but reports no issues of pain/drainage/redness in the foot.  He is ambulating w/cane support w/o issue.

## 2023-03-16 ENCOUNTER — APPOINTMENT (OUTPATIENT)
Dept: INFECTIOUS DISEASE | Facility: CLINIC | Age: 63
End: 2023-03-16

## 2023-03-22 ENCOUNTER — NON-APPOINTMENT (OUTPATIENT)
Age: 63
End: 2023-03-22

## 2023-03-23 ENCOUNTER — APPOINTMENT (OUTPATIENT)
Dept: INFECTIOUS DISEASE | Facility: CLINIC | Age: 63
End: 2023-03-23

## 2023-05-04 ENCOUNTER — APPOINTMENT (OUTPATIENT)
Dept: VASCULAR SURGERY | Facility: CLINIC | Age: 63
End: 2023-05-04
Payer: MEDICARE

## 2023-05-04 VITALS
HEART RATE: 89 BPM | SYSTOLIC BLOOD PRESSURE: 204 MMHG | BODY MASS INDEX: 24.96 KG/M2 | WEIGHT: 159 LBS | DIASTOLIC BLOOD PRESSURE: 97 MMHG | HEIGHT: 67 IN

## 2023-05-04 PROCEDURE — 99212 OFFICE O/P EST SF 10 MIN: CPT

## 2023-05-04 NOTE — PHYSICAL EXAM
[JVD] : no jugular venous distention  [Normal Thyroid] : the thyroid was normal [Carotid Bruits] : no carotid bruits [Normal Breath Sounds] : Normal breath sounds [Respiratory Effort] : normal respiratory effort [Normal Heart Sounds] : normal heart sounds [Normal Rate and Rhythm] : normal rate and rhythm [Right Carotid Bruit] : no bruit heard over the right carotid [Left Carotid Bruit] : no bruit heard over the left carotid [2+] : right 2+ [0] : left 0 [1+] : left 1+ [Ankle Swelling (On Exam)] : present [Ankle Swelling On The Left] : of the left ankle [Ankle Swelling On The Right] : mild [Varicose Veins Of Lower Extremities] : not present [] : not present [Abdomen Masses] : No abdominal masses [Abdomen Tenderness] : ~T ~M No abdominal tenderness [Purpura] : no purpura  [Petechiae] : no petechiae [Skin Ulcer] : no ulcer [Skin Induration] : no induration [Alert] : alert [Calm] : calm [de-identified] : Healthy appearance, NAD [de-identified] : NC/AT, anicteric [de-identified] : FROM throughout, strength 5/5x4, no palpable cords in LEs, no atrophy noted [de-identified] : L TMA site healed well, dry skin over entire foot, +2x1 cm superficial wound with good granulation tissue over lateral sole with surrounding callus, no erythema/drainage/infection

## 2023-05-04 NOTE — REVIEW OF SYSTEMS
[Fever] : no fever [Chills] : no chills [As Noted in HPI] : as noted in HPI [Skin Wound] : skin wound [Negative] : Heme/Lymph

## 2023-05-04 NOTE — ASSESSMENT
[FreeTextEntry1] : 62yoM w/ [id number] and PVD, s/p PTA at an outside facility,  s/p LLE PTA/stent of SFA w/Carroccio, followed by L TMA by Dr. Lamar (podiatry) on 02/08/2023. Pt returns today for evaluation of his left plantar wound that has been followed by his podiatrist.\par L TMA site healed well, dry skin over the entire foot, +2x1 cm superficial wound with good granulation tissue over lateral sole with surrounding callus, no erythema/drainage/infection.\par We explained to the patient that the wound appears to be small, with healthy granulation tissue and there is no concerns from a vascular stand point.\par We recommend to take daily showers, moisturize skin over the foot, apply Mupirocin to the wound and wrap with kerlix.\par Continue to f/u with podiatry.\par F/u as needed. [Arterial/Venous Disease] : arterial/venous disease [Foot care/Footwear] : foot care/footwear [Ulcer Care] : ulcer care

## 2023-05-04 NOTE — HISTORY OF PRESENT ILLNESS
[FreeTextEntry1] : 62yoM w/ [id number] and PVD, s/p PTA at an outside facility,  s/p LLE PTA/stent of SFA w/Carroccio, followed by L LELA by Dr. Lamar (podiatry) on 02/08/2023. Pt returns today for evaluation of his left plantar wound that has been followed by his podiatrist.  Patient states that the podiatrist shaved the callus and recommended to apply Mupirocin, however he feels that his wound is not healing and he is wondering if he could use Betadine instead. His TMA site healed, and there is no issues of pain/drainage/redness in the foot, no fever, chills. He wear offloading boot and uses a scooted for his left leg. He was also prescribed abx's and has a f/u with  on 5/12/23.

## 2023-05-23 ENCOUNTER — APPOINTMENT (OUTPATIENT)
Dept: UROLOGY | Facility: CLINIC | Age: 63
End: 2023-05-23

## 2023-06-11 ENCOUNTER — INPATIENT (INPATIENT)
Facility: HOSPITAL | Age: 63
LOS: 1 days | Discharge: HOME | DRG: 603 | End: 2023-06-13
Attending: STUDENT IN AN ORGANIZED HEALTH CARE EDUCATION/TRAINING PROGRAM | Admitting: STUDENT IN AN ORGANIZED HEALTH CARE EDUCATION/TRAINING PROGRAM
Payer: MEDICARE

## 2023-06-11 VITALS
TEMPERATURE: 97 F | DIASTOLIC BLOOD PRESSURE: 85 MMHG | SYSTOLIC BLOOD PRESSURE: 199 MMHG | OXYGEN SATURATION: 99 % | HEIGHT: 67 IN | HEART RATE: 99 BPM | RESPIRATION RATE: 18 BRPM | WEIGHT: 162.04 LBS

## 2023-06-11 DIAGNOSIS — Z29.9 ENCOUNTER FOR PROPHYLACTIC MEASURES, UNSPECIFIED: ICD-10-CM

## 2023-06-11 DIAGNOSIS — E78.5 HYPERLIPIDEMIA, UNSPECIFIED: ICD-10-CM

## 2023-06-11 DIAGNOSIS — E11.9 TYPE 2 DIABETES MELLITUS WITHOUT COMPLICATIONS: ICD-10-CM

## 2023-06-11 DIAGNOSIS — I10 ESSENTIAL (PRIMARY) HYPERTENSION: ICD-10-CM

## 2023-06-11 DIAGNOSIS — L03.90 CELLULITIS, UNSPECIFIED: ICD-10-CM

## 2023-06-11 DIAGNOSIS — I73.9 PERIPHERAL VASCULAR DISEASE, UNSPECIFIED: ICD-10-CM

## 2023-06-11 LAB
A1C WITH ESTIMATED AVERAGE GLUCOSE RESULT: 11.3 % — HIGH (ref 4–5.6)
ALBUMIN SERPL ELPH-MCNC: 3.3 G/DL — SIGNIFICANT CHANGE UP (ref 3.3–5)
ALP SERPL-CCNC: 90 U/L — SIGNIFICANT CHANGE UP (ref 40–120)
ALT FLD-CCNC: 11 U/L — SIGNIFICANT CHANGE UP (ref 10–45)
ANION GAP SERPL CALC-SCNC: 7 MMOL/L — SIGNIFICANT CHANGE UP (ref 5–17)
APTT BLD: 30.4 SEC — SIGNIFICANT CHANGE UP (ref 27.5–35.5)
AST SERPL-CCNC: 11 U/L — SIGNIFICANT CHANGE UP (ref 10–40)
BASE EXCESS BLDV CALC-SCNC: 0.3 MMOL/L — SIGNIFICANT CHANGE UP (ref -2–3)
BASOPHILS # BLD AUTO: 0.05 K/UL — SIGNIFICANT CHANGE UP (ref 0–0.2)
BASOPHILS NFR BLD AUTO: 0.6 % — SIGNIFICANT CHANGE UP (ref 0–2)
BILIRUB SERPL-MCNC: 0.3 MG/DL — SIGNIFICANT CHANGE UP (ref 0.2–1.2)
BUN SERPL-MCNC: 26 MG/DL — HIGH (ref 7–23)
CA-I SERPL-SCNC: 1.25 MMOL/L — SIGNIFICANT CHANGE UP (ref 1.15–1.33)
CALCIUM SERPL-MCNC: 8.9 MG/DL — SIGNIFICANT CHANGE UP (ref 8.4–10.5)
CHLORIDE SERPL-SCNC: 105 MMOL/L — SIGNIFICANT CHANGE UP (ref 96–108)
CK MB CFR SERPL CALC: 4.8 NG/ML — SIGNIFICANT CHANGE UP (ref 0–6.7)
CK SERPL-CCNC: 222 U/L — HIGH (ref 30–200)
CO2 BLDV-SCNC: 26.7 MMOL/L — HIGH (ref 22–26)
CO2 SERPL-SCNC: 24 MMOL/L — SIGNIFICANT CHANGE UP (ref 22–31)
CREAT SERPL-MCNC: 1.29 MG/DL — SIGNIFICANT CHANGE UP (ref 0.5–1.3)
CRP SERPL-MCNC: <3 MG/L — SIGNIFICANT CHANGE UP (ref 0–4)
EGFR: 62 ML/MIN/1.73M2 — SIGNIFICANT CHANGE UP
EOSINOPHIL # BLD AUTO: 0.17 K/UL — SIGNIFICANT CHANGE UP (ref 0–0.5)
EOSINOPHIL NFR BLD AUTO: 1.9 % — SIGNIFICANT CHANGE UP (ref 0–6)
ERYTHROCYTE [SEDIMENTATION RATE] IN BLOOD: 48 MM/HR — HIGH
ESTIMATED AVERAGE GLUCOSE: 278 MG/DL — HIGH (ref 68–114)
GAS PNL BLDV: 135 MMOL/L — LOW (ref 136–145)
GAS PNL BLDV: SIGNIFICANT CHANGE UP
GLUCOSE BLDC GLUCOMTR-MCNC: 250 MG/DL — HIGH (ref 70–99)
GLUCOSE BLDC GLUCOMTR-MCNC: 254 MG/DL — HIGH (ref 70–99)
GLUCOSE BLDC GLUCOMTR-MCNC: 288 MG/DL — HIGH (ref 70–99)
GLUCOSE SERPL-MCNC: 315 MG/DL — HIGH (ref 70–99)
GRAM STN FLD: SIGNIFICANT CHANGE UP
HCO3 BLDV-SCNC: 25 MMOL/L — SIGNIFICANT CHANGE UP (ref 22–29)
HCT VFR BLD CALC: 30.9 % — LOW (ref 39–50)
HGB BLD-MCNC: 10.1 G/DL — LOW (ref 13–17)
IMM GRANULOCYTES NFR BLD AUTO: 0.4 % — SIGNIFICANT CHANGE UP (ref 0–0.9)
INR BLD: 1.02 — SIGNIFICANT CHANGE UP (ref 0.88–1.16)
LACTATE SERPL-SCNC: 1.1 MMOL/L — SIGNIFICANT CHANGE UP (ref 0.5–2)
LYMPHOCYTES # BLD AUTO: 2.58 K/UL — SIGNIFICANT CHANGE UP (ref 1–3.3)
LYMPHOCYTES # BLD AUTO: 28.8 % — SIGNIFICANT CHANGE UP (ref 13–44)
MCHC RBC-ENTMCNC: 27.7 PG — SIGNIFICANT CHANGE UP (ref 27–34)
MCHC RBC-ENTMCNC: 32.7 GM/DL — SIGNIFICANT CHANGE UP (ref 32–36)
MCV RBC AUTO: 84.7 FL — SIGNIFICANT CHANGE UP (ref 80–100)
MONOCYTES # BLD AUTO: 0.66 K/UL — SIGNIFICANT CHANGE UP (ref 0–0.9)
MONOCYTES NFR BLD AUTO: 7.4 % — SIGNIFICANT CHANGE UP (ref 2–14)
NEUTROPHILS # BLD AUTO: 5.47 K/UL — SIGNIFICANT CHANGE UP (ref 1.8–7.4)
NEUTROPHILS NFR BLD AUTO: 60.9 % — SIGNIFICANT CHANGE UP (ref 43–77)
NRBC # BLD: 0 /100 WBCS — SIGNIFICANT CHANGE UP (ref 0–0)
PCO2 BLDV: 42 MMHG — SIGNIFICANT CHANGE UP (ref 42–55)
PH BLDV: 7.39 — SIGNIFICANT CHANGE UP (ref 7.32–7.43)
PLATELET # BLD AUTO: 469 K/UL — HIGH (ref 150–400)
PO2 BLDV: 52 MMHG — HIGH (ref 25–45)
POTASSIUM BLDV-SCNC: 4.5 MMOL/L — SIGNIFICANT CHANGE UP (ref 3.5–5.1)
POTASSIUM SERPL-MCNC: 4.6 MMOL/L — SIGNIFICANT CHANGE UP (ref 3.5–5.3)
POTASSIUM SERPL-SCNC: 4.6 MMOL/L — SIGNIFICANT CHANGE UP (ref 3.5–5.3)
PROT SERPL-MCNC: 6.4 G/DL — SIGNIFICANT CHANGE UP (ref 6–8.3)
PROTHROM AB SERPL-ACNC: 12.1 SEC — SIGNIFICANT CHANGE UP (ref 10.5–13.4)
RBC # BLD: 3.65 M/UL — LOW (ref 4.2–5.8)
RBC # FLD: 17.9 % — HIGH (ref 10.3–14.5)
SAO2 % BLDV: 84.4 % — SIGNIFICANT CHANGE UP (ref 67–88)
SODIUM SERPL-SCNC: 136 MMOL/L — SIGNIFICANT CHANGE UP (ref 135–145)
SPECIMEN SOURCE: SIGNIFICANT CHANGE UP
WBC # BLD: 8.97 K/UL — SIGNIFICANT CHANGE UP (ref 3.8–10.5)
WBC # FLD AUTO: 8.97 K/UL — SIGNIFICANT CHANGE UP (ref 3.8–10.5)

## 2023-06-11 PROCEDURE — 99222 1ST HOSP IP/OBS MODERATE 55: CPT

## 2023-06-11 PROCEDURE — 73630 X-RAY EXAM OF FOOT: CPT | Mod: 26,RT

## 2023-06-11 PROCEDURE — 99285 EMERGENCY DEPT VISIT HI MDM: CPT

## 2023-06-11 RX ORDER — SODIUM CHLORIDE 9 MG/ML
1000 INJECTION INTRAMUSCULAR; INTRAVENOUS; SUBCUTANEOUS ONCE
Refills: 0 | Status: COMPLETED | OUTPATIENT
Start: 2023-06-11 | End: 2023-06-11

## 2023-06-11 RX ORDER — CLOPIDOGREL BISULFATE 75 MG/1
75 TABLET, FILM COATED ORAL DAILY
Refills: 0 | Status: DISCONTINUED | OUTPATIENT
Start: 2023-06-12 | End: 2023-06-13

## 2023-06-11 RX ORDER — INSULIN LISPRO 100/ML
8 VIAL (ML) SUBCUTANEOUS
Refills: 0 | Status: DISCONTINUED | OUTPATIENT
Start: 2023-06-11 | End: 2023-06-13

## 2023-06-11 RX ORDER — LISINOPRIL 2.5 MG/1
40 TABLET ORAL EVERY 24 HOURS
Refills: 0 | Status: DISCONTINUED | OUTPATIENT
Start: 2023-06-11 | End: 2023-06-13

## 2023-06-11 RX ORDER — DEXTROSE 50 % IN WATER 50 %
25 SYRINGE (ML) INTRAVENOUS ONCE
Refills: 0 | Status: DISCONTINUED | OUTPATIENT
Start: 2023-06-11 | End: 2023-06-13

## 2023-06-11 RX ORDER — INSULIN LISPRO 100/ML
VIAL (ML) SUBCUTANEOUS
Refills: 0 | Status: DISCONTINUED | OUTPATIENT
Start: 2023-06-11 | End: 2023-06-13

## 2023-06-11 RX ORDER — VANCOMYCIN HCL 1 G
1000 VIAL (EA) INTRAVENOUS EVERY 12 HOURS
Refills: 0 | Status: DISCONTINUED | OUTPATIENT
Start: 2023-06-12 | End: 2023-06-12

## 2023-06-11 RX ORDER — DEXTROSE 50 % IN WATER 50 %
12.5 SYRINGE (ML) INTRAVENOUS ONCE
Refills: 0 | Status: DISCONTINUED | OUTPATIENT
Start: 2023-06-11 | End: 2023-06-13

## 2023-06-11 RX ORDER — DEXTROSE 50 % IN WATER 50 %
15 SYRINGE (ML) INTRAVENOUS ONCE
Refills: 0 | Status: DISCONTINUED | OUTPATIENT
Start: 2023-06-11 | End: 2023-06-13

## 2023-06-11 RX ORDER — AMLODIPINE BESYLATE 2.5 MG/1
10 TABLET ORAL EVERY 24 HOURS
Refills: 0 | Status: DISCONTINUED | OUTPATIENT
Start: 2023-06-11 | End: 2023-06-13

## 2023-06-11 RX ORDER — LANOLIN ALCOHOL/MO/W.PET/CERES
3 CREAM (GRAM) TOPICAL AT BEDTIME
Refills: 0 | Status: DISCONTINUED | OUTPATIENT
Start: 2023-06-11 | End: 2023-06-13

## 2023-06-11 RX ORDER — CLOPIDOGREL BISULFATE 75 MG/1
75 TABLET, FILM COATED ORAL DAILY
Refills: 0 | Status: DISCONTINUED | OUTPATIENT
Start: 2023-06-11 | End: 2023-06-11

## 2023-06-11 RX ORDER — ASPIRIN/CALCIUM CARB/MAGNESIUM 324 MG
81 TABLET ORAL DAILY
Refills: 0 | Status: DISCONTINUED | OUTPATIENT
Start: 2023-06-11 | End: 2023-06-11

## 2023-06-11 RX ORDER — INSULIN GLARGINE 100 [IU]/ML
40 INJECTION, SOLUTION SUBCUTANEOUS AT BEDTIME
Refills: 0 | Status: DISCONTINUED | OUTPATIENT
Start: 2023-06-11 | End: 2023-06-13

## 2023-06-11 RX ORDER — ENOXAPARIN SODIUM 100 MG/ML
40 INJECTION SUBCUTANEOUS EVERY 24 HOURS
Refills: 0 | Status: DISCONTINUED | OUTPATIENT
Start: 2023-06-11 | End: 2023-06-13

## 2023-06-11 RX ORDER — ATORVASTATIN CALCIUM 80 MG/1
40 TABLET, FILM COATED ORAL AT BEDTIME
Refills: 0 | Status: DISCONTINUED | OUTPATIENT
Start: 2023-06-11 | End: 2023-06-13

## 2023-06-11 RX ORDER — PIPERACILLIN AND TAZOBACTAM 4; .5 G/20ML; G/20ML
3.38 INJECTION, POWDER, LYOPHILIZED, FOR SOLUTION INTRAVENOUS ONCE
Refills: 0 | Status: COMPLETED | OUTPATIENT
Start: 2023-06-11 | End: 2023-06-11

## 2023-06-11 RX ORDER — SODIUM CHLORIDE 9 MG/ML
1000 INJECTION, SOLUTION INTRAVENOUS
Refills: 0 | Status: DISCONTINUED | OUTPATIENT
Start: 2023-06-11 | End: 2023-06-13

## 2023-06-11 RX ORDER — DAPTOMYCIN 500 MG/10ML
600 INJECTION, POWDER, LYOPHILIZED, FOR SOLUTION INTRAVENOUS ONCE
Refills: 0 | Status: COMPLETED | OUTPATIENT
Start: 2023-06-11 | End: 2023-06-11

## 2023-06-11 RX ORDER — DOCUSATE SODIUM 100 MG
1 CAPSULE ORAL
Qty: 0 | Refills: 0 | DISCHARGE

## 2023-06-11 RX ORDER — ASPIRIN/CALCIUM CARB/MAGNESIUM 324 MG
81 TABLET ORAL EVERY 24 HOURS
Refills: 0 | Status: DISCONTINUED | OUTPATIENT
Start: 2023-06-12 | End: 2023-06-13

## 2023-06-11 RX ORDER — GLUCAGON INJECTION, SOLUTION 0.5 MG/.1ML
1 INJECTION, SOLUTION SUBCUTANEOUS ONCE
Refills: 0 | Status: DISCONTINUED | OUTPATIENT
Start: 2023-06-11 | End: 2023-06-13

## 2023-06-11 RX ADMIN — PIPERACILLIN AND TAZOBACTAM 200 GRAM(S): 4; .5 INJECTION, POWDER, LYOPHILIZED, FOR SOLUTION INTRAVENOUS at 11:59

## 2023-06-11 RX ADMIN — DAPTOMYCIN 124 MILLIGRAM(S): 500 INJECTION, POWDER, LYOPHILIZED, FOR SOLUTION INTRAVENOUS at 12:32

## 2023-06-11 RX ADMIN — SODIUM CHLORIDE 1000 MILLILITER(S): 9 INJECTION INTRAMUSCULAR; INTRAVENOUS; SUBCUTANEOUS at 11:59

## 2023-06-11 RX ADMIN — Medication 6: at 17:53

## 2023-06-11 RX ADMIN — ATORVASTATIN CALCIUM 40 MILLIGRAM(S): 80 TABLET, FILM COATED ORAL at 21:50

## 2023-06-11 RX ADMIN — INSULIN GLARGINE 40 UNIT(S): 100 INJECTION, SOLUTION SUBCUTANEOUS at 21:51

## 2023-06-11 RX ADMIN — Medication 8 UNIT(S): at 17:53

## 2023-06-11 RX ADMIN — AMLODIPINE BESYLATE 10 MILLIGRAM(S): 2.5 TABLET ORAL at 17:12

## 2023-06-11 RX ADMIN — ENOXAPARIN SODIUM 40 MILLIGRAM(S): 100 INJECTION SUBCUTANEOUS at 17:12

## 2023-06-11 NOTE — H&P ADULT - NSHPSOCIALHISTORY_GEN_ALL_CORE
Pt lives with his wife and grandson in an elevator building. Wears a boot on his left foot. Can sometimes ambulate without assistive devices, sometimes uses cane or walker.     No current tobacco use, quit smoking over 20 years ago. No alcohol use or recreational drug use.

## 2023-06-11 NOTE — H&P ADULT - NSHPLABSRESULTS_GEN_ALL_CORE
.  LABS:                         10.1   8.97  )-----------( 469      ( 11 Jun 2023 10:53 )             30.9     06-11    136  |  105  |  26<H>  ----------------------------<  315<H>  4.6   |  24  |  1.29    Ca    8.9      11 Jun 2023 10:53    TPro  6.4  /  Alb  3.3  /  TBili  0.3  /  DBili  x   /  AST  11  /  ALT  11  /  AlkPhos  90  06-11    PT/INR - ( 11 Jun 2023 10:53 )   PT: 12.1 sec;   INR: 1.02          PTT - ( 11 Jun 2023 10:53 )  PTT:30.4 sec    CARDIAC MARKERS ( 11 Jun 2023 10:53 )  x     / x     / 222 U/L / x     / 4.8 ng/mL        Lactate, Blood: 1.1 mmol/L (06-11 @ 10:53)      RADIOLOGY, EKG & ADDITIONAL TESTS: Reviewed.

## 2023-06-11 NOTE — ED ADULT NURSE NOTE - NSFALLHARMRISKINTERV_ED_ALL_ED

## 2023-06-11 NOTE — H&P ADULT - ASSESSMENT
63 y/o M with PMHx of HTN, HLD, DM, PAD, s/p multiple L toe amputations (c/b medial deviation of 1st/2nd toes resulting in ulceration of medial metatarsal head), [facility] admission in 2/2023 for LLE angioplasty and stent of SFA/pop, s/p Left TMA w/ residual OM (VRE) and dc'd on 6wks of daptomycin (2/9/23-3/23/23), presented with R foot blister for 1 day, s/p excisional debridement by podiatry today (6/11), admitted to [facility] for R foot cellulitis.

## 2023-06-11 NOTE — H&P ADULT - HISTORY OF PRESENT ILLNESS
63 y/o M with PMHx of HTN, HLD, DM, PAD, s/p multiple L toe amputations (c/b medial deviation of 1st/2nd toes resulting in ulceration of medial metatarsal head), [facility] admission in 2/2023 for LLE angioplasty and stent of SFA/pop, s/p Left TMA w/ residual OM (VRE) and dc'd on 6wks of daptomycin (2/9/23-3/23/23), presented with R foot blister for 1 day. Pt states he saw his podiatrist on Friday and did not have a blister to his right foot at that time. He noticed his blister on Saturday morning. No known trauma. No drainage or pain. 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 Denies any new complaints to the left foot. No fevers, chills, chest pain, dyspnea, lightheadedness, dizziness, abd pain, N/V/D.    ED Course:   Vitals on presentation: T 97.4F | HR 99 | /85 -> 154/72 | RR 18 | O2sat 99% on RA  Labs notable for: Hb 10.1, plts 469, ESR 48 (high), CRP <3 (nml),   Interventions: zosyn 3.375 x1, dapto 600mg x1, 1L NS  Consults: podiatry -> s/p debridement of R foot with ~5cc sango-purulent drainaged noted   63 y/o M with PMHx of HTN, HLD, DM, PAD, s/p multiple L toe amputations (c/b medial deviation of 1st/2nd toes resulting in ulceration of medial metatarsal head), [facility] admission in 2/2023 for LLE angioplasty and stent of SFA/pop, s/p Left TMA w/ residual OM (VRE) and dc'd on 6wks of daptomycin (2/9/23-3/23/23), presented with R foot blister for 1 day. Pt states he saw his podiatrist on Friday and did not have a blister to his right foot at that time. He noticed his blister on Saturday morning. No known trauma. No drainage or pain. 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 Denies any new complaints to the left foot. No fevers, chills, chest pain, dyspnea, lightheadedness, dizziness, abd pain, N/V/D.    ED Course:   Vitals on presentation: T 97.4F | HR 99 | /85 -> 154/72 | RR 18 | O2sat 99% on RA  Labs notable for: Hb 10.1, plts 469, ESR 48 (high), CRP <3 (nml),   Interventions: zosyn 3.375 x1, dapto 600mg x1, 1L NS  Consults: podiatry -> s/p debridement of R foot with ~5cc sango-purulent drainaged noted

## 2023-06-11 NOTE — ED ADULT NURSE NOTE - OBJECTIVE STATEMENT
62y Male with pmx of HTN, HLD, DM, PAD, multiple L toe amputation presented to ED with c/o of chronic wound to plantar aspect of b/l feet but that he has a few days of new RLE blister and increasing RLE swelling. Patient oriented to ED area. All needs attended. Pt on cardiac monitor. Rounding in progress. Fall risk precautions maintained. 62y Male with pmx of HTN, HLD, DM, PAD, multiple L toes amputations presented to ED with c/o of chronic wound to plantar aspect of b/l feet but that he has a few days of new RLE blister and increasing RLE swelling. Patient oriented to ED area. All needs attended. Pt on cardiac monitor. Rounding in progress. Fall risk precautions maintained.

## 2023-06-11 NOTE — H&P ADULT - PROBLEM SELECTOR PLAN 1
Presented with R foot blister with fluctuance x1d. Afebrile, VSS, no leukocytosis, elevated ESR. R foot x-ray (wet read): neg for soft tissue air, foreign body, or OM. s/p excisional debridement by podiatry today. s/p zosyn 3.375 x1 and dapto 600 x1 in the ED.     Plan:   - s/p excisional debridement by podiatry today (6/11) with ~5cc of sango-purulent drainage noted; neg for PTB/malodor/drainage  - start ampicillin (previous wound Cx (2/8/2023) grew VRE. faecalis sensitive to dapto and ampicillin)   - f/u wound Cx, BCx  - f/u R foot XR final read   - WBAT to b/l LE  - podiatry following, appreciate recs Presented with R foot blister with fluctuance x1d. Afebrile, VSS, no leukocytosis, elevated ESR. R foot x-ray (wet read): neg for soft tissue air, foreign body, or OM. s/p excisional debridement by podiatry today. s/p zosyn 3.375 x1 and dapto 600 x1 in the ED.     Plan:   - s/p excisional debridement by podiatry today (6/11) with ~5cc of sango-purulent drainage noted; neg for PTB/malodor/drainage  - c/w daptomycin 450mg qd (previous wound Cx (2/8/2023) grew VRE. faecalis sensitive to dapto and ampicillin)   - f/u wound Cx, BCx  - f/u R foot XR final read   - WBAT to b/l LE  - podiatry following, appreciate recs Presented with R foot blister with fluctuance x1d. Afebrile, VSS, no leukocytosis, elevated ESR. R foot x-ray (wet read): neg for soft tissue air, foreign body, or OM. s/p excisional debridement by podiatry today. s/p zosyn 3.375 x1 and dapto 600 x1 in the ED.     Plan:   - s/p excisional debridement by podiatry today (6/11) with ~5cc of sango-purulent drainage noted; neg for PTB/malodor/drainage  - ID initially consulted for daptomycin approval due to previous L foot wound Cx (2/8/2023) growing VRE. faecalis sensitive to dapto and ampicillin; however per ID, would not presume the same microbio and would just tx as purulent cellulitis for now  - start vanc 1g BID (vanc trough 6/13 at 10pm)  - f/u wound Cx, BCx  - f/u R foot XR final read   - WBAT to b/l LE  - podiatry following, appreciate recs

## 2023-06-11 NOTE — H&P ADULT - NSHPPHYSICALEXAM_GEN_ALL_CORE
.  VITAL SIGNS:  T(F): 98 (06-11-23 @ 14:56), Max: 98 (06-11-23 @ 14:56)  HR: 77 (06-11-23 @ 14:56) (73 - 99)  BP: 158/72 (06-11-23 @ 14:56) (154/72 - 199/85)  BP(mean): 107 (06-11-23 @ 14:13) (107 - 107)  RR: 17 (06-11-23 @ 14:56) (17 - 18)  SpO2: 99% (06-11-23 @ 14:56) (99% - 99%)    PHYSICAL EXAM:    Constitutional: resting comfortably in bed; NAD  HEENT: NC/AT, PERRL, EOMI, anicteric sclera, no nasal discharge; MMM  Neck: supple  Respiratory: unlabored breathing, CTA B/L; no W/Rhonchi/Crackles, no retractions or use of accessory muscles   Cardiac: +S1/S2; RRR; no M/R/G  Gastrointestinal: soft, NT/ND; no rebound or guarding; +BS  Extremities: R foot with hyperkeratotic lesion to medial aspect of plantar foot with fluctuance appreciated, no active drainage; s/p L foot TMA with superficial ulcer to lateral aspect of plantar foot; b/l feet wrapped in Kerlix  Vascular: 2+ radial pulses B/L  Neurologic: AAOx3; CNII-XII grossly intact; no focal deficits  Psychiatric: affect and characteristics of appearance, verbalizations, behaviors are appropriate

## 2023-06-11 NOTE — H&P ADULT - PROBLEM SELECTOR PLAN 5
Hx of PAD s/p LLE angioplasty and stent of SFA/pop. Home meds: aspirin 81mg qd    - c/w home aspirin 81mg qd Hx of PAD s/p LLE angioplasty and stent of SFA/pop. Home meds: aspirin 81mg qd. Pt does not think he is currently taking Plavix. however, per vascular team, pts usually take ASA/Plavix for 6 months and then dc the Plavix after.     - c/w home aspirin 81mg qd  - restart Plavix 75mg qd for now

## 2023-06-11 NOTE — ED PROVIDER NOTE - PROGRESS NOTE DETAILS
Since she has stopped xanax and started klonopin she's been herbert but she isn't really sure if its the medication or just other things around her. She takes lexapro before bed and cannot sleep. Klepfish: ESR 48, Hgb 10.1, plt 469, BUN 26, glucose 315, , other labs grossly wnl. XR w/ no tracking gas. Blister debrided by podiatry. BP self improving. Will admit medicine for further care.

## 2023-06-11 NOTE — H&P ADULT - PROBLEM SELECTOR PLAN 3
Hx of HTN. Home meds: Toprol 50mg qd and amlodipine/benazepirl 10-40    - c/w home Toprol 50 qd  - c/w home amlodipine 10 qd  - c/w therapeutic interchange of benazepril 40 -> lisinopril 40

## 2023-06-11 NOTE — ED ADULT NURSE NOTE - SUICIDE SCREENING QUESTION 3
Wife, Ness, updated. Questions encouraged and answered. She will visit during visiting hours today.    No

## 2023-06-11 NOTE — ED PROVIDER NOTE - CLINICAL SUMMARY MEDICAL DECISION MAKING FREE TEXT BOX
62M PMH  HTN, HLD, DM, PAD p/w concern for infection. Pt is s/p multiple L toe amputations (c/b medial deviation of 1st/2nd toes resulting in ulceration of medial metatarsal head), admitted Feb 2023 (Dr. Avendano) for LLE angiogram angioplasty and stent of SFA/pop and Left TMA w/ wound cx growing VRE and dc'd on daptomycin. Pt states that he has chronic wound to plantar aspect of b/l feet but that he has a few days of new RLE blister and increasing RLE swelling. States he went to podiatry who referred to ED. Has no pain to RLE. No other systemic symptoms.   Hypertensive, other vitals wnl. Exam as above.  ddx: Concern for RLE infection. Asymptomatic HTN.  Labs, XR, empiric abx, podiatry consulted.

## 2023-06-11 NOTE — H&P ADULT - ATTENDING COMMENTS
61 y/o M pmh HTN, HLD, poorly controlled DM2, PAD s/p multiple left toe amputations, recent admission Feb '23 for LLE angioplasty and stent, s/p left TMA with residual OM discharged on dapto for VRE     with PMHx of HTN, HLD, DM, PAD, s/p multiple L toe amputations (c/b medial deviation of 1st/2nd toes resulting in ulceration of medial metatarsal head), [facility] admission in 2/2023 for LLE angioplasty and stent of SFA/pop, s/p Left TMA w/ residual OM (VRE) and dc'd on 6wks of daptomycin (2/9/23-3/23/23), presented with R foot blister for 1 day, s/p excisional debridement by podiatry today (6/11), admitted to [facility] for R foot cellulitis. 61 y/o M pmh HTN, HLD, poorly controlled DM2, PAD s/p multiple left toe amputations, recent admission Feb '23 for LLE angioplasty and stent, s/p left TMA with residual OM discharged on dapto for VRE presenting with right foot ulcer     #right foot ulcer / SSTI (skin and soft tissue infection) - s/p debridement by podiatry -- some purulence expressed  -IV vancomycin  -f/u wound culture  -f/u podiatry recs    #PAD with stent -- patient reports only taking aspirin but per chart review appears he should be on asp/plavix as rxed in Feb '23 -- cont w/ asp/plavix    #DM2 - poorly controlled A1C 11, worse since February -- continue lantus, premeal at about 80% of home dose while inpatient, MISS, adjust insulin daily  refer to endo on discharge for tighter control     #HTN - c/w home meds

## 2023-06-11 NOTE — ED PROVIDER NOTE - OBJECTIVE STATEMENT
62M PMH  HTN, HLD, DM, PAD p/w concern for infection. Pt is s/p multiple L toe amputations (c/b medial deviation of 1st/2nd toes resulting in ulceration of medial metatarsal head), admitted Feb 2023 (Dr. Avendano) for LLE angiogram angioplasty and stent of SFA/pop and Left TMA w/ wound cx growing VRE and dc'd on daptomycin. Pt states that he has chronic wound to plantar aspect of b/l feet but that he has a few days of new RLE blister and increasing RLE swelling. States he went to podiatry who referred to ED. Has no pain to RLE. No other systemic symptoms.   Denies f/c, SOB/CP, NVD, URI symptoms, abd pain, urinary complaints.

## 2023-06-11 NOTE — H&P ADULT - PROBLEM SELECTOR PLAN 2
Hx of diabetes.  Follows with PCP for DM management. States his last A1c was 11 a couple of months ago. Home meds: Lantus 50U qhs, Lispro TID before meals (takes 6-12U if glucose <200; 12-16U if glucose >200). Also takes metformin intermittently.    Plan:   - A1c 11.3 during this admission   - c/w Lantus at decreased dose 40U qhs and increase if indicated  - c/w Lispro 8U TID premeals  - Freq FSG  - moderate insulin sliding scale  - consider endo consult

## 2023-06-11 NOTE — ED PROVIDER NOTE - PHYSICAL EXAMINATION
RLE: blister to plantar aspect of distal foot, mild surrounding blanching erythema, trace pitting edema to inferior aspect of shin region. no palpable DP pulse.

## 2023-06-11 NOTE — H&P ADULT - PROBLEM SELECTOR PLAN 6
Plan:  F: s/p 1L NS in the ED   E: replete K<4, Mg<2  N: consistent carb  VTE Prophylaxis: Lovenox  GI: none  C: Full Code  D: F

## 2023-06-12 ENCOUNTER — TRANSCRIPTION ENCOUNTER (OUTPATIENT)
Age: 63
End: 2023-06-12

## 2023-06-12 LAB
A1C WITH ESTIMATED AVERAGE GLUCOSE RESULT: 11 % — HIGH (ref 4–5.6)
ALBUMIN SERPL ELPH-MCNC: 3.1 G/DL — LOW (ref 3.3–5)
ALP SERPL-CCNC: 78 U/L — SIGNIFICANT CHANGE UP (ref 40–120)
ALT FLD-CCNC: 10 U/L — SIGNIFICANT CHANGE UP (ref 10–45)
ANION GAP SERPL CALC-SCNC: 9 MMOL/L — SIGNIFICANT CHANGE UP (ref 5–17)
AST SERPL-CCNC: 10 U/L — SIGNIFICANT CHANGE UP (ref 10–40)
BASOPHILS # BLD AUTO: 0.07 K/UL — SIGNIFICANT CHANGE UP (ref 0–0.2)
BASOPHILS NFR BLD AUTO: 0.6 % — SIGNIFICANT CHANGE UP (ref 0–2)
BILIRUB SERPL-MCNC: 0.4 MG/DL — SIGNIFICANT CHANGE UP (ref 0.2–1.2)
BUN SERPL-MCNC: 23 MG/DL — SIGNIFICANT CHANGE UP (ref 7–23)
CALCIUM SERPL-MCNC: 8.7 MG/DL — SIGNIFICANT CHANGE UP (ref 8.4–10.5)
CHLORIDE SERPL-SCNC: 107 MMOL/L — SIGNIFICANT CHANGE UP (ref 96–108)
CK SERPL-CCNC: 191 U/L — SIGNIFICANT CHANGE UP (ref 30–200)
CO2 SERPL-SCNC: 23 MMOL/L — SIGNIFICANT CHANGE UP (ref 22–31)
CREAT SERPL-MCNC: 1.19 MG/DL — SIGNIFICANT CHANGE UP (ref 0.5–1.3)
EGFR: 69 ML/MIN/1.73M2 — SIGNIFICANT CHANGE UP
EOSINOPHIL # BLD AUTO: 0.2 K/UL — SIGNIFICANT CHANGE UP (ref 0–0.5)
EOSINOPHIL NFR BLD AUTO: 1.8 % — SIGNIFICANT CHANGE UP (ref 0–6)
ERYTHROCYTE [SEDIMENTATION RATE] IN BLOOD: 58 MM/HR — HIGH
ESTIMATED AVERAGE GLUCOSE: 269 MG/DL — HIGH (ref 68–114)
GLUCOSE BLDC GLUCOMTR-MCNC: 181 MG/DL — HIGH (ref 70–99)
GLUCOSE BLDC GLUCOMTR-MCNC: 213 MG/DL — HIGH (ref 70–99)
GLUCOSE BLDC GLUCOMTR-MCNC: 227 MG/DL — HIGH (ref 70–99)
GLUCOSE BLDC GLUCOMTR-MCNC: 229 MG/DL — HIGH (ref 70–99)
GLUCOSE SERPL-MCNC: 245 MG/DL — HIGH (ref 70–99)
HCT VFR BLD CALC: 28.5 % — LOW (ref 39–50)
HCT VFR BLD CALC: 30 % — LOW (ref 39–50)
HGB BLD-MCNC: 9.4 G/DL — LOW (ref 13–17)
HGB BLD-MCNC: 9.5 G/DL — LOW (ref 13–17)
IMM GRANULOCYTES NFR BLD AUTO: 0.5 % — SIGNIFICANT CHANGE UP (ref 0–0.9)
LYMPHOCYTES # BLD AUTO: 18.9 % — SIGNIFICANT CHANGE UP (ref 13–44)
LYMPHOCYTES # BLD AUTO: 2.06 K/UL — SIGNIFICANT CHANGE UP (ref 1–3.3)
MCHC RBC-ENTMCNC: 27 PG — SIGNIFICANT CHANGE UP (ref 27–34)
MCHC RBC-ENTMCNC: 27.5 PG — SIGNIFICANT CHANGE UP (ref 27–34)
MCHC RBC-ENTMCNC: 31.7 GM/DL — LOW (ref 32–36)
MCHC RBC-ENTMCNC: 33 GM/DL — SIGNIFICANT CHANGE UP (ref 32–36)
MCV RBC AUTO: 83.3 FL — SIGNIFICANT CHANGE UP (ref 80–100)
MCV RBC AUTO: 85.2 FL — SIGNIFICANT CHANGE UP (ref 80–100)
MONOCYTES # BLD AUTO: 0.71 K/UL — SIGNIFICANT CHANGE UP (ref 0–0.9)
MONOCYTES NFR BLD AUTO: 6.5 % — SIGNIFICANT CHANGE UP (ref 2–14)
NEUTROPHILS # BLD AUTO: 7.82 K/UL — HIGH (ref 1.8–7.4)
NEUTROPHILS NFR BLD AUTO: 71.7 % — SIGNIFICANT CHANGE UP (ref 43–77)
NRBC # BLD: 0 /100 WBCS — SIGNIFICANT CHANGE UP (ref 0–0)
NRBC # BLD: 0 /100 WBCS — SIGNIFICANT CHANGE UP (ref 0–0)
PLATELET # BLD AUTO: 401 K/UL — HIGH (ref 150–400)
PLATELET # BLD AUTO: 440 K/UL — HIGH (ref 150–400)
POTASSIUM SERPL-MCNC: 4.8 MMOL/L — SIGNIFICANT CHANGE UP (ref 3.5–5.3)
POTASSIUM SERPL-SCNC: 4.8 MMOL/L — SIGNIFICANT CHANGE UP (ref 3.5–5.3)
PROT SERPL-MCNC: 6 G/DL — SIGNIFICANT CHANGE UP (ref 6–8.3)
RBC # BLD: 3.42 M/UL — LOW (ref 4.2–5.8)
RBC # BLD: 3.52 M/UL — LOW (ref 4.2–5.8)
RBC # FLD: 17.3 % — HIGH (ref 10.3–14.5)
RBC # FLD: 17.9 % — HIGH (ref 10.3–14.5)
SODIUM SERPL-SCNC: 139 MMOL/L — SIGNIFICANT CHANGE UP (ref 135–145)
WBC # BLD: 10.91 K/UL — HIGH (ref 3.8–10.5)
WBC # BLD: 11.91 K/UL — HIGH (ref 3.8–10.5)
WBC # FLD AUTO: 10.91 K/UL — HIGH (ref 3.8–10.5)
WBC # FLD AUTO: 11.91 K/UL — HIGH (ref 3.8–10.5)

## 2023-06-12 PROCEDURE — 99233 SBSQ HOSP IP/OBS HIGH 50: CPT | Mod: GC

## 2023-06-12 RX ORDER — VANCOMYCIN HCL 1 G
1000 VIAL (EA) INTRAVENOUS EVERY 12 HOURS
Refills: 0 | Status: COMPLETED | OUTPATIENT
Start: 2023-06-12 | End: 2023-06-13

## 2023-06-12 RX ADMIN — LISINOPRIL 40 MILLIGRAM(S): 2.5 TABLET ORAL at 06:34

## 2023-06-12 RX ADMIN — Medication 4: at 13:00

## 2023-06-12 RX ADMIN — Medication 2: at 17:41

## 2023-06-12 RX ADMIN — Medication 81 MILLIGRAM(S): at 09:05

## 2023-06-12 RX ADMIN — Medication 4: at 22:36

## 2023-06-12 RX ADMIN — CLOPIDOGREL BISULFATE 75 MILLIGRAM(S): 75 TABLET, FILM COATED ORAL at 11:09

## 2023-06-12 RX ADMIN — Medication 8 UNIT(S): at 13:00

## 2023-06-12 RX ADMIN — ENOXAPARIN SODIUM 40 MILLIGRAM(S): 100 INJECTION SUBCUTANEOUS at 18:31

## 2023-06-12 RX ADMIN — Medication 250 MILLIGRAM(S): at 11:09

## 2023-06-12 RX ADMIN — Medication 8 UNIT(S): at 09:33

## 2023-06-12 RX ADMIN — INSULIN GLARGINE 40 UNIT(S): 100 INJECTION, SOLUTION SUBCUTANEOUS at 22:37

## 2023-06-12 RX ADMIN — Medication 250 MILLIGRAM(S): at 22:37

## 2023-06-12 RX ADMIN — AMLODIPINE BESYLATE 10 MILLIGRAM(S): 2.5 TABLET ORAL at 17:40

## 2023-06-12 RX ADMIN — ATORVASTATIN CALCIUM 40 MILLIGRAM(S): 80 TABLET, FILM COATED ORAL at 22:37

## 2023-06-12 RX ADMIN — Medication 8 UNIT(S): at 17:41

## 2023-06-12 NOTE — DISCHARGE NOTE PROVIDER - NSDCCPCAREPLAN_GEN_ALL_CORE_FT
PRINCIPAL DISCHARGE DIAGNOSIS  Diagnosis: Cellulitis  Assessment and Plan of Treatment: You were found to have a skin infection called cellulitis. You were treated with intravenous antibiotics during your stay at [facility]. Please continue to take bactrim ___ and follow-up with your primary care physician. Should start to notice symptoms such as but not limited to: high persisting fevers (>104 F or lasting more than 3 days), severe pain, increased swelling and/or skin color changes after finishing your antibiotics, please return to the emergency department for interval evaluation.       PRINCIPAL DISCHARGE DIAGNOSIS  Diagnosis: Cellulitis  Assessment and Plan of Treatment: You were found to have a skin infection called cellulitis. You were treated with intravenous antibiotics during your stay at [facility]. Please continue to take bactrim DS for the next five days and follow-up with your primary care physician. Should start to notice symptoms such as but not limited to: high persisting fevers (>104 F or lasting more than 3 days), severe pain, increased swelling and/or skin color changes after finishing your antibiotics, please return to the emergency department for interval evaluation.   Wound Care Dressing Instructions: Dress R foot with betadine soaked gauze, kerlix, tape. L foot dressed with saline WTD, gauze, kerlix, tape  Please follow up with your outpatient podiatrist as early as you are able.       PRINCIPAL DISCHARGE DIAGNOSIS  Diagnosis: Cellulitis  Assessment and Plan of Treatment: You were found to have a skin infection called cellulitis. You were treated with intravenous antibiotics during your stay at [facility]. Please continue to take bactrim DS for the next five days and follow-up with your primary care physician. Should start to notice symptoms such as but not limited to: high persisting fevers (>104 F or lasting more than 3 days), severe pain, increased swelling and/or skin color changes after finishing your antibiotics, please return to the emergency department for interval evaluation.   Wound Care Dressing Instructions: Dress R foot with betadine soaked gauze, kerlix, tape. L foot dressed with saline WTD, gauze, kerlix, tape  Please follow up with your outpatient podiatrist as early as you are able.      SECONDARY DISCHARGE DIAGNOSES  Diagnosis: DM (diabetes mellitus)  Assessment and Plan of Treatment: You have a known history of diabetes mellitus prior to your admission. This condition results from blood sugar levels getting too high because your body is more resistant to insulin. Uncontrolled blood sugar levels can lead to kidney and heart damage, pain/numbness/paralysis in your hands and feet, and increased rates of infections.  To manage this you are on medications called lantus and lispro. It is important that you continue to take this medication when you are discharged so that you can continue to control your blood sugar levels. Additionally be sure to follow up with your primary care physician, podiatrist, and ophthalmologist on a regular basis.      Diagnosis: HTN (hypertension)  Assessment and Plan of Treatment: You have a known history of high blood pressure prior to your admission. To manage this you are on a medication called amlodipine 10/benazepril 40 daily. High blood pressure can cause damage to your heart and kidneys and increases your risk of heart attack and stroke. To avoid this, It is important that you continue to take this medication when you are discharged so that you can continue to control your blood pressure. Additionally be sure to follow up with your primary care physician on a regular basis to make sure your blood pressure continues to be well controlled. If you experience symptoms such as but not limited to: sudden onset blurry vision, nausea, vomiting, chest pain, shortness of breath, or palpitations, please go to the nearest emergency room.      Diagnosis: PAD (peripheral artery disease)  Assessment and Plan of Treatment: It is improtant that you continue taking your plavix 75mg and aspirin 81 mg daily especially after the recent surgery that you had this year.    Diagnosis: HLD (hyperlipidemia)  Assessment and Plan of Treatment: You have a known history of high cholesterol prior to your admission. To manage this you are on a medication called atorvastatin 40 mg daily. High cholesterol is bad because it can cause damage to your heart and puts you at risk for heart attack and stroke.  It is important that you continue to take this medication when you are discharged so that you can continue to control your level of cholesterol. Additionally be sure to follow up with your primary care physician on a regular basis to make sure your triglyceride and cholesterol levels continue to be well controlled.       PRINCIPAL DISCHARGE DIAGNOSIS  Diagnosis: Cellulitis  Assessment and Plan of Treatment: You were found to have a skin infection called cellulitis. You were treated with intravenous antibiotics during your stay at [facility]. Please continue to take bactrim DS for the next five days and follow-up with your primary care physician. Should start to notice symptoms such as but not limited to: high persisting fevers (>104 F or lasting more than 3 days), severe pain, increased swelling and/or skin color changes after finishing your antibiotics, please return to the emergency department for interval evaluation.   Wound Care Dressing Instructions: Dress R foot with betadine soaked gauze, kerlix, tape. L foot dressed with saline WTD, gauze, kerlix, tape  Please follow up with your outpatient podiatrist as early as you are able.      SECONDARY DISCHARGE DIAGNOSES  Diagnosis: DM (diabetes mellitus)  Assessment and Plan of Treatment: You have a known history of diabetes mellitus prior to your admission. This condition results from blood sugar levels getting too high because your body is more resistant to insulin. Uncontrolled blood sugar levels can lead to kidney and heart damage, pain/numbness/paralysis in your hands and feet, and increased rates of infections.  To manage this you are on medications called lantus and lispro. It is important that you continue to take this medication when you are discharged so that you can continue to control your blood sugar levels. Additionally be sure to follow up with your primary care physician, podiatrist, and ophthalmologist on a regular basis.      Diagnosis: HTN (hypertension)  Assessment and Plan of Treatment: You have a known history of high blood pressure prior to your admission. To manage this you are on a medication called amlodipine 10/benazepril 40 daily. High blood pressure can cause damage to your heart and kidneys and increases your risk of heart attack and stroke. To avoid this, It is important that you continue to take this medication when you are discharged so that you can continue to control your blood pressure. Additionally be sure to follow up with your primary care physician on a regular basis to make sure your blood pressure continues to be well controlled. If you experience symptoms such as but not limited to: sudden onset blurry vision, nausea, vomiting, chest pain, shortness of breath, or palpitations, please go to the nearest emergency room.      Diagnosis: PAD (peripheral artery disease)  Assessment and Plan of Treatment: It is improtant that you continue taking your plavix 75mg and aspirin 81 mg daily especially after the recent surgery that you had this year.    Diagnosis: HLD (hyperlipidemia)  Assessment and Plan of Treatment: You have a known history of high cholesterol prior to your admission. To manage this you are on a medication called rosuvastatin 10 mg daily. High cholesterol is bad because it can cause damage to your heart and puts you at risk for heart attack and stroke. It is important that you continue to take this medication when you are discharged so that you can continue to control your level of cholesterol. Additionally be sure to follow up with your primary care physician on a regular basis to make sure your triglyceride and cholesterol levels continue to be well controlled.       PRINCIPAL DISCHARGE DIAGNOSIS  Diagnosis: Cellulitis  Assessment and Plan of Treatment: You were found to have a skin infection called cellulitis. You were treated with intravenous antibiotics during your stay at [facility]. Please continue to take bactrim DS for the next five days and follow-up with your primary care physician. Should start to notice symptoms such as but not limited to: high persisting fevers (>104 F or lasting more than 3 days), severe pain, increased swelling and/or skin color changes after finishing your antibiotics, please return to the emergency department for interval evaluation.   Wound Care Dressing Instructions: Dress R foot with betadine soaked gauze, kerlix, tape. L foot dressed with saline WTD, gauze, kerlix, tape  Please follow up with your outpatient podiatrist as early as you are able.      SECONDARY DISCHARGE DIAGNOSES  Diagnosis: DM (diabetes mellitus)  Assessment and Plan of Treatment: You have a known history of diabetes mellitus prior to your admission. This condition results from blood sugar levels getting too high because your body is more resistant to insulin. Uncontrolled blood sugar levels can lead to kidney and heart damage, pain/numbness/paralysis in your hands and feet, and increased rates of infections.  To manage this you are on medications called lantus and lispro. It is important that you continue to take this medication when you are discharged so that you can continue to control your blood sugar levels. Additionally be sure to follow up with your primary care physician, podiatrist, and ophthalmologist on a regular basis.      Diagnosis: HTN (hypertension)  Assessment and Plan of Treatment: You have a known history of high blood pressure prior to your admission. To manage this you are on a medication called amlodipine 10/benazepril 40 daily as well as metoporol succinate 50 mg as prescribed by oyur PCP. High blood pressure can cause damage to your heart and kidneys and increases your risk of heart attack and stroke. To avoid this, It is important that you continue to take this medication when you are discharged so that you can continue to control your blood pressure. Additionally be sure to follow up with your primary care physician on a regular basis to make sure your blood pressure continues to be well controlled. If you experience symptoms such as but not limited to: sudden onset blurry vision, nausea, vomiting, chest pain, shortness of breath, or palpitations, please go to the nearest emergency room.      Diagnosis: PAD (peripheral artery disease)  Assessment and Plan of Treatment: It is improtant that you continue taking your plavix 75mg and aspirin 81 mg daily especially after the recent surgery that you had this year.    Diagnosis: HLD (hyperlipidemia)  Assessment and Plan of Treatment: You have a known history of high cholesterol prior to your admission. To manage this you are on a medication called rosuvastatin 10 mg daily. High cholesterol is bad because it can cause damage to your heart and puts you at risk for heart attack and stroke. It is important that you continue to take this medication when you are discharged so that you can continue to control your level of cholesterol. Additionally be sure to follow up with your primary care physician on a regular basis to make sure your triglyceride and cholesterol levels continue to be well controlled.

## 2023-06-12 NOTE — PHYSICAL THERAPY INITIAL EVALUATION ADULT - PERTINENT HX OF CURRENT PROBLEM, REHAB EVAL
62M presented with R foot blister for 1 day. Pt states he saw his podiatrist on Friday and did not have a blister to his right foot at that time. He noticed his blister on Saturday morning. No known trauma. No drainage or pain. 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

## 2023-06-12 NOTE — PHYSICAL THERAPY INITIAL EVALUATION ADULT - ADDITIONAL COMMENTS
has straight cane , and rolling walker , CAM boot for LLE, apartment, elevator, no steps, independent prior to arrival, no falls

## 2023-06-12 NOTE — PROGRESS NOTE ADULT - ASSESSMENT
62M PMH  HTN, HLD, DM, PAD p/w concern for right foot infection. Pt is s/p multiple L toe amputations (c/b medial deviation of 1st/2nd toes resulting in ulceration of medial metatarsal head), admitted Feb 2023 (Dr. Avendano) for LLE angiogram angioplasty and stent of SFA/pop and Left TMA w/ wound cx growing VRE and dc'd on 6wks of daptomycin. Podiatry consulted with regards to R foot 1st MPJ blister to r/o soft tissue air, FB, or potential osteomyelitis Pt afebrile w/ VSS. Labs significant for mildly elevated ESR, and normal CRP/WBC. XR with no concerns of soft tissue air, fb or any acute osteomyelitic process. On PE, edema and erythema noted to the R 1st MPJ and digit. Medial MPJ blister with underlying fluctuance concern for infectious fluid collection. Left foot TMA site healed. Superficial ulceration plantar left foot with no concern for infection.     Plan   - Xr reviewed, f/u final XR read  - local care: dressed R foot with betadine soaked gauze, kerlix, tape  L foot dressed with saline WTD, gauze, kerlix, tape  - f/u R submet 1 wound drainage cx; staph capitis growth noted  - WBAT to b/l LE   - Rest of care per primary team     Podiatry following, plan discussed with attending

## 2023-06-12 NOTE — DISCHARGE NOTE PROVIDER - HOSPITAL COURSE
#Discharge: do not delete    61 y/o M with PMHx of HTN, HLD, DM, PAD, s/p multiple L toe amputations (c/b medial deviation of 1st/2nd toes resulting in ulceration of medial metatarsal head), [facility] admission in 2/2023 for LLE angioplasty and stent of SFA/pop, s/p Left TMA w/ residual OM (VRE) and dc'd on 6wks of daptomycin (2/9/23-3/23/23), presented with R foot blister for 1 day, s/p excisional debridement by podiatry today (6/11), admitted to [facility] for R foot cellulitis.     Hospital course (by problem):   #Moderate Cellulitis  Presented with R foot blister with fluctuance x1d. Afebrile, VSS, no leukocytosis, elevated ESR. R foot x-ray (wet read): neg for soft tissue air, foreign body, or OM. s/p excisional debridement by podiatry today. s/p zosyn 3.375 x1 and dapto 600 x1 in the ED.   - s/p excisional debridement by podiatry today (6/11) with ~5cc of sango-purulent drainage noted; neg for PTB/malodor/drainage  - ID initially consulted for daptomycin approval due to previous L foot wound Cx (2/8/2023) growing VRE. faecalis sensitive to dapto and ampicillin; however per ID, would not presume the same microbio and would just tx as purulent cellulitis for now  - S/p Vanc 1g BID  - Wound and blood cx with ngtd    Plan:  - Bactrim PO for _____  - f/u wound Cx, BCx  - f/u R foot XR final read   - WBAT to b/l LE  - podiatry following, appreciate recs.    Patient was discharged to: (home/[facility]/acute rehab/hospice, etc, and with what services – home health PT/RN? Home O2?)    New medications:   Changes to old medications:  Medications that were stopped:    Items to follow up as outpatient:    Physical exam at the time of discharge:       #Discharge: do not delete    63 y/o M with PMHx of HTN, HLD, DM, PAD, s/p multiple L toe amputations (c/b medial deviation of 1st/2nd toes resulting in ulceration of medial metatarsal head), [facility] admission in 2/2023 for LLE angioplasty and stent of SFA/pop, s/p Left TMA w/ residual OM (VRE) and dc'd on 6wks of daptomycin (2/9/23-3/23/23), presented with R foot blister for 1 day, s/p excisional debridement by podiatry today (6/11), admitted to [facility] for R foot cellulitis.     Hospital course (by problem):   #Moderate Cellulitis  Presented with R foot blister with fluctuance x1d. Afebrile, VSS, no leukocytosis, elevated ESR. R foot x-ray (wet read): neg for soft tissue air, foreign body, or OM. s/p excisional debridement by podiatry today. s/p zosyn 3.375 x1 and dapto 600 x1 in the ED.   - s/p excisional debridement by podiatry today (6/11) with ~5cc of sango-purulent drainage noted; neg for PTB/malodor/drainage  - ID initially consulted for daptomycin approval due to previous L foot wound Cx (2/8/2023) growing VRE. faecalis sensitive to dapto and ampicillin; however per ID, would not presume the same microbio and would just tx as purulent cellulitis for now  - S/p Vanc 1g BID  - Wound and blood cx with ngtd    Plan:  - Bactrim PO for _____  - f/u wound Cx, BCx  - f/u R foot XR final read   - WBAT to b/l LE  - podiatry following, appreciate recs.    #DM (diabetes mellitus).   Hx of diabetes. Follows with PCP for DM management. States his last A1c was 11 a couple of months ago. Home meds: Lantus 50U qhs, Lispro TID before meals (takes 6-12U if glucose <200; 12-16U if glucose >200). Also takes metformin intermittently.  - A1c 11.3 during this admission   - c/w Lantus at decreased dose 40U qhs and increase if indicated  - c/w Lispro 8U TID premeals  - Freq FSG  - moderate insulin sliding scale    Plan:  - C/w Lantus 50 qhs and Lispro TID     #HTN (hypertension).   Hx of HTN. Home meds: Toprol 50mg qd and amlodipine/benazepirl 10-40 which were continued inpatient.    Plan:  - c/w home Toprol 50 qd  - c/w home amlodipine 10 qd  - c/w benazepril 40    #HLD (hyperlipidemia).   Hx of HLD. Home meds: rosuvastatin 10mg qd    Plan:  - c/w home rosuvastatin 10mg qd.    #PAD (peripheral artery disease).   Hx of PAD s/p LLE angioplasty and stent of SFA/pop. Home meds: aspirin 81mg qd. Pt does not think he is currently taking Plavix. however, per vascular team, pts usually take ASA/Plavix for 6 months and then dc the Plavix after.     Plan:  - c/w home aspirin 81mg qd  - restarted Plavix 75mg qd for now, continue    Patient was discharged to: Home    New medications: None    Changes to old medications: None    Medications that were stopped: None    Items to follow up as outpatient: Podiatry F/u    Physical exam at the time of discharge:  Constitutional: resting comfortably in bed; NAD  HEENT: NC/AT, PERRL, EOMI, anicteric sclera, no nasal discharge; MMM  Respiratory: unlabored breathing, CTA B/L; no retractions or use of accessory muscles   Cardiac: +S1/S2; RRR; no M/R/G  Gastrointestinal: soft, NT/ND; no rebound or guarding; +BS  Extremities: R foot with hyperkeratotic lesion to medial aspect of plantar foot with fluctuance appreciated, no active drainage; s/p L foot TMA with superficial ulcer to lateral aspect of plantar foot; b/l feet wrapped in Kerlix  Vascular: 2+ radial pulses B/L  Neurologic: AAOx3; CNII-XII grossly intact; no focal deficits  Psychiatric: affect and characteristics of appearance, verbalizations, behaviors are appropriate       #Discharge: do not delete    61 y/o M with PMHx of HTN, HLD, DM, PAD, s/p multiple L toe amputations (c/b medial deviation of 1st/2nd toes resulting in ulceration of medial metatarsal head), [facility] admission in 2/2023 for LLE angioplasty and stent of SFA/pop, s/p Left TMA w/ residual OM (VRE) and dc'd on 6wks of daptomycin (2/9/23-3/23/23), presented with R foot blister for 1 day, s/p excisional debridement by podiatry today (6/11), admitted to [facility] for R foot cellulitis.     Hospital course (by problem):   #Moderate Cellulitis  Presented with R foot blister with fluctuance x1d. Afebrile, VSS, no leukocytosis, elevated ESR. R foot x-ray (wet read): neg for soft tissue air, foreign body, or OM. s/p excisional debridement by podiatry today. s/p zosyn 3.375 x1 and dapto 600 x1 in the ED. S/p excisional debridement by podiatry on 6/11with ~5cc of sango-purulent drainage noted; neg for PTB/malodor/drainage. ID initially consulted for daptomycin approval due to previous L foot wound Cx (2/8/2023) growing VRE. faecalis sensitive to dapto and ampicillin; however per ID, would not presume the same microbio and would just tx as purulent cellulitis for now  - S/p Vanc 1g BID  - Wound and blood cx with ngtd  - Xray of R foot: There is a chronic appearing erosion at the distal aspect of the second metatarsal. Chronic appearing resorptive changes are seen of the distal phalanx of the fourth digit. However, superimposed osteomyelitis cannot be excluded. A similar but less extensive appearance is noted at the third digit distal phalanx. Correlation with MRI is recommended for better evaluation of osteomyelitis. There is soft tissue swelling and vascular calcification present.    Plan:        #DM (diabetes mellitus).   Hx of diabetes. Follows with PCP for DM management. States his last A1c was 11 a couple of months ago. Home meds: Lantus 50U qhs, Lispro TID before meals (takes 6-12U if glucose <200; 12-16U if glucose >200). Also takes metformin intermittently.  - A1c 11.3 during this admission   - c/w Lantus at decreased dose 40U qhs and increase if indicated  - c/w Lispro 8U TID premeals  - Freq FSG  - moderate insulin sliding scale    Plan:  - C/w Lantus 50 qhs and Lispro TID     #HTN (hypertension).   Hx of HTN. Home meds: Toprol 50mg qd and amlodipine/benazepirl 10-40 which were continued inpatient.    Plan:  - c/w home Toprol 50 qd  - c/w home amlodipine 10 qd  - c/w benazepril 40    #HLD (hyperlipidemia).   Hx of HLD. Home meds: rosuvastatin 10mg qd    Plan:  - c/w home rosuvastatin 10mg qd.    #PAD (peripheral artery disease).   Hx of PAD s/p LLE angioplasty and stent of SFA/pop. Home meds: aspirin 81mg qd. Pt does not think he is currently taking Plavix. however, per vascular team, pts usually take ASA/Plavix for 6 months and then dc the Plavix after.     Plan:  - c/w home aspirin 81mg qd  - restarted Plavix 75mg qd for now, continue    Patient was discharged to: Home    New medications: None    Changes to old medications: None    Medications that were stopped: None    Items to follow up as outpatient: Podiatry F/u    Physical exam at the time of discharge:  Constitutional: resting comfortably in bed; NAD  HEENT: NC/AT, PERRL, EOMI, anicteric sclera, no nasal discharge; MMM  Respiratory: unlabored breathing, CTA B/L; no retractions or use of accessory muscles   Cardiac: +S1/S2; RRR; no M/R/G  Gastrointestinal: soft, NT/ND; no rebound or guarding; +BS  Extremities: R foot with hyperkeratotic lesion to medial aspect of plantar foot with fluctuance appreciated, no active drainage; s/p L foot TMA with superficial ulcer to lateral aspect of plantar foot; b/l feet wrapped in Kerlix  Vascular: 2+ radial pulses B/L  Neurologic: AAOx3; CNII-XII grossly intact; no focal deficits  Psychiatric: affect and characteristics of appearance, verbalizations, behaviors are appropriate       #Discharge: do not delete    61 y/o M with PMHx of HTN, HLD, DM, PAD, s/p multiple L toe amputations (c/b medial deviation of 1st/2nd toes resulting in ulceration of medial metatarsal head), [facility] admission in 2/2023 for LLE angioplasty and stent of SFA/pop, s/p Left TMA w/ residual OM (VRE) and dc'd on 6wks of daptomycin (2/9/23-3/23/23), presented with R foot blister for 1 day, s/p excisional debridement by podiatry today (6/11), admitted to [facility] for R foot cellulitis.     Hospital course (by problem):   #Moderate Cellulitis  Presented with R foot blister with fluctuance x1d. Afebrile, VSS, no leukocytosis, elevated ESR. R foot x-ray (wet read): neg for soft tissue air, foreign body, or OM. s/p excisional debridement by podiatry today. s/p zosyn 3.375 x1 and dapto 600 x1 in the ED. S/p excisional debridement by podiatry on 6/11with ~5cc of sango-purulent drainage noted; neg for PTB/malodor/drainage. ID initially consulted for daptomycin approval due to previous L foot wound Cx (2/8/2023) growing VRE. faecalis sensitive to dapto and ampicillin; however per ID, would not presume the same microbio and would just tx as purulent cellulitis for now  - S/p Vanc 1g BID  - Wound and blood cx with ngtd  - Xray of R foot: There is a chronic appearing erosion at the distal aspect of the second metatarsal. Chronic appearing resorptive changes are seen of the distal phalanx of the fourth digit. However, superimposed osteomyelitis cannot be excluded. A similar but less extensive appearance is noted at the third digit distal phalanx. Correlation with MRI is recommended for better evaluation of osteomyelitis. There is soft tissue swelling and vascular calcification present.    Plan:  - Bactrim PO   - Wound care recommendations: dressed R foot with betadine soaked gauze, kerlix, tape L foot dressed with saline WTD, gauze, kerlix, tape    #DM (diabetes mellitus).   Hx of diabetes. Follows with PCP for DM management. States his last A1c was 11 a couple of months ago. Home meds: Lantus 50U qhs, Lispro TID before meals (takes 6-12U if glucose <200; 12-16U if glucose >200). Also takes metformin intermittently.  - A1c 11.3 during this admission   - c/w Lantus at decreased dose 40U qhs and increase if indicated  - c/w Lispro 8U TID premeals  - Freq FSG  - moderate insulin sliding scale    Plan:  - C/w Lantus 50 qhs and Lispro TID     #HTN (hypertension).   Hx of HTN. Home meds: Toprol 50mg qd and amlodipine/benazepirl 10-40 which were continued inpatient.    Plan:  - c/w home Toprol 50 qd  - c/w home amlodipine 10 qd  - c/w benazepril 40    #HLD (hyperlipidemia).   Hx of HLD. Home meds: rosuvastatin 10mg qd    Plan:  - c/w home rosuvastatin 10mg qd.    #PAD (peripheral artery disease).   Hx of PAD s/p LLE angioplasty and stent of SFA/pop. Home meds: aspirin 81mg qd. Pt does not think he is currently taking Plavix. however, per vascular team, pts usually take ASA/Plavix for 6 months and then dc the Plavix after.     Plan:  - c/w home aspirin 81mg qd  - restarted Plavix 75mg qd for now, continue    Patient was discharged to: Home    New medications: None    Changes to old medications: None    Medications that were stopped: None    Items to follow up as outpatient: Podiatry F/u    Physical exam at the time of discharge:  Constitutional: resting comfortably in bed; NAD  HEENT: NC/AT, PERRL, EOMI, anicteric sclera, no nasal discharge; MMM  Respiratory: unlabored breathing, CTA B/L; no retractions or use of accessory muscles   Cardiac: +S1/S2; RRR; no M/R/G  Gastrointestinal: soft, NT/ND; no rebound or guarding; +BS  Extremities: R foot with hyperkeratotic lesion to medial aspect of plantar foot with fluctuance appreciated, no active drainage; s/p L foot TMA with superficial ulcer to lateral aspect of plantar foot; b/l feet wrapped in Kerlix  Vascular: 2+ radial pulses B/L  Neurologic: AAOx3; CNII-XII grossly intact; no focal deficits  Psychiatric: affect and characteristics of appearance, verbalizations, behaviors are appropriate       #Discharge: do not delete    61 y/o M with PMHx of HTN, HLD, DM, PAD, s/p multiple L toe amputations (c/b medial deviation of 1st/2nd toes resulting in ulceration of medial metatarsal head), [facility] admission in 2/2023 for LLE angioplasty and stent of SFA/pop, s/p Left TMA w/ residual OM (VRE) and dc'd on 6wks of daptomycin (2/9/23-3/23/23), presented with R foot blister for 1 day, s/p excisional debridement by podiatry today (6/11), admitted to [facility] for R foot cellulitis.     Hospital course (by problem):   #Moderate Cellulitis  Presented with R foot blister with fluctuance x1d. Afebrile, VSS, no leukocytosis, elevated ESR. R foot x-ray (wet read): neg for soft tissue air, foreign body, or OM. s/p excisional debridement by podiatry today. s/p zosyn 3.375 x1 and dapto 600 x1 in the ED. S/p excisional debridement by podiatry on 6/11with ~5cc of sango-purulent drainage noted; neg for PTB/malodor/drainage. ID initially consulted for daptomycin approval due to previous L foot wound Cx (2/8/2023) growing VRE. faecalis sensitive to dapto and ampicillin; however per ID, would not presume the same microbio and would just tx as purulent cellulitis for now  - S/p Vanc 1g BID  - Wound and blood cx with ngtd  - Xray of R foot: There is a chronic appearing erosion at the distal aspect of the second metatarsal. Chronic appearing resorptive changes are seen of the distal phalanx of the fourth digit. However, superimposed osteomyelitis cannot be excluded. A similar but less extensive appearance is noted at the third digit distal phalanx. Correlation with MRI is recommended for better evaluation of osteomyelitis. There is soft tissue swelling and vascular calcification present.    Plan:  - Bactrim PO BID for 5 days  - Wound care recommendations: dressed R foot with betadine soaked gauze, kerlix, tape L foot dressed with saline WTD, gauze, kerlix, tape    #DM (diabetes mellitus).   Hx of diabetes. Follows with PCP for DM management. States his last A1c was 11 a couple of months ago. Home meds: Lantus 50U qhs, Lispro TID before meals (takes 6-12U if glucose <200; 12-16U if glucose >200). Also takes metformin intermittently.  - A1c 11.3 during this admission   - c/w Lantus at decreased dose 40U qhs and increase if indicated  - c/w Lispro 8U TID premeals  - Freq FSG  - moderate insulin sliding scale    Plan:  - C/w Lantus 50 qhs and Lispro TID     #HTN (hypertension).   Hx of HTN. Home meds: Toprol 50mg qd and amlodipine/benazepirl 10-40.    Plan:  - c/w home amlodipine 10 qd  - c/w benazepril 40    #HLD (hyperlipidemia).   Hx of HLD. Home meds: rosuvastatin 10mg qd    Plan:  - c/w home rosuvastatin 10mg qd.    #PAD (peripheral artery disease).   Hx of PAD s/p LLE angioplasty and stent of SFA/pop. Home meds: aspirin 81mg qd. Pt does not think he is currently taking Plavix. however, per vascular team, pts usually take ASA/Plavix for 6 months and then dc the Plavix after.     Plan:  - c/w home aspirin 81mg qd  - restarted Plavix 75mg qd, continue    Patient was discharged to: Home    New medications: None    Changes to old medications: None    Medications that were stopped: None    Items to follow up as outpatient: Podiatry F/u    Physical exam at the time of discharge:  Constitutional: resting comfortably in bed; NAD  HEENT: NC/AT, PERRL, EOMI, anicteric sclera, no nasal discharge; MMM  Respiratory: unlabored breathing, CTA B/L; no retractions or use of accessory muscles   Cardiac: +S1/S2; RRR; no M/R/G  Gastrointestinal: soft, NT/ND; no rebound or guarding; +BS  Extremities: R foot with hyperkeratotic lesion to medial aspect of plantar foot with fluctuance appreciated, no active drainage; s/p L foot TMA with superficial ulcer to lateral aspect of plantar foot; b/l feet wrapped in Kerlix  Vascular: 2+ radial pulses B/L  Neurologic: AAOx3; CNII-XII grossly intact; no focal deficits  Psychiatric: affect and characteristics of appearance, verbalizations, behaviors are appropriate       #Discharge: do not delete    61 y/o M with PMHx of HTN, HLD, DM, PAD, s/p multiple L toe amputations (c/b medial deviation of 1st/2nd toes resulting in ulceration of medial metatarsal head), [facility] admission in 2/2023 for LLE angioplasty and stent of SFA/pop, s/p Left TMA w/ residual OM (VRE) and dc'd on 6wks of daptomycin (2/9/23-3/23/23), presented with R foot blister for 1 day, s/p excisional debridement by podiatry today (6/11), admitted to [facility] for R foot cellulitis.     Hospital course (by problem):   #Moderate Cellulitis  Presented with R foot blister with fluctuance x1d. Afebrile, VSS, no leukocytosis, elevated ESR. R foot x-ray (wet read): neg for soft tissue air, foreign body, or OM. s/p excisional debridement by podiatry today. s/p zosyn 3.375 x1 and dapto 600 x1 in the ED. S/p excisional debridement by podiatry on 6/11with ~5cc of sango-purulent drainage noted; neg for PTB/malodor/drainage. ID initially consulted for daptomycin approval due to previous L foot wound Cx (2/8/2023) growing VRE. faecalis sensitive to dapto and ampicillin; however per ID, would not presume the same microbio and would just tx as purulent cellulitis for now  - S/p Vanc 1g BID  - Wound and blood cx with ngtd  - Xray of R foot: There is a chronic appearing erosion at the distal aspect of the second metatarsal. Chronic appearing resorptive changes are seen of the distal phalanx of the fourth digit. However, superimposed osteomyelitis cannot be excluded. A similar but less extensive appearance is noted at the third digit distal phalanx. Correlation with MRI is recommended for better evaluation of osteomyelitis. There is soft tissue swelling and vascular calcification present.    Plan:  - Bactrim PO BID for 5 days  - Wound care recommendations: dressed R foot with betadine soaked gauze, kerlix, tape L foot dressed with saline WTD, gauze, kerlix, tape    #DM (diabetes mellitus).   Hx of diabetes. Follows with PCP for DM management. States his last A1c was 11 a couple of months ago. Home meds: Lantus 50U qhs, Lispro TID before meals (takes 6-12U if glucose <200; 12-16U if glucose >200). Also takes metformin intermittently.  - A1c 11.3 during this admission   - c/w Lantus at decreased dose 40U qhs and increase if indicated  - c/w Lispro 8U TID premeals  - Freq FSG  - moderate insulin sliding scale    Plan:  - C/w Lantus 50 qhs and Lispro TID     #HTN (hypertension).   Hx of HTN. Home meds: Toprol 50mg qd and amlodipine/benazepirl 10-40 but was just on amlodipine and benazepril during hospital stay.    Plan:  - c/w Toprol 50 mg qd  - c/w home amlodipine 10 qd  - c/w benazepril 40    #HLD (hyperlipidemia).   Hx of HLD. Home meds: rosuvastatin 10mg qd    Plan:  - c/w home rosuvastatin 10mg qd.    #PAD (peripheral artery disease).   Hx of PAD s/p LLE angioplasty and stent of SFA/pop. Home meds: aspirin 81mg qd. Pt does not think he is currently taking Plavix. however, per vascular team, pts usually take ASA/Plavix for 6 months and then dc the Plavix after.     Plan:  - c/w home aspirin 81mg qd  - restarted Plavix 75mg qd, continue    Patient was discharged to: Home    New medications: None    Changes to old medications: None    Medications that were stopped: None    Items to follow up as outpatient: Podiatry F/u    Physical exam at the time of discharge:  Constitutional: resting comfortably in bed; NAD  HEENT: NC/AT, PERRL, EOMI, anicteric sclera, no nasal discharge; MMM  Respiratory: unlabored breathing, CTA B/L; no retractions or use of accessory muscles   Cardiac: +S1/S2; RRR; no M/R/G  Gastrointestinal: soft, NT/ND; no rebound or guarding; +BS  Extremities: R foot with hyperkeratotic lesion to medial aspect of plantar foot with fluctuance appreciated, no active drainage; s/p L foot TMA with superficial ulcer to lateral aspect of plantar foot; b/l feet wrapped in Kerlix  Vascular: 2+ radial pulses B/L  Neurologic: AAOx3; CNII-XII grossly intact; no focal deficits  Psychiatric: affect and characteristics of appearance, verbalizations, behaviors are appropriate

## 2023-06-12 NOTE — DISCHARGE NOTE PROVIDER - NSDCFUADDAPPT_GEN_ALL_CORE_FT
Please schedule an appointment with your outpatient follow up  Please schedule an appointment with your outpatient podiatrist for follow up  Please schedule an appointment with your outpatient podiatrist for follow up by using the number provided

## 2023-06-12 NOTE — OCCUPATIONAL THERAPY INITIAL EVALUATION ADULT - ADDITIONAL COMMENTS
Pt. reports living with his wife in an elevator accessible building. Pt. was I prior in ADLS and functional mob/transfers. Pt. owns a SC and RW and CAM boot for E 2/2 TMA. Pt. BR with tub/shower, no seat, standard toilet

## 2023-06-12 NOTE — PROGRESS NOTE ADULT - ATTENDING COMMENTS
The patient's right foot has shown improvement since admission and the start of antibiotic treatment. However, there is an uptrend in WBC today. It would be advisable for the patient to stay until at least tomorrow to see if the WBC count resolves or continues to increase. Currently, there is a low suspicion for acute osteomyelitis of the right foot, as there are no ulcerations present or near the areas where other findings were observed on the x-ray. The only ulceration present is the new ulceration (submet 1) that does not probe to bone and has no sinus tract.    Aldair Lamar DPM
61 y/o M with PMHx of HTN, HLD, DM, PAD, s/p multiple L toe amputations (c/b medial deviation of 1st/2nd toes resulting in ulceration of medial metatarsal head), [facility] admission in 2/2023 for LLE angioplasty and stent of SFA/pop, s/p Left TMA w/ residual OM (VRE) and dc'd on 6wks of daptomycin (2/9/23-3/23/23), presented with R foot blister for 1 day, s/p excisional debridement by podiatry today (6/11), admitted to [facility] for R foot cellulitis.       Labs and imaging reviewed    Problem List  #Purulent Cellulitis R Foot  #Diabetic Foot Ulcer  #DM unctonrolled  #PAD s/p multiple L toe amputations    Plan:  -Continue Vancomycin, can consider adding additional coverage with Bactrim if White count still increasing in AM  -Rest asa isauro

## 2023-06-12 NOTE — PROGRESS NOTE ADULT - ASSESSMENT
61 y/o M with PMHx of HTN, HLD, DM, PAD, s/p multiple L toe amputations (c/b medial deviation of 1st/2nd toes resulting in ulceration of medial metatarsal head), [facility] admission in 2/2023 for LLE angioplasty and stent of SFA/pop, s/p Left TMA w/ residual OM (VRE) and dc'd on 6wks of daptomycin (2/9/23-3/23/23), presented with R foot blister for 1 day, s/p excisional debridement by podiatry today (6/11), admitted to [facility] for R foot cellulitis.

## 2023-06-12 NOTE — PROGRESS NOTE ADULT - SUBJECTIVE AND OBJECTIVE BOX
SUBJECTIVE / INTERVAL HPI: Patient seen and examined at bedside.       PHYSICAL EXAM:    General: WDWN  HEENT: NC/AT; PERRL, anicteric sclera; MMM  Neck: supple  Cardiovascular: +S1/S2, RRR  Respiratory: CTA B/L; no W/R/R  Gastrointestinal: soft, NT/ND; +BSx4  Extremities: WWP; no edema, clubbing or cyanosis  Vascular: 2+ radial, DP/PT pulses B/L  Neurological: AAOx3; no focal deficits  Psychiatric: pleasant mood and affect  Dermatologic: no appreciable wounds or damage to the skin    VITAL SIGNS:  Vital Signs Last 24 Hrs  T(C): 36.9 (12 Jun 2023 05:24), Max: 36.9 (12 Jun 2023 05:24)  T(F): 98.4 (12 Jun 2023 05:24), Max: 98.4 (12 Jun 2023 05:24)  HR: 82 (12 Jun 2023 05:24) (73 - 82)  BP: 161/81 (12 Jun 2023 05:24) (131/68 - 166/77)  BP(mean): 107 (11 Jun 2023 14:13) (107 - 107)  RR: 18 (12 Jun 2023 05:24) (16 - 18)  SpO2: 97% (12 Jun 2023 05:24) (97% - 99%)    Parameters below as of 12 Jun 2023 05:24  Patient On (Oxygen Delivery Method): room air          MEDICATIONS:  MEDICATIONS  (STANDING):  amLODIPine   Tablet 10 milliGRAM(s) Oral every 24 hours  aspirin enteric coated 81 milliGRAM(s) Oral every 24 hours  atorvastatin 40 milliGRAM(s) Oral at bedtime  clopidogrel Tablet 75 milliGRAM(s) Oral daily  dextrose 5%. 1000 milliLiter(s) (50 mL/Hr) IV Continuous <Continuous>  dextrose 5%. 1000 milliLiter(s) (100 mL/Hr) IV Continuous <Continuous>  dextrose 50% Injectable 25 Gram(s) IV Push once  dextrose 50% Injectable 12.5 Gram(s) IV Push once  dextrose 50% Injectable 25 Gram(s) IV Push once  enoxaparin Injectable 40 milliGRAM(s) SubCutaneous every 24 hours  glucagon  Injectable 1 milliGRAM(s) IntraMuscular once  insulin glargine Injectable (LANTUS) 40 Unit(s) SubCutaneous at bedtime  insulin lispro (ADMELOG) corrective regimen sliding scale   SubCutaneous Before meals and at bedtime  insulin lispro Injectable (ADMELOG) 8 Unit(s) SubCutaneous three times a day before meals  lisinopril 40 milliGRAM(s) Oral every 24 hours  vancomycin  IVPB 1000 milliGRAM(s) IV Intermittent every 12 hours    MEDICATIONS  (PRN):  dextrose Oral Gel 15 Gram(s) Oral once PRN Blood Glucose LESS THAN 70 milliGRAM(s)/deciliter  melatonin 3 milliGRAM(s) Oral at bedtime PRN Insomnia      ALLERGIES:  Allergies    cefepime (Rash; Hives)  ciprofloxacin (Hives; Rash)    Intolerances        LABS:                        9.5    10.91 )-----------( 401      ( 12 Jun 2023 05:30 )             30.0     06-12    139  |  107  |  23  ----------------------------<  245<H>  4.8   |  23  |  1.19    Ca    8.7      12 Jun 2023 05:30    TPro  6.0  /  Alb  3.1<L>  /  TBili  0.4  /  DBili  x   /  AST  10  /  ALT  10  /  AlkPhos  78  06-12    PT/INR - ( 11 Jun 2023 10:53 )   PT: 12.1 sec;   INR: 1.02          PTT - ( 11 Jun 2023 10:53 )  PTT:30.4 sec    CAPILLARY BLOOD GLUCOSE      POCT Blood Glucose.: 229 mg/dL (12 Jun 2023 09:28)      RADIOLOGY & ADDITIONAL TESTS: Reviewed. SUBJECTIVE / INTERVAL HPI: Patient seen and examined at bedside. Denies pain to the R foot and acknowedleges that symptoms have improved. He is able to walk well. Denies all other ROS.    PHYSICAL EXAM:    General: NAD, resting comfortably in bed  HEENT: PERRL, anicteric sclera; MMM  Neck: supple  Cardiovascular: +S1/S2, RRR  Respiratory: CTA B/L; no W/R/R  Gastrointestinal: soft, NT/ND; +BSx4  Extremities: WWP; no edema, clubbing or cyanosis. B/l feet wrapped but nontender to palpation. S/p amputation of multiple left foot digits.   Vascular: 2+ radial pulses B/L  Neurological: AAOx3; no focal deficits  Psychiatric: pleasant mood and affect  Dermatologic: no appreciable wounds or damage to the skin    VITAL SIGNS:  Vital Signs Last 24 Hrs  T(C): 36.9 (12 Jun 2023 05:24), Max: 36.9 (12 Jun 2023 05:24)  T(F): 98.4 (12 Jun 2023 05:24), Max: 98.4 (12 Jun 2023 05:24)  HR: 82 (12 Jun 2023 05:24) (73 - 82)  BP: 161/81 (12 Jun 2023 05:24) (131/68 - 166/77)  BP(mean): 107 (11 Jun 2023 14:13) (107 - 107)  RR: 18 (12 Jun 2023 05:24) (16 - 18)  SpO2: 97% (12 Jun 2023 05:24) (97% - 99%)    Parameters below as of 12 Jun 2023 05:24  Patient On (Oxygen Delivery Method): room air          MEDICATIONS:  MEDICATIONS  (STANDING):  amLODIPine   Tablet 10 milliGRAM(s) Oral every 24 hours  aspirin enteric coated 81 milliGRAM(s) Oral every 24 hours  atorvastatin 40 milliGRAM(s) Oral at bedtime  clopidogrel Tablet 75 milliGRAM(s) Oral daily  dextrose 5%. 1000 milliLiter(s) (50 mL/Hr) IV Continuous <Continuous>  dextrose 5%. 1000 milliLiter(s) (100 mL/Hr) IV Continuous <Continuous>  dextrose 50% Injectable 25 Gram(s) IV Push once  dextrose 50% Injectable 12.5 Gram(s) IV Push once  dextrose 50% Injectable 25 Gram(s) IV Push once  enoxaparin Injectable 40 milliGRAM(s) SubCutaneous every 24 hours  glucagon  Injectable 1 milliGRAM(s) IntraMuscular once  insulin glargine Injectable (LANTUS) 40 Unit(s) SubCutaneous at bedtime  insulin lispro (ADMELOG) corrective regimen sliding scale   SubCutaneous Before meals and at bedtime  insulin lispro Injectable (ADMELOG) 8 Unit(s) SubCutaneous three times a day before meals  lisinopril 40 milliGRAM(s) Oral every 24 hours  vancomycin  IVPB 1000 milliGRAM(s) IV Intermittent every 12 hours    MEDICATIONS  (PRN):  dextrose Oral Gel 15 Gram(s) Oral once PRN Blood Glucose LESS THAN 70 milliGRAM(s)/deciliter  melatonin 3 milliGRAM(s) Oral at bedtime PRN Insomnia      ALLERGIES:  Allergies    cefepime (Rash; Hives)  ciprofloxacin (Hives; Rash)    Intolerances        LABS:                        9.5    10.91 )-----------( 401      ( 12 Jun 2023 05:30 )             30.0     06-12    139  |  107  |  23  ----------------------------<  245<H>  4.8   |  23  |  1.19    Ca    8.7      12 Jun 2023 05:30    TPro  6.0  /  Alb  3.1<L>  /  TBili  0.4  /  DBili  x   /  AST  10  /  ALT  10  /  AlkPhos  78  06-12    PT/INR - ( 11 Jun 2023 10:53 )   PT: 12.1 sec;   INR: 1.02          PTT - ( 11 Jun 2023 10:53 )  PTT:30.4 sec    CAPILLARY BLOOD GLUCOSE      POCT Blood Glucose.: 229 mg/dL (12 Jun 2023 09:28)      RADIOLOGY & ADDITIONAL TESTS: Reviewed.

## 2023-06-12 NOTE — PROGRESS NOTE ADULT - PROBLEM SELECTOR PLAN 5
Hx of PAD s/p LLE angioplasty and stent of SFA/pop. Home meds: aspirin 81mg qd. Pt does not think he is currently taking Plavix. however, per vascular team, pts usually take ASA/Plavix for 6 months and then dc the Plavix after.     - c/w home aspirin 81mg qd  - C/w Plavix 75mg qd for now

## 2023-06-12 NOTE — PROGRESS NOTE ADULT - SUBJECTIVE AND OBJECTIVE BOX
Patient is a 62y old  Male who presents with a chief complaint of R foot cellulitis (12 Jun 2023 12:13)      INTERVAL HPI/ OVERNIGHT EVENTS      LABS                        9.5    10.91 )-----------( 401      ( 12 Jun 2023 05:30 )             30.0     06-12    139  |  107  |  23  ----------------------------<  245<H>  4.8   |  23  |  1.19    Ca    8.7      12 Jun 2023 05:30    TPro  6.0  /  Alb  3.1<L>  /  TBili  0.4  /  DBili  x   /  AST  10  /  ALT  10  /  AlkPhos  78  06-12    PT/INR - ( 11 Jun 2023 10:53 )   PT: 12.1 sec;   INR: 1.02          PTT - ( 11 Jun 2023 10:53 )  PTT:30.4 sec  ESR: 58  CRP: --  06-12 @ 05:30    ICU Vital Signs Last 24 Hrs  T(C): 36.6 (12 Jun 2023 12:50), Max: 36.9 (12 Jun 2023 05:24)  T(F): 97.9 (12 Jun 2023 12:50), Max: 98.4 (12 Jun 2023 05:24)  HR: 76 (12 Jun 2023 12:50) (73 - 82)  BP: 144/84 (12 Jun 2023 12:50) (131/68 - 166/77)  BP(mean): 107 (11 Jun 2023 14:13) (107 - 107)  ABP: --  ABP(mean): --  RR: 16 (12 Jun 2023 12:50) (16 - 18)  SpO2: 98% (12 Jun 2023 12:50) (97% - 99%)    O2 Parameters below as of 12 Jun 2023 12:50  Patient On (Oxygen Delivery Method): room air            RADIOLOGY    MICROBIOLOGY    PHYSICAL EXAM  Lower Extremity Focused  Vasc:  Derm:  Neuro:  MSK: Patient is a 62y old  Male who presents with a chief complaint of R foot cellulitis (12 Jun 2023 12:13)      INTERVAL HPI/ OVERNIGHT EVENTS: pt evaluated at bedside with attending. no new pedal complaints at this time. Dressings are c/d/i.      LABS                        9.5    10.91 )-----------( 401      ( 12 Jun 2023 05:30 )             30.0     06-12    139  |  107  |  23  ----------------------------<  245<H>  4.8   |  23  |  1.19    Ca    8.7      12 Jun 2023 05:30    TPro  6.0  /  Alb  3.1<L>  /  TBili  0.4  /  DBili  x   /  AST  10  /  ALT  10  /  AlkPhos  78  06-12    PT/INR - ( 11 Jun 2023 10:53 )   PT: 12.1 sec;   INR: 1.02          PTT - ( 11 Jun 2023 10:53 )  PTT:30.4 sec  ESR: 58  CRP: --  06-12 @ 05:30    ICU Vital Signs Last 24 Hrs  T(C): 36.6 (12 Jun 2023 12:50), Max: 36.9 (12 Jun 2023 05:24)  T(F): 97.9 (12 Jun 2023 12:50), Max: 98.4 (12 Jun 2023 05:24)  HR: 76 (12 Jun 2023 12:50) (73 - 82)  BP: 144/84 (12 Jun 2023 12:50) (131/68 - 166/77)  BP(mean): 107 (11 Jun 2023 14:13) (107 - 107)  ABP: --  ABP(mean): --  RR: 16 (12 Jun 2023 12:50) (16 - 18)  SpO2: 98% (12 Jun 2023 12:50) (97% - 99%)    O2 Parameters below as of 12 Jun 2023 12:50  Patient On (Oxygen Delivery Method): room air        MICROBIOLOGY  Culture - Surgical Swab (06.11.23 @ 12:34)   Gram Stain:   No WBC's seen.   No organisms seen  Specimen Source: Drainage Drainage  Culture Results:   Rare Staphylococcus capitis Susceptibility to follow.   Culture in progress      PHYSICAL EXAM  Lower Extremity Focused  Vasc: DP and PT non palpable b/l . Doppler biphasic DP and PT b/l. Edema and erythema noted to the R 1st digit and MPJ.   Derm:  Right foot: submet 1 hyperkeratotic lesion with underlying hematoma noted. No active opening or drainage noted. Medial to the hyperkeratotic lesion, blister noted to encompass the entire medial aspect of the 1st MPJ. Fluctuance noted within the blister and skin underlying the submet 1 hyperkeratotic lesion.   Left foot: healed L TMA site. Superfiical ulceration plantar lateral L foot, with hyperkeratotic border, 100% granular wound base, negative PTB/malodor/crepitus/ fluctuance.   Neuro: Protective sensation diminished b/l   MSK: L foot TMA

## 2023-06-12 NOTE — CONSULT NOTE ADULT - SUBJECTIVE AND OBJECTIVE BOX
Patient is a 62y old  Male who presents with a chief complaint of R foot cellulitis (12 Jun 2023 07:10)      HPI:  61 y/o M with PMHx of HTN, HLD, DM, PAD, s/p multiple L toe amputations (c/b medial deviation of 1st/2nd toes resulting in ulceration of medial metatarsal head), [facility] admission in 2/2023 for LLE angioplasty and stent of SFA/pop, s/p Left TMA w/ residual OM (VRE) and dc'd on 6wks of daptomycin (2/9/23-3/23/23), presented with R foot blister for 1 day. Pt states he saw his podiatrist on Friday and did not have a blister to his right foot at that time. He noticed his blister on Saturday morning. No known trauma. No drainage or pain. 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 Denies any new complaints to the left foot. No fevers, chills, chest pain, dyspnea, lightheadedness, dizziness, abd pain, N/V/D.    ED Course:   Vitals on presentation: T 97.4F | HR 99 | /85 -> 154/72 | RR 18 | O2sat 99% on RA  Labs notable for: Hb 10.1, plts 469, ESR 48 (high), CRP <3 (nml),   Interventions: zosyn 3.375 x1, dapto 600mg x1, 1L NS  Consults: podiatry -> s/p debridement of R foot with ~5cc sango-purulent drainaged noted   (11 Jun 2023 14:12)    PAST MEDICAL & SURGICAL HISTORY:  HTN (hypertension)      HLD (hyperlipidemia)      DM (diabetes mellitus)      PAD (peripheral artery disease)        MEDICATIONS  (STANDING):  amLODIPine   Tablet 10 milliGRAM(s) Oral every 24 hours  aspirin enteric coated 81 milliGRAM(s) Oral every 24 hours  atorvastatin 40 milliGRAM(s) Oral at bedtime  clopidogrel Tablet 75 milliGRAM(s) Oral daily  dextrose 5%. 1000 milliLiter(s) (50 mL/Hr) IV Continuous <Continuous>  dextrose 5%. 1000 milliLiter(s) (100 mL/Hr) IV Continuous <Continuous>  dextrose 50% Injectable 25 Gram(s) IV Push once  dextrose 50% Injectable 25 Gram(s) IV Push once  dextrose 50% Injectable 12.5 Gram(s) IV Push once  enoxaparin Injectable 40 milliGRAM(s) SubCutaneous every 24 hours  glucagon  Injectable 1 milliGRAM(s) IntraMuscular once  insulin glargine Injectable (LANTUS) 40 Unit(s) SubCutaneous at bedtime  insulin lispro (ADMELOG) corrective regimen sliding scale   SubCutaneous Before meals and at bedtime  insulin lispro Injectable (ADMELOG) 8 Unit(s) SubCutaneous three times a day before meals  lisinopril 40 milliGRAM(s) Oral every 24 hours  vancomycin  IVPB 1000 milliGRAM(s) IV Intermittent every 12 hours    MEDICATIONS  (PRN):  dextrose Oral Gel 15 Gram(s) Oral once PRN Blood Glucose LESS THAN 70 milliGRAM(s)/deciliter  melatonin 3 milliGRAM(s) Oral at bedtime PRN Insomnia           FAMILY HISTORY:      CBC Full  -  ( 12 Jun 2023 05:30 )  WBC Count : 10.91 K/uL  RBC Count : 3.52 M/uL  Hemoglobin : 9.5 g/dL  Hematocrit : 30.0 %  Platelet Count - Automated : 401 K/uL  Mean Cell Volume : 85.2 fl  Mean Cell Hemoglobin : 27.0 pg  Mean Cell Hemoglobin Concentration : 31.7 gm/dL  Auto Neutrophil # : 7.82 K/uL  Auto Lymphocyte # : 2.06 K/uL  Auto Monocyte # : 0.71 K/uL  Auto Eosinophil # : 0.20 K/uL  Auto Basophil # : 0.07 K/uL  Auto Neutrophil % : 71.7 %  Auto Lymphocyte % : 18.9 %  Auto Monocyte % : 6.5 %  Auto Eosinophil % : 1.8 %  Auto Basophil % : 0.6 %      06-12    139  |  107  |  23  ----------------------------<  245<H>  4.8   |  23  |  1.19    Ca    8.7      12 Jun 2023 05:30    TPro  6.0  /  Alb  3.1<L>  /  TBili  0.4  /  DBili  x   /  AST  10  /  ALT  10  /  AlkPhos  78  06-12          Radiology :     < from: Xray Foot AP + Lateral + Oblique, Right (06.11.23 @ 11:37) >  ACC: [id number] EXAM:  XR FOOT COMP MIN 3 VIEWS RT   ORDERED BY: BRODY SERRANO     PROCEDURE DATE:  06/11/2023          INTERPRETATION:  3 views of the right foot.    Clinical indications: Right foot pain.    IMPRESSION: There is a chronic appearing erosion at the distal aspect of   the second metatarsal. Chronic appearing resorptive changes are seen of   the distal phalanx of the fourth digit. However, superimposed   osteomyelitis cannot be excluded. A similar but less extensive appearance   is noted at the third digit distal phalanx. Correlation with MRI is   recommended for better evaluation of osteomyelitis.    There is soft tissue swelling and vascular calcification present.       REVIEW OF SYSTEMS:   VASCULAR:  per hpi         Vital Signs Last 24 Hrs  T(C): 36.9 (12 Jun 2023 05:24), Max: 36.9 (12 Jun 2023 05:24)  T(F): 98.4 (12 Jun 2023 05:24), Max: 98.4 (12 Jun 2023 05:24)  HR: 82 (12 Jun 2023 05:24) (73 - 99)  BP: 161/81 (12 Jun 2023 05:24) (131/68 - 199/85)  BP(mean): 107 (11 Jun 2023 14:13) (107 - 107)  RR: 18 (12 Jun 2023 05:24) (16 - 18)  SpO2: 97% (12 Jun 2023 05:24) (97% - 99%)    Parameters below as of 12 Jun 2023 05:24  Patient On (Oxygen Delivery Method): room air            Physical Exam :   62 y o man  lying comfortably in semi Morrow's position , awake , alert , no acute complaints      Head : normocephalic , atraumatic    Eyes : PERRLA , EOMI , no nystagmus , sclera anicteric    ENT / FACE : neg nasal discharge , uvula midline , no oropharyngeal erythema / exudate    Neck : supple , negative JVD , negative carotid bruits , no thyromegaly    Chest : CTA bilaterally , neg wheeze / rhonchi / rales / crackles / egophany    Cardiovascular : regular rate and rhythm , neg murmurs / rubs / gallops    Abdomen : soft , non distended , non tender to palpation in all 4 quadrants ,  normal bowel sounds     Extremities :  Wolf foot/ankle Kerlix wraps     Neurologic Exam :    Alert and oriented  x  3    Motor Exam:          Right UE:               no focal weakness ,  > 4/5 throughout      Left UE:                 no focal weakness ,  > 4/5 throughout          Right LE:    no focal weakness ,  > 4/5 throughout        Left LE:    no focal weakness ,  > 4/5 throughout          Sensation :        dec distal LE's       DTR :     biceps/brachioradialis : equal                      patella/ankle : equal        Gait :  not tested          PM&R Impression :     admitted for R foot cellulitis          Recommendations / Plan :              1) Physical / Occupational therapy focusing on therapeutic exercises , equipment evaluation , bed mobility/transfer out of bed evaluation , progressive ambulation with assistive devices prn .    2) Current disposition plan recommendation  :    pending functional progress    
Attending: Aldair Srivastava     Patient is a 62y old  Male who presents with a chief complaint of Right foot blister with infection     HPI: 62M PMH  HTN, HLD, DM, PAD p/w concern for right foot infection. Pt is s/p multiple L toe amputations (c/b medial deviation of 1st/2nd toes resulting in ulceration of medial metatarsal head), admitted Feb 2023 (Dr. Avendano) for LLE angiogram angioplasty and stent of SFA/pop and Left TMA w/ wound cx growing VRE and dc'd on 6wks of daptomycin.   Pt reports that he has thick skin under the submet 1 of the R foot for the past few months, for which he follows up with Dr. Lamar outpt. States he last saw Dr. Lamar within the last week and did not have any blister to the R foot. Noticed the blister on Saturday morning and stated it was larger then how it is now. Denies seeing any drainage. Denies any trauma or any fb punctures. Pt states that he also has a ulceration to the bottom of his Left TMA site, for which he applies and ointment every other day and covers with DSD. Denies any new complaints to the left foot.  Has no pain to RLE.  States that went to [facility] in [place] on Saturday 6/10,  but left AMA to follow up at Health system as he has established foot care at [facility]. No other systemic symptoms.   Denies f/c, SOB/CP, NVD, URI symptoms, abd pain, urinary complaints.      Review of systems negative except per HPI and as stated below    PAST MEDICAL & SURGICAL HISTORY:    Home Medications:  amlodipine-benazepril 10 mg-40 mg oral capsule: 1 cap(s) orally once a day (14 Feb 2023 10:17)  Aspirin Enteric Coated 81 mg oral delayed release tablet: 1 tab(s) orally once a day (07 Feb 2023 13:18)  clopidogrel 75 mg oral tablet: 1 tab(s) orally once a day (14 Feb 2023 10:08)  docusate sodium 100 mg oral tablet: 1 tab(s) orally 3 times a day (07 Feb 2023 13:18)  rosuvastatin 10 mg oral tablet: 1 tab(s) orally once a day (07 Feb 2023 13:18)    Allergies    cefepime (Rash; Hives)  ciprofloxacin (Hives; Rash)    Intolerances      FAMILY HISTORY:    Social History:       LABS                        10.1   8.97  )-----------( 469      ( 11 Jun 2023 10:53 )             30.9     06-11    136  |  105  |  26<H>  ----------------------------<  315<H>  4.6   |  24  |  1.29    Ca    8.9      11 Jun 2023 10:53    TPro  6.4  /  Alb  3.3  /  TBili  0.3  /  DBili  x   /  AST  11  /  ALT  11  /  AlkPhos  90  06-11    PT/INR - ( 11 Jun 2023 10:53 )   PT: 12.1 sec;   INR: 1.02          PTT - ( 11 Jun 2023 10:53 )  PTT:30.4 sec  ESR: 48  CRP: --  06-11 @ 10:53    Vital Signs Last 24 Hrs  T(C): 36.3 (11 Jun 2023 10:33), Max: 36.3 (11 Jun 2023 10:33)  T(F): 97.4 (11 Jun 2023 10:33), Max: 97.4 (11 Jun 2023 10:33)  HR: 99 (11 Jun 2023 10:33) (99 - 99)  BP: 199/85 (11 Jun 2023 10:33) (199/85 - 199/85)  BP(mean): --  RR: 18 (11 Jun 2023 10:33) (18 - 18)  SpO2: 99% (11 Jun 2023 10:33) (99% - 99%)    Parameters below as of 11 Jun 2023 10:33  Patient On (Oxygen Delivery Method): room air        PHYSICAL EXAM  General: NAD, AA0x3    Lower Extremity Focused  Vasc: DP and PT non palpable b/l . Doppler biphasic DP and PT b/l. Edema and erythema noted to the R 1st digit and MPJ.   Derm:  Right foot: submet 1 hyperkeratotic lesion with underlying hematoma noted. No active opening or drainage noted. Medial to the hyperkeratotic lesion, blister noted to encompass the entire medial aspect of the 1st MPJ. Fluctuance noted within the blister and skin underlying the submet 1 hyperkeratotic lesion.   Left foot: healed L TMA site. Superfiical ulceration plantar lateral L foot, with hyperkeratotic border, 100% granular wound base, negative PTB/malodor/crepitus/ fluctuance.   Neuro: Protective sensation diminished b/l   MSK: L foot TMA     RADIOLOGY    XR right foot: RESIDENT WET READ: negative for FB, fx, or soft tissue air.

## 2023-06-12 NOTE — OCCUPATIONAL THERAPY INITIAL EVALUATION ADULT - PERTINENT HX OF CURRENT PROBLEM, REHAB EVAL
63 y/o M with PMHx of HTN, HLD, DM, PAD, s/p multiple L toe amputations (c/b medial deviation of 1st/2nd toes resulting in ulceration of medial metatarsal head), [facility] admission in 2/2023 for LLE angioplasty and stent of SFA/pop, s/p Left TMA w/ residual OM (VRE) and dc'd on 6wks of daptomycin (2/9/23-3/23/23), presented with R foot blister for 1 day. Pt states he saw his podiatrist on Friday and did not have a blister to his right foot at that time. He noticed his blister on Saturday morning. No known trauma. No drainage or pain.

## 2023-06-12 NOTE — CONSULT NOTE ADULT - ASSESSMENT
I M     62 y o M with PMHx of HTN, HLD, DM, PAD, s/p multiple L toe amputations (c/b medial deviation of 1st/2nd toes resulting in ulceration of medial metatarsal head), [facility] admission in 2/2023 for LLE angioplasty and stent of SFA/pop, s/p Left TMA w/ residual OM (VRE) and dc'd on 6wks of daptomycin (2/9/23-3/23/23), presented with R foot blister for 1 day, s/p excisional debridement by podiatry today (6/11), admitted to [facility] for R foot cellulitis.        Problem/Plan - 1:  ·  Problem: Cellulitis.   ·  Plan: Presented with R foot blister with fluctuance x1d. Afebrile, VSS, no leukocytosis, elevated ESR. R foot x-ray (wet read): neg for soft tissue air, foreign body, or OM. s/p excisional debridement by podiatry today. s/p zosyn 3.375 x1 and dapto 600 x1 in the ED.     Plan:   - s/p excisional debridement by podiatry today (6/11) with ~5cc of sango-purulent drainage noted; neg for PTB/malodor/drainage  - ID initially consulted for daptomycin approval due to previous L foot wound Cx (2/8/2023) growing VRE. faecalis sensitive to dapto and ampicillin; however per ID, would not presume the same microbio and would just tx as purulent cellulitis for now  - start vanc 1g BID (vanc trough 6/13 at 10pm)  - f/u wound Cx, BCx  - f/u R foot XR final read   - WBAT to b/l LE  - podiatry following, appreciate recs.     Problem/Plan - 2:  ·  Problem: DM (diabetes mellitus).   ·  Plan: Hx of diabetes.  Follows with PCP for DM management. States his last A1c was 11 a couple of months ago. Home meds: Lantus 50U qhs, Lispro TID before meals (takes 6-12U if glucose <200; 12-16U if glucose >200). Also takes metformin intermittently.    Plan:   - A1c 11.3 during this admission   - c/w Lantus at decreased dose 40U qhs and increase if indicated  - c/w Lispro 8U TID premeals  - Freq FSG  - moderate insulin sliding scale  - consider endo consult.     Problem/Plan - 3:  ·  Problem: HTN (hypertension).   ·  Plan: Hx of HTN. Home meds: Toprol 50mg qd and amlodipine/benazepirl 10-40    - c/w home Toprol 50 qd  - c/w home amlodipine 10 qd  - c/w therapeutic interchange of benazepril 40 -> lisinopril 40.     Problem/Plan - 4:  ·  Problem: HLD (hyperlipidemia).   ·  Plan: Hx of HLD. Home meds: rosuvastatin 10mg qd    - c/w home rosuvastatin 10mg qd.     Problem/Plan - 5:  ·  Problem: PAD (peripheral artery disease).   ·  Plan: Hx of PAD s/p LLE angioplasty and stent of SFA/pop. Home meds: aspirin 81mg qd. Pt does not think he is currently taking Plavix. however, per vascular team, pts usually take ASA/Plavix for 6 months and then dc the Plavix after.     - c/w home aspirin 81mg qd  - restart Plavix 75mg qd for now.     Problem/Plan - 6:  ·  Problem: Need for prophylactic measure.   ·  Plan: Plan:  F: s/p 1L NS in the ED   E: replete K<4, Mg<2  N: consistent carb  VTE Prophylaxis: Lovenox  GI: none  C: Full Code  D: RMF.       Attestation Statements:  I have personally seen and examined the patient.  I fully participated in the care of this patient.  I have made amendments to the documentation where necessary, and agree with the history, physical exam, and plan as documented by the Resident.     61 y/o M pmh HTN, HLD, poorly controlled DM2, PAD s/p multiple left toe amputations, recent admission Feb '23 for LLE angioplasty and stent, s/p left TMA with residual OM discharged on dapto for VRE presenting with right foot ulcer     #right foot ulcer / SSTI (skin and soft tissue infection) - s/p debridement by podiatry -- some purulence expressed  -IV vancomycin  -f/u wound culture  -f/u podiatry recs    #PAD with stent -- patient reports only taking aspirin but per chart review appears he should be on asp/plavix as rxed in Feb '23 -- cont w/ asp/plavix    #DM2 - poorly controlled A1C 11, worse since February -- continue lantus, premeal at about 80% of home dose while inpatient, MISS, adjust insulin daily  refer to endo on discharge for tighter control     #HTN - c/w home meds.    
62M PMH  HTN, HLD, DM, PAD p/w concern for right foot infection. Pt is s/p multiple L toe amputations (c/b medial deviation of 1st/2nd toes resulting in ulceration of medial metatarsal head), admitted Feb 2023 (Dr. Avendano) for LLE angiogram angioplasty and stent of SFA/pop and Left TMA w/ wound cx growing VRE and dc'd on 6wks of daptomycin. Podiatry consulted with regards to R foot 1st MPJ blister to r/o soft tissue air, FB, or potential osteomyelitis Pt afebrile w/ VSS. Labs significant for mildly elevated ESR, and normal CRP/WBC. XR with no concerns of soft tissue air, fb or any acute osteomyelitic process. On PE, edema and erythema noted to the R 1st MPJ and digit. Medial MPJ blister with underlying fluctuance concern for infectious fluid collection. Left foot TMA site healed. Superficial ulceration plantar left foot with no concern for infection.     Plan   - Xr reviewed, f/u final XR read  - Rec admit to medicine 2/2 to R foot cellulitis plan to monitor on IV abx and transition to PO abx for discharge once resolution noted.   - Bedside sharp excisional debridement of R submet 1 hyperkeratotic skin down to and including deep dermis. About 5 cc of sango-purulent drainage noted. Medial MPJ blister noted to be in communication with Submet 1 lesion. Overlying hanging skin was excised. Pt tolerated well. Upon debridement, wound site was negative for PTB/malodor/drainage.   - f/u R submet 1 wound drainage cx   - R foot cleansed with NS, wrapped in betadine soaked gazue and DSD  - L foot cleansed with NS, wrapped in wet to dry dressing   - WBAT to b/l LE   - Rest of care per primary team     Podiatry following, plan discussed with attending

## 2023-06-12 NOTE — DISCHARGE NOTE PROVIDER - NSDCFUSCHEDAPPT_GEN_ALL_CORE_FT
Maryellen Avendano  [facility]  VASCULAR [address]  Scheduled Appointment: 09/05/2023

## 2023-06-12 NOTE — PHYSICAL THERAPY INITIAL EVALUATION ADULT - NSPTDISCHREC_GEN_A_CORE
DISCHARGE INPATIENT PT secondary to patient independent and at prior level of function/No skilled PT needs

## 2023-06-12 NOTE — PROGRESS NOTE ADULT - PROBLEM SELECTOR PLAN 3
Hx of HTN. Home meds: Toprol 50mg qd and amlodipine/benazepirl 10-40    - c/w home Toprol 50 qd  - c/w home amlodipine 10 qd  - c/w therapeutic interchange of benazepril 40 -> lisinopril 40
show

## 2023-06-12 NOTE — PROGRESS NOTE ADULT - PROBLEM SELECTOR PLAN 2
Hx of diabetes.  Follows with PCP for DM management. States his last A1c was 11 a couple of months ago. Home meds: Lantus 50U qhs, Lispro TID before meals (takes 6-12U if glucose <200; 12-16U if glucose >200). Also takes metformin intermittently.  - A1c 11.3 during this admission     Plan:  - c/w Lantus at decreased dose 40U qhs and increase if indicated  - c/w Lispro 8U TID premeals  - Freq FSG  - moderate insulin sliding scale  - consider endo consult

## 2023-06-12 NOTE — DISCHARGE NOTE PROVIDER - ATTENDING DISCHARGE PHYSICAL EXAMINATION:
Physical exam at the time of discharge:  Constitutional: resting comfortably in bed; NAD  HEENT: NC/AT, PERRL, EOMI, anicteric sclera, no nasal discharge; MMM  Respiratory: unlabored breathing, CTA B/L; no retractions or use of accessory muscles   Cardiac: +S1/S2; RRR; no M/R/G  Gastrointestinal: soft, NT/ND; no rebound or guarding; +BS  Extremities: R foot with hyperkeratotic lesion to medial aspect of plantar foot with fluctuance appreciated, no active drainage; s/p L foot TMA with superficial ulcer to lateral aspect of plantar foot; b/l feet wrapped in Kerlix  Vascular: 2+ radial pulses B/L  Neurologic: AAOx3; CNII-XII grossly intact; no focal deficits  Psychiatric: affect and characteristics of appearance, verbalizations, behaviors are appropriate

## 2023-06-12 NOTE — OCCUPATIONAL THERAPY INITIAL EVALUATION ADULT - DIAGNOSIS, OT EVAL
Pt. admitted to [facility] for R foot cellulitis. Upon assessment, pt. remains at his functional baseline of independent in ADLs and functional mob/transfers without AD. No changes in strength or balance. No further OT needs at this time, follow up if change in status is noted.

## 2023-06-12 NOTE — OCCUPATIONAL THERAPY INITIAL EVALUATION ADULT - GENERAL OBSERVATIONS, REHAB EVAL
ZEINAB Marshall clearing pt. for session. Pt. received semi-supine in bed, +heplock in NAD, no c/o pain agreeable to therapy session.

## 2023-06-12 NOTE — DISCHARGE NOTE PROVIDER - CARE PROVIDER_API CALL
Aldair Lamar  Podiatric Medicine  [address], [address]  [place] [address]  Phone: [phone]  Fax: [phone]  Follow Up Time:

## 2023-06-12 NOTE — DISCHARGE NOTE PROVIDER - NSDCMRMEDTOKEN_GEN_ALL_CORE_FT
amlodipine-benazepril 10 mg-40 mg oral capsule: 1 cap(s) orally once a day  Aspirin Enteric Coated 81 mg oral delayed release tablet: 1 tab(s) orally once a day  daily administration kit:   glucometer (per patient&#x27;s insurance): Test blood sugars four times a day. Dispense #1 glucometer.  HumaLOG KwikPen 100 units/mL injectable solution: 8 unit(s) injectable 3 times a day (before meals)   Insulin Pen Needles, 4mm: 1 application subcutaneously 4 times a day. ** Use with insulin pen **   lancets: 1 application subcutaneously 4 times a day   Lantus Solostar Pen 100 units/mL subcutaneous solution: 50 unit(s) subcutaneous once a day (at bedtime)  rosuvastatin 10 mg oral tablet: 1 tab(s) orally once a day  test strips (per patient&#x27;s insurance): 1 application subcutaneously 4 times a day. ** Compatible with patient&#x27;s glucometer **   amlodipine-benazepril 10 mg-40 mg oral capsule: 1 cap(s) orally once a day  Aspirin Enteric Coated 81 mg oral delayed release tablet: 1 tab(s) orally once a day  Bactrim  mg-160 mg oral tablet: 1 tab(s) orally every 12 hours  clopidogrel 75 mg oral tablet: 1 tab(s) orally once a day  daily administration kit:   glucometer (per patient&#x27;s insurance): Test blood sugars four times a day. Dispense #1 glucometer.  HumaLOG KwikPen 100 units/mL injectable solution: 8 unit(s) injectable 3 times a day (before meals)   Insulin Pen Needles, 4mm: 1 application subcutaneously 4 times a day. ** Use with insulin pen **   lancets: 1 application subcutaneously 4 times a day   Lantus Solostar Pen 100 units/mL subcutaneous solution: 50 unit(s) subcutaneous once a day (at bedtime)  rosuvastatin 10 mg oral tablet: 1 tab(s) orally once a day  test strips (per patient&#x27;s insurance): 1 application subcutaneously 4 times a day. ** Compatible with patient&#x27;s glucometer **   amlodipine-benazepril 10 mg-40 mg oral capsule: 1 cap(s) orally once a day  Aspirin Enteric Coated 81 mg oral delayed release tablet: 1 tab(s) orally once a day  Bactrim  mg-160 mg oral tablet: 1 tab(s) orally every 12 hours  daily administration kit:   glucometer (per patient&#x27;s insurance): Test blood sugars four times a day. Dispense #1 glucometer.  HumaLOG KwikPen 100 units/mL injectable solution: 8 unit(s) injectable 3 times a day (before meals)   Insulin Pen Needles, 4mm: 1 application subcutaneously 4 times a day. ** Use with insulin pen **   lancets: 1 application subcutaneously 4 times a day   Lantus Solostar Pen 100 units/mL subcutaneous solution: 50 unit(s) subcutaneous once a day (at bedtime)  metoprolol succinate 50 mg oral capsule, extended release: 1 orally once a day  rosuvastatin 10 mg oral tablet: 1 tab(s) orally once a day  test strips (per patient&#x27;s insurance): 1 application subcutaneously 4 times a day. ** Compatible with patient&#x27;s glucometer **

## 2023-06-12 NOTE — PROGRESS NOTE ADULT - PROBLEM SELECTOR PLAN 1
Presented with R foot blister with fluctuance x1d. Afebrile, VSS, no leukocytosis, elevated ESR. R foot x-ray (wet read): neg for soft tissue air, foreign body, or OM. s/p excisional debridement by podiatry today. s/p zosyn 3.375 x1 and dapto 600 x1 in the ED.   - s/p excisional debridement by podiatry today (6/11) with ~5cc of sango-purulent drainage noted; neg for PTB/malodor/drainage  - ID initially consulted for daptomycin approval due to previous L foot wound Cx (2/8/2023) growing VRE. faecalis sensitive to dapto and ampicillin; however per ID, would not presume the same microbio and would just tx as purulent cellulitis for now  - Xray of R foot: There is a chronic appearing erosion at the distal aspect of the second metatarsal. Chronic appearing resorptive changes are seen of the distal phalanx of the fourth digit. However, superimposed osteomyelitis cannot be excluded. A similar but less extensive appearance is noted at the third digit distal phalanx. Correlation with MRI is recommended for better evaluation of osteomyelitis. There is soft tissue swelling and vascular calcification present.  - Wound and blood cx NGTD    Plan:  - c/w vanc 1g BID (vanc trough 6/13 at 10pm)- consider transition to PO Bactrim   - f/u wound Cx, BCx  - WBAT to b/l LE  - podiatry following, appreciate recs

## 2023-06-13 ENCOUNTER — TRANSCRIPTION ENCOUNTER (OUTPATIENT)
Age: 63
End: 2023-06-13

## 2023-06-13 VITALS
OXYGEN SATURATION: 98 % | SYSTOLIC BLOOD PRESSURE: 168 MMHG | TEMPERATURE: 98 F | HEART RATE: 84 BPM | RESPIRATION RATE: 18 BRPM | DIASTOLIC BLOOD PRESSURE: 80 MMHG

## 2023-06-13 LAB
-  CLINDAMYCIN: SIGNIFICANT CHANGE UP
-  ERYTHROMYCIN: SIGNIFICANT CHANGE UP
-  LINEZOLID: SIGNIFICANT CHANGE UP
-  OXACILLIN: SIGNIFICANT CHANGE UP
-  RIFAMPIN: SIGNIFICANT CHANGE UP
-  TRIMETHOPRIM/SULFAMETHOXAZOLE: SIGNIFICANT CHANGE UP
-  VANCOMYCIN: SIGNIFICANT CHANGE UP
ALBUMIN SERPL ELPH-MCNC: 2.8 G/DL — LOW (ref 3.3–5)
ALP SERPL-CCNC: 82 U/L — SIGNIFICANT CHANGE UP (ref 40–120)
ALT FLD-CCNC: 9 U/L — LOW (ref 10–45)
ANION GAP SERPL CALC-SCNC: 7 MMOL/L — SIGNIFICANT CHANGE UP (ref 5–17)
AST SERPL-CCNC: 9 U/L — LOW (ref 10–40)
BILIRUB SERPL-MCNC: 0.5 MG/DL — SIGNIFICANT CHANGE UP (ref 0.2–1.2)
BUN SERPL-MCNC: 22 MG/DL — SIGNIFICANT CHANGE UP (ref 7–23)
CALCIUM SERPL-MCNC: 8.7 MG/DL — SIGNIFICANT CHANGE UP (ref 8.4–10.5)
CHLORIDE SERPL-SCNC: 107 MMOL/L — SIGNIFICANT CHANGE UP (ref 96–108)
CO2 SERPL-SCNC: 22 MMOL/L — SIGNIFICANT CHANGE UP (ref 22–31)
CREAT SERPL-MCNC: 1.39 MG/DL — HIGH (ref 0.5–1.3)
CULTURE RESULTS: SIGNIFICANT CHANGE UP
EGFR: 57 ML/MIN/1.73M2 — LOW
GLUCOSE BLDC GLUCOMTR-MCNC: 165 MG/DL — HIGH (ref 70–99)
GLUCOSE BLDC GLUCOMTR-MCNC: 229 MG/DL — HIGH (ref 70–99)
GLUCOSE BLDC GLUCOMTR-MCNC: 232 MG/DL — HIGH (ref 70–99)
GLUCOSE SERPL-MCNC: 204 MG/DL — HIGH (ref 70–99)
HCT VFR BLD CALC: 31 % — LOW (ref 39–50)
HGB BLD-MCNC: 9.7 G/DL — LOW (ref 13–17)
MAGNESIUM SERPL-MCNC: 1.8 MG/DL — SIGNIFICANT CHANGE UP (ref 1.6–2.6)
MCHC RBC-ENTMCNC: 27 PG — SIGNIFICANT CHANGE UP (ref 27–34)
MCHC RBC-ENTMCNC: 31.3 GM/DL — LOW (ref 32–36)
MCV RBC AUTO: 86.4 FL — SIGNIFICANT CHANGE UP (ref 80–100)
METHOD TYPE: SIGNIFICANT CHANGE UP
NRBC # BLD: 0 /100 WBCS — SIGNIFICANT CHANGE UP (ref 0–0)
ORGANISM # SPEC MICROSCOPIC CNT: SIGNIFICANT CHANGE UP
ORGANISM # SPEC MICROSCOPIC CNT: SIGNIFICANT CHANGE UP
PHOSPHATE SERPL-MCNC: 3.6 MG/DL — SIGNIFICANT CHANGE UP (ref 2.5–4.5)
PLATELET # BLD AUTO: 396 K/UL — SIGNIFICANT CHANGE UP (ref 150–400)
POTASSIUM SERPL-MCNC: 4.5 MMOL/L — SIGNIFICANT CHANGE UP (ref 3.5–5.3)
POTASSIUM SERPL-SCNC: 4.5 MMOL/L — SIGNIFICANT CHANGE UP (ref 3.5–5.3)
PROT SERPL-MCNC: 6.1 G/DL — SIGNIFICANT CHANGE UP (ref 6–8.3)
RBC # BLD: 3.59 M/UL — LOW (ref 4.2–5.8)
RBC # FLD: 17.6 % — HIGH (ref 10.3–14.5)
SODIUM SERPL-SCNC: 136 MMOL/L — SIGNIFICANT CHANGE UP (ref 135–145)
SPECIMEN SOURCE: SIGNIFICANT CHANGE UP
WBC # BLD: 11.89 K/UL — HIGH (ref 3.8–10.5)
WBC # FLD AUTO: 11.89 K/UL — HIGH (ref 3.8–10.5)

## 2023-06-13 PROCEDURE — 83036 HEMOGLOBIN GLYCOSYLATED A1C: CPT

## 2023-06-13 PROCEDURE — 80053 COMPREHEN METABOLIC PANEL: CPT

## 2023-06-13 PROCEDURE — 82550 ASSAY OF CK (CPK): CPT

## 2023-06-13 PROCEDURE — 97165 OT EVAL LOW COMPLEX 30 MIN: CPT

## 2023-06-13 PROCEDURE — 83735 ASSAY OF MAGNESIUM: CPT

## 2023-06-13 PROCEDURE — 82553 CREATINE MB FRACTION: CPT

## 2023-06-13 PROCEDURE — 73630 X-RAY EXAM OF FOOT: CPT

## 2023-06-13 PROCEDURE — 83605 ASSAY OF LACTIC ACID: CPT

## 2023-06-13 PROCEDURE — 87070 CULTURE OTHR SPECIMN AEROBIC: CPT

## 2023-06-13 PROCEDURE — 82962 GLUCOSE BLOOD TEST: CPT

## 2023-06-13 PROCEDURE — 82330 ASSAY OF CALCIUM: CPT

## 2023-06-13 PROCEDURE — 86140 C-REACTIVE PROTEIN: CPT

## 2023-06-13 PROCEDURE — 87040 BLOOD CULTURE FOR BACTERIA: CPT

## 2023-06-13 PROCEDURE — 85027 COMPLETE CBC AUTOMATED: CPT

## 2023-06-13 PROCEDURE — 84295 ASSAY OF SERUM SODIUM: CPT

## 2023-06-13 PROCEDURE — 85652 RBC SED RATE AUTOMATED: CPT

## 2023-06-13 PROCEDURE — 84132 ASSAY OF SERUM POTASSIUM: CPT

## 2023-06-13 PROCEDURE — 99239 HOSP IP/OBS DSCHRG MGMT >30: CPT | Mod: GC

## 2023-06-13 PROCEDURE — 96374 THER/PROPH/DIAG INJ IV PUSH: CPT

## 2023-06-13 PROCEDURE — 36415 COLL VENOUS BLD VENIPUNCTURE: CPT

## 2023-06-13 PROCEDURE — 85730 THROMBOPLASTIN TIME PARTIAL: CPT

## 2023-06-13 PROCEDURE — 73630 X-RAY EXAM OF FOOT: CPT | Mod: 26,LT

## 2023-06-13 PROCEDURE — 99285 EMERGENCY DEPT VISIT HI MDM: CPT | Mod: 25

## 2023-06-13 PROCEDURE — 85025 COMPLETE CBC W/AUTO DIFF WBC: CPT

## 2023-06-13 PROCEDURE — 96375 TX/PRO/DX INJ NEW DRUG ADDON: CPT

## 2023-06-13 PROCEDURE — 82803 BLOOD GASES ANY COMBINATION: CPT

## 2023-06-13 PROCEDURE — 85610 PROTHROMBIN TIME: CPT

## 2023-06-13 PROCEDURE — 87186 SC STD MICRODIL/AGAR DIL: CPT

## 2023-06-13 PROCEDURE — 84100 ASSAY OF PHOSPHORUS: CPT

## 2023-06-13 PROCEDURE — 97161 PT EVAL LOW COMPLEX 20 MIN: CPT

## 2023-06-13 RX ORDER — CLOPIDOGREL BISULFATE 75 MG/1
1 TABLET, FILM COATED ORAL
Qty: 0 | Refills: 0 | DISCHARGE
Start: 2023-06-13

## 2023-06-13 RX ADMIN — Medication 8 UNIT(S): at 09:15

## 2023-06-13 RX ADMIN — Medication 4: at 13:18

## 2023-06-13 RX ADMIN — Medication 4: at 09:14

## 2023-06-13 RX ADMIN — Medication 250 MILLIGRAM(S): at 12:02

## 2023-06-13 RX ADMIN — CLOPIDOGREL BISULFATE 75 MILLIGRAM(S): 75 TABLET, FILM COATED ORAL at 11:53

## 2023-06-13 RX ADMIN — Medication 8 UNIT(S): at 13:19

## 2023-06-13 RX ADMIN — LISINOPRIL 40 MILLIGRAM(S): 2.5 TABLET ORAL at 06:13

## 2023-06-13 NOTE — DISCHARGE NOTE NURSING/CASE MANAGEMENT/SOCIAL WORK - NSFLUVACAGEDISCH_IMM_ALL_CORE
Ventricular Rate : 98  Atrial Rate : 98  P-R Interval : 134  QRS Duration : 106  Q-T Interval : 382  QTC Calculation(Bezet) : 487  P Axis : 47  R Axis : -73  T Axis : 37  Diagnosis : Sinus rhythm with occasional Premature ventricular complexes  Left axis deviation  Incomplete right bundle branch block  Inferior infarct , age undetermined  Abnormal ECG  No previous ECGs available  Confirmed by CAROL MORENO ([id number]) on 6/10/2019 1:57:47 PM  
Adult

## 2023-06-13 NOTE — DISCHARGE NOTE NURSING/CASE MANAGEMENT/SOCIAL WORK - PATIENT PORTAL LINK FT
You can access the FollowMyHealth Patient Portal offered by [facility] by registering at the following website: [email or web address]. By joining [employer]’s FollowMyHealth portal, you will also be able to view your health information using other applications (apps) compatible with our system.

## 2023-06-13 NOTE — PROGRESS NOTE ADULT - SUBJECTIVE AND OBJECTIVE BOX
Patient is a 62y old  Male who presents with a chief complaint of R foot cellulitis (13 Jun 2023 07:35)      INTERVAL HPI/ OVERNIGHT EVENTS: Pt evaluated at bedside this AM. No new pedal complaints at this time. Dressings are C/D/I. WBC is uptrending despite improvement to b/l feet superficial wounds.      LABS                        9.4    11.91 )-----------( 440      ( 12 Jun 2023 15:59 )             28.5     06-12    139  |  107  |  23  ----------------------------<  245<H>  4.8   |  23  |  1.19    Ca    8.7      12 Jun 2023 05:30    TPro  6.0  /  Alb  3.1<L>  /  TBili  0.4  /  DBili  x   /  AST  10  /  ALT  10  /  AlkPhos  78  06-12    PT/INR - ( 11 Jun 2023 10:53 )   PT: 12.1 sec;   INR: 1.02          PTT - ( 11 Jun 2023 10:53 )  PTT:30.4 sec    ICU Vital Signs Last 24 Hrs  T(C): 36.9 (13 Jun 2023 05:31), Max: 36.9 (13 Jun 2023 05:31)  T(F): 98.5 (13 Jun 2023 05:31), Max: 98.5 (13 Jun 2023 05:31)  HR: 84 (13 Jun 2023 05:31) (76 - 88)  BP: 168/80 (13 Jun 2023 05:31) (138/77 - 168/80)  BP(mean): --  ABP: --  ABP(mean): --  RR: 18 (13 Jun 2023 05:31) (16 - 18)  SpO2: 98% (13 Jun 2023 05:31) (98% - 98%)    O2 Parameters below as of 13 Jun 2023 05:31  Patient On (Oxygen Delivery Method): room air            RADIOLOGY    MICROBIOLOGY    PHYSICAL EXAM  Lower Extremity Focused  Vasc: DP and PT non palpable b/l . Doppler biphasic DP and PT b/l. Edema and erythema noted to the R 1st digit and MPJ.   Derm:  Right foot: submet 1 hyperkeratotic lesion with underlying hematoma noted. No active opening or drainage noted. Medial to the hyperkeratotic lesion, blister noted to encompass the entire medial aspect of the 1st MPJ. Fluctuance noted within the blister and skin underlying the submet 1 hyperkeratotic lesion.   Left foot: healed L TMA site. Superfiical ulceration plantar lateral L foot, with hyperkeratotic border, 100% granular wound base, negative PTB/malodor/crepitus/ fluctuance.   Neuro: Protective sensation diminished b/l   MSK: L foot TMA

## 2023-06-13 NOTE — CHART NOTE - NSCHARTNOTEFT_GEN_A_CORE
Pt seen at bedside for nutrition consult. Provided diet education on consistent carbohydrate diet, provided handout. RD to remain available.

## 2023-06-13 NOTE — PROGRESS NOTE ADULT - ASSESSMENT
62M PMH  HTN, HLD, DM, PAD p/w concern for right foot infection. Pt is s/p multiple L toe amputations (c/b medial deviation of 1st/2nd toes resulting in ulceration of medial metatarsal head), admitted Feb 2023 (Dr. Avendano) for LLE angiogram angioplasty and stent of SFA/pop and Left TMA w/ wound cx growing VRE and dc'd on 6wks of daptomycin. Podiatry consulted with regards to R foot 1st MPJ blister to r/o soft tissue air, FB, or potential osteomyelitis Pt afebrile w/ VSS. Labs significant for mildly elevated ESR, and normal CRP/WBC. XR with no concerns of soft tissue air, fb or any acute osteomyelitic process. On PE, edema and erythema noted to the R 1st MPJ and digit. Medial MPJ blister with underlying fluctuance concern for infectious fluid collection. Left foot TMA site healed. Superficial ulceration plantar left foot with no concern for infection.     Plan   - ABX per primary  - local care: dressed R foot with betadine soaked gauze, kerlix, tape  L foot dressed with saline WTD, gauze, kerlix, tape  - f/u R submet 1 wound drainage cx; staph capitis growth noted  - WBAT to b/l LE   - Rest of care per primary team     Podiatry following, plan discussed with attending    62M PMH  HTN, HLD, DM, PAD p/w concern for right foot infection. Pt is s/p multiple L toe amputations (c/b medial deviation of 1st/2nd toes resulting in ulceration of medial metatarsal head), admitted Feb 2023 (Dr. Avendano) for LLE angiogram angioplasty and stent of SFA/pop and Left TMA w/ wound cx growing VRE and dc'd on 6wks of daptomycin. Podiatry consulted with regards to R foot 1st MPJ blister to r/o soft tissue air, FB, or potential osteomyelitis Pt afebrile w/ VSS. Labs significant for mildly elevated ESR, and normal CRP/WBC. XR with no concerns of soft tissue air, fb or any acute osteomyelitic process. On PE, edema and erythema noted to the R 1st MPJ and digit. Medial MPJ blister with underlying fluctuance concern for infectious fluid collection. Left foot TMA site healed. Superficial ulceration plantar left foot with no concern for infection.     Plan   - ABX per primary  - please obtain Left foot Xray  - local care: dressed R foot with betadine soaked gauze, kerlix, tape  L foot dressed with saline WTD, gauze, kerlix, tape  - f/u R submet 1 wound drainage cx; staph capitis growth noted  - WBAT to b/l LE   - Rest of care per primary team     Podiatry following, plan discussed with attending    62M PMH  HTN, HLD, DM, PAD p/w concern for right foot infection. Pt is s/p multiple L toe amputations (c/b medial deviation of 1st/2nd toes resulting in ulceration of medial metatarsal head), admitted Feb 2023 (Dr. Avendano) for LLE angiogram angioplasty and stent of SFA/pop and Left TMA w/ wound cx growing VRE and dc'd on 6wks of daptomycin. Podiatry consulted with regards to R foot 1st MPJ blister to r/o soft tissue air, FB, or potential osteomyelitis Pt afebrile w/ VSS. Labs significant for mildly elevated ESR, and normal CRP/WBC. XR with no concerns of soft tissue air, fb or any acute osteomyelitic process. On PE, edema and erythema noted to the R 1st MPJ and digit. Medial MPJ blister with underlying fluctuance concern for infectious fluid collection. Left foot TMA site healed. Superficial ulceration plantar left foot with no concern for infection.     Plan   - ABX per primary  - please obtain Left foot Xray  - local care: dressed R foot with betadine soaked gauze, kerlix, tape  L foot dressed with saline WTD, gauze, kerlix, tape  - f/u R submet 1 wound drainage cx; staph capitis growth noted  - WBAT to b/l LE   - Rest of care per primary team     Podiatry following, plan discussed with attending       Discharge recommendations   - C/w oral antibiotic course outpt   - Discharge dressing instructions: Cleanse wounds with normal sailine daily, pat dry with sterile guaze, apply saline soaked gauze to wound base, cover with dry sterile gauze, ABD pad, wrap with Kerlix and light ACE bandage.   - Patient should follow up with Dr. Aldair Lamar within 1 week of discharge.    Office information:          [place] Address- [address], [place] [address] Phone: [phone]

## 2023-06-13 NOTE — DISCHARGE NOTE NURSING/CASE MANAGEMENT/SOCIAL WORK - NSDCFUADDAPPT_GEN_ALL_CORE_FT
Please schedule an appointment with your outpatient podiatrist for follow up by using the number provided

## 2023-06-13 NOTE — DISCHARGE NOTE NURSING/CASE MANAGEMENT/SOCIAL WORK - NSDCPEFALRISK_GEN_ALL_CORE
For information on Fall & Injury Prevention, visit: [email or web address] OR  [email or web address] OR  https://www.cdc.gov/steadi/patient.html

## 2023-06-16 LAB
CULTURE RESULTS: SIGNIFICANT CHANGE UP
CULTURE RESULTS: SIGNIFICANT CHANGE UP
SPECIMEN SOURCE: SIGNIFICANT CHANGE UP
SPECIMEN SOURCE: SIGNIFICANT CHANGE UP

## 2023-06-19 DIAGNOSIS — E78.5 HYPERLIPIDEMIA, UNSPECIFIED: ICD-10-CM

## 2023-06-19 DIAGNOSIS — Z89.422 ACQUIRED ABSENCE OF OTHER LEFT TOE(S): ICD-10-CM

## 2023-06-19 DIAGNOSIS — I10 ESSENTIAL (PRIMARY) HYPERTENSION: ICD-10-CM

## 2023-06-19 DIAGNOSIS — L03.115 CELLULITIS OF RIGHT LOWER LIMB: ICD-10-CM

## 2023-06-19 DIAGNOSIS — L97.519 NON-PRESSURE CHRONIC ULCER OF OTHER PART OF RIGHT FOOT WITH UNSPECIFIED SEVERITY: ICD-10-CM

## 2023-06-19 DIAGNOSIS — E11.621 TYPE 2 DIABETES MELLITUS WITH FOOT ULCER: ICD-10-CM

## 2023-06-19 DIAGNOSIS — Z95.828 PRESENCE OF OTHER VASCULAR IMPLANTS AND GRAFTS: ICD-10-CM

## 2023-06-19 DIAGNOSIS — E11.51 TYPE 2 DIABETES MELLITUS WITH DIABETIC PERIPHERAL ANGIOPATHY WITHOUT GANGRENE: ICD-10-CM

## 2023-06-19 DIAGNOSIS — Z79.4 LONG TERM (CURRENT) USE OF INSULIN: ICD-10-CM

## 2023-06-19 DIAGNOSIS — Z41.8 ENCOUNTER FOR OTHER PROCEDURES FOR PURPOSES OTHER THAN REMEDYING HEALTH STATE: ICD-10-CM

## 2023-06-19 DIAGNOSIS — Z88.1 ALLERGY STATUS TO OTHER ANTIBIOTIC AGENTS STATUS: ICD-10-CM

## 2023-07-19 NOTE — ED ADULT TRIAGE NOTE - BP NONINVASIVE SYSTOLIC (MM HG)
199 Imiquimod Counseling:  I discussed with the patient the risks of imiquimod including but not limited to erythema, scaling, itching, weeping, crusting, and pain.  Patient understands that the inflammatory response to imiquimod is variable from person to person and was educated regarded proper titration schedule.  If flu-like symptoms develop, patient knows to discontinue the medication and contact us.

## 2023-08-03 ENCOUNTER — APPOINTMENT (OUTPATIENT)
Dept: VASCULAR SURGERY | Facility: CLINIC | Age: 63
End: 2023-08-03
Payer: MEDICARE

## 2023-08-03 VITALS
SYSTOLIC BLOOD PRESSURE: 177 MMHG | HEART RATE: 93 BPM | BODY MASS INDEX: 24.96 KG/M2 | WEIGHT: 159 LBS | DIASTOLIC BLOOD PRESSURE: 97 MMHG | HEIGHT: 67 IN

## 2023-08-03 PROBLEM — E11.9 TYPE 2 DIABETES MELLITUS WITHOUT COMPLICATIONS: Chronic | Status: ACTIVE | Noted: 2023-06-11

## 2023-08-03 PROBLEM — E78.5 HYPERLIPIDEMIA, UNSPECIFIED: Chronic | Status: ACTIVE | Noted: 2023-06-11

## 2023-08-03 PROBLEM — I73.9 PERIPHERAL VASCULAR DISEASE, UNSPECIFIED: Chronic | Status: ACTIVE | Noted: 2023-06-11

## 2023-08-03 PROBLEM — I10 ESSENTIAL (PRIMARY) HYPERTENSION: Chronic | Status: ACTIVE | Noted: 2023-06-11

## 2023-08-03 PROCEDURE — 99214 OFFICE O/P EST MOD 30 MIN: CPT

## 2023-08-03 PROCEDURE — 93926 LOWER EXTREMITY STUDY: CPT

## 2023-08-03 NOTE — HISTORY OF PRESENT ILLNESS
[FreeTextEntry1] : 63yoM w/ [id number] and PVD, s/p PTA at an outside facility,  s/p LLE PTA/stent of SFA w/Carroccio, previously followed by L TMA by Dr. Lamar (podiatry) for serial debridements and off-loading, more recently seen by Dr. Pope. Pt returns today for evaluation of his L plantar wound that has not been significantly healing over the past 3mos despite daily mupirocin use; he was instructed by Niko to wear an ankle immobilizer but has not been offloading pressure from the wound.  He denies increased drainage, pain, fevers/chills, but states the wound is not making steady progress.

## 2023-08-03 NOTE — ASSESSMENT
[Arterial/Venous Disease] : arterial/venous disease [Foot care/Footwear] : foot care/footwear [Ulcer Care] : ulcer care [FreeTextEntry1] : 63yoM w/ [id number] and PVD, s/p PTA at an outside facility,  s/p LLE PTA/stent of SFA w/Juan Alberto, previously followed by ALEXANDRA VOGEL by Dr. Lamar (podiatry) for serial debridements and off-loading, more recently seen by Dr. Pope. Pt returns today for evaluation of his L plantar wound that has not been significantly healing over the past 3mos despite daily mupirocin use; he was instructed by Niko to wear an ankle immobilizer but has not been offloading pressure from the wound.  He denies increased drainage, pain, fevers/chills, but states the wound is not making steady progress.  L plantar foot ulcer granulating well w/good color, surrounding callus noted and debrided at bedside.  LLE arterial duplex performed to evaluate LE perfusion demonstrates widely patent L SFA stent, triphasic flow to the foot via L CESILIA and peroneal artery, DPA patent, PTA occluded.  Wound stable, granulating well, no evidence of infection or arterial insufficiency noted, will need better off-loading and glycemic control to heal.  Recommended pt return to see podiatry for an improved orthotic, and Rx for medihoney provided to his pharmacy.  Pt will RTO in 6mos for surveillance of his stent, recommend regular exercise to maintain LE perfusion.

## 2023-08-03 NOTE — PROCEDURE
[FreeTextEntry1] : LLE arterial duplex performed to evaluate LE perfusion demonstrates widely patent L SFA stent, triphasic flow to the foot via L CESILIA and peroneal artery, DPA patent, PTA occluded.

## 2023-08-03 NOTE — PHYSICAL EXAM
[Normal Thyroid] : the thyroid was normal [Normal Breath Sounds] : Normal breath sounds [Respiratory Effort] : normal respiratory effort [Normal Heart Sounds] : normal heart sounds [Normal Rate and Rhythm] : normal rate and rhythm [2+] : right 2+ [0] : left 0 [1+] : left 1+ [Ankle Swelling (On Exam)] : present [Ankle Swelling On The Left] : of the left ankle [Ankle Swelling On The Right] : mild [Alert] : alert [Calm] : calm [JVD] : no jugular venous distention  [Carotid Bruits] : no carotid bruits [Right Carotid Bruit] : no bruit heard over the right carotid [Left Carotid Bruit] : no bruit heard over the left carotid [Varicose Veins Of Lower Extremities] : not present [] : not present [Abdomen Masses] : No abdominal masses [Abdomen Tenderness] : ~T ~M No abdominal tenderness [Purpura] : no purpura  [Petechiae] : no petechiae [Skin Ulcer] : ulcer [Skin Induration] : no induration [de-identified] : Healthy appearance, NAD [de-identified] : NC/AT, anicteric [de-identified] : FROM throughout, strength 5/5x4, no palpable cords in LEs, no atrophy noted [de-identified] : L TMA site healed well, dry skin over entire foot, +2x1 cm superficial wound with good granulation tissue over lateral sole with surrounding callus, no erythema/drainage/infection

## 2023-08-24 ENCOUNTER — APPOINTMENT (OUTPATIENT)
Dept: UROLOGY | Facility: CLINIC | Age: 63
End: 2023-08-24

## 2023-09-27 ENCOUNTER — APPOINTMENT (OUTPATIENT)
Dept: INTERNAL MEDICINE | Facility: CLINIC | Age: 63
End: 2023-09-27
Payer: MEDICARE

## 2023-09-27 VITALS
DIASTOLIC BLOOD PRESSURE: 78 MMHG | WEIGHT: 160 LBS | TEMPERATURE: 97.2 F | HEART RATE: 88 BPM | OXYGEN SATURATION: 98 % | HEIGHT: 67 IN | BODY MASS INDEX: 25.11 KG/M2 | SYSTOLIC BLOOD PRESSURE: 176 MMHG

## 2023-09-27 DIAGNOSIS — Z16.21 STREPTOCOCCAL INFECTION, UNSPECIFIED SITE: ICD-10-CM

## 2023-09-27 DIAGNOSIS — Z79.2 LONG TERM (CURRENT) USE OF ANTIBIOTICS: ICD-10-CM

## 2023-09-27 DIAGNOSIS — A49.1 STREPTOCOCCAL INFECTION, UNSPECIFIED SITE: ICD-10-CM

## 2023-09-27 DIAGNOSIS — Z00.00 ENCOUNTER FOR GENERAL ADULT MEDICAL EXAMINATION W/OUT ABNORMAL FINDINGS: ICD-10-CM

## 2023-09-27 PROCEDURE — 36415 COLL VENOUS BLD VENIPUNCTURE: CPT

## 2023-09-27 PROCEDURE — G0438: CPT

## 2023-09-27 RX ORDER — TIRZEPATIDE 7.5 MG/.5ML
INJECTION, SOLUTION SUBCUTANEOUS
Refills: 0 | Status: DISCONTINUED | COMMUNITY
End: 2023-09-27

## 2023-09-27 RX ORDER — HUMAN INSULIN 100 [IU]/ML
100 INJECTION, SUSPENSION SUBCUTANEOUS
Refills: 0 | Status: ACTIVE | COMMUNITY

## 2023-09-27 RX ORDER — ATORVASTATIN CALCIUM 80 MG/1
TABLET, FILM COATED ORAL
Refills: 0 | Status: DISCONTINUED | COMMUNITY
End: 2023-09-27

## 2023-09-27 RX ORDER — INSULIN DEGLUDEC INJECTION 100 U/ML
100 INJECTION, SOLUTION SUBCUTANEOUS
Refills: 0 | Status: DISCONTINUED | COMMUNITY
End: 2023-09-27

## 2023-09-27 RX ORDER — METOPROLOL TARTRATE 50 MG/1
50 TABLET, FILM COATED ORAL DAILY
Qty: 90 | Refills: 3 | Status: ACTIVE | COMMUNITY

## 2023-09-27 RX ORDER — INSULIN ASPART 100 [IU]/ML
INJECTION, SOLUTION INTRAVENOUS; SUBCUTANEOUS
Refills: 0 | Status: ACTIVE | COMMUNITY

## 2023-10-02 LAB
25(OH)D3 SERPL-MCNC: 12.2 NG/ML
ALBUMIN SERPL ELPH-MCNC: 3.8 G/DL
ALP BLD-CCNC: 109 U/L
ALT SERPL-CCNC: 9 U/L
ANION GAP SERPL CALC-SCNC: 15 MMOL/L
APPEARANCE: CLEAR
AST SERPL-CCNC: 11 U/L
BACTERIA: NEGATIVE /HPF
BILIRUB SERPL-MCNC: 0.7 MG/DL
BILIRUBIN URINE: NEGATIVE
BLOOD URINE: ABNORMAL
BUN SERPL-MCNC: 24 MG/DL
CALCIUM SERPL-MCNC: 9.6 MG/DL
CAST: 5 /LPF
CHLORIDE SERPL-SCNC: 104 MMOL/L
CHOLEST SERPL-MCNC: 170 MG/DL
CO2 SERPL-SCNC: 21 MMOL/L
COLOR: YELLOW
CREAT SERPL-MCNC: 1.27 MG/DL
EGFR: 63 ML/MIN/1.73M2
EPITHELIAL CELLS: 2 /HPF
ESTIMATED AVERAGE GLUCOSE: 203 MG/DL
GLUCOSE QUALITATIVE U: 250 MG/DL
GLUCOSE SERPL-MCNC: 198 MG/DL
HBA1C MFR BLD HPLC: 8.7 %
HCT VFR BLD CALC: 36.3 %
HDLC SERPL-MCNC: 29 MG/DL
HGB BLD-MCNC: 11.2 G/DL
KETONES URINE: NEGATIVE MG/DL
LDLC SERPL CALC-MCNC: 112 MG/DL
LEUKOCYTE ESTERASE URINE: NEGATIVE
MCHC RBC-ENTMCNC: 27.3 PG
MCHC RBC-ENTMCNC: 30.9 GM/DL
MCV RBC AUTO: 88.3 FL
MICROSCOPIC-UA: NORMAL
NITRITE URINE: NEGATIVE
NONHDLC SERPL-MCNC: 142 MG/DL
PH URINE: 5.5
PLATELET # BLD AUTO: 492 K/UL
POTASSIUM SERPL-SCNC: 5.1 MMOL/L
PROT SERPL-MCNC: 6.9 G/DL
PROTEIN URINE: 300 MG/DL
PSA SERPL-MCNC: 2.97 NG/ML
RBC # BLD: 4.11 M/UL
RBC # FLD: 17.8 %
RED BLOOD CELLS URINE: 2 /HPF
SODIUM SERPL-SCNC: 139 MMOL/L
SPECIFIC GRAVITY URINE: 1.02
T4 FREE SERPL-MCNC: 1 NG/DL
TRIGL SERPL-MCNC: 165 MG/DL
TSH SERPL-ACNC: 0.83 UIU/ML
UROBILINOGEN URINE: 1 MG/DL
WBC # FLD AUTO: 13.03 K/UL
WHITE BLOOD CELLS URINE: 1 /HPF

## 2023-10-02 RX ORDER — ADHESIVE TAPE 3"X 2.3 YD
50 MCG TAPE, NON-MEDICATED TOPICAL
Qty: 30 | Refills: 5 | Status: ACTIVE | COMMUNITY
Start: 2023-10-02 | End: 1900-01-01

## 2023-10-03 RX ORDER — SEMAGLUTIDE 1.34 MG/ML
4 INJECTION, SOLUTION SUBCUTANEOUS
Qty: 2 | Refills: 3 | Status: DISCONTINUED | COMMUNITY
Start: 2023-10-02 | End: 2023-10-03

## 2023-10-24 ENCOUNTER — APPOINTMENT (OUTPATIENT)
Dept: INTERNAL MEDICINE | Facility: CLINIC | Age: 63
End: 2023-10-24
Payer: MEDICARE

## 2023-10-24 VITALS
BODY MASS INDEX: 25.74 KG/M2 | SYSTOLIC BLOOD PRESSURE: 145 MMHG | TEMPERATURE: 97.7 F | DIASTOLIC BLOOD PRESSURE: 75 MMHG | WEIGHT: 164 LBS | HEIGHT: 67 IN | HEART RATE: 83 BPM | OXYGEN SATURATION: 98 %

## 2023-10-24 DIAGNOSIS — Z98.890 PERIPHERAL VASCULAR DISEASE, UNSPECIFIED: ICD-10-CM

## 2023-10-24 DIAGNOSIS — Z01.818 ENCOUNTER FOR OTHER PREPROCEDURAL EXAMINATION: ICD-10-CM

## 2023-10-24 DIAGNOSIS — I73.9 PERIPHERAL VASCULAR DISEASE, UNSPECIFIED: ICD-10-CM

## 2023-10-24 PROCEDURE — 99213 OFFICE O/P EST LOW 20 MIN: CPT | Mod: 25

## 2023-10-24 PROCEDURE — 36415 COLL VENOUS BLD VENIPUNCTURE: CPT

## 2023-10-24 RX ORDER — TIRZEPATIDE 2.5 MG/.5ML
2.5 INJECTION, SOLUTION SUBCUTANEOUS
Qty: 2 | Refills: 3 | Status: ACTIVE | COMMUNITY
Start: 2023-10-03 | End: 1900-01-01

## 2023-10-25 LAB
ALBUMIN SERPL ELPH-MCNC: 3.5 G/DL
ALP BLD-CCNC: 101 U/L
ALT SERPL-CCNC: 8 U/L
ANION GAP SERPL CALC-SCNC: 11 MMOL/L
AST SERPL-CCNC: 10 U/L
BASOPHILS # BLD AUTO: 0.12 K/UL
BASOPHILS NFR BLD AUTO: 1.3 %
BILIRUB SERPL-MCNC: 0.3 MG/DL
BUN SERPL-MCNC: 25 MG/DL
CALCIUM SERPL-MCNC: 9.3 MG/DL
CHLORIDE SERPL-SCNC: 108 MMOL/L
CO2 SERPL-SCNC: 23 MMOL/L
CREAT SERPL-MCNC: 1.48 MG/DL
EGFR: 53 ML/MIN/1.73M2
EOSINOPHIL # BLD AUTO: 0.26 K/UL
EOSINOPHIL NFR BLD AUTO: 2.8 %
ESTIMATED AVERAGE GLUCOSE: 194 MG/DL
GLUCOSE SERPL-MCNC: 131 MG/DL
HBA1C MFR BLD HPLC: 8.4 %
HCT VFR BLD CALC: 33.8 %
HGB BLD-MCNC: 10.1 G/DL
IMM GRANULOCYTES NFR BLD AUTO: 0.3 %
LYMPHOCYTES # BLD AUTO: 3.1 K/UL
LYMPHOCYTES NFR BLD AUTO: 33.7 %
MAN DIFF?: NORMAL
MCHC RBC-ENTMCNC: 26.6 PG
MCHC RBC-ENTMCNC: 29.9 GM/DL
MCV RBC AUTO: 89.2 FL
MONOCYTES # BLD AUTO: 0.65 K/UL
MONOCYTES NFR BLD AUTO: 7.1 %
NEUTROPHILS # BLD AUTO: 5.04 K/UL
NEUTROPHILS NFR BLD AUTO: 54.8 %
PLATELET # BLD AUTO: 495 K/UL
POTASSIUM SERPL-SCNC: 5.1 MMOL/L
PROT SERPL-MCNC: 6.4 G/DL
RBC # BLD: 3.79 M/UL
RBC # FLD: 17.3 %
SODIUM SERPL-SCNC: 141 MMOL/L
WBC # FLD AUTO: 9.2 K/UL

## 2023-12-12 ENCOUNTER — APPOINTMENT (OUTPATIENT)
Dept: INTERNAL MEDICINE | Facility: CLINIC | Age: 63
End: 2023-12-12

## 2024-01-10 ENCOUNTER — APPOINTMENT (OUTPATIENT)
Dept: INTERNAL MEDICINE | Facility: CLINIC | Age: 64
End: 2024-01-10
Payer: MEDICARE

## 2024-01-10 VITALS
SYSTOLIC BLOOD PRESSURE: 183 MMHG | TEMPERATURE: 97.3 F | DIASTOLIC BLOOD PRESSURE: 88 MMHG | OXYGEN SATURATION: 99 % | HEART RATE: 83 BPM

## 2024-01-10 DIAGNOSIS — L97.509 ATHEROSCLEROSIS OF NATIVE ARTERIES OF OTHER EXTREMITIES WITH ULCERATION: ICD-10-CM

## 2024-01-10 DIAGNOSIS — E11.65 TYPE 2 DIABETES MELLITUS WITH HYPERGLYCEMIA: ICD-10-CM

## 2024-01-10 DIAGNOSIS — I70.25 ATHEROSCLEROSIS OF NATIVE ARTERIES OF OTHER EXTREMITIES WITH ULCERATION: ICD-10-CM

## 2024-01-10 DIAGNOSIS — Z89.432 ACQUIRED ABSENCE OF LEFT FOOT: ICD-10-CM

## 2024-01-10 DIAGNOSIS — M86.172 OTHER ACUTE OSTEOMYELITIS, LEFT ANKLE AND FOOT: ICD-10-CM

## 2024-01-10 PROCEDURE — 36415 COLL VENOUS BLD VENIPUNCTURE: CPT

## 2024-01-10 PROCEDURE — 99213 OFFICE O/P EST LOW 20 MIN: CPT

## 2024-01-10 NOTE — HISTORY OF PRESENT ILLNESS
[FreeTextEntry1] : Recent in hospital for pneumonia All medication changed.   [de-identified] : Still needs surgery on left foot. Medication reviewed with patient Insulin regimen Lantus 8 units a night  Lispro 10 units before meals  Feels better

## 2024-01-10 NOTE — PHYSICAL EXAM
[No Acute Distress] : no acute distress [Well Nourished] : well nourished [Well Developed] : well developed [Well-Appearing] : well-appearing [Normal Sclera/Conjunctiva] : normal sclera/conjunctiva [PERRL] : pupils equal round and reactive to light [EOMI] : extraocular movements intact [Normal Outer Ear/Nose] : the outer ears and nose were normal in appearance [Normal Oropharynx] : the oropharynx was normal [No JVD] : no jugular venous distention [No Lymphadenopathy] : no lymphadenopathy [Supple] : supple [Thyroid Normal, No Nodules] : the thyroid was normal and there were no nodules present [No Respiratory Distress] : no respiratory distress  [No Accessory Muscle Use] : no accessory muscle use [Clear to Auscultation] : lungs were clear to auscultation bilaterally [Normal Rate] : normal rate  [Regular Rhythm] : with a regular rhythm [Normal S1, S2] : normal S1 and S2 [No Murmur] : no murmur heard [No Carotid Bruits] : no carotid bruits [No Abdominal Bruit] : a ~M bruit was not heard ~T in the abdomen [No Varicosities] : no varicosities [Pedal Pulses Present] : the pedal pulses are present [No Edema] : there was no peripheral edema [No Palpable Aorta] : no palpable aorta [No Extremity Clubbing/Cyanosis] : no extremity clubbing/cyanosis [Soft] : abdomen soft [Non Tender] : non-tender [Non-distended] : non-distended [No Masses] : no abdominal mass palpated [No HSM] : no HSM [Normal Bowel Sounds] : normal bowel sounds [Normal Posterior Cervical Nodes] : no posterior cervical lymphadenopathy [Normal Anterior Cervical Nodes] : no anterior cervical lymphadenopathy [No CVA Tenderness] : no CVA  tenderness [No Spinal Tenderness] : no spinal tenderness [No Joint Swelling] : no joint swelling [Grossly Normal Strength/Tone] : grossly normal strength/tone [No Rash] : no rash [Coordination Grossly Intact] : coordination grossly intact [No Focal Deficits] : no focal deficits [Normal Gait] : normal gait [Deep Tendon Reflexes (DTR)] : deep tendon reflexes were 2+ and symmetric [Normal Affect] : the affect was normal [Normal Insight/Judgement] : insight and judgment were intact [de-identified] : Left foot with dressing

## 2024-01-10 NOTE — HEALTH RISK ASSESSMENT
[No] : No [No falls in past year] : Patient reported no falls in the past year [0] : 1) Little interest or pleasure doing things: Not at all (0)

## 2024-01-10 NOTE — ASSESSMENT
[FreeTextEntry1] : All medication reviewed with patient Also will repeat A1C  To discuss plans with Dr Billingsley  Hopefully A!C is lower

## 2024-01-12 LAB
ALBUMIN SERPL ELPH-MCNC: 3.9 G/DL
ALP BLD-CCNC: 111 U/L
ALT SERPL-CCNC: 7 U/L
ANION GAP SERPL CALC-SCNC: 12 MMOL/L
AST SERPL-CCNC: 9 U/L
BILIRUB SERPL-MCNC: 0.4 MG/DL
BUN SERPL-MCNC: 31 MG/DL
CALCIUM SERPL-MCNC: 9.6 MG/DL
CHLORIDE SERPL-SCNC: 105 MMOL/L
CO2 SERPL-SCNC: 23 MMOL/L
CREAT SERPL-MCNC: 1.52 MG/DL
EGFR: 51 ML/MIN/1.73M2
ESTIMATED AVERAGE GLUCOSE: 171 MG/DL
GLUCOSE SERPL-MCNC: 141 MG/DL
HBA1C MFR BLD HPLC: 7.6 %
POTASSIUM SERPL-SCNC: 5.2 MMOL/L
PROT SERPL-MCNC: 7.5 G/DL
SODIUM SERPL-SCNC: 140 MMOL/L

## 2024-01-19 DIAGNOSIS — J40 BRONCHITIS, NOT SPECIFIED AS ACUTE OR CHRONIC: ICD-10-CM

## 2024-02-07 ENCOUNTER — INPATIENT (INPATIENT)
Facility: HOSPITAL | Age: 64
LOS: 1 days | Discharge: HOME | DRG: 194 | End: 2024-02-09
Attending: INTERNAL MEDICINE | Admitting: INTERNAL MEDICINE
Payer: MEDICARE

## 2024-02-07 VITALS
OXYGEN SATURATION: 98 % | SYSTOLIC BLOOD PRESSURE: 177 MMHG | TEMPERATURE: 98 F | RESPIRATION RATE: 18 BRPM | WEIGHT: 154.98 LBS | HEIGHT: 67 IN | DIASTOLIC BLOOD PRESSURE: 98 MMHG | HEART RATE: 86 BPM

## 2024-02-07 DIAGNOSIS — J18.9 PNEUMONIA, UNSPECIFIED ORGANISM: ICD-10-CM

## 2024-02-07 DIAGNOSIS — I10 ESSENTIAL (PRIMARY) HYPERTENSION: ICD-10-CM

## 2024-02-07 DIAGNOSIS — E11.9 TYPE 2 DIABETES MELLITUS WITHOUT COMPLICATIONS: ICD-10-CM

## 2024-02-07 DIAGNOSIS — I73.9 PERIPHERAL VASCULAR DISEASE, UNSPECIFIED: ICD-10-CM

## 2024-02-07 DIAGNOSIS — Z29.9 ENCOUNTER FOR PROPHYLACTIC MEASURES, UNSPECIFIED: ICD-10-CM

## 2024-02-07 LAB
ANION GAP SERPL CALC-SCNC: 10 MMOL/L — SIGNIFICANT CHANGE UP (ref 5–17)
BASOPHILS # BLD AUTO: 0.1 K/UL — SIGNIFICANT CHANGE UP (ref 0–0.2)
BASOPHILS NFR BLD AUTO: 1.2 % — SIGNIFICANT CHANGE UP (ref 0–2)
BUN SERPL-MCNC: 26 MG/DL — HIGH (ref 7–23)
CALCIUM SERPL-MCNC: 9.4 MG/DL — SIGNIFICANT CHANGE UP (ref 8.4–10.5)
CHLORIDE SERPL-SCNC: 102 MMOL/L — SIGNIFICANT CHANGE UP (ref 96–108)
CO2 SERPL-SCNC: 23 MMOL/L — SIGNIFICANT CHANGE UP (ref 22–31)
CREAT SERPL-MCNC: 1.34 MG/DL — HIGH (ref 0.5–1.3)
EGFR: 59 ML/MIN/1.73M2 — LOW
EOSINOPHIL # BLD AUTO: 0.46 K/UL — SIGNIFICANT CHANGE UP (ref 0–0.5)
EOSINOPHIL NFR BLD AUTO: 5.4 % — SIGNIFICANT CHANGE UP (ref 0–6)
GLUCOSE BLDC GLUCOMTR-MCNC: 295 MG/DL — HIGH (ref 70–99)
GLUCOSE SERPL-MCNC: 285 MG/DL — HIGH (ref 70–99)
HCT VFR BLD CALC: 32 % — LOW (ref 39–50)
HGB BLD-MCNC: 10 G/DL — LOW (ref 13–17)
IMM GRANULOCYTES NFR BLD AUTO: 0.7 % — SIGNIFICANT CHANGE UP (ref 0–0.9)
LACTATE SERPL-SCNC: 1.4 MMOL/L — SIGNIFICANT CHANGE UP (ref 0.5–2)
LYMPHOCYTES # BLD AUTO: 2.4 K/UL — SIGNIFICANT CHANGE UP (ref 1–3.3)
LYMPHOCYTES # BLD AUTO: 28.3 % — SIGNIFICANT CHANGE UP (ref 13–44)
MAGNESIUM SERPL-MCNC: 2 MG/DL — SIGNIFICANT CHANGE UP (ref 1.6–2.6)
MCHC RBC-ENTMCNC: 27.5 PG — SIGNIFICANT CHANGE UP (ref 27–34)
MCHC RBC-ENTMCNC: 31.3 GM/DL — LOW (ref 32–36)
MCV RBC AUTO: 88.2 FL — SIGNIFICANT CHANGE UP (ref 80–100)
MONOCYTES # BLD AUTO: 0.75 K/UL — SIGNIFICANT CHANGE UP (ref 0–0.9)
MONOCYTES NFR BLD AUTO: 8.8 % — SIGNIFICANT CHANGE UP (ref 2–14)
MRSA PCR RESULT.: NEGATIVE — SIGNIFICANT CHANGE UP
NEUTROPHILS # BLD AUTO: 4.72 K/UL — SIGNIFICANT CHANGE UP (ref 1.8–7.4)
NEUTROPHILS NFR BLD AUTO: 55.6 % — SIGNIFICANT CHANGE UP (ref 43–77)
NRBC # BLD: 0 /100 WBCS — SIGNIFICANT CHANGE UP (ref 0–0)
PHOSPHATE SERPL-MCNC: 3.8 MG/DL — SIGNIFICANT CHANGE UP (ref 2.5–4.5)
PLATELET # BLD AUTO: 488 K/UL — HIGH (ref 150–400)
POTASSIUM SERPL-MCNC: 4.7 MMOL/L — SIGNIFICANT CHANGE UP (ref 3.5–5.3)
POTASSIUM SERPL-SCNC: 4.7 MMOL/L — SIGNIFICANT CHANGE UP (ref 3.5–5.3)
PROCALCITONIN SERPL-MCNC: 0.15 NG/ML — HIGH (ref 0.02–0.1)
RAPID RVP RESULT: SIGNIFICANT CHANGE UP
RBC # BLD: 3.63 M/UL — LOW (ref 4.2–5.8)
RBC # FLD: 17.5 % — HIGH (ref 10.3–14.5)
S AUREUS DNA NOSE QL NAA+PROBE: NEGATIVE — SIGNIFICANT CHANGE UP
SARS-COV-2 RNA SPEC QL NAA+PROBE: SIGNIFICANT CHANGE UP
SODIUM SERPL-SCNC: 135 MMOL/L — SIGNIFICANT CHANGE UP (ref 135–145)
WBC # BLD: 8.49 K/UL — SIGNIFICANT CHANGE UP (ref 3.8–10.5)
WBC # FLD AUTO: 8.49 K/UL — SIGNIFICANT CHANGE UP (ref 3.8–10.5)

## 2024-02-07 PROCEDURE — 71045 X-RAY EXAM CHEST 1 VIEW: CPT | Mod: 26

## 2024-02-07 PROCEDURE — 71250 CT THORAX DX C-: CPT | Mod: 26

## 2024-02-07 PROCEDURE — 99285 EMERGENCY DEPT VISIT HI MDM: CPT

## 2024-02-07 PROCEDURE — 73630 X-RAY EXAM OF FOOT: CPT | Mod: 26,50

## 2024-02-07 RX ORDER — DEXTROSE 50 % IN WATER 50 %
25 SYRINGE (ML) INTRAVENOUS ONCE
Refills: 0 | Status: DISCONTINUED | OUTPATIENT
Start: 2024-02-07 | End: 2024-02-09

## 2024-02-07 RX ORDER — SODIUM CHLORIDE 9 MG/ML
1000 INJECTION, SOLUTION INTRAVENOUS
Refills: 0 | Status: DISCONTINUED | OUTPATIENT
Start: 2024-02-07 | End: 2024-02-09

## 2024-02-07 RX ORDER — VANCOMYCIN HCL 1 G
500 VIAL (EA) INTRAVENOUS ONCE
Refills: 0 | Status: COMPLETED | OUTPATIENT
Start: 2024-02-07 | End: 2024-02-07

## 2024-02-07 RX ORDER — DEXTROSE 50 % IN WATER 50 %
15 SYRINGE (ML) INTRAVENOUS ONCE
Refills: 0 | Status: DISCONTINUED | OUTPATIENT
Start: 2024-02-07 | End: 2024-02-09

## 2024-02-07 RX ORDER — GLUCAGON INJECTION, SOLUTION 0.5 MG/.1ML
1 INJECTION, SOLUTION SUBCUTANEOUS ONCE
Refills: 0 | Status: DISCONTINUED | OUTPATIENT
Start: 2024-02-07 | End: 2024-02-09

## 2024-02-07 RX ORDER — ENOXAPARIN SODIUM 100 MG/ML
40 INJECTION SUBCUTANEOUS EVERY 24 HOURS
Refills: 0 | Status: DISCONTINUED | OUTPATIENT
Start: 2024-02-07 | End: 2024-02-09

## 2024-02-07 RX ORDER — PIPERACILLIN AND TAZOBACTAM 4; .5 G/20ML; G/20ML
4.5 INJECTION, POWDER, LYOPHILIZED, FOR SOLUTION INTRAVENOUS EVERY 8 HOURS
Refills: 0 | Status: DISCONTINUED | OUTPATIENT
Start: 2024-02-07 | End: 2024-02-09

## 2024-02-07 RX ORDER — ONDANSETRON 8 MG/1
4 TABLET, FILM COATED ORAL EVERY 8 HOURS
Refills: 0 | Status: DISCONTINUED | OUTPATIENT
Start: 2024-02-07 | End: 2024-02-09

## 2024-02-07 RX ORDER — CARVEDILOL PHOSPHATE 80 MG/1
6.25 CAPSULE, EXTENDED RELEASE ORAL EVERY 12 HOURS
Refills: 0 | Status: DISCONTINUED | OUTPATIENT
Start: 2024-02-07 | End: 2024-02-09

## 2024-02-07 RX ORDER — GABAPENTIN 400 MG/1
100 CAPSULE ORAL EVERY 8 HOURS
Refills: 0 | Status: DISCONTINUED | OUTPATIENT
Start: 2024-02-07 | End: 2024-02-09

## 2024-02-07 RX ORDER — PIPERACILLIN AND TAZOBACTAM 4; .5 G/20ML; G/20ML
3.38 INJECTION, POWDER, LYOPHILIZED, FOR SOLUTION INTRAVENOUS ONCE
Refills: 0 | Status: COMPLETED | OUTPATIENT
Start: 2024-02-07 | End: 2024-02-07

## 2024-02-07 RX ORDER — ATORVASTATIN CALCIUM 80 MG/1
40 TABLET, FILM COATED ORAL AT BEDTIME
Refills: 0 | Status: DISCONTINUED | OUTPATIENT
Start: 2024-02-08 | End: 2024-02-09

## 2024-02-07 RX ORDER — ASPIRIN/CALCIUM CARB/MAGNESIUM 324 MG
81 TABLET ORAL DAILY
Refills: 0 | Status: DISCONTINUED | OUTPATIENT
Start: 2024-02-08 | End: 2024-02-09

## 2024-02-07 RX ORDER — DEXTROSE 50 % IN WATER 50 %
12.5 SYRINGE (ML) INTRAVENOUS ONCE
Refills: 0 | Status: DISCONTINUED | OUTPATIENT
Start: 2024-02-07 | End: 2024-02-09

## 2024-02-07 RX ORDER — LOSARTAN POTASSIUM 100 MG/1
100 TABLET, FILM COATED ORAL DAILY
Refills: 0 | Status: DISCONTINUED | OUTPATIENT
Start: 2024-02-08 | End: 2024-02-09

## 2024-02-07 RX ORDER — INSULIN GLARGINE 100 [IU]/ML
8 INJECTION, SOLUTION SUBCUTANEOUS AT BEDTIME
Refills: 0 | Status: DISCONTINUED | OUTPATIENT
Start: 2024-02-07 | End: 2024-02-09

## 2024-02-07 RX ORDER — VANCOMYCIN HCL 1 G
1500 VIAL (EA) INTRAVENOUS EVERY 12 HOURS
Refills: 0 | Status: DISCONTINUED | OUTPATIENT
Start: 2024-02-08 | End: 2024-02-08

## 2024-02-07 RX ORDER — ACETAMINOPHEN 500 MG
650 TABLET ORAL EVERY 6 HOURS
Refills: 0 | Status: DISCONTINUED | OUTPATIENT
Start: 2024-02-07 | End: 2024-02-09

## 2024-02-07 RX ORDER — INSULIN LISPRO 100/ML
VIAL (ML) SUBCUTANEOUS
Refills: 0 | Status: DISCONTINUED | OUTPATIENT
Start: 2024-02-07 | End: 2024-02-09

## 2024-02-07 RX ORDER — VANCOMYCIN HCL 1 G
1000 VIAL (EA) INTRAVENOUS ONCE
Refills: 0 | Status: COMPLETED | OUTPATIENT
Start: 2024-02-07 | End: 2024-02-07

## 2024-02-07 RX ORDER — LANOLIN ALCOHOL/MO/W.PET/CERES
3 CREAM (GRAM) TOPICAL AT BEDTIME
Refills: 0 | Status: DISCONTINUED | OUTPATIENT
Start: 2024-02-07 | End: 2024-02-09

## 2024-02-07 RX ADMIN — Medication 250 MILLIGRAM(S): at 19:10

## 2024-02-07 RX ADMIN — INSULIN GLARGINE 8 UNIT(S): 100 INJECTION, SOLUTION SUBCUTANEOUS at 23:34

## 2024-02-07 RX ADMIN — GABAPENTIN 100 MILLIGRAM(S): 400 CAPSULE ORAL at 19:50

## 2024-02-07 RX ADMIN — PIPERACILLIN AND TAZOBACTAM 25 GRAM(S): 4; .5 INJECTION, POWDER, LYOPHILIZED, FOR SOLUTION INTRAVENOUS at 23:35

## 2024-02-07 RX ADMIN — PIPERACILLIN AND TAZOBACTAM 200 GRAM(S): 4; .5 INJECTION, POWDER, LYOPHILIZED, FOR SOLUTION INTRAVENOUS at 17:39

## 2024-02-07 RX ADMIN — GABAPENTIN 100 MILLIGRAM(S): 400 CAPSULE ORAL at 23:37

## 2024-02-07 RX ADMIN — CARVEDILOL PHOSPHATE 6.25 MILLIGRAM(S): 80 CAPSULE, EXTENDED RELEASE ORAL at 19:50

## 2024-02-07 NOTE — ED PROVIDER NOTE - CLINICAL SUMMARY MEDICAL DECISION MAKING FREE TEXT BOX
63 y/o M with PMHx of HTN, HLD, DM, PAD, s/p multiple L toe amputations (c/b medial deviation of 1st/2nd toes resulting in ulceration of medial metatarsal head), [facility] admission in 2/2023 for LLE angioplasty and stent of SFA/pop, s/p Left TMA w/ residual OM (VRE) and dc'd on 6wks of daptomycin (2/9/23-3/23/23), admission 6/2023 for foot cellulitis presents to ED with cough x several days.  PT seen in hospital in [place] and dx with multifocal PNA and admission suggested but pt decided to present to [facility] because his doctor Dr. Logan is here.  In ED pt complains of cough and mild weakness.    VSS  Xray with multifocal PNA  IV zosyn given   Will discuss w Dr. Logan

## 2024-02-07 NOTE — H&P ADULT - ASSESSMENT
64 y/o M with PMHx of HTN, HLD, DM, PAD, s/p multiple L toe amputations, s/p stent of SFA/pop (2/2023), s/p Left TMA w/ residual OM (VRE) who presents with URI sx's and found to have multifocal PNA admitted for further management.

## 2024-02-07 NOTE — H&P ADULT - NSHPLABSRESULTS_GEN_ALL_CORE
LABS:                         10.0   8.49  )-----------( 488      ( 07 Feb 2024 16:13 )             32.0     02-07    135  |  102  |  26<H>  ----------------------------<  285<H>  4.7   |  23  |  1.34<H>    Ca    9.4      07 Feb 2024 16:13        Urinalysis Basic - ( 07 Feb 2024 16:13 )    Color: x / Appearance: x / SG: x / pH: x  Gluc: 285 mg/dL / Ketone: x  / Bili: x / Urobili: x   Blood: x / Protein: x / Nitrite: x   Leuk Esterase: x / RBC: x / WBC x   Sq Epi: x / Non Sq Epi: x / Bacteria: x        Lactate, Blood: 1.4 mmol/L (02-07 @ 16:13)      RADIOLOGY, EKG & ADDITIONAL TESTS: Reviewed.

## 2024-02-07 NOTE — H&P ADULT - PROBLEM SELECTOR PLAN 3
On admission, presented with /90. On home __  - c/w home __ On admission, presented with /90. On home coreg 6.25 q12, losartan 100 mg qd (pt taking 2x daily mistakingly for some time now), lasix 20 mg q12 (recently started for LE edema)  - c/w home coreg 6.25 q12 and losartan 100 mg qd  - hold lasix for now

## 2024-02-07 NOTE — H&P ADULT - PROBLEM SELECTOR PLAN 1
Patient presenting with. CXR with. Pneumonia etiology unclear viral vs bacterial. Lactate negative. No respiratory distress/failure. On RA. s/p zosyn in ED  - cont CTX and azithromycin  - f/u urine legionella and strep Patient presenting with. CXR with. Pneumonia etiology unclear, possibly infectious although no leukocytosis. Afebrile, VSS. Lactate negative. No respiratory distress/failure. On RA. s/p zosyn in ED. Has pseudomonal risk factors including IV abx in the last 90 days. Recent admission 2 months ago to [place] for PNA.  - RVP negative  - cont vanc and zosyn for pseudomonal coverage  - f/u urine legionella and strep  - f/u BCx  - f/u chest CT  - f/u MRSA swab

## 2024-02-07 NOTE — H&P ADULT - PROBLEM SELECTOR PLAN 5
Electrolytes: replete as necessary  Nutrition: regular   DVT: SCDs, IMPROVE score low  Code status: Full   Disposition: RMF > homr Electrolytes: replete as necessary  Nutrition: carb consistent  DVT: lovenox  Code status: Full   Disposition: RMF > home

## 2024-02-07 NOTE — PATIENT PROFILE ADULT - FUNCTIONAL ASSESSMENT - DAILY ACTIVITY 2.
Called patient to check to see how he was doing - he said he is doing good - having a little pain but is doing better - told patient that the x-rays have not been read as of yet that we would keep him updated - patient appreciated the call 4 = No assist / stand by assistance

## 2024-02-07 NOTE — H&P ADULT - PROBLEM SELECTOR PLAN 2
Known history of DM2. On home __  - Obtain A1c  - moderate ISF-25 lispro sliding scale AC+qHS  - monitor FS  - consistent carb diet Known history of DM2 c/b neuropathy and chronic foot wounds. On home humalog 10u with meals and lantus 8 units at bed time. Adherent with insulin regimen FS at home 160-170s.   - Obtain A1c  - moderate ISF-25 lispro sliding scale AC+qHS  - monitor FS  - consistent carb diet

## 2024-02-07 NOTE — ED ADULT NURSE NOTE - OBJECTIVE STATEMENT
Pt is 63 y.o male pt walked in denies any symptoms at present. Pt was seen at another hospital and [facility] and came here for possible PNA per pt and states " I came here because my doctor is here and I think I might have pneumonia". Denies CP, SOB. Pt A&Ox4, conversive in full sentences and ambulatory. Denies fever, chills, cough, abd pain, n/v/d. IV inserted and labs sent. Assessment ongoing. Pt is 63 y.o male pt walked in denies any symptoms at present. Pt was seen at another hospital and [place] and came here for possible PNA per pt and states " I came here because my doctor is here and I think I might have pneumonia". Denies CP, SOB. Pt A&Ox4, conversive in full sentences and ambulatory. Denies fever, chills, cough, abd pain, n/v/d. IV inserted and labs sent. Assessment ongoing. Per pt, his MD prescribed him water pill, pt states " because they told me I have fluids in my body".

## 2024-02-07 NOTE — H&P ADULT - PROBLEM SELECTOR PLAN 4
PAD, s/p multiple L toe amputations, s/p stent of SFA/pop (2/2023), s/p Left TMA w/ residual OM (VRE). At home on ASA 81 mg and crestor 10 mg qhs  - continue with ASA and statin   - follows with wound care out-patient  - podiatry consulted as he is well known to their service, recs appreciated  - wearing foot brace  - cw gabapentin 100 mg q8  - f/u ESR/CRP

## 2024-02-07 NOTE — ED PROVIDER NOTE - PHYSICAL EXAMINATION
VITAL SIGNS: I have reviewed nursing notes and confirm.  CONSTITUTIONAL: Well-developed; well-nourished; in no acute distress.  SKIN: Agree with RN documentation regarding decubitus evaluation. Remainder of skin exam is warm and dry, no acute rash.  HEAD: Normocephalic; atraumatic.  EYES: PERRL, EOM intact; conjunctiva and sclera clear.  ENT: No nasal discharge; airway clear.  NECK: Supple; non tender.  CARD: S1, S2 normal; no murmurs, gallops, or rubs. Regular rate and rhythm.  RESP: decreased bs b/l  ABD: Normal bowel sounds; soft; non-distended; non-tender; no hepatosplenomegaly.  EXT: Normal ROM. No clubbing, cyanosis or edema.  LYMPH: No acute cervical adenopathy.  NEURO: Alert, oriented. Grossly unremarkable.  PSYCH: Cooperative, appropriate.

## 2024-02-07 NOTE — H&P ADULT - HISTORY OF PRESENT ILLNESS
64 y/o M with PMHx of HTN, HLD, DM, PAD, s/p multiple L toe amputations, s/p stent of SFA/pop (2/2023), s/p Left TMA w/ residual OM (VRE) who presents to [facility] ED from [facility] due to cough and generalized weakness where he was diagnosed with multifocal PNA. He decided to come here because he follows with Dr. Logan.    In the ED:  Initial vital signs: T: 97.7 F, HR: 86, BP: 177/98, R: 18, SpO2: 98% on RA  Labs: significant for WBC wnl, plt 488, lactate negative  Imaging:  CXR: multifocal PNA  EKG:   Medications: zosyn  Consults: none  64 y/o M with PMHx of HTN, HLD, DM, PAD, s/p multiple L toe amputations, s/p stent of SFA/pop (2/2023), s/p Left TMA w/ residual OM (VRE) who presents to [facility] ED from [facility] due to cough and generalized weakness where he was diagnosed with multifocal PNA. He decided to come here because he follows with Dr. Logan. He states that over the last 2 days he's had generalized fatigue, productive cough, SOB, and chest tightness, now improved since abx administration at [facility]. Denies any fevers, chills, n/v/abd pain or urinary sx's. Notes wife is also recovering from URI. Home covid testing negative. Of note, pt with admission to [place] 2 months ago for PNA. He says he is adherent to his home medications - recently started on lasix 20 mg q12 for RLE swelling yesterday but only took one dose. Notes improvement in edema.  Adherent with insulin regimen FS at home 160-170s.   Follow's with wound care out-patient for LLE wound. Per patient, healing with no s/sx's of infection. Wearing a foot brace to ambulate. Denies any worsening pain.    In the ED:  Initial vital signs: T: 97.7 F, HR: 86, BP: 177/98, R: 18, SpO2: 98% on RA  Labs: significant for WBC wnl, plt 488, lactate negative  Imaging:  CXR: multifocal PNA  Medications: vanc and zosyn  Consults: none

## 2024-02-07 NOTE — ED ADULT NURSE NOTE - NSICDXPASTMEDICALHX_GEN_ALL_CORE_FT
CC:  Chief Complaint   Patient presents with    Disease Management    Pain       History of Present Illness:  Karin Zhu a 65 y.o.yo female   Patient Active Problem List   Diagnosis    Psoriatic arthritis    Hypothyroid    Hypovitaminosis D    Fibromyalgia    Back pain    Psoriasis    Degenerative arthritis of cervical spine    Reflux    Neck pain    High risk medication use    Immunocompromised     Here for follow-up of psoriasis and psoriatic arthritis  Seen by Zhanan in our clinic     Since last visit she is currently on Humira 40 mg subcu every 2 weeks  She feels she is doing a little better on Humira but her concern is local injection site reaction to Humira  She has local site reaction which lasts about 7-10 days and then resolves  Today no such lesion is noted  She continues to have persistent a.m. stiffness which gets better later in the day    Saw her 3 months ago for the first  time  She was  on cosentyx  300 mg q.4 weeks then  However she presented with severe pain in both hands with swelling of bilateral 2nd fingers  Then was switched to Humira    Review of old records indicate she failed multiple prior medications including Enbrel, Cimzia, Remicade, Simponi, Stelara  GI upset with methotrexate  She was also on Humira in the past    Otherwise denies any fever, chills, weight loss  Last Sunday she  fell down after of blackout episode and hurt her lip and right knee.  Right knee has a bruise and hurts  No other concerns      History  She also mentions of neck pain, she has chronic neck pain had a fusion surgery in the past many years ago in [place]   Received ILENE in the past  Neck continues to bother her with radiation to the shoulder and right arm, with intermittent tingling  She received a right shoulder injection few months back and this helped for few weeks  X-ray was suggestive of AC and G-H joint arthritis    Has taken tramadol in the past which has helped  Denies any feet pain or  swelling  Pain in the hands worse both in a.m. and also aggravated with any activity and touch  Stop methotrexate as she did not like the side effects mentioned in literature  Was intolerant of gabapentin in the past as well    She feels cosentyx  is not working, feels Humira in the past work better for her  She would like to give it a try again    Past Medical History:   Diagnosis Date    Acid reflux     Allergy     Arthritis     Degenerative disc disease     Thyroid disease        Past Surgical History:   Procedure Laterality Date     SECTION      HYSTERECTOMY      TONSILLECTOMY           Social History     Tobacco Use    Smoking status: Former Smoker    Smokeless tobacco: Never Used   Substance Use Topics    Alcohol use: Yes    Drug use: No       Family History   Problem Relation Age of Onset    Cancer Mother     Cancer Father     Heart disease Father     Kidney disease Father     Cancer Brother     Cancer Brother     Diabetes Mellitus Brother        Review of patient's allergies indicates:   Allergen Reactions    Benadryl [diphenhydramine hcl]              Review of Systems:  Constitutional: Denies fever, chills. No recent weight changes.   Fatigue: yes   Muscle weakness: no  Headaches: no new headaches  Eyes: No redness or dryness.  No recent visual changes.  ENT: Denies dry mouth. No oral or nasal ulcers.  Card: No chest pain.  Resp: No cough or sob.   Gastro: No nausea or vomiting.  No heartburn.  Constipation: no  Diarrhea: no  Genito:  No dysuria.  No genital ulcers.  Skin: No rash.  Raynauds:no  Neuro: No numbness / tingling.   Psych: No depression, anxiety  Endo:  no excess thirst.  Heme: no abnormal bleeding or bruising  Clots:none   Miscarriages :     OBJECTIVE:     Vital Signs   Vitals:    19 0937   BP: (!) 139/92   Pulse: 93     Physical Exam:  General Appearance:  NAD.   Gait: not favoring.  HEENT: PERRL.  Eyes not dry or injected.  No nasal ulcers.  Mouth not dry,  no oral lesions.  Lymph: cervical, supraclavicular or axillary nodes: none abnormal   Cardio: no irregularity of S1 or S2.  No gallops or rubs.   Resp: Normal respiratory motion. Clear to auscultation bilaterally.   No abnormal chest conformation.  Abd: Soft, non-tender, nondistended.  No masses.   Skin: Head and neck,  and extremities examined.    Small few cm dry scaly lesion noted on right side of the neck  Neuro: Ox3.   Cranial nerves II-XII grossly intact.   Sensation intact  in both distal LE and upper extremities to light touch.  Musculoskeletal Exam:  Right 2nd finger mildly swollen but improved  Enlarged prominent PIP/DIP    Rt shoulder:  Tenderness over AC joint, subacromial tenderness present  Pain on range of motion  Right knee:  Bruise  Pain on range of motion  Spine :  Cervical and lumbar facet tenderness    Tender points:  Muscle strength:Equal and full in all mm groups of the upper and lower ext.    Laboratory:   Results for orders placed or performed in visit on 01/16/19   CBC auto differential   Result Value Ref Range    WBC 6.20 3.90 - 12.70 K/uL    RBC 4.69 4.00 - 5.40 M/uL    Hemoglobin 13.6 12.0 - 16.0 g/dL    Hematocrit 43.3 37.0 - 48.5 %    MCV 92 82 - 98 fL    MCH 29.0 27.0 - 31.0 pg    MCHC 31.4 (L) 32.0 - 36.0 g/dL    RDW 14.0 11.5 - 14.5 %    Platelets 281 150 - 350 K/uL    MPV 10.4 9.2 - 12.9 fL    Immature Granulocytes 0.2 0.0 - 0.5 %    Gran # (ANC) 4.0 1.8 - 7.7 K/uL    Immature Grans (Abs) 0.01 0.00 - 0.04 K/uL    Lymph # 1.8 1.0 - 4.8 K/uL    Mono # 0.4 0.3 - 1.0 K/uL    Eos # 0.0 0.0 - 0.5 K/uL    Baso # 0.02 0.00 - 0.20 K/uL    nRBC 0 0 /100 WBC    Gran% 63.7 38.0 - 73.0 %    Lymph% 29.2 18.0 - 48.0 %    Mono% 6.5 4.0 - 15.0 %    Eosinophil% 0.3 0.0 - 8.0 %    Basophil% 0.3 0.0 - 1.9 %    Differential Method Automated    Comprehensive metabolic panel   Result Value Ref Range    Sodium 141 136 - 145 mmol/L    Potassium 4.1 3.5 - 5.1 mmol/L    Chloride 103 95 - 110 mmol/L    CO2 27  23 - 29 mmol/L    Glucose 103 70 - 110 mg/dL    BUN, Bld 14 8 - 23 mg/dL    Creatinine 1.0 0.5 - 1.4 mg/dL    Calcium 10.0 8.7 - 10.5 mg/dL    Total Protein 7.7 6.0 - 8.4 g/dL    Albumin 3.9 3.5 - 5.2 g/dL    Total Bilirubin 0.3 0.1 - 1.0 mg/dL    Alkaline Phosphatase 75 55 - 135 U/L    AST 28 10 - 40 U/L    ALT 18 10 - 44 U/L    Anion Gap 11 8 - 16 mmol/L    eGFR if African American >60 >60 mL/min/1.73 m^2    eGFR if non African American 59 (A) >60 mL/min/1.73 m^2   C-reactive protein   Result Value Ref Range    CRP 9.9 (H) 0.0 - 8.2 mg/L     Imaging :  X-ray hands    Findings: No acute fractures or dislocations visualized. Fairly extensive multi-articular arthritic findings throughout the bilateral hands and wrists with associated chronic erosive findings appear unchanged from prior.  Findings predominantly involve the IP joints as well as the bilateral 1st CMC joints. No new erosive changes appreciated.        X-ray shoulder:    FINDINGS:  There is no radiographic evidence of acute osseous, articular, or soft tissue abnormality.  There are moderate degenerative findings present at the AC and glenohumeral joints.      Impression       As above.  No acute findings         Notes reviewed  Other procedures:    ASSESSMENT/PLAN:     Post-traumatic osteoarthritis of both knees  -     Cancel: X-Ray Knee 3 View Bilateral; Future; Expected date: 01/16/2019    Psoriatic arthritis  -     ixekizumab (TALTZ AUTOINJECTOR, 2 PACK,) 80 mg/mL AtIn; Inject 160 mg into the skin once. for 1 dose  Dispense: 2 mL; Refill: 0  -     ixekizumab (TALTZ AUTOINJECTOR) 80 mg/mL AtIn; Inject 80 mg into the skin once. for 1 dose  Dispense: 1 mL; Refill: 5  -     Comprehensive metabolic panel; Standing  -     C-reactive protein; Standing  -     Sedimentation rate; Standing  -     CBC auto differential; Standing      1:  Psoriatic arthritis:    Failed multiple medications in the past as detailed above  Most recently inadequate response to  consentyx  Better on Humira but with local injection site reactions- will stop   Uses Mobic p.r.n.  Switch to taltz    If cannot afford the co pay will call me to let me know  Other options would be Orencia subcu or IV    Also some of her pain could be from chronic deformities and erosive disease    Up-to-date on immunizations except zoster     2:  Chronic neck pain:    Tramadol 1 tablet p.o. b.i.d. p.r.n.  Advised she has  to follow up with spine surgery again or Pain Management    3:  Right knee pain status post fall  X-ray knee    4:  Tear to lip from an injury after fall:  Recommend she follows up with PCP    3 month return, with labs      Risks vs Benefits and potential side effects of medication prescribed today were discussed with patient. Medication literature given to patient up discharge  Went over uptodate information /literature on the meds prescribed today      Infection, malignancy, demyelinating disease risk discussed  She is aware to hold the medication for any infection or prior to surgery    Disclaimer: This note was prepared using voice recognition system and is likely to have sound alike errors and is not proof read.  Please call me with any questions.   PAST MEDICAL HISTORY:  DM (diabetes mellitus)     HLD (hyperlipidemia)     HTN (hypertension)     PAD (peripheral artery disease)

## 2024-02-07 NOTE — ED ADULT NURSE NOTE - NSFALLUNIVINTERV_ED_ALL_ED
Bed/Stretcher in lowest position, wheels locked, appropriate side rails in place/Call bell, personal items and telephone in reach/Instruct patient to call for assistance before getting out of bed/chair/stretcher/Non-slip footwear applied when patient is off stretcher/[place] to call system/Physically safe environment - no spills, clutter or unnecessary equipment/Purposeful proactive rounding/Room/bathroom lighting operational, light cord in reach

## 2024-02-07 NOTE — H&P ADULT - NSHPPHYSICALEXAM_GEN_ALL_CORE
PHYSICAL EXAM:  Constitutional: WDWN resting comfortably in bed; NAD  Head: NC/AT  Eyes: PERRL, EOMI, anicteric sclera  ENT: no nasal discharge; uvula midline, no oropharyngeal erythema or exudates; MMM  Neck: supple; no JVD or thyromegaly  Respiratory: CTA B/L; no W/R/R, no retractions  Cardiac: +S1/S2; RRR; no M/R/G; PMI non-displaced  Gastrointestinal: abdomen soft, NT/ND; no rebound or guarding  Back: spine midline, no bony tenderness or step-offs; no CVAT B/L  Extremities: WWP, no clubbing or cyanosis; no peripheral edema  Musculoskeletal: NROM x4; no joint swelling, tenderness or erythema  Vascular: 2+ radial, DP pulses B/L  Dermatologic: skin warm, dry and intact; no rashes, wounds, or scars  Neurologic: AAOx3; CNII-XII grossly intact; no focal deficits  Psychiatric: affect and characteristics of appearance, verbalizations, behaviors are appropriate PHYSICAL EXAM:  Constitutional:  resting comfortably in bed; NAD  Head: NC/AT  Eyes: PERRL, EOMI, anicteric sclera  ENT: no nasal discharge; uvula midline, no oropharyngeal erythema or exudates; MMM  Neck: supple; no JVD or thyromegaly  Respiratory: clear L lng fields, faint expiratory wheezing on R; no retractions  Cardiac: +S1/S2; RRR; no M/R/G; PMI non-displaced  Gastrointestinal: abdomen soft, NT/ND; no rebound or guarding  Musculoskeletal: NROM x4; 1+ edema of RLE, no tenderness or erythema, + boot RLE  Vascular: 2+ radial, DP pulses B/L  Neurologic: AAOx3; no focal deficits  Psychiatric: affect and characteristics of appearance, verbalizations, behaviors are appropriate

## 2024-02-07 NOTE — ED PROVIDER NOTE - OBJECTIVE STATEMENT
61 y/o M with PMHx of HTN, HLD, DM, PAD, s/p multiple L toe amputations (c/b medial deviation of 1st/2nd toes resulting in ulceration of medial metatarsal head), [facility] admission in 2/2023 for LLE angioplasty and stent of SFA/pop, s/p Left TMA w/ residual OM (VRE) and dc'd on 6wks of daptomycin (2/9/23-3/23/23), admission 6/2023 for foot cellulitis presents to ED with 63 y/o M with PMHx of HTN, HLD, DM, PAD, s/p multiple L toe amputations (c/b medial deviation of 1st/2nd toes resulting in ulceration of medial metatarsal head), [facility] admission in 2/2023 for LLE angioplasty and stent of SFA/pop, s/p Left TMA w/ residual OM (VRE) and dc'd on 6wks of daptomycin (2/9/23-3/23/23), admission 6/2023 for foot cellulitis presents to ED with cough x several days.  PT seen in hospital in [place] and dx with multifocal PNA and admission suggested but pt decided to present to [facility] because his doctor Dr. Logan is here.  In ED pt complains of cough and mild weakness.

## 2024-02-07 NOTE — CONSULT NOTE ADULT - ASSESSMENT
62 y/o M with PMHx of HTN, HLD, DM, PAD (s/p stent of SFA/pop (2/2023)), OM (s/p Left TMA w/ residual OM (VRE)) admitted to [facility] for pneumonia. Pt with hx of L TMA with Dr. Lamar in 2/23. Chronic left foot wound for which he follows with Dr. Lamar regularly with no recent infections. At time of consult, VSS, WBC 8.49.    Plan:   - Excisional debridement of L sub met 5 wound down to and including level of dermis with #15 blade scalpel  - Excisional debridement of R sub 5th met hyperkeratotic lesion down to and including level of dermis with #15 blade scalpel  - Local wound care - Wounds cleansed with normal saline. Betadine DSD applied to L foot wound and WTD dressing applied to R foot  - Please obtain b/l foot x-rays to evaluate feet for any signs of infection  - Please obtain ESR/CRP   - Pt can heel WBAT to b/l LE  - Rest of care per primary team    Plan d/w Attending. Podiatry following.      Discharge recommendations:   Discharge dressing instructions:   - Cleanse left foot wound with normal saline daily, pat dry with sterile guaze, apply  betadine soaked gauze to wound base, cover with dry sterile gauze, wrap with Kerlix and secure with tape.  - Discharge dressing instructions: Cleanse wound with normal saline daily, pat dry with sterile guaze, apply saline soaked gauze to wound base, cover with dry sterile gauze, wrap with Kerlix and secure with tape.  Patient should follow up with Dr. Aldair Lamar within 1-2 weeks of discharge.    Office information:          [place] Address- [address] [place] [address]. [address], [place] [address] Phone: [phone]          64 y/o M with PMHx of HTN, HLD, DM, PAD (s/p stent of SFA/pop (2/2023)), OM (s/p Left TMA w/ residual OM (VRE)) admitted to [facility] for pneumonia. Pt with hx of L TMA with Dr. Lamar in 2/23. Chronic left foot wound for which he follows with Dr. Lamar regularly with no recent infections. At time of consult, VSS, WBC 8.49.    Plan:   - Excisional debridement performed of L sub met 5 wound down to and including level of dermis with #15 blade scalpel  - Excisional debridement performed of R sub 5th met hyperkeratotic lesion down to and including level of dermis with #15 blade scalpel  - Local wound care - Wounds cleansed with normal saline. Betadine DSD applied to L foot wound and WTD dressing applied to R foot  - Please obtain b/l foot x-rays to evaluate feet for any signs of infection  - Please obtain ESR/CRP   - Pt can heel WBAT to b/l LE  - Rest of care per primary team    Plan d/w Attending. Podiatry following.      Discharge recommendations:   Discharge dressing instructions:   - Cleanse left foot wound with normal saline daily, pat dry with sterile guaze, apply  betadine soaked gauze to wound base, cover with dry sterile gauze, wrap with Kerlix and secure with tape.  - Discharge dressing instructions: Cleanse wound with normal saline daily, pat dry with sterile guaze, apply saline soaked gauze to wound base, cover with dry sterile gauze, wrap with Kerlix and secure with tape.  Patient should follow up with Dr. Aldair Lamar within 1-2 weeks of discharge.    Office information:          [place] Address- [address] [facility] [address]. [address], [place] [address] Phone: [phone]

## 2024-02-08 LAB
A1C WITH ESTIMATED AVERAGE GLUCOSE RESULT: 7.8 % — HIGH (ref 4–5.6)
ALBUMIN SERPL ELPH-MCNC: 2.9 G/DL — LOW (ref 3.3–5)
ALP SERPL-CCNC: 93 U/L — SIGNIFICANT CHANGE UP (ref 40–120)
ALT FLD-CCNC: 6 U/L — LOW (ref 10–45)
ANION GAP SERPL CALC-SCNC: 8 MMOL/L — SIGNIFICANT CHANGE UP (ref 5–17)
APTT BLD: 30.9 SEC — SIGNIFICANT CHANGE UP (ref 24.5–35.6)
AST SERPL-CCNC: 10 U/L — SIGNIFICANT CHANGE UP (ref 10–40)
BASOPHILS # BLD AUTO: 0.08 K/UL — SIGNIFICANT CHANGE UP (ref 0–0.2)
BASOPHILS NFR BLD AUTO: 1 % — SIGNIFICANT CHANGE UP (ref 0–2)
BILIRUB SERPL-MCNC: 0.4 MG/DL — SIGNIFICANT CHANGE UP (ref 0.2–1.2)
BUN SERPL-MCNC: 27 MG/DL — HIGH (ref 7–23)
CALCIUM SERPL-MCNC: 9 MG/DL — SIGNIFICANT CHANGE UP (ref 8.4–10.5)
CHLORIDE SERPL-SCNC: 103 MMOL/L — SIGNIFICANT CHANGE UP (ref 96–108)
CO2 SERPL-SCNC: 24 MMOL/L — SIGNIFICANT CHANGE UP (ref 22–31)
CREAT SERPL-MCNC: 1.44 MG/DL — HIGH (ref 0.5–1.3)
CRP SERPL-MCNC: 4.3 MG/L — HIGH (ref 0–4)
EGFR: 54 ML/MIN/1.73M2 — LOW
EOSINOPHIL # BLD AUTO: 0.54 K/UL — HIGH (ref 0–0.5)
EOSINOPHIL NFR BLD AUTO: 6.6 % — HIGH (ref 0–6)
ESTIMATED AVERAGE GLUCOSE: 177 MG/DL — HIGH (ref 68–114)
GLUCOSE BLDC GLUCOMTR-MCNC: 112 MG/DL — HIGH (ref 70–99)
GLUCOSE BLDC GLUCOMTR-MCNC: 122 MG/DL — HIGH (ref 70–99)
GLUCOSE BLDC GLUCOMTR-MCNC: 225 MG/DL — HIGH (ref 70–99)
GLUCOSE BLDC GLUCOMTR-MCNC: 280 MG/DL — HIGH (ref 70–99)
GLUCOSE SERPL-MCNC: 242 MG/DL — HIGH (ref 70–99)
HCT VFR BLD CALC: 29.8 % — LOW (ref 39–50)
HGB BLD-MCNC: 9 G/DL — LOW (ref 13–17)
IMM GRANULOCYTES NFR BLD AUTO: 0.7 % — SIGNIFICANT CHANGE UP (ref 0–0.9)
INR BLD: 1.09 — SIGNIFICANT CHANGE UP (ref 0.85–1.18)
LYMPHOCYTES # BLD AUTO: 2.51 K/UL — SIGNIFICANT CHANGE UP (ref 1–3.3)
LYMPHOCYTES # BLD AUTO: 30.5 % — SIGNIFICANT CHANGE UP (ref 13–44)
MAGNESIUM SERPL-MCNC: 2 MG/DL — SIGNIFICANT CHANGE UP (ref 1.6–2.6)
MCHC RBC-ENTMCNC: 27.3 PG — SIGNIFICANT CHANGE UP (ref 27–34)
MCHC RBC-ENTMCNC: 30.2 GM/DL — LOW (ref 32–36)
MCV RBC AUTO: 90.3 FL — SIGNIFICANT CHANGE UP (ref 80–100)
MONOCYTES # BLD AUTO: 0.67 K/UL — SIGNIFICANT CHANGE UP (ref 0–0.9)
MONOCYTES NFR BLD AUTO: 8.1 % — SIGNIFICANT CHANGE UP (ref 2–14)
NEUTROPHILS # BLD AUTO: 4.38 K/UL — SIGNIFICANT CHANGE UP (ref 1.8–7.4)
NEUTROPHILS NFR BLD AUTO: 53.1 % — SIGNIFICANT CHANGE UP (ref 43–77)
NRBC # BLD: 0 /100 WBCS — SIGNIFICANT CHANGE UP (ref 0–0)
PHOSPHATE SERPL-MCNC: 3.9 MG/DL — SIGNIFICANT CHANGE UP (ref 2.5–4.5)
PLATELET # BLD AUTO: 441 K/UL — HIGH (ref 150–400)
POTASSIUM SERPL-MCNC: 5.1 MMOL/L — SIGNIFICANT CHANGE UP (ref 3.5–5.3)
POTASSIUM SERPL-SCNC: 5.1 MMOL/L — SIGNIFICANT CHANGE UP (ref 3.5–5.3)
PROT SERPL-MCNC: 6 G/DL — SIGNIFICANT CHANGE UP (ref 6–8.3)
PROTHROM AB SERPL-ACNC: 12.4 SEC — SIGNIFICANT CHANGE UP (ref 9.5–13)
RBC # BLD: 3.3 M/UL — LOW (ref 4.2–5.8)
RBC # FLD: 18.1 % — HIGH (ref 10.3–14.5)
SODIUM SERPL-SCNC: 135 MMOL/L — SIGNIFICANT CHANGE UP (ref 135–145)
VANCOMYCIN TROUGH SERPL-MCNC: 19.3 UG/ML — SIGNIFICANT CHANGE UP (ref 10–20)
WBC # BLD: 8.24 K/UL — SIGNIFICANT CHANGE UP (ref 3.8–10.5)
WBC # FLD AUTO: 8.24 K/UL — SIGNIFICANT CHANGE UP (ref 3.8–10.5)

## 2024-02-08 PROCEDURE — 99221 1ST HOSP IP/OBS SF/LOW 40: CPT | Mod: GC

## 2024-02-08 RX ORDER — INSULIN LISPRO 100/ML
10 VIAL (ML) SUBCUTANEOUS
Refills: 0 | Status: DISCONTINUED | OUTPATIENT
Start: 2024-02-08 | End: 2024-02-09

## 2024-02-08 RX ORDER — VANCOMYCIN HCL 1 G
1250 VIAL (EA) INTRAVENOUS EVERY 12 HOURS
Refills: 0 | Status: DISCONTINUED | OUTPATIENT
Start: 2024-02-08 | End: 2024-02-09

## 2024-02-08 RX ADMIN — Medication 6: at 09:49

## 2024-02-08 RX ADMIN — Medication 4: at 13:41

## 2024-02-08 RX ADMIN — GABAPENTIN 100 MILLIGRAM(S): 400 CAPSULE ORAL at 13:55

## 2024-02-08 RX ADMIN — CARVEDILOL PHOSPHATE 6.25 MILLIGRAM(S): 80 CAPSULE, EXTENDED RELEASE ORAL at 17:07

## 2024-02-08 RX ADMIN — PIPERACILLIN AND TAZOBACTAM 25 GRAM(S): 4; .5 INJECTION, POWDER, LYOPHILIZED, FOR SOLUTION INTRAVENOUS at 06:04

## 2024-02-08 RX ADMIN — Medication 81 MILLIGRAM(S): at 12:06

## 2024-02-08 RX ADMIN — ATORVASTATIN CALCIUM 40 MILLIGRAM(S): 80 TABLET, FILM COATED ORAL at 21:29

## 2024-02-08 RX ADMIN — GABAPENTIN 100 MILLIGRAM(S): 400 CAPSULE ORAL at 06:05

## 2024-02-08 RX ADMIN — CARVEDILOL PHOSPHATE 6.25 MILLIGRAM(S): 80 CAPSULE, EXTENDED RELEASE ORAL at 06:05

## 2024-02-08 RX ADMIN — GABAPENTIN 100 MILLIGRAM(S): 400 CAPSULE ORAL at 21:30

## 2024-02-08 RX ADMIN — Medication 166.67 MILLIGRAM(S): at 22:05

## 2024-02-08 RX ADMIN — Medication 10 UNIT(S): at 13:42

## 2024-02-08 RX ADMIN — PIPERACILLIN AND TAZOBACTAM 25 GRAM(S): 4; .5 INJECTION, POWDER, LYOPHILIZED, FOR SOLUTION INTRAVENOUS at 16:26

## 2024-02-08 RX ADMIN — Medication 300 MILLIGRAM(S): at 12:06

## 2024-02-08 RX ADMIN — INSULIN GLARGINE 8 UNIT(S): 100 INJECTION, SOLUTION SUBCUTANEOUS at 21:30

## 2024-02-08 RX ADMIN — Medication 10 UNIT(S): at 18:33

## 2024-02-08 NOTE — OCCUPATIONAL THERAPY INITIAL EVALUATION ADULT - MODALITIES TREATMENT COMMENTS
Pt able to perform sit<->stand and ambulation independently and w/o AD. No unsteadiness observed throughout.

## 2024-02-08 NOTE — PROGRESS NOTE ADULT - SUBJECTIVE AND OBJECTIVE BOX
Patient is a 63y old  Male who presents with a chief complaint of PNA (07 Feb 2024 19:55)      INTERVAL HPI/ OVERNIGHT EVENTS Pt was seen bedside during AM rounds. Pt was resting comfortably in bed. Dressings were found to be clean, dry, and intact. Pt has no other pedal complaints at this time.       LABS                        9.0    8.24  )-----------( 441      ( 08 Feb 2024 05:30 )             29.8     02-08    135  |  103  |  27<H>  ----------------------------<  242<H>  5.1   |  24  |  1.44<H>    Ca    9.0      08 Feb 2024 05:30  Phos  3.9     02-08  Mg     2.0     02-08    TPro  6.0  /  Alb  2.9<L>  /  TBili  0.4  /  DBili  x   /  AST  10  /  ALT  6<L>  /  AlkPhos  93  02-08    PT/INR - ( 08 Feb 2024 05:30 )   PT: 12.4 sec;   INR: 1.09          PTT - ( 08 Feb 2024 05:30 )  PTT:30.9 sec    ICU Vital Signs Last 24 Hrs  T(C): 36.6 (08 Feb 2024 05:49), Max: 37.2 (07 Feb 2024 23:05)  T(F): 97.8 (08 Feb 2024 05:49), Max: 99 (07 Feb 2024 23:05)  HR: 72 (08 Feb 2024 05:49) (72 - 91)  BP: 162/83 (08 Feb 2024 05:49) (137/63 - 177/98)  BP(mean): 107 (07 Feb 2024 23:05) (107 - 107)  ABP: --  ABP(mean): --  RR: 18 (08 Feb 2024 05:49) (17 - 18)  SpO2: 98% (08 Feb 2024 05:49) (97% - 98%)    O2 Parameters below as of 08 Feb 2024 05:49  Patient On (Oxygen Delivery Method): room air    RADIOLOGY  < from: Xray Foot AP + Lateral + Oblique, Left (06.13.23 @ 12:58) >  INTERPRETATION:  CLINICAL HISTORY: 62-year-old with increasing white   blood cell count.          IMPRESSION: Frontal, lateral andoblique views of the left foot are   compared to 2/9/2023 and demonstrates transmetatarsal amputation of the   first through fifth digits.. Heterotopic calcification adjacent to the   base of the fifth metatarsal. Linear calcification adjacent to thecuboid   likely within the adjacent peritoneal tendon, possibly secondary to prior   trauma. Extensive arterial vascular calcification. No focal osteopenia or   cortical destruction to suggest osteomyelitis.    MRI is more sensitive examination for detection of osteomyelitis.    --- End of Report ---        < end of copied text >    MICROBIOLOGY    PHYSICAL EXAM  Lower Extremity Focused  Vasc: DP/PT 2/4 b/l, No edema or erythema  Derm: L foot sub 5th met shaft wound with granular base, macerated border, serous drainage, undermining present along medial aspect of wound, no tracking, no purulence or malodor, -PTB , (-)erythema    Right foot sub 1st met head wound with granular base, serous drainage, no tracking or tunneling, -PTB, no purulence or malodor; Right foot hyperkeratotic lesion sub 5th met head  Neuro: Protective sensation in tact  MSK: L TMA

## 2024-02-08 NOTE — PROGRESS NOTE ADULT - SUBJECTIVE AND OBJECTIVE BOX
OVERNIGHT EVENTS:    SUBJECTIVE / INTERVAL HPI: Patient seen and examined at bedside.     VITAL SIGNS:  Vital Signs Last 24 Hrs  T(C): 36.6 (08 Feb 2024 05:49), Max: 37.2 (07 Feb 2024 23:05)  T(F): 97.8 (08 Feb 2024 05:49), Max: 99 (07 Feb 2024 23:05)  HR: 72 (08 Feb 2024 05:49) (72 - 91)  BP: 162/83 (08 Feb 2024 05:49) (137/63 - 177/98)  BP(mean): 107 (07 Feb 2024 23:05) (107 - 107)  RR: 18 (08 Feb 2024 05:49) (17 - 18)  SpO2: 98% (08 Feb 2024 05:49) (97% - 98%)    Parameters below as of 08 Feb 2024 05:49  Patient On (Oxygen Delivery Method): room air        PHYSICAL EXAM:    General: alert, in no acute distress  HEENT: NC/AT; PERRL, anicteric sclera; MMM  Neck: supple  Cardiovascular: +S1/S2, RRR  Respiratory: CTA B/L; no W/R/R  Gastrointestinal: soft, NT/ND; +BSx4  Extremities: WWP; no edema, clubbing or cyanosis  Vascular: 2+ radial, DP/PT pulses B/L  Neurological: AAOx3; no focal deficits    MEDICATIONS:  MEDICATIONS  (STANDING):  aspirin  chewable 81 milliGRAM(s) Oral daily  atorvastatin 40 milliGRAM(s) Oral at bedtime  carvedilol 6.25 milliGRAM(s) Oral every 12 hours  dextrose 5%. 1000 milliLiter(s) (50 mL/Hr) IV Continuous <Continuous>  dextrose 5%. 1000 milliLiter(s) (100 mL/Hr) IV Continuous <Continuous>  dextrose 50% Injectable 25 Gram(s) IV Push once  dextrose 50% Injectable 12.5 Gram(s) IV Push once  dextrose 50% Injectable 25 Gram(s) IV Push once  enoxaparin Injectable 40 milliGRAM(s) SubCutaneous every 24 hours  gabapentin 100 milliGRAM(s) Oral every 8 hours  glucagon  Injectable 1 milliGRAM(s) IntraMuscular once  insulin glargine Injectable (LANTUS) 8 Unit(s) SubCutaneous at bedtime  insulin lispro (ADMELOG) corrective regimen sliding scale   SubCutaneous three times a day before meals  insulin lispro Injectable (ADMELOG) 10 Unit(s) SubCutaneous three times a day before meals  losartan 100 milliGRAM(s) Oral daily  piperacillin/tazobactam IVPB.. 4.5 Gram(s) IV Intermittent every 8 hours  vancomycin  IVPB 1500 milliGRAM(s) IV Intermittent every 12 hours    MEDICATIONS  (PRN):  acetaminophen     Tablet .. 650 milliGRAM(s) Oral every 6 hours PRN Temp greater or equal to 38C (100.4F), Mild Pain (1 - 3)  aluminum hydroxide/magnesium hydroxide/simethicone Suspension 30 milliLiter(s) Oral every 4 hours PRN Dyspepsia  dextrose Oral Gel 15 Gram(s) Oral once PRN Blood Glucose LESS THAN 70 milliGRAM(s)/deciliter  melatonin 3 milliGRAM(s) Oral at bedtime PRN Insomnia  ondansetron Injectable 4 milliGRAM(s) IV Push every 8 hours PRN Nausea and/or Vomiting      ALLERGIES:  Allergies    cefepime (Rash; Hives)  ciprofloxacin (Hives; Rash)    Intolerances        LABS:                        9.0    8.24  )-----------( 441      ( 08 Feb 2024 05:30 )             29.8     02-08    135  |  103  |  27<H>  ----------------------------<  242<H>  5.1   |  24  |  1.44<H>    Ca    9.0      08 Feb 2024 05:30  Phos  3.9     02-08  Mg     2.0     02-08    TPro  6.0  /  Alb  2.9<L>  /  TBili  0.4  /  DBili  x   /  AST  10  /  ALT  6<L>  /  AlkPhos  93  02-08    PT/INR - ( 08 Feb 2024 05:30 )   PT: 12.4 sec;   INR: 1.09          PTT - ( 08 Feb 2024 05:30 )  PTT:30.9 sec  Urinalysis Basic - ( 08 Feb 2024 05:30 )    Color: x / Appearance: x / SG: x / pH: x  Gluc: 242 mg/dL / Ketone: x  / Bili: x / Urobili: x   Blood: x / Protein: x / Nitrite: x   Leuk Esterase: x / RBC: x / WBC x   Sq Epi: x / Non Sq Epi: x / Bacteria: x      CAPILLARY BLOOD GLUCOSE      POCT Blood Glucose.: 295 mg/dL (07 Feb 2024 22:33)      RADIOLOGY & ADDITIONAL TESTS: Reviewed.   OVERNIGHT EVENTS: NILAY    SUBJECTIVE / INTERVAL HPI: Patient seen and examined at bedside. States he is feeling well. Offers no complaints.    VITAL SIGNS:  Vital Signs Last 24 Hrs  T(C): 36.6 (08 Feb 2024 05:49), Max: 37.2 (07 Feb 2024 23:05)  T(F): 97.8 (08 Feb 2024 05:49), Max: 99 (07 Feb 2024 23:05)  HR: 72 (08 Feb 2024 05:49) (72 - 91)  BP: 162/83 (08 Feb 2024 05:49) (137/63 - 177/98)  BP(mean): 107 (07 Feb 2024 23:05) (107 - 107)  RR: 18 (08 Feb 2024 05:49) (17 - 18)  SpO2: 98% (08 Feb 2024 05:49) (97% - 98%)    Parameters below as of 08 Feb 2024 05:49  Patient On (Oxygen Delivery Method): room air        PHYSICAL EXAM:    Constitutional:  resting comfortably in bed; NAD  Head: NC/AT  Eyes: PERRL, EOMI, anicteric sclera  ENT: no nasal discharge; uvula midline, no oropharyngeal erythema or exudates; MMM  Neck: supple; no JVD or thyromegaly  Respiratory: clear L lng fields, faint expiratory wheezing on R; no retractions  Cardiac: +S1/S2; RRR; no M/R/G; PMI non-displaced  Gastrointestinal: abdomen soft, NT/ND; no rebound or guarding  Musculoskeletal: NROM x4; 1+ edema of RLE, no tenderness or erythema, + boot RLE  Vascular: 2+ radial, DP pulses B/L  Neurologic: AAOx3; no focal deficits  Psychiatric: affect and characteristics of appearance, verbalizations, behaviors are appropriate    MEDICATIONS:  MEDICATIONS  (STANDING):  aspirin  chewable 81 milliGRAM(s) Oral daily  atorvastatin 40 milliGRAM(s) Oral at bedtime  carvedilol 6.25 milliGRAM(s) Oral every 12 hours  dextrose 5%. 1000 milliLiter(s) (50 mL/Hr) IV Continuous <Continuous>  dextrose 5%. 1000 milliLiter(s) (100 mL/Hr) IV Continuous <Continuous>  dextrose 50% Injectable 25 Gram(s) IV Push once  dextrose 50% Injectable 12.5 Gram(s) IV Push once  dextrose 50% Injectable 25 Gram(s) IV Push once  enoxaparin Injectable 40 milliGRAM(s) SubCutaneous every 24 hours  gabapentin 100 milliGRAM(s) Oral every 8 hours  glucagon  Injectable 1 milliGRAM(s) IntraMuscular once  insulin glargine Injectable (LANTUS) 8 Unit(s) SubCutaneous at bedtime  insulin lispro (ADMELOG) corrective regimen sliding scale   SubCutaneous three times a day before meals  insulin lispro Injectable (ADMELOG) 10 Unit(s) SubCutaneous three times a day before meals  losartan 100 milliGRAM(s) Oral daily  piperacillin/tazobactam IVPB.. 4.5 Gram(s) IV Intermittent every 8 hours  vancomycin  IVPB 1500 milliGRAM(s) IV Intermittent every 12 hours    MEDICATIONS  (PRN):  acetaminophen     Tablet .. 650 milliGRAM(s) Oral every 6 hours PRN Temp greater or equal to 38C (100.4F), Mild Pain (1 - 3)  aluminum hydroxide/magnesium hydroxide/simethicone Suspension 30 milliLiter(s) Oral every 4 hours PRN Dyspepsia  dextrose Oral Gel 15 Gram(s) Oral once PRN Blood Glucose LESS THAN 70 milliGRAM(s)/deciliter  melatonin 3 milliGRAM(s) Oral at bedtime PRN Insomnia  ondansetron Injectable 4 milliGRAM(s) IV Push every 8 hours PRN Nausea and/or Vomiting      ALLERGIES:  Allergies    cefepime (Rash; Hives)  ciprofloxacin (Hives; Rash)    Intolerances        LABS:                        9.0    8.24  )-----------( 441      ( 08 Feb 2024 05:30 )             29.8     02-08    135  |  103  |  27<H>  ----------------------------<  242<H>  5.1   |  24  |  1.44<H>    Ca    9.0      08 Feb 2024 05:30  Phos  3.9     02-08  Mg     2.0     02-08    TPro  6.0  /  Alb  2.9<L>  /  TBili  0.4  /  DBili  x   /  AST  10  /  ALT  6<L>  /  AlkPhos  93  02-08    PT/INR - ( 08 Feb 2024 05:30 )   PT: 12.4 sec;   INR: 1.09          PTT - ( 08 Feb 2024 05:30 )  PTT:30.9 sec  Urinalysis Basic - ( 08 Feb 2024 05:30 )    Color: x / Appearance: x / SG: x / pH: x  Gluc: 242 mg/dL / Ketone: x  / Bili: x / Urobili: x   Blood: x / Protein: x / Nitrite: x   Leuk Esterase: x / RBC: x / WBC x   Sq Epi: x / Non Sq Epi: x / Bacteria: x      CAPILLARY BLOOD GLUCOSE      POCT Blood Glucose.: 295 mg/dL (07 Feb 2024 22:33)      RADIOLOGY & ADDITIONAL TESTS: Reviewed.

## 2024-02-08 NOTE — PHYSICAL THERAPY INITIAL EVALUATION ADULT - ADDITIONAL COMMENTS
Prior to admission pt reports living with wife and grandson in an elevator apt, no steps to enter, indep with ADLs and mobility, no use of AD. as per chart pt has cane and walker at home.

## 2024-02-08 NOTE — PHYSICAL THERAPY INITIAL EVALUATION ADULT - PERTINENT HX OF CURRENT PROBLEM, REHAB EVAL
62 y/o M with PMHx of HTN, HLD, DM, PAD, s/p multiple L toe amputations, s/p stent of SFA/pop (2/2023), s/p Left TMA w/ residual OM (VRE) who presents with URI sx's and found to have multifocal PNA admitted for further management.

## 2024-02-08 NOTE — OCCUPATIONAL THERAPY INITIAL EVALUATION ADULT - GENERAL OBSERVATIONS, REHAB EVAL
OT IE completed. Orders received, chart reviewed, pt cleared for OT by ZEINAB Chaudhry. Pt received semi supine in bed, NAD, +heplock, +BLE dressings C/D/I. Pt A&Ox4, agreeable to OT, and tolerated session well.

## 2024-02-08 NOTE — PROGRESS NOTE ADULT - SUBJECTIVE AND OBJECTIVE BOX
INTERVAL HPI/OVERNIGHT EVENTS:  All notes reviewed;  Patient feels better;  CT with multilobar pneumonia   Glucoses noted       MEDICATIONS  (STANDING):  aspirin  chewable 81 milliGRAM(s) Oral daily  atorvastatin 40 milliGRAM(s) Oral at bedtime  carvedilol 6.25 milliGRAM(s) Oral every 12 hours  dextrose 5%. 1000 milliLiter(s) (100 mL/Hr) IV Continuous <Continuous>  dextrose 5%. 1000 milliLiter(s) (50 mL/Hr) IV Continuous <Continuous>  dextrose 50% Injectable 25 Gram(s) IV Push once  dextrose 50% Injectable 25 Gram(s) IV Push once  dextrose 50% Injectable 12.5 Gram(s) IV Push once  enoxaparin Injectable 40 milliGRAM(s) SubCutaneous every 24 hours  gabapentin 100 milliGRAM(s) Oral every 8 hours  glucagon  Injectable 1 milliGRAM(s) IntraMuscular once  insulin glargine Injectable (LANTUS) 8 Unit(s) SubCutaneous at bedtime  insulin lispro (ADMELOG) corrective regimen sliding scale   SubCutaneous three times a day before meals  insulin lispro Injectable (ADMELOG) 10 Unit(s) SubCutaneous three times a day before meals  losartan 100 milliGRAM(s) Oral daily  piperacillin/tazobactam IVPB.. 4.5 Gram(s) IV Intermittent every 8 hours  vancomycin  IVPB 1500 milliGRAM(s) IV Intermittent every 12 hours    MEDICATIONS  (PRN):  acetaminophen     Tablet .. 650 milliGRAM(s) Oral every 6 hours PRN Temp greater or equal to 38C (100.4F), Mild Pain (1 - 3)  aluminum hydroxide/magnesium hydroxide/simethicone Suspension 30 milliLiter(s) Oral every 4 hours PRN Dyspepsia  dextrose Oral Gel 15 Gram(s) Oral once PRN Blood Glucose LESS THAN 70 milliGRAM(s)/deciliter  melatonin 3 milliGRAM(s) Oral at bedtime PRN Insomnia  ondansetron Injectable 4 milliGRAM(s) IV Push every 8 hours PRN Nausea and/or Vomiting      Allergies    cefepime (Rash; Hives)  ciprofloxacin (Hives; Rash)    Intolerances        Vital Signs Last 24 Hrs  T(C): 36.6 (08 Feb 2024 05:49), Max: 37.2 (07 Feb 2024 23:05)  T(F): 97.8 (08 Feb 2024 05:49), Max: 99 (07 Feb 2024 23:05)  HR: 72 (08 Feb 2024 05:49) (72 - 91)  BP: 162/83 (08 Feb 2024 05:49) (137/63 - 177/98)  BP(mean): 107 (07 Feb 2024 23:05) (107 - 107)  RR: 18 (08 Feb 2024 05:49) (17 - 18)  SpO2: 98% (08 Feb 2024 05:49) (97% - 98%)    Parameters below as of 08 Feb 2024 05:49  Patient On (Oxygen Delivery Method): room air              Constitutional: Awake     Eyes: OB    ENMT: Negative    Neck: Supple    Back:  no tenderness     Respiratory:  clear     Cardiovascular: S1 S2    Gastrointestinal: soft     Genitourinary:    Extremities: no edema;  Feet with dressing     Vascular:    Neurological:    Skin:    Lymph Nodes:            LABS:                        9.0    8.24  )-----------( 441      ( 08 Feb 2024 05:30 )             29.8     02-08    135  |  103  |  27<H>  ----------------------------<  242<H>  5.1   |  24  |  1.44<H>    Ca    9.0      08 Feb 2024 05:30  Phos  3.9     02-08  Mg     2.0     02-08    TPro  6.0  /  Alb  2.9<L>  /  TBili  0.4  /  DBili  x   /  AST  10  /  ALT  6<L>  /  AlkPhos  93  02-08    PT/INR - ( 08 Feb 2024 05:30 )   PT: 12.4 sec;   INR: 1.09          PTT - ( 08 Feb 2024 05:30 )  PTT:30.9 sec  Urinalysis Basic - ( 08 Feb 2024 05:30 )    Color: x / Appearance: x / SG: x / pH: x  Gluc: 242 mg/dL / Ketone: x  / Bili: x / Urobili: x   Blood: x / Protein: x / Nitrite: x   Leuk Esterase: x / RBC: x / WBC x   Sq Epi: x / Non Sq Epi: x / Bacteria: x        RADIOLOGY & ADDITIONAL TESTS:

## 2024-02-08 NOTE — OCCUPATIONAL THERAPY INITIAL EVALUATION ADULT - LIVES WITH, PROFILE
PT TO CONTACT [facility] FOR SPINAL CORD STIMULATOR TO BE REPROGRAMMED  YAHAIRA BUENO  [phone].    CAUDAL BRENDA    11-8-2022 AT 11        ALL PATIENTS TO HAVE COVID TESTING TO BE DONE 48-72 HOURS PRIOR TO PROCEDURE IF PT HAS NOT RECEIVED BOTH COVID VACCINATIONS OR HAS BEEN VACCINATED WITHIN THE LAST 2 WEEKS.     IF PATIENT HAD BOTH VACCINES AT GREATER THAN  TWO WEEKS PRIOR TO PROCEDURE , PT DOES NOT HAVE TO HAVE COVID TESTING, VACCINATION CARD MUST BE PROVIDED  OR   PT MUST HAVE COVID TESTING IN ORDER TO HAVE PROCEDURE.     If you have not had the covid vaccine and have tested positive in a clinical setting 60 days prior to procedure, you do not have to be tested for covid.    IF YOUR  PROCEDURE IS ON A Tuesday, GET COVID TESTING ON THE  Friday BEFORE PROCEDURE.    IF YOUR PROCEDURE IS ON Thursday, HAVE COVID TESTING ON THE Monday OR Tuesday PRIOR TO PROCEDURE  IF YOUR PROCEDURE IS ON A Friday, GET COVID TESTING ON THE Tuesday  OR Wednesday PRIOR TO PROCEDURE      COVID TESTING TO BE DONE AT [facility] IN THE LAB DEPARTMENT OR YOUR PRIMARY CARE DOCTOR MAY ORDER IT FOR YOU.IF YOUR PRIMARY  CARE DOCTOR ORDERS YOUR COVID TESTING YOU MUST BRING YOUR RESULTS WITH YOU TO YOUR PROCEDURE.     HOME COVID TESTING ARE NOT ALLOWED TO BE USED FOR TESTING FOR PROCEDURE.      Procedure Instructions:    Nothing to eat or drink for 8 hours or after midnight including gum, candy, mints, or tobacco products.  If you are scheduled for 1:30 or later nothing to eat or drink after 5 a.m. the morning of the procedure, including gum, candy, mints, or tobacco products.  Must have a  at least 18 yrs of age to stay present at all times  No Diabetic medications the morning of procedure, check blood sugar the morning of procedure, if it is greater than 200 call the office at [phone]  If you are started on antibiotics or have been prescribed antibiotics, have a fever, or have any other type of infection call to reschedule  procedure.  If you take blood pressure medications you can take it at your regular scheduled time with a small sip of WATER!    HOLD ASPIRIN AND ASPIRIN PRODUCTS  (ASPIRIN, BC POWDER ETC. ) FOR 7 DAYS  PRIOR TO PROCEDURE  HOLD NSAIDS( ibuprofen, mobic, meloxicam, advil, diclofenac, naproxen, relafen, celebrex,  methotrexate, aleve etc....)  FOR 3 DAYS   PRIOR TO PROCEDURE       and grandson in elevator apartment with no CAMRYN./spouse

## 2024-02-08 NOTE — OCCUPATIONAL THERAPY INITIAL EVALUATION ADULT - DIAGNOSIS, OT EVAL
Pt presenting with no significant deficits and performing all ADL/functional mobility independently at this time.

## 2024-02-09 ENCOUNTER — NON-APPOINTMENT (OUTPATIENT)
Age: 64
End: 2024-02-09

## 2024-02-09 ENCOUNTER — TRANSCRIPTION ENCOUNTER (OUTPATIENT)
Age: 64
End: 2024-02-09

## 2024-02-09 VITALS
HEART RATE: 74 BPM | RESPIRATION RATE: 16 BRPM | OXYGEN SATURATION: 97 % | DIASTOLIC BLOOD PRESSURE: 79 MMHG | TEMPERATURE: 98 F | SYSTOLIC BLOOD PRESSURE: 160 MMHG

## 2024-02-09 DIAGNOSIS — J18.9 PNEUMONIA, UNSPECIFIED ORGANISM: ICD-10-CM

## 2024-02-09 LAB
GLUCOSE BLDC GLUCOMTR-MCNC: 157 MG/DL — HIGH (ref 70–99)
GLUCOSE BLDC GLUCOMTR-MCNC: 245 MG/DL — HIGH (ref 70–99)
GLUCOSE BLDC GLUCOMTR-MCNC: 515 MG/DL — CRITICAL HIGH (ref 70–99)

## 2024-02-09 PROCEDURE — 82962 GLUCOSE BLOOD TEST: CPT

## 2024-02-09 PROCEDURE — 84145 PROCALCITONIN (PCT): CPT

## 2024-02-09 PROCEDURE — 80048 BASIC METABOLIC PNL TOTAL CA: CPT

## 2024-02-09 PROCEDURE — 71250 CT THORAX DX C-: CPT | Mod: MC

## 2024-02-09 PROCEDURE — 87040 BLOOD CULTURE FOR BACTERIA: CPT

## 2024-02-09 PROCEDURE — 73630 X-RAY EXAM OF FOOT: CPT

## 2024-02-09 PROCEDURE — 83605 ASSAY OF LACTIC ACID: CPT

## 2024-02-09 PROCEDURE — 97165 OT EVAL LOW COMPLEX 30 MIN: CPT

## 2024-02-09 PROCEDURE — 86140 C-REACTIVE PROTEIN: CPT

## 2024-02-09 PROCEDURE — 85730 THROMBOPLASTIN TIME PARTIAL: CPT

## 2024-02-09 PROCEDURE — 84100 ASSAY OF PHOSPHORUS: CPT

## 2024-02-09 PROCEDURE — 87641 MR-STAPH DNA AMP PROBE: CPT

## 2024-02-09 PROCEDURE — 71045 X-RAY EXAM CHEST 1 VIEW: CPT

## 2024-02-09 PROCEDURE — 87640 STAPH A DNA AMP PROBE: CPT

## 2024-02-09 PROCEDURE — 0225U NFCT DS DNA&RNA 21 SARSCOV2: CPT

## 2024-02-09 PROCEDURE — 83735 ASSAY OF MAGNESIUM: CPT

## 2024-02-09 PROCEDURE — 97161 PT EVAL LOW COMPLEX 20 MIN: CPT

## 2024-02-09 PROCEDURE — 85025 COMPLETE CBC W/AUTO DIFF WBC: CPT

## 2024-02-09 PROCEDURE — 85610 PROTHROMBIN TIME: CPT

## 2024-02-09 PROCEDURE — 36415 COLL VENOUS BLD VENIPUNCTURE: CPT

## 2024-02-09 PROCEDURE — 80202 ASSAY OF VANCOMYCIN: CPT

## 2024-02-09 PROCEDURE — 80053 COMPREHEN METABOLIC PANEL: CPT

## 2024-02-09 PROCEDURE — 83036 HEMOGLOBIN GLYCOSYLATED A1C: CPT

## 2024-02-09 PROCEDURE — 96374 THER/PROPH/DIAG INJ IV PUSH: CPT

## 2024-02-09 PROCEDURE — 99285 EMERGENCY DEPT VISIT HI MDM: CPT | Mod: 25

## 2024-02-09 RX ORDER — AMOXICILLIN AND CLAVULANATE POTASSIUM 875; 125 MG/1; MG/1
875-125 TABLET, COATED ORAL
Qty: 14 | Refills: 0 | Status: ACTIVE | COMMUNITY
Start: 2024-02-09 | End: 1900-01-01

## 2024-02-09 RX ORDER — LANCETS 30 GAUGE
EACH MISCELLANEOUS
Qty: 1 | Refills: 0 | Status: ACTIVE | COMMUNITY
Start: 2024-02-09 | End: 1900-01-01

## 2024-02-09 RX ORDER — GABAPENTIN 400 MG/1
1 CAPSULE ORAL
Qty: 0 | Refills: 0 | DISCHARGE
Start: 2024-02-09

## 2024-02-09 RX ORDER — METOPROLOL TARTRATE 50 MG
1 TABLET ORAL
Qty: 0 | Refills: 0 | DISCHARGE

## 2024-02-09 RX ORDER — CARVEDILOL PHOSPHATE 80 MG/1
1 CAPSULE, EXTENDED RELEASE ORAL
Qty: 0 | Refills: 0 | DISCHARGE
Start: 2024-02-09

## 2024-02-09 RX ORDER — AMLODIPINE BESYLATE AND BENAZEPRIL HYDROCHLORIDE 10; 20 MG/1; MG/1
1 CAPSULE ORAL
Qty: 0 | Refills: 0 | DISCHARGE

## 2024-02-09 RX ORDER — ALBUTEROL SULFATE 90 UG/1
108 (90 BASE) INHALANT RESPIRATORY (INHALATION)
Qty: 1 | Refills: 3 | Status: ACTIVE | COMMUNITY
Start: 2024-02-09 | End: 1900-01-01

## 2024-02-09 RX ADMIN — PIPERACILLIN AND TAZOBACTAM 25 GRAM(S): 4; .5 INJECTION, POWDER, LYOPHILIZED, FOR SOLUTION INTRAVENOUS at 00:26

## 2024-02-09 RX ADMIN — PIPERACILLIN AND TAZOBACTAM 25 GRAM(S): 4; .5 INJECTION, POWDER, LYOPHILIZED, FOR SOLUTION INTRAVENOUS at 08:00

## 2024-02-09 RX ADMIN — Medication 2: at 13:20

## 2024-02-09 RX ADMIN — Medication 4: at 09:26

## 2024-02-09 RX ADMIN — Medication 166.67 MILLIGRAM(S): at 11:14

## 2024-02-09 RX ADMIN — Medication 81 MILLIGRAM(S): at 08:57

## 2024-02-09 RX ADMIN — GABAPENTIN 100 MILLIGRAM(S): 400 CAPSULE ORAL at 06:10

## 2024-02-09 RX ADMIN — LOSARTAN POTASSIUM 100 MILLIGRAM(S): 100 TABLET, FILM COATED ORAL at 06:09

## 2024-02-09 RX ADMIN — Medication 10 UNIT(S): at 09:27

## 2024-02-09 RX ADMIN — CARVEDILOL PHOSPHATE 6.25 MILLIGRAM(S): 80 CAPSULE, EXTENDED RELEASE ORAL at 06:09

## 2024-02-09 NOTE — DISCHARGE NOTE PROVIDER - CARE PROVIDER_API CALL
Tori Logan J  Critical Care Medicine  [address] [address]  Phone: [phone]  Fax: [phone]  Established Patient  Follow Up Time:     Aldair Lamar  Podiatric Medicine  [address] [place] [address], [address]  [place] [address]  Phone: [phone]  Fax: [phone]  Established Patient  Follow Up Time:

## 2024-02-09 NOTE — DISCHARGE NOTE PROVIDER - NSDCCPTREATMENT_GEN_ALL_CORE_FT
PRINCIPAL PROCEDURE  Procedure: CT chest wo con  Findings and Treatment: IMPRESSION:  Patchy consolidative changes in the right middle lobe and groundglass   opacities in the left upper lobe suggesting multifocal pneumonia.  Bilateral pleural effusions and bibasilar atelectasis right greater than   left.        SECONDARY PROCEDURE  Procedure: XR foot left, 3 views  Findings and Treatment: IMPRESSION:  Left foot TMA defect with smooth corticated stump margins and maintain   overlying soft tissue coverage. Well marginated erosion along lateral   right 2nd metatarsal head margin. Chronically resorbed/diminutive   appearing right 4th distal phalanx. No tracking gas collections or gross   radiographic evidence for osteomyelitis on either side, however if there   is further concern MRI suggested to further assess.  No acute fractures or dislocations.  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 Preserved remaining joint spaces   and no joint margin erosions.

## 2024-02-09 NOTE — PROGRESS NOTE ADULT - PROBLEM SELECTOR PLAN 4
PAD, s/p multiple L toe amputations, s/p stent of SFA/pop (2/2023), s/p Left TMA w/ residual OM (VRE). At home on ASA 81 mg and crestor 10 mg qhs  - continue with ASA and statin   - follows with wound care out-patient  - podiatry consulted as he is well known to their service, they rec local wound care: wounds cleansed with normal saline. Betadine DSD applied to L foot wound and WTD dressing applied to R foot  - Foot XR unchanged  - wearing foot brace  - cw gabapentin 100 mg q8  - f/u ESR/CRP
PAD, s/p multiple L toe amputations, s/p stent of SFA/pop (2/2023), s/p Left TMA w/ residual OM (VRE). At home on ASA 81 mg and crestor 10 mg qhs  - continue with ASA and statin   - follows with wound care out-patient  - podiatry consulted as he is well known to their service, they rec local wound care: wounds cleansed with normal saline. Betadine DSD applied to L foot wound and WTD dressing applied to R foot  - Foot XR unchanged  - wearing foot brace  - cw gabapentin 100 mg q8  - f/u ESR/CRP

## 2024-02-09 NOTE — PROGRESS NOTE ADULT - SUBJECTIVE AND OBJECTIVE BOX
OVERNIGHT EVENTS:    SUBJECTIVE / INTERVAL HPI: Patient seen and examined at bedside.     VITAL SIGNS:  Vital Signs Last 24 Hrs  T(C): 36.6 (09 Feb 2024 08:55), Max: 36.6 (09 Feb 2024 08:55)  T(F): 97.9 (09 Feb 2024 08:55), Max: 97.9 (09 Feb 2024 08:55)  HR: 74 (09 Feb 2024 08:55) (71 - 83)  BP: 160/79 (09 Feb 2024 08:55) (150/76 - 169/80)  BP(mean): --  RR: 16 (09 Feb 2024 08:55) (16 - 18)  SpO2: 97% (09 Feb 2024 08:55) (96% - 98%)    Parameters below as of 08 Feb 2024 21:13  Patient On (Oxygen Delivery Method): room air        PHYSICAL EXAM:    General: alert, in no acute distress  HEENT: NC/AT; PERRL, anicteric sclera; MMM  Neck: supple  Cardiovascular: +S1/S2, RRR  Respiratory: CTA B/L; no W/R/R  Gastrointestinal: soft, NT/ND; +BSx4  Extremities: WWP; no edema, clubbing or cyanosis  Vascular: 2+ radial, DP/PT pulses B/L  Neurological: AAOx3; no focal deficits    MEDICATIONS:  MEDICATIONS  (STANDING):  aspirin  chewable 81 milliGRAM(s) Oral daily  atorvastatin 40 milliGRAM(s) Oral at bedtime  carvedilol 6.25 milliGRAM(s) Oral every 12 hours  dextrose 5%. 1000 milliLiter(s) (50 mL/Hr) IV Continuous <Continuous>  dextrose 5%. 1000 milliLiter(s) (100 mL/Hr) IV Continuous <Continuous>  dextrose 50% Injectable 12.5 Gram(s) IV Push once  dextrose 50% Injectable 25 Gram(s) IV Push once  dextrose 50% Injectable 25 Gram(s) IV Push once  enoxaparin Injectable 40 milliGRAM(s) SubCutaneous every 24 hours  gabapentin 100 milliGRAM(s) Oral every 8 hours  glucagon  Injectable 1 milliGRAM(s) IntraMuscular once  insulin glargine Injectable (LANTUS) 8 Unit(s) SubCutaneous at bedtime  insulin lispro (ADMELOG) corrective regimen sliding scale   SubCutaneous three times a day before meals  insulin lispro Injectable (ADMELOG) 10 Unit(s) SubCutaneous three times a day before meals  losartan 100 milliGRAM(s) Oral daily  piperacillin/tazobactam IVPB.. 4.5 Gram(s) IV Intermittent every 8 hours  vancomycin  IVPB 1250 milliGRAM(s) IV Intermittent every 12 hours    MEDICATIONS  (PRN):  acetaminophen     Tablet .. 650 milliGRAM(s) Oral every 6 hours PRN Temp greater or equal to 38C (100.4F), Mild Pain (1 - 3)  aluminum hydroxide/magnesium hydroxide/simethicone Suspension 30 milliLiter(s) Oral every 4 hours PRN Dyspepsia  dextrose Oral Gel 15 Gram(s) Oral once PRN Blood Glucose LESS THAN 70 milliGRAM(s)/deciliter  melatonin 3 milliGRAM(s) Oral at bedtime PRN Insomnia  ondansetron Injectable 4 milliGRAM(s) IV Push every 8 hours PRN Nausea and/or Vomiting      ALLERGIES:  Allergies    cefepime (Rash; Hives)  ciprofloxacin (Hives; Rash)    Intolerances        LABS:                        9.0    8.24  )-----------( 441      ( 08 Feb 2024 05:30 )             29.8     02-08    135  |  103  |  27<H>  ----------------------------<  242<H>  5.1   |  24  |  1.44<H>    Ca    9.0      08 Feb 2024 05:30  Phos  3.9     02-08  Mg     2.0     02-08    TPro  6.0  /  Alb  2.9<L>  /  TBili  0.4  /  DBili  x   /  AST  10  /  ALT  6<L>  /  AlkPhos  93  02-08    PT/INR - ( 08 Feb 2024 05:30 )   PT: 12.4 sec;   INR: 1.09          PTT - ( 08 Feb 2024 05:30 )  PTT:30.9 sec  Urinalysis Basic - ( 08 Feb 2024 05:30 )    Color: x / Appearance: x / SG: x / pH: x  Gluc: 242 mg/dL / Ketone: x  / Bili: x / Urobili: x   Blood: x / Protein: x / Nitrite: x   Leuk Esterase: x / RBC: x / WBC x   Sq Epi: x / Non Sq Epi: x / Bacteria: x      CAPILLARY BLOOD GLUCOSE      POCT Blood Glucose.: 245 mg/dL (09 Feb 2024 09:15)      RADIOLOGY & ADDITIONAL TESTS: Reviewed. OVERNIGHT EVENTS: NILAY    SUBJECTIVE / INTERVAL HPI: Patient seen and examined at bedside. Feeling well. No new complaints.     VITAL SIGNS:  Vital Signs Last 24 Hrs  T(C): 36.6 (09 Feb 2024 08:55), Max: 36.6 (09 Feb 2024 08:55)  T(F): 97.9 (09 Feb 2024 08:55), Max: 97.9 (09 Feb 2024 08:55)  HR: 74 (09 Feb 2024 08:55) (71 - 83)  BP: 160/79 (09 Feb 2024 08:55) (150/76 - 169/80)  BP(mean): --  RR: 16 (09 Feb 2024 08:55) (16 - 18)  SpO2: 97% (09 Feb 2024 08:55) (96% - 98%)    Parameters below as of 08 Feb 2024 21:13  Patient On (Oxygen Delivery Method): room air        PHYSICAL EXAM:    Constitutional:  resting comfortably in bed; NAD  Head: NC/AT  Eyes: PERRL, EOMI, anicteric sclera  ENT: no nasal discharge; uvula midline, no oropharyngeal erythema or exudates; MMM  Neck: supple; no JVD or thyromegaly  Respiratory: clear L lng fields, faint expiratory wheezing on R; no retractions  Cardiac: +S1/S2; RRR; no M/R/G; PMI non-displaced  Gastrointestinal: abdomen soft, NT/ND; no rebound or guarding  Musculoskeletal: NROM x4; 1+ edema of RLE, no tenderness or erythema, + boot RLE  Vascular: 2+ radial, DP pulses B/L  Neurologic: AAOx3; no focal deficits  Psychiatric: affect and characteristics of appearance, verbalizations, behaviors are appropriate      MEDICATIONS:  MEDICATIONS  (STANDING):  aspirin  chewable 81 milliGRAM(s) Oral daily  atorvastatin 40 milliGRAM(s) Oral at bedtime  carvedilol 6.25 milliGRAM(s) Oral every 12 hours  dextrose 5%. 1000 milliLiter(s) (50 mL/Hr) IV Continuous <Continuous>  dextrose 5%. 1000 milliLiter(s) (100 mL/Hr) IV Continuous <Continuous>  dextrose 50% Injectable 12.5 Gram(s) IV Push once  dextrose 50% Injectable 25 Gram(s) IV Push once  dextrose 50% Injectable 25 Gram(s) IV Push once  enoxaparin Injectable 40 milliGRAM(s) SubCutaneous every 24 hours  gabapentin 100 milliGRAM(s) Oral every 8 hours  glucagon  Injectable 1 milliGRAM(s) IntraMuscular once  insulin glargine Injectable (LANTUS) 8 Unit(s) SubCutaneous at bedtime  insulin lispro (ADMELOG) corrective regimen sliding scale   SubCutaneous three times a day before meals  insulin lispro Injectable (ADMELOG) 10 Unit(s) SubCutaneous three times a day before meals  losartan 100 milliGRAM(s) Oral daily  piperacillin/tazobactam IVPB.. 4.5 Gram(s) IV Intermittent every 8 hours  vancomycin  IVPB 1250 milliGRAM(s) IV Intermittent every 12 hours    MEDICATIONS  (PRN):  acetaminophen     Tablet .. 650 milliGRAM(s) Oral every 6 hours PRN Temp greater or equal to 38C (100.4F), Mild Pain (1 - 3)  aluminum hydroxide/magnesium hydroxide/simethicone Suspension 30 milliLiter(s) Oral every 4 hours PRN Dyspepsia  dextrose Oral Gel 15 Gram(s) Oral once PRN Blood Glucose LESS THAN 70 milliGRAM(s)/deciliter  melatonin 3 milliGRAM(s) Oral at bedtime PRN Insomnia  ondansetron Injectable 4 milliGRAM(s) IV Push every 8 hours PRN Nausea and/or Vomiting      ALLERGIES:  Allergies    cefepime (Rash; Hives)  ciprofloxacin (Hives; Rash)    Intolerances        LABS:                        9.0    8.24  )-----------( 441      ( 08 Feb 2024 05:30 )             29.8     02-08    135  |  103  |  27<H>  ----------------------------<  242<H>  5.1   |  24  |  1.44<H>    Ca    9.0      08 Feb 2024 05:30  Phos  3.9     02-08  Mg     2.0     02-08    TPro  6.0  /  Alb  2.9<L>  /  TBili  0.4  /  DBili  x   /  AST  10  /  ALT  6<L>  /  AlkPhos  93  02-08    PT/INR - ( 08 Feb 2024 05:30 )   PT: 12.4 sec;   INR: 1.09          PTT - ( 08 Feb 2024 05:30 )  PTT:30.9 sec  Urinalysis Basic - ( 08 Feb 2024 05:30 )    Color: x / Appearance: x / SG: x / pH: x  Gluc: 242 mg/dL / Ketone: x  / Bili: x / Urobili: x   Blood: x / Protein: x / Nitrite: x   Leuk Esterase: x / RBC: x / WBC x   Sq Epi: x / Non Sq Epi: x / Bacteria: x      CAPILLARY BLOOD GLUCOSE      POCT Blood Glucose.: 245 mg/dL (09 Feb 2024 09:15)      RADIOLOGY & ADDITIONAL TESTS: Reviewed.

## 2024-02-09 NOTE — PROGRESS NOTE ADULT - PROBLEM SELECTOR PLAN 2
Known history of DM2 c/b neuropathy and chronic foot wounds. On home humalog 10u with meals and lantus 8 units at bed time. Adherent with insulin regimen FS at home 160-170s.   - A1c 7.8  - moderate ISF-25 lispro sliding scale AC+qHS  - monitor FS  - consistent carb diet
Known history of DM2 c/b neuropathy and chronic foot wounds. On home humalog 10u with meals and lantus 8 units at bed time. Adherent with insulin regimen FS at home 160-170s.   - A1c 7.8  - moderate ISF-25 lispro sliding scale AC+qHS  - monitor FS  - consistent carb diet

## 2024-02-09 NOTE — CONSULT NOTE ADULT - ASSESSMENT
I M    63 y o M with PMHx of HTN, HLD, DM, PAD, s/p multiple L toe amputations, s/p stent of SFA/pop (2/2023), s/p Left TMA w/ residual OM (VRE) who presents with URI sx's and found to have multifocal PNA admitted for further management.     Problem/Plan - 1:  ·  Problem: Multifocal pneumonia.   ·  Plan: Patient presenting with. CXR with. Pneumonia etiology unclear, possibly infectious although no leukocytosis. Afebrile, VSS. Lactate negative. No respiratory distress/failure. On RA. s/p zosyn in ED. Has pseudomonal risk factors including IV abx in the last 90 days. Recent admission 2 months ago to [place] for PNA.  CT chest: Patchy consolidative changes in the right middle lobe and groundglass opacities in the left upper lobe suggesting multifocal pneumonia. Bilateral pleural effusions and bibasilar atelectasis right greater than   left.  - RVP negative  - cont vanc and zosyn for pseudomonal coverage  - f/u urine legionella and strep  - BCx NGTD  - f/u MRSA swab.    Problem/Plan - 2:  ·  Problem: DM (diabetes mellitus).   ·  Plan: Known history of DM2 c/b neuropathy and chronic foot wounds. On home humalog 10u with meals and lantus 8 units at bed time. Adherent with insulin regimen FS at home 160-170s.   - A1c 7.8  - moderate ISF-25 lispro sliding scale AC+qHS  - monitor FS  - consistent carb diet.    Problem/Plan - 3:  ·  Problem: HTN (hypertension).   ·  Plan: On admission, presented with /90. On home coreg 6.25 q12, losartan 100 mg qd (pt taking 2x daily mistakingly for some time now), lasix 20 mg q12 (recently started for LE edema)  - c/w home coreg 6.25 q12 and losartan 100 mg qd  - hold lasix for now.    Problem/Plan - 4:  ·  Problem: PAD (peripheral artery disease).   ·  Plan: PAD, s/p multiple L toe amputations, s/p stent of SFA/pop (2/2023), s/p Left TMA w/ residual OM (VRE). At home on ASA 81 mg and crestor 10 mg qhs  - continue with ASA and statin   - follows with wound care out-patient  - podiatry consulted as he is well known to their service, they rec local wound care: wounds cleansed with normal saline. Betadine DSD applied to L foot wound and WTD dressing applied to R foot  - Foot XR unchanged  - wearing foot brace  - cw gabapentin 100 mg q8  - f/u ESR/CRP.    Problem/Plan - 5:  ·  Problem: Preventive measure.   ·  Plan: Electrolytes: replete as necessary  Nutrition: carb consistent  DVT: lovenox  Code status: Full   Disposition: RMF > home.

## 2024-02-09 NOTE — PROGRESS NOTE ADULT - PROBLEM SELECTOR PLAN 5
Electrolytes: replete as necessary  Nutrition: carb consistent  DVT: lovenox  Code status: Full   Disposition: RMF > home
Electrolytes: replete as necessary  Nutrition: carb consistent  DVT: lovenox  Code status: Full   Disposition: RMF > home

## 2024-02-09 NOTE — DISCHARGE NOTE PROVIDER - NSDCMRMEDTOKEN_GEN_ALL_CORE_FT
Aspirin Enteric Coated 81 mg oral delayed release tablet: 1 tab(s) orally once a day  Augmentin 500 mg-125 mg oral tablet: 500 milligram(s) orally every 8 hours  carvedilol 6.25 mg oral tablet: 1 tab(s) orally every 12 hours  HumaLOG KwikPen 100 units/mL injectable solution: 8 unit(s) injectable 3 times a day (before meals)   Lantus Solostar Pen 100 units/mL subcutaneous solution: 50 unit(s) subcutaneous once a day (at bedtime)  losartan 100 mg oral tablet: 1 tab(s) orally once a day  Neurontin 100 mg oral capsule: 1 cap(s) orally every 8 hours  rosuvastatin 10 mg oral tablet: 1 tab(s) orally once a day

## 2024-02-09 NOTE — DISCHARGE NOTE PROVIDER - HOSPITAL COURSE
#Discharge: do not delete    64 y/o M with PMHx of HTN, HLD, DM, PAD, s/p multiple L toe amputations, s/p stent of SFA/pop (2/2023), s/p Left TMA w/ residual OM (VRE) who presents with URI sx's and found to have multifocal PNA admitted for further management.      Problem List/Main Diagnoses (system-based):    #Multifocal pneumonia.   CT chest: Patchy consolidative changes in the right middle lobe and ground glass opacities in the left upper lobe suggesting multifocal pneumonia. Bilateral pleural effusions and bibasilar atelectasis right greater than left. RVP negative  - s/p vanc and zosyn for pseudomonal coverage  - BCx NGTD  - MRSA swab  - continue with augment x1 wk    #DM (diabetes mellitus).   Known history of DM2 c/b neuropathy and chronic foot wounds.   - continue with home humalog 10u with meals and lantus 8 units at bed time.      #HTN (hypertension).   - c/w home coreg 6.25 q12 and losartan 100 mg qd  - hold lasix for now.    #PAD (peripheral artery disease).   PAD, s/p multiple L toe amputations, s/p stent of SFA/pop (2/2023), s/p Left TMA w/ residual OM (VRE). At home on ASA 81 mg and crestor 10 mg qhs  - continue with ASA and statin   - follow-up wound care out-patient    Patient was discharged to: home    New medications:  Changes to old medications:  Medications that were stopped:    Items to follow up as outpatient:      Physical exam at the time of discharge:

## 2024-02-09 NOTE — DISCHARGE NOTE PROVIDER - CARE PROVIDERS DIRECT ADDRESSES
[email or web address]

## 2024-02-09 NOTE — CONSULT NOTE ADULT - SUBJECTIVE AND OBJECTIVE BOX
Patient is a 63y old  Male who presents with a chief complaint of PNA (09 Feb 2024 08:54)      HPI:  64 y/o M with PMHx of HTN, HLD, DM, PAD, s/p multiple L toe amputations, s/p stent of SFA/pop (2/2023), s/p Left TMA w/ residual OM (VRE) who presents to [facility] ED from [facility] due to cough and generalized weakness where he was diagnosed with multifocal PNA. He decided to come here because he follows with Dr. Logan. He states that over the last 2 days he's had generalized fatigue, productive cough, SOB, and chest tightness, now improved since abx administration at [facility]. Denies any fevers, chills, n/v/abd pain or urinary sx's. Notes wife is also recovering from URI. Home covid testing negative. Of note, pt with admission to [place] 2 months ago for PNA. He says he is adherent to his home medications - recently started on lasix 20 mg q12 for RLE swelling yesterday but only took one dose. Notes improvement in edema.  Adherent with insulin regimen FS at home 160-170s.   Follow's with wound care out-patient for LLE wound. Per patient, healing with no s/sx's of infection. Wearing a foot brace to ambulate. Denies any worsening pain.    In the ED:  Initial vital signs: T: 97.7 F, HR: 86, BP: 177/98, R: 18, SpO2: 98% on RA  Labs: significant for WBC wnl, plt 488, lactate negative  Imaging:  CXR: multifocal PNA  Medications: vanc and zosyn  Consults: none  (07 Feb 2024 18:01)    PAST MEDICAL & SURGICAL HISTORY:  HTN (hypertension)      HLD (hyperlipidemia)      DM (diabetes mellitus)      PAD (peripheral artery disease)        MEDICATIONS  (STANDING):  aspirin  chewable 81 milliGRAM(s) Oral daily  atorvastatin 40 milliGRAM(s) Oral at bedtime  carvedilol 6.25 milliGRAM(s) Oral every 12 hours  dextrose 5%. 1000 milliLiter(s) (50 mL/Hr) IV Continuous <Continuous>  dextrose 5%. 1000 milliLiter(s) (100 mL/Hr) IV Continuous <Continuous>  dextrose 50% Injectable 25 Gram(s) IV Push once  dextrose 50% Injectable 25 Gram(s) IV Push once  dextrose 50% Injectable 12.5 Gram(s) IV Push once  enoxaparin Injectable 40 milliGRAM(s) SubCutaneous every 24 hours  gabapentin 100 milliGRAM(s) Oral every 8 hours  glucagon  Injectable 1 milliGRAM(s) IntraMuscular once  insulin glargine Injectable (LANTUS) 8 Unit(s) SubCutaneous at bedtime  insulin lispro (ADMELOG) corrective regimen sliding scale   SubCutaneous three times a day before meals  insulin lispro Injectable (ADMELOG) 10 Unit(s) SubCutaneous three times a day before meals  losartan 100 milliGRAM(s) Oral daily  piperacillin/tazobactam IVPB.. 4.5 Gram(s) IV Intermittent every 8 hours  vancomycin  IVPB 1250 milliGRAM(s) IV Intermittent every 12 hours    MEDICATIONS  (PRN):  acetaminophen     Tablet .. 650 milliGRAM(s) Oral every 6 hours PRN Temp greater or equal to 38C (100.4F), Mild Pain (1 - 3)  aluminum hydroxide/magnesium hydroxide/simethicone Suspension 30 milliLiter(s) Oral every 4 hours PRN Dyspepsia  dextrose Oral Gel 15 Gram(s) Oral once PRN Blood Glucose LESS THAN 70 milliGRAM(s)/deciliter  melatonin 3 milliGRAM(s) Oral at bedtime PRN Insomnia  ondansetron Injectable 4 milliGRAM(s) IV Push every 8 hours PRN Nausea and/or Vomiting          Home Living Status :  lives with his wife and grandson in an elevator accessible apartment building ,  0 steps to enter          -  Prior Home Care Services :  none          Baseline Functional Level Prior to Admission :             - ADL's/ IADL's :  independent          - Ambulatory status prior to admission :   walked with no assistive devices          FAMILY HISTORY:      CBC Full  -  ( 08 Feb 2024 05:30 )  WBC Count : 8.24 K/uL  RBC Count : 3.30 M/uL  Hemoglobin : 9.0 g/dL  Hematocrit : 29.8 %  Platelet Count - Automated : 441 K/uL  Mean Cell Volume : 90.3 fl  Mean Cell Hemoglobin : 27.3 pg  Mean Cell Hemoglobin Concentration : 30.2 gm/dL  Auto Neutrophil # : 4.38 K/uL  Auto Lymphocyte # : 2.51 K/uL  Auto Monocyte # : 0.67 K/uL  Auto Eosinophil # : 0.54 K/uL  Auto Basophil # : 0.08 K/uL  Auto Neutrophil % : 53.1 %  Auto Lymphocyte % : 30.5 %  Auto Monocyte % : 8.1 %  Auto Eosinophil % : 6.6 %  Auto Basophil % : 1.0 %      02-08    135  |  103  |  27<H>  ----------------------------<  242<H>  5.1   |  24  |  1.44<H>    Ca    9.0      08 Feb 2024 05:30  Phos  3.9     02-08  Mg     2.0     02-08    TPro  6.0  /  Alb  2.9<L>  /  TBili  0.4  /  DBili  x   /  AST  10  /  ALT  6<L>  /  AlkPhos  93  02-08      Urinalysis Basic - ( 08 Feb 2024 05:30 )    Color: x / Appearance: x / SG: x / pH: x  Gluc: 242 mg/dL / Ketone: x  / Bili: x / Urobili: x   Blood: x / Protein: x / Nitrite: x   Leuk Esterase: x / RBC: x / WBC x   Sq Epi: x / Non Sq Epi: x / Bacteria: x        Radiology :             Review of Systems : per HPI         Vital Signs Last 24 Hrs  T(C): 36.6 (09 Feb 2024 08:55), Max: 36.6 (09 Feb 2024 08:55)  T(F): 97.9 (09 Feb 2024 08:55), Max: 97.9 (09 Feb 2024 08:55)  HR: 74 (09 Feb 2024 08:55) (71 - 83)  BP: 160/79 (09 Feb 2024 08:55) (150/76 - 169/80)  BP(mean): --  RR: 16 (09 Feb 2024 08:55) (16 - 18)  SpO2: 97% (09 Feb 2024 08:55) (96% - 98%)    Parameters below as of 08 Feb 2024 21:13  Patient On (Oxygen Delivery Method): room air            Physical Exam :  on AIRBORNE & CONTACT COVID 19 DROPLET isolation , in accordance with current standards limiting patient contact , please refer to exam performed on    ,  lying comfortably in semi Morrow's position , awake , alert , no acute complaints , feels tired     Head : normocephalic , atraumatic    Eyes : PERRLA , EOMI , no nystagmus , sclera anicteric    ENT / FACE : neg nasal discharge , uvula midline , no oropharyngeal erythema / exudate    Neck : supple , negative JVD , negative carotid bruits , no thyromegaly    Chest : CTA bilaterally , neg wheeze / rhonchi / rales / crackles / egophany    Cardiovascular : regular rate and rhythm , neg murmurs / rubs / gallops    Abdomen : soft , non distended , non tender to palpation in all 4 quadrants ,  normal bowel sounds     Extremities : estee foot dressings     Neurologic Exam :     Alert and oriented  x 3        Motor Exam:                Right UE:                     no focal weakness ,  > 3+/5 throughout     Left UE:                       no focal weakness ,  > 3+/5 throughout          Right LE:          no focal weakness ,  > 3+/5 throughout       Left LE:          no focal weakness ,  > 3+/5 throughout       Sensation :         intact to light touch x 4 extremities     DTR :           biceps/brachioradialis : equal                            patella/ankle : equal      Gait :  not tested          PM&R Impression : admitted for multifocal PNA     - deconditioned         Recommendations / Plan :       1) Physical / Occupational therapy focusing on therapeutic exercises , equipment evaluation , bed mobility/transfer out of bed evaluation , progressive ambulation with assistive devices prn .    2) Current disposition plan recommendation  :     d/c home with outpatient physical therapy      
Attending: Dr. Lamar    Patient is a 63y old  Male who presents with a chief complaint of PNA (07 Feb 2024 18:01)      HPI:  62 y/o M with PMHx of HTN, HLD, DM, PAD, s/p multiple L toe amputations, s/p stent of SFA/pop (2/2023), s/p Left TMA w/ residual OM (VRE) who presents to [facility] ED from [facility] due to cough and generalized weakness where he was diagnosed with multifocal PNA. He decided to come here because he follows with Dr. Logan. He states that over the last 2 days he's had generalized fatigue, productive cough, SOB, and chest tightness, now improved since abx administration at [facility]. Denies any fevers, chills, n/v/abd pain or urinary sx's. Notes wife is also recovering from URI. Home covid testing negative. Of note, pt with admission to [place] 2 months ago for PNA. He says he is adherent to his home medications - recently started on lasix 20 mg q12 for RLE swelling yesterday but only took one dose. Notes improvement in edema.  Adherent with insulin regimen FS at home 160-170s.   Follow's with wound care out-patient for LLE wound. Per patient, healing with no s/sx's of infection. Wearing a foot brace to ambulate. Denies any worsening pain.  Podiatry addendum: 62 y/o M with PMHx of HTN, HLD, DM, PAD (s/p stent of SFA/pop (2/2023)), OM (s/p Left TMA w/ residual OM (VRE)) admitted to [facility] for pneumonia. Podiatry consulted for bilateral foot wounds. Pt states he developed L foot wound shortly after his L TMA in 2/2023. Denies history of infection in the wound. Initially, was seeing podiatrist Dr. Lamar weekly, but is now seeing him monthly. Pt last saw Dr. Lamar 5 days ago (on 2/2) who informed him there was no signs of infection at the time of visit. Patient changes his dressing daily. Has not noticed any new signs of infection. Also complains of recent wound on his right foot. Denies any infection from right foot wound.       Review of systems negative except per HPI and as stated below  General:	 no weakness; no fevers, no chills  Skin/Breast: no rash  Respiratory and Thorax: no SOB, no cough  Cardiovascular:	No chest pain  Gastrointestinal:	 no nausea, vomiting , diarrhea  Genitourinary:	no dysuria, no difficulty urinating, no hematuria  Musculoskeletal:	no weakness, no joint swelling/pain  Neurological:	no focal weakness/numbness  Endocrine:	no polyuria, no polydipsia    PAST MEDICAL & SURGICAL HISTORY:  HTN (hypertension)      HLD (hyperlipidemia)      DM (diabetes mellitus)      PAD (peripheral artery disease)        Home Medications:  amlodipine-benazepril 10 mg-40 mg oral capsule: 1 cap(s) orally once a day (11 Jun 2023 13:14)  Aspirin Enteric Coated 81 mg oral delayed release tablet: 1 tab(s) orally once a day (11 Jun 2023 13:14)  Lantus Solostar Pen 100 units/mL subcutaneous solution: 50 unit(s) subcutaneous once a day (at bedtime) (11 Jun 2023 14:27)  metoprolol succinate 50 mg oral capsule, extended release: 1 orally once a day (13 Jun 2023 14:52)  rosuvastatin 10 mg oral tablet: 1 tab(s) orally once a day (11 Jun 2023 13:14)    Allergies    cefepime (Rash; Hives)  ciprofloxacin (Hives; Rash)    Intolerances      FAMILY HISTORY:    Social History:       LABS                        10.0   8.49  )-----------( 488      ( 07 Feb 2024 16:13 )             32.0     02-07    135  |  102  |  26<H>  ----------------------------<  285<H>  4.7   |  23  |  1.34<H>    Ca    9.4      07 Feb 2024 16:13  Phos  3.8     02-07  Mg     2.0     02-07          Vital Signs Last 24 Hrs  T(C): 36.6 (07 Feb 2024 18:30), Max: 36.6 (07 Feb 2024 18:30)  T(F): 97.9 (07 Feb 2024 18:30), Max: 97.9 (07 Feb 2024 18:30)  HR: 84 (07 Feb 2024 18:30) (84 - 86)  BP: 171/105 (07 Feb 2024 18:30) (171/105 - 177/98)  BP(mean): --  RR: 18 (07 Feb 2024 18:30) (18 - 18)  SpO2: 97% (07 Feb 2024 18:30) (97% - 98%)    Parameters below as of 07 Feb 2024 18:30  Patient On (Oxygen Delivery Method): room air        PHYSICAL EXAM  General: NAD, AA0x3    Lower Extremity Focused:  Vasc: DP/PT 2/4 b/l, No edema or erythema  Derm: L foot sub 5th met shaft wound with granular base, macerated border, serous drainage, undermining present along medial aspect of wound, no tracking, no purulence or malodor, -PTB; Right foot sub 1st met head wound with granular base, serous drainage, no tracking or tunneling, -PTB, no purulence or malodor; Right foot hyperkeratotic lesion sub 5th met head  Neuro: Protective sensation in tact  MSK: L TMA    RADIOLOGY  X-rays pending

## 2024-02-09 NOTE — PROGRESS NOTE ADULT - SUBJECTIVE AND OBJECTIVE BOX
Patient is a 63y old  Male who presents with a chief complaint of PNA (08 Feb 2024 11:32)      INTERVAL HPI/ OVERNIGHT EVENTS: Pt evaluated at bedside this AM. No new pedal complaints at this time. Dressings are C/D/I.      LABS                        9.0    8.24  )-----------( 441      ( 08 Feb 2024 05:30 )             29.8     02-08    135  |  103  |  27<H>  ----------------------------<  242<H>  5.1   |  24  |  1.44<H>    Ca    9.0      08 Feb 2024 05:30  Phos  3.9     02-08  Mg     2.0     02-08    TPro  6.0  /  Alb  2.9<L>  /  TBili  0.4  /  DBili  x   /  AST  10  /  ALT  6<L>  /  AlkPhos  93  02-08    PT/INR - ( 08 Feb 2024 05:30 )   PT: 12.4 sec;   INR: 1.09          PTT - ( 08 Feb 2024 05:30 )  PTT:30.9 sec    ICU Vital Signs Last 24 Hrs  T(C): 36.6 (09 Feb 2024 08:55), Max: 36.6 (09 Feb 2024 08:55)  T(F): 97.9 (09 Feb 2024 08:55), Max: 97.9 (09 Feb 2024 08:55)  HR: 74 (09 Feb 2024 08:55) (71 - 83)  BP: 160/79 (09 Feb 2024 08:55) (150/76 - 169/80)  BP(mean): --  ABP: --  ABP(mean): --  RR: 16 (09 Feb 2024 08:55) (16 - 18)  SpO2: 97% (09 Feb 2024 08:55) (96% - 98%)    O2 Parameters below as of 08 Feb 2024 21:13  Patient On (Oxygen Delivery Method): room air            RADIOLOGY    MICROBIOLOGY    PHYSICAL EXAM  Lower Extremity Focused  Vasc: DP/PT 2/4 b/l, No edema or erythema  Derm: L foot sub 5th met shaft wound with granular base, macerated border, serous drainage, undermining present along medial aspect of wound, no tracking, no purulence or malodor, -PTB , (-)erythema    Right foot sub 1st met head wound with granular base, serous drainage, no tracking or tunneling, -PTB, no purulence or malodor; Right foot hyperkeratotic lesion sub 5th met head  Neuro: Protective sensation in tact  MSK: L TMA

## 2024-02-09 NOTE — PROGRESS NOTE ADULT - ASSESSMENT
64 y/o M with PMHx of HTN, HLD, DM, PAD (s/p stent of SFA/pop (2/2023)), OM (s/p Left TMA w/ residual OM (VRE)) admitted to [facility] for pneumonia. Pt with hx of L TMA with Dr. Lamar in 2/23. Chronic left foot wound for which he follows with Dr. Lamar regularly with no recent infections. At time of consult, VSS, WBC 8.49.    Plan:  - sharp excisional debridement with #15 scalpel performed at left foot plantar wound's hyperkeratotic borders, down to and including the level of dermis.  - Local wound care - Wounds cleansed with normal saline. Betadine DSD applied to L foot wound and WTD dressing applied to R foot  - XR images reviewed and discussed with pt  - Pt can heel WBAT to b/l LE  - Rest of care per primary team    Plan d/w Attending. Podiatry following.      Discharge dressing instructions:   - Cleanse left foot wound with normal saline daily, pat dry with sterile guaze, apply  betadine soaked gauze to wound base, cover with dry sterile gauze, wrap with Kerlix and secure with tape.  - Discharge dressing instructions: Cleanse wound with normal saline daily, pat dry with sterile guaze, apply saline soaked gauze to wound base, cover with dry sterile gauze, wrap with Kerlix and secure with tape.      Patient should follow up with Dr. Aldair Lamar within 1-2 weeks of discharge.    Office information:          [place] Address- [address] [place] [address]. [address], [place] [address] Phone: [phone]
62 y/o M with PMHx of HTN, HLD, DM, PAD (s/p stent of SFA/pop (2/2023)), OM (s/p Left TMA w/ residual OM (VRE)) admitted to [facility] for pneumonia. Pt with hx of L TMA with Dr. Lamar in 2/23. Chronic left foot wound for which he follows with Dr. Lamar regularly with no recent infections. At time of consult, VSS, WBC 8.49.    Plan:   - Local wound care - Wounds cleansed with normal saline. Betadine DSD applied to L foot wound and WTD dressing applied to R foot  - XR images reviewed and discussed with pt   - Pt can heel WBAT to b/l LE  - Rest of care per primary team    Plan d/w Attending. Podiatry following.      Discharge recommendations:   Discharge dressing instructions:   - Cleanse left foot wound with normal saline daily, pat dry with sterile guaze, apply  betadine soaked gauze to wound base, cover with dry sterile gauze, wrap with Kerlix and secure with tape.  - Discharge dressing instructions: Cleanse wound with normal saline daily, pat dry with sterile guaze, apply saline soaked gauze to wound base, cover with dry sterile gauze, wrap with Kerlix and secure with tape.      Patient should follow up with Dr. Aldair Lamar within 1-2 weeks of discharge.    Office information:          [place] Address- [address] [facility] [facility], [place] [address] Phone: [phone]           
62 y/o M with PMHx of HTN, HLD, DM, PAD, s/p multiple L toe amputations, s/p stent of SFA/pop (2/2023), s/p Left TMA w/ residual OM (VRE) who presents with URI sx's and found to have multifocal PNA admitted for further management.       
62 y/o M with PMHx of HTN, HLD, DM, PAD, s/p multiple L toe amputations, s/p stent of SFA/pop (2/2023), s/p Left TMA w/ residual OM (VRE) who presents with URI sx's and found to have multifocal PNA admitted for further management.

## 2024-02-09 NOTE — PROGRESS NOTE ADULT - PROBLEM SELECTOR PLAN 1
Patient presenting with. CXR with. Pneumonia etiology unclear, possibly infectious although no leukocytosis. Afebrile, VSS. Lactate negative. No respiratory distress/failure. On RA. s/p zosyn in ED. Has pseudomonal risk factors including IV abx in the last 90 days. Recent admission 2 months ago to [place] for PNA.  CT chest: Patchy consolidative changes in the right middle lobe and groundglass opacities in the left upper lobe suggesting multifocal pneumonia. Bilateral pleural effusions and bibasilar atelectasis right greater than   left.  - RVP negative  - cont vanc and zosyn for pseudomonal coverage  - f/u urine legionella and strep  - BCx NGTD  - f/u MRSA swab
Patient presenting with. Pneumonia etiology unclear, possibly infectious although no leukocytosis. Afebrile, VSS. Lactate negative. No respiratory distress/failure. On RA. s/p zosyn in ED. Has pseudomonal risk factors including IV abx in the last 90 days. Recent admission 2 months ago to [place] for PNA.  CT chest: Patchy consolidative changes in the right middle lobe and groundglass opacities in the left upper lobe suggesting multifocal pneumonia. Bilateral pleural effusions and bibasilar atelectasis right greater than   left.  - RVP negative  - s/p vanc and zosyn for pseudomonal coverage > d/c on augmentin x1 wk  - BCx NGTD

## 2024-02-09 NOTE — DISCHARGE NOTE NURSING/CASE MANAGEMENT/SOCIAL WORK - PATIENT PORTAL LINK FT
You can access the FollowMyHealth Patient Portal offered by [facility] by registering at the following website: [email or web address]. By joining [employer] [facility]’s FollowMyHealth portal, you will also be able to view your health information using other applications (apps) compatible with our system.

## 2024-02-09 NOTE — PROGRESS NOTE ADULT - TIME BILLING
Patient seen and examined;  Antibiotics as outlined;  Check Vancomycin level  Check cultures;  Insulin regimen as ordered   Podiatry following
Patient seen and examined;  Discharge with one week of Augmentin

## 2024-02-09 NOTE — PROGRESS NOTE ADULT - PROBLEM SELECTOR PLAN 3
On admission, presented with /90. On home coreg 6.25 q12, losartan 100 mg qd (pt taking 2x daily mistakingly for some time now), lasix 20 mg q12 (recently started for LE edema)  - c/w home coreg 6.25 q12 and losartan 100 mg qd  - hold lasix for now
On admission, presented with /90. On home coreg 6.25 q12, losartan 100 mg qd (pt taking 2x daily mistakingly for some time now), lasix 20 mg q12 (recently started for LE edema)  - c/w home coreg 6.25 q12 and losartan 100 mg qd  - hold lasix for now

## 2024-02-09 NOTE — DISCHARGE NOTE PROVIDER - NSDCCPCAREPLAN_GEN_ALL_CORE_FT
PRINCIPAL DISCHARGE DIAGNOSIS  Diagnosis: Pneumonia  Assessment and Plan of Treatment: You were diagnosed with pneumonia, an infection that inflames air sacs in one or both lungs, which may fill with fluid. With pneumonia, the air sacs may fill with fluid or pus. The infection can be life-threatening to infants, children, and people over 65. Symptoms include cough with phlegm or pus, fever, chills, and difficulty breathing. Antibiotics can treat many forms of pneumonia. Some forms of pneumonia can be prevented by vaccines. You were treated with antibiotics and began to show improvement. Please continue taking your antibiotic AUGMENTIN EVERY 8 HOURS FOR SEVEN DAYS through 2/15. Please follow up with your primary care physician to check for full resolution of this problem.      SECONDARY DISCHARGE DIAGNOSES  Diagnosis: PAD (peripheral artery disease)  Assessment and Plan of Treatment: Discharge dressing instructions:   - Cleanse left foot wound with normal saline daily, pat dry with sterile guaze, apply  betadine soaked gauze to wound base, cover with dry sterile gauze, wrap with Kerlix and secure with tape.  - Discharge dressing instructions: Cleanse wound with normal saline daily, pat dry with sterile guaze, apply saline soaked gauze to wound base, cover with dry sterile gauze, wrap with Kerlix and secure with tape.  Follow up with Dr. Aldair Lamar within 1-2 weeks of discharge.   Office information:    [place] Address- [address] [place] [address]. [address], [place] [address] Phone: [phone]

## 2024-02-09 NOTE — DISCHARGE NOTE PROVIDER - PROVIDER TOKENS
PROVIDER:[TOKEN:[[id number]],ESTABLISHEDPATIENT:[T]],PROVIDER:[TOKEN:[[id number]],ESTABLISHEDPATIENT:[T]]

## 2024-02-12 RX ORDER — ALBUTEROL SULFATE 2.5 MG/3ML
(2.5 MG/3ML) SOLUTION RESPIRATORY (INHALATION)
Qty: 360 | Refills: 0 | Status: ACTIVE | COMMUNITY
Start: 2024-02-12 | End: 1900-01-01

## 2024-02-12 RX ORDER — BLOOD-GLUCOSE METER
70 EACH MISCELLANEOUS
Qty: 100 | Refills: 2 | Status: ACTIVE | COMMUNITY
Start: 2024-02-12 | End: 1900-01-01

## 2024-02-14 DIAGNOSIS — J18.9 PNEUMONIA, UNSPECIFIED ORGANISM: ICD-10-CM

## 2024-02-14 DIAGNOSIS — I10 ESSENTIAL (PRIMARY) HYPERTENSION: ICD-10-CM

## 2024-02-14 DIAGNOSIS — E78.5 HYPERLIPIDEMIA, UNSPECIFIED: ICD-10-CM

## 2024-02-14 DIAGNOSIS — J98.11 ATELECTASIS: ICD-10-CM

## 2024-02-14 DIAGNOSIS — Z79.4 LONG TERM (CURRENT) USE OF INSULIN: ICD-10-CM

## 2024-02-14 DIAGNOSIS — E11.40 TYPE 2 DIABETES MELLITUS WITH DIABETIC NEUROPATHY, UNSPECIFIED: ICD-10-CM

## 2024-02-14 DIAGNOSIS — E11.69 TYPE 2 DIABETES MELLITUS WITH OTHER SPECIFIED COMPLICATION: ICD-10-CM

## 2024-02-14 DIAGNOSIS — M86.9 OSTEOMYELITIS, UNSPECIFIED: ICD-10-CM

## 2024-02-14 DIAGNOSIS — J90 PLEURAL EFFUSION, NOT ELSEWHERE CLASSIFIED: ICD-10-CM

## 2024-02-14 DIAGNOSIS — E11.51 TYPE 2 DIABETES MELLITUS WITH DIABETIC PERIPHERAL ANGIOPATHY WITHOUT GANGRENE: ICD-10-CM

## 2024-03-07 ENCOUNTER — APPOINTMENT (OUTPATIENT)
Dept: INTERNAL MEDICINE | Facility: CLINIC | Age: 64
End: 2024-03-07
Payer: MEDICARE

## 2024-03-07 VITALS — TEMPERATURE: 97.5 F | SYSTOLIC BLOOD PRESSURE: 184 MMHG | DIASTOLIC BLOOD PRESSURE: 84 MMHG | HEIGHT: 67 IN

## 2024-03-07 DIAGNOSIS — E55.9 VITAMIN D DEFICIENCY, UNSPECIFIED: ICD-10-CM

## 2024-03-07 DIAGNOSIS — A60.01 HERPESVIRAL INFECTION OF PENIS: ICD-10-CM

## 2024-03-07 DIAGNOSIS — A60.00 HERPESVIRAL INFECTION OF UROGENITAL SYSTEM, UNSPECIFIED: ICD-10-CM

## 2024-03-07 PROCEDURE — 36415 COLL VENOUS BLD VENIPUNCTURE: CPT

## 2024-03-07 PROCEDURE — 99214 OFFICE O/P EST MOD 30 MIN: CPT | Mod: 25

## 2024-03-07 RX ORDER — VALACYCLOVIR 500 MG/1
500 TABLET, FILM COATED ORAL
Qty: 30 | Refills: 5 | Status: ACTIVE | COMMUNITY
Start: 2024-03-07 | End: 1900-01-01

## 2024-03-07 NOTE — ASSESSMENT
[FreeTextEntry1] : Appears stable now after recent hospitalization at [facility] I reviewed all the medication with the patient. RTC endo month

## 2024-03-07 NOTE — ASSESSMENT
[FreeTextEntry1] : Appears stable now; Will repeat labs; Will be seeing podiatry tomorrow  All medication reviewed patient

## 2024-03-07 NOTE — HISTORY OF PRESENT ILLNESS
[de-identified] : Glucoses stable Will being seeing Podiatry tomorrow  Feels okay now [FreeTextEntry1] : Recent in hospital at [facility] Said he needed diuresis. Multiple-le medication given.

## 2024-03-08 LAB
BASOPHILS # BLD AUTO: 0.08 K/UL
BASOPHILS NFR BLD AUTO: 0.8 %
EOSINOPHIL # BLD AUTO: 0.23 K/UL
EOSINOPHIL NFR BLD AUTO: 2.3 %
ESTIMATED AVERAGE GLUCOSE: 194 MG/DL
HBA1C MFR BLD HPLC: 8.4 %
HCT VFR BLD CALC: 30.1 %
HGB BLD-MCNC: 9.1 G/DL
IMM GRANULOCYTES NFR BLD AUTO: 0.5 %
LYMPHOCYTES # BLD AUTO: 2.23 K/UL
LYMPHOCYTES NFR BLD AUTO: 22.2 %
MAN DIFF?: NORMAL
MCHC RBC-ENTMCNC: 26.2 PG
MCHC RBC-ENTMCNC: 30.2 GM/DL
MCV RBC AUTO: 86.7 FL
MONOCYTES # BLD AUTO: 0.83 K/UL
MONOCYTES NFR BLD AUTO: 8.3 %
NEUTROPHILS # BLD AUTO: 6.63 K/UL
NEUTROPHILS NFR BLD AUTO: 65.9 %
PLATELET # BLD AUTO: 566 K/UL
RBC # BLD: 3.47 M/UL
RBC # FLD: 18.7 %
WBC # FLD AUTO: 10.05 K/UL

## 2024-03-12 ENCOUNTER — NON-APPOINTMENT (OUTPATIENT)
Age: 64
End: 2024-03-12

## 2024-03-15 ENCOUNTER — NON-APPOINTMENT (OUTPATIENT)
Age: 64
End: 2024-03-15

## 2024-03-15 LAB
ALBUMIN SERPL ELPH-MCNC: 3.3 G/DL
ALP BLD-CCNC: 115 U/L
ALT SERPL-CCNC: 7 U/L
ANION GAP SERPL CALC-SCNC: 17 MMOL/L
AST SERPL-CCNC: 8 U/L
BILIRUB SERPL-MCNC: 0.3 MG/DL
BUN SERPL-MCNC: 23 MG/DL
CALCIUM SERPL-MCNC: 8.9 MG/DL
CHLORIDE SERPL-SCNC: 104 MMOL/L
CO2 SERPL-SCNC: 16 MMOL/L
CREAT SERPL-MCNC: 1.27 MG/DL
EGFR: 63 ML/MIN/1.73M2
GLUCOSE SERPL-MCNC: 303 MG/DL
POTASSIUM SERPL-SCNC: 4.6 MMOL/L
PROT SERPL-MCNC: 6.5 G/DL
SODIUM SERPL-SCNC: 138 MMOL/L

## 2024-04-02 ENCOUNTER — INPATIENT (INPATIENT)
Facility: HOSPITAL | Age: 64
LOS: 4 days | Discharge: HOME | DRG: 291 | End: 2024-04-07
Attending: INTERNAL MEDICINE | Admitting: INTERNAL MEDICINE
Payer: MEDICARE

## 2024-04-02 VITALS
SYSTOLIC BLOOD PRESSURE: 180 MMHG | OXYGEN SATURATION: 94 % | WEIGHT: 167.99 LBS | TEMPERATURE: 97 F | DIASTOLIC BLOOD PRESSURE: 79 MMHG | HEIGHT: 67 IN | RESPIRATION RATE: 20 BRPM | HEART RATE: 96 BPM

## 2024-04-02 DIAGNOSIS — E78.5 HYPERLIPIDEMIA, UNSPECIFIED: ICD-10-CM

## 2024-04-02 DIAGNOSIS — I10 ESSENTIAL (PRIMARY) HYPERTENSION: ICD-10-CM

## 2024-04-02 DIAGNOSIS — I50.9 HEART FAILURE, UNSPECIFIED: ICD-10-CM

## 2024-04-02 DIAGNOSIS — I73.9 PERIPHERAL VASCULAR DISEASE, UNSPECIFIED: ICD-10-CM

## 2024-04-02 DIAGNOSIS — N18.9 CHRONIC KIDNEY DISEASE, UNSPECIFIED: ICD-10-CM

## 2024-04-02 DIAGNOSIS — E11.9 TYPE 2 DIABETES MELLITUS WITHOUT COMPLICATIONS: ICD-10-CM

## 2024-04-02 DIAGNOSIS — D72.829 ELEVATED WHITE BLOOD CELL COUNT, UNSPECIFIED: ICD-10-CM

## 2024-04-02 LAB
ADD ON TEST-SPECIMEN IN LAB: SIGNIFICANT CHANGE UP
ALBUMIN SERPL ELPH-MCNC: 3.8 G/DL — SIGNIFICANT CHANGE UP (ref 3.3–5)
ALP SERPL-CCNC: 139 U/L — HIGH (ref 40–120)
ALT FLD-CCNC: 7 U/L — LOW (ref 10–45)
ANION GAP SERPL CALC-SCNC: 12 MMOL/L — SIGNIFICANT CHANGE UP (ref 5–17)
ANISOCYTOSIS BLD QL: SLIGHT — SIGNIFICANT CHANGE UP
APPEARANCE UR: CLEAR — SIGNIFICANT CHANGE UP
AST SERPL-CCNC: 11 U/L — SIGNIFICANT CHANGE UP (ref 10–40)
BACTERIA # UR AUTO: NEGATIVE /HPF — SIGNIFICANT CHANGE UP
BASOPHILS # BLD AUTO: 0 K/UL — SIGNIFICANT CHANGE UP (ref 0–0.2)
BASOPHILS NFR BLD AUTO: 0 % — SIGNIFICANT CHANGE UP (ref 0–2)
BILIRUB DIRECT SERPL-MCNC: 0.2 MG/DL — SIGNIFICANT CHANGE UP (ref 0–0.3)
BILIRUB INDIRECT FLD-MCNC: 0.5 MG/DL — SIGNIFICANT CHANGE UP (ref 0.2–1)
BILIRUB SERPL-MCNC: 0.7 MG/DL — SIGNIFICANT CHANGE UP (ref 0.2–1.2)
BILIRUB UR-MCNC: NEGATIVE — SIGNIFICANT CHANGE UP
BUN SERPL-MCNC: 25 MG/DL — HIGH (ref 7–23)
CALCIUM SERPL-MCNC: 9 MG/DL — SIGNIFICANT CHANGE UP (ref 8.4–10.5)
CHLORIDE SERPL-SCNC: 105 MMOL/L — SIGNIFICANT CHANGE UP (ref 96–108)
CO2 SERPL-SCNC: 21 MMOL/L — LOW (ref 22–31)
COLOR SPEC: YELLOW — SIGNIFICANT CHANGE UP
CREAT SERPL-MCNC: 1.43 MG/DL — HIGH (ref 0.5–1.3)
DIFF PNL FLD: ABNORMAL
EGFR: 55 ML/MIN/1.73M2 — LOW
EOSINOPHIL # BLD AUTO: 0.2 K/UL — SIGNIFICANT CHANGE UP (ref 0–0.5)
EOSINOPHIL NFR BLD AUTO: 1.7 % — SIGNIFICANT CHANGE UP (ref 0–6)
GLUCOSE BLDC GLUCOMTR-MCNC: 207 MG/DL — HIGH (ref 70–99)
GLUCOSE SERPL-MCNC: 166 MG/DL — HIGH (ref 70–99)
GLUCOSE UR QL: 100 MG/DL
HCT VFR BLD CALC: 29.4 % — LOW (ref 39–50)
HGB BLD-MCNC: 9.3 G/DL — LOW (ref 13–17)
KETONES UR-MCNC: NEGATIVE MG/DL — SIGNIFICANT CHANGE UP
LEUKOCYTE ESTERASE UR-ACNC: NEGATIVE — SIGNIFICANT CHANGE UP
LYMPHOCYTES # BLD AUTO: 1.15 K/UL — SIGNIFICANT CHANGE UP (ref 1–3.3)
LYMPHOCYTES # BLD AUTO: 9.6 % — LOW (ref 13–44)
MAGNESIUM SERPL-MCNC: 2.2 MG/DL — SIGNIFICANT CHANGE UP (ref 1.6–2.6)
MANUAL SMEAR VERIFICATION: SIGNIFICANT CHANGE UP
MCHC RBC-ENTMCNC: 26.4 PG — LOW (ref 27–34)
MCHC RBC-ENTMCNC: 31.6 GM/DL — LOW (ref 32–36)
MCV RBC AUTO: 83.5 FL — SIGNIFICANT CHANGE UP (ref 80–100)
MICROCYTES BLD QL: SLIGHT — SIGNIFICANT CHANGE UP
MONOCYTES # BLD AUTO: 0.73 K/UL — SIGNIFICANT CHANGE UP (ref 0–0.9)
MONOCYTES NFR BLD AUTO: 6.1 % — SIGNIFICANT CHANGE UP (ref 2–14)
NEUTROPHILS # BLD AUTO: 9.9 K/UL — HIGH (ref 1.8–7.4)
NEUTROPHILS NFR BLD AUTO: 77.4 % — HIGH (ref 43–77)
NEUTS BAND # BLD: 5.2 % — SIGNIFICANT CHANGE UP (ref 0–8)
NITRITE UR-MCNC: NEGATIVE — SIGNIFICANT CHANGE UP
NRBC # BLD: 1 /100 WBCS — HIGH (ref 0–0)
NRBC # BLD: SIGNIFICANT CHANGE UP /100 WBCS (ref 0–0)
NT-PROBNP SERPL-SCNC: 7127 PG/ML — HIGH (ref 0–300)
PH UR: 6 — SIGNIFICANT CHANGE UP (ref 5–8)
PLAT MORPH BLD: NORMAL — SIGNIFICANT CHANGE UP
PLATELET # BLD AUTO: 784 K/UL — HIGH (ref 150–400)
POIKILOCYTOSIS BLD QL AUTO: SIGNIFICANT CHANGE UP
POLYCHROMASIA BLD QL SMEAR: SLIGHT — SIGNIFICANT CHANGE UP
POTASSIUM SERPL-MCNC: 4.8 MMOL/L — SIGNIFICANT CHANGE UP (ref 3.5–5.3)
POTASSIUM SERPL-SCNC: 4.8 MMOL/L — SIGNIFICANT CHANGE UP (ref 3.5–5.3)
PROT SERPL-MCNC: 8 G/DL — SIGNIFICANT CHANGE UP (ref 6–8.3)
PROT UR-MCNC: 300 MG/DL
RBC # BLD: 3.52 M/UL — LOW (ref 4.2–5.8)
RBC # FLD: 20.2 % — HIGH (ref 10.3–14.5)
RBC BLD AUTO: ABNORMAL
RBC CASTS # UR COMP ASSIST: 1 /HPF — SIGNIFICANT CHANGE UP (ref 0–4)
SODIUM SERPL-SCNC: 138 MMOL/L — SIGNIFICANT CHANGE UP (ref 135–145)
SP GR SPEC: 1.01 — SIGNIFICANT CHANGE UP (ref 1–1.03)
SQUAMOUS # UR AUTO: 0 /HPF — SIGNIFICANT CHANGE UP (ref 0–5)
TARGETS BLD QL SMEAR: SLIGHT — SIGNIFICANT CHANGE UP
TROPONIN T, HIGH SENSITIVITY RESULT: 82 NG/L — CRITICAL HIGH (ref 0–51)
UROBILINOGEN FLD QL: 1 MG/DL — SIGNIFICANT CHANGE UP (ref 0.2–1)
WBC # BLD: 11.99 K/UL — HIGH (ref 3.8–10.5)
WBC # FLD AUTO: 11.99 K/UL — HIGH (ref 3.8–10.5)
WBC UR QL: 0 /HPF — SIGNIFICANT CHANGE UP (ref 0–5)

## 2024-04-02 PROCEDURE — 93010 ELECTROCARDIOGRAM REPORT: CPT

## 2024-04-02 PROCEDURE — 99285 EMERGENCY DEPT VISIT HI MDM: CPT

## 2024-04-02 PROCEDURE — 71046 X-RAY EXAM CHEST 2 VIEWS: CPT | Mod: 26

## 2024-04-02 RX ORDER — SODIUM CHLORIDE 9 MG/ML
1000 INJECTION, SOLUTION INTRAVENOUS
Refills: 0 | Status: DISCONTINUED | OUTPATIENT
Start: 2024-04-02 | End: 2024-04-07

## 2024-04-02 RX ORDER — OXYCODONE HYDROCHLORIDE 5 MG/1
1 TABLET ORAL
Refills: 0 | DISCHARGE

## 2024-04-02 RX ORDER — DEXTROSE 50 % IN WATER 50 %
12.5 SYRINGE (ML) INTRAVENOUS ONCE
Refills: 0 | Status: DISCONTINUED | OUTPATIENT
Start: 2024-04-02 | End: 2024-04-07

## 2024-04-02 RX ORDER — DEXTROSE 50 % IN WATER 50 %
25 SYRINGE (ML) INTRAVENOUS ONCE
Refills: 0 | Status: DISCONTINUED | OUTPATIENT
Start: 2024-04-02 | End: 2024-04-07

## 2024-04-02 RX ORDER — LOSARTAN POTASSIUM 100 MG/1
100 TABLET, FILM COATED ORAL EVERY 24 HOURS
Refills: 0 | Status: DISCONTINUED | OUTPATIENT
Start: 2024-04-03 | End: 2024-04-04

## 2024-04-02 RX ORDER — LOSARTAN POTASSIUM 100 MG/1
100 TABLET, FILM COATED ORAL ONCE
Refills: 0 | Status: COMPLETED | OUTPATIENT
Start: 2024-04-02 | End: 2024-04-02

## 2024-04-02 RX ORDER — INSULIN GLARGINE 100 [IU]/ML
6 INJECTION, SOLUTION SUBCUTANEOUS AT BEDTIME
Refills: 0 | Status: DISCONTINUED | OUTPATIENT
Start: 2024-04-02 | End: 2024-04-07

## 2024-04-02 RX ORDER — ASPIRIN/CALCIUM CARB/MAGNESIUM 324 MG
81 TABLET ORAL DAILY
Refills: 0 | Status: DISCONTINUED | OUTPATIENT
Start: 2024-04-03 | End: 2024-04-07

## 2024-04-02 RX ORDER — ATORVASTATIN CALCIUM 80 MG/1
40 TABLET, FILM COATED ORAL AT BEDTIME
Refills: 0 | Status: DISCONTINUED | OUTPATIENT
Start: 2024-04-02 | End: 2024-04-07

## 2024-04-02 RX ORDER — HEPARIN SODIUM 5000 [USP'U]/ML
5000 INJECTION INTRAVENOUS; SUBCUTANEOUS EVERY 8 HOURS
Refills: 0 | Status: DISCONTINUED | OUTPATIENT
Start: 2024-04-03 | End: 2024-04-07

## 2024-04-02 RX ORDER — FUROSEMIDE 40 MG
80 TABLET ORAL ONCE
Refills: 0 | Status: COMPLETED | OUTPATIENT
Start: 2024-04-02 | End: 2024-04-02

## 2024-04-02 RX ORDER — DEXTROSE 50 % IN WATER 50 %
15 SYRINGE (ML) INTRAVENOUS ONCE
Refills: 0 | Status: DISCONTINUED | OUTPATIENT
Start: 2024-04-02 | End: 2024-04-07

## 2024-04-02 RX ORDER — ATORVASTATIN CALCIUM 80 MG/1
1 TABLET, FILM COATED ORAL
Refills: 0 | DISCHARGE

## 2024-04-02 RX ORDER — FUROSEMIDE 40 MG
40 TABLET ORAL
Refills: 0 | Status: DISCONTINUED | OUTPATIENT
Start: 2024-04-03 | End: 2024-04-03

## 2024-04-02 RX ORDER — CARVEDILOL PHOSPHATE 80 MG/1
6.25 CAPSULE, EXTENDED RELEASE ORAL EVERY 12 HOURS
Refills: 0 | Status: DISCONTINUED | OUTPATIENT
Start: 2024-04-02 | End: 2024-04-07

## 2024-04-02 RX ORDER — ROSUVASTATIN CALCIUM 5 MG/1
1 TABLET ORAL
Qty: 0 | Refills: 0 | DISCHARGE

## 2024-04-02 RX ORDER — INSULIN LISPRO 100/ML
VIAL (ML) SUBCUTANEOUS
Refills: 0 | Status: DISCONTINUED | OUTPATIENT
Start: 2024-04-02 | End: 2024-04-07

## 2024-04-02 RX ORDER — GLUCAGON INJECTION, SOLUTION 0.5 MG/.1ML
1 INJECTION, SOLUTION SUBCUTANEOUS ONCE
Refills: 0 | Status: DISCONTINUED | OUTPATIENT
Start: 2024-04-02 | End: 2024-04-07

## 2024-04-02 RX ORDER — IPRATROPIUM/ALBUTEROL SULFATE 18-103MCG
3 AEROSOL WITH ADAPTER (GRAM) INHALATION ONCE
Refills: 0 | Status: COMPLETED | OUTPATIENT
Start: 2024-04-02 | End: 2024-04-02

## 2024-04-02 RX ADMIN — Medication 80 MILLIGRAM(S): at 21:02

## 2024-04-02 RX ADMIN — Medication 3 MILLILITER(S): at 21:02

## 2024-04-02 NOTE — H&P ADULT - PROBLEM SELECTOR PLAN 5
F/u AM a1c  - Home regimen:   - Continue ___ F/u AM a1c  - Home regimen: Lantus 8u QHS, Lispo 10u TID  - Continue MISS, Lantus 6u QHS Baseline from prior admission 1.3-1.4  - Currently at baseline 1.44

## 2024-04-02 NOTE — ED ADULT NURSE REASSESSMENT NOTE - NS ED NURSE REASSESS COMMENT FT1
Pt transported to [facility] with cardiac monitoring and RN present Stelara Counseling:  I discussed with the patient the risks of ustekinumab including but not limited to immunosuppression, malignancy, posterior leukoencephalopathy syndrome, and serious infections.  The patient understands that monitoring is required including a PPD at baseline and must alert us or the primary physician if symptoms of infection or other concerning signs are noted.

## 2024-04-02 NOTE — H&P ADULT - MLM HIDDEN
Spoke with pt and told her that we left a message for her  sister that Aidee is not comfortable that pt will give her self injection. Pt verbalized understand.    yes

## 2024-04-02 NOTE — H&P ADULT - PROBLEM SELECTOR PLAN 2
WBC on admission 11.99 w left shift. Recent admission to [facility] 2/2024 for PNA, tx w vanco/zozyn/augmentin   - No infectious sx. Afebrile.   - F/u RVP, procal, UA  - F/u official CXR read   - Continue to monitor WBC on admission 11.99 w left shift. Recent admission to [facility] 2/2024 for PNA, tx w vanco/zozyn/augmentin   - No infectious sx. Afebrile.   - F/u RVP, procal, UA  - F/u official CXR read   - Podiatry consult ?LT foot wound infection  - Continue to monitor WBC on admission 11.99 w left shift. Recent admission to [facility] 2/2024 for PNA s/p abx  - No infectious sx. Afebrile.   - F/u RVP, procal, UA, CXR    - Podiatry consult ?LT foot wound source of white count

## 2024-04-02 NOTE — ED ADULT NURSE NOTE - CAS EDN DISCHARGE ASSESSMENT
Labs today.    Please discuss with your dentist if you have to have the implants.    I would suggest starting the [facility]. Printed info provided.       Alert and oriented to person, place and time

## 2024-04-02 NOTE — ED ADULT NURSE NOTE - OBJECTIVE STATEMENT
63yM pmhx CHF, HTN, PAD, DM presents to the Er complaining of SOB since last night. PT states he has SOB upon exertion and when lying flat, "I know what this is, it is fluid in my lungs, I don't know why people keep talking about my heart when we know where it is coming from", pt also reports "I take a water pill, I don't know the name of and I ran out starting yesterday". Pt reports 3 prior hospitalization for similar episodes. Denies CP, F/C, edema.

## 2024-04-02 NOTE — H&P ADULT - RESPIRATORY
normal/clear to auscultation bilaterally/no wheezes/no rales/no rhonchi/diminished breath sounds, R/rales

## 2024-04-02 NOTE — H&P ADULT - PROBLEM SELECTOR PLAN 4
Baseline from prior admission appears 1.3-1.4  - Currently at baseline 1.44 Hgb on admission 9.3, baseline 9-10  - Maintain active T&S (next 4/7)  - F/u AM iron panel

## 2024-04-02 NOTE — PATIENT PROFILE ADULT - FALL HARM RISK - UNIVERSAL INTERVENTIONS
Bed in lowest position, wheels locked, appropriate side rails in place/Call bell, personal items and telephone in reach/Instruct patient to call for assistance before getting out of bed or chair/Non-slip footwear when patient is out of bed/[place] to call system/Physically safe environment - no spills, clutter or unnecessary equipment/Purposeful Proactive Rounding/Room/bathroom lighting operational, light cord in reach

## 2024-04-02 NOTE — ED PROVIDER NOTE - CLINICAL SUMMARY MEDICAL DECISION MAKING FREE TEXT BOX
history of  HTN, HLD, DM, PAD, s/p multiple L toe amputations, s/p stent of SFA/pop (2/2023), s/p Left TMA w/ residual OM (VRE). here w two days of worsening SOB, orthopnea in setting of running out of diuretic. lasix 40mg daily.   pt nontoxic appearing, sats 94% on room air, tachypenic w exertion, otherwise ok, lungs w crackles bilateral bases, no LE edema.   history and exam most consistent w chf exacerbation  doubt acs - chest tightness seemingly associated w SOB.   r/o pna, r/o uri / viral illness  plan - labs, ecg, cxr  likely admit to cards

## 2024-04-02 NOTE — H&P ADULT - PROBLEM SELECTOR PLAN 8
f/u AM lipid panel  - Continue atorva 40mg QHS (TIC for Rosuvastatin 10mg)    F: None  E: Replete if K<4 or Mag<2  N: DASH Diet  VTEppx:   Dispo: Pending euvolemia

## 2024-04-02 NOTE — H&P ADULT - NSHPLABSRESULTS_GEN_ALL_CORE
9.3    11.99 )-----------( 784      ( 2024 20:26 )             29.4       04-02    138  |  105  |  25<H>  ----------------------------<  166<H>  4.8   |  21<L>  |  1.43<H>    Ca    9.0      2024 20:26  Mg     2.2     04-02    TPro  8.0  /  Alb  3.8  /  TBili  0.7  /  DBili  0.2  /  AST  11  /  ALT  7<L>  /  AlkPhos  139<H>  04-02      CARDIAC MARKERS ( 2024 20:26 )  x     / x     / 362 U/L / x     / 6.1 ng/mL      Urinalysis Basic - ( 2024 22:07 )    Color: Yellow / Appearance: Clear / S.014 / pH: x  Gluc: x / Ketone: Negative mg/dL  / Bili: Negative / Urobili: 1.0 mg/dL   Blood: x / Protein: 300 mg/dL / Nitrite: Negative   Leuk Esterase: Negative / RBC: 1 /HPF / WBC 0 /HPF   Sq Epi: x / Non Sq Epi: 0 /HPF / Bacteria: Negative /HPF

## 2024-04-02 NOTE — ED ADULT NURSE NOTE - CAS TRG GENERAL AIRWAY, MLM
Preventive Care Visit  [facility]  DYLAN BOYLE MD, Pediatrics  Oct 26, 2023    Assessment & Plan   12 month old, here for preventive care.    Joanne was seen today for well child.    Diagnoses and all orders for this visit:    Encounter for routine child health examination w/o abnormal findings  -     Hemoglobin  -     Lead Capillary  -     PNEUMOCOCCAL CONJUGATE PCV 13 (PREVNAR 13)  -     INFLUENZA VACCINE IM > 6 MONTHS VALENT IIV4 (AFLURIA/FLUZONE)  -     MMR (M-M-R II)  -     HEPATITIS A 12M-18Y(HAVRIX/VAQTA)    Candidal intertrigo  -     nystatin (MYCOSTATIN) 278052 UNIT/GM external ointment; Apply topically 2 times daily    Joanne is a 12 month old female who presents with her parents for a well child exam.     She is meeting appropriate growth and development milestones.     She is eating whole milk, breast milk and simple baby foods (cereal, puree). Her parents are working to encourage her to eat more solid foods however she is a little hesitant. Her mother is working to ween her from breastfeeding, however she still uses this frequently for comfort.     Joanne is not yet walking but does scoot, cruise and pull herself to standing. She speaks one to two words.     She has a small candidal infection in the fold of her right axilla. Her parents have tried treating the patch with desatin diaper cream. Today we provided a prescription for nystatin to be use in the axilla and any other areas that develop candidiasis.     Joanne should return for her next well child check or sooner if she develops any new or worsening symptoms.     Her parents understand and agree with the plan      Patient has been advised of split billing requirements and indicates understanding: Yes  Growth      Normal OFC, length and weight    Immunizations   Appropriate vaccinations were ordered.    Anticipatory Guidance    Reviewed age appropriate anticipatory guidance.   Special attention given to:    Reading to child     Given a book from Reach Out & Read    Encourage self-feeding    Table foods    Whole milk introduction    Limit juice to 4 ounces     Dental hygiene    Never leave unattended    Referrals/Ongoing Specialty Care  None  Verbal Dental Referral:  Will establish dental home in the future  Dental Fluoride Varnish: No, parent/guardian declines fluoride varnish.  Reason for decline: Patient/Parental preference      Return in 3 months (on 1/26/2024) for Preventive Care visit.    Subjective       Food:  Breastfeeds 2-3 times daily. Mom is interested in weaning however [place] is still wants to breastfeed for comfort (mom estimates only 1-2 ounces per feeding).  Mom works seven overnight shifts per week. Following these shifts [place] is more clingy/breastfeeds more.   [place]ix eats cereal, pureed baby foods and whole milk.  She is not interested in solid foods.    Sleep:   Wakes up 1-2 times nightly     No concerns with bowel and bladder     Activity:  Scooting   Not crawling   Pulling up to standing  Cruising     Speech:   Says Marco Dimas in armpit       10/26/2023    10:02 AM   Additional Questions   Accompanied by Mother and father   Questions for today's visit Yes   Questions prefers breast milk over food   Surgery, major illness, or injury since last physical No         10/19/2023   Social   Lives with Parent(s)   Who takes care of your child? Parent(s)   Recent potential stressors (!) RECENT MOVE   History of trauma No   Family Hx mental health challenges (!) YES   Lack of transportation has limited access to appts/meds No   Do you have housing?  Yes   Are you worried about losing your housing? No         10/19/2023     7:10 PM   Health Risks/Safety   What type of car seat does your child use?  Car seat with harness   Is your child's car seat forward or rear facing? Rear facing   Where does your child sit in the car?  Back seat   Do you use space heaters, wood stove, or a fireplace in your home? No   Are  poisons/cleaning supplies and medications kept out of reach? Yes   Do you have guns/firearms in the home? (!) YES   Are the guns/firearms secured in a safe or with a trigger lock? Yes   Is ammunition stored separately from guns? Yes         10/19/2023     7:10 PM   TB Screening   Was your child born outside of the [place]? No         10/19/2023     7:10 PM   TB Screening: Consider immunosuppression as a risk factor for TB   Recent TB infection or positive TB test in family/close contacts No   Recent travel outside [place] (child/family/close contacts) No   Recent residence in high-risk group setting (correctional facility/health care facility/homeless shelter/refugee camp) No          10/19/2023     7:10 PM   Dental Screening   Has your child had cavities in the last 2 years? No   Have parents/caregivers/siblings had cavities in the last 2 years? (!) YES, IN THE LAST 7-23 MONTHS- MODERATE RISK         10/19/2023   Diet   Questions about feeding? No   How does your child eat?  Breastfeeding/Nursing    (!) BOTTLE    Sippy cup    Cup    Spoon feeding by caregiver    Self-feeding   What does your child regularly drink? Water    Cow's Milk    Breast milk   What type of milk? Whole   What type of water? (!) FILTERED   Vitamin or supplement use None   How often does your family eat meals together? Most days   How many snacks does your child eat per day 2-3   Are there types of foods your child won't eat? (!) YES   Please specify: Foods that have chunks or piece in them.   In past 12 months, concerned food might run out No   In past 12 months, food has run out/couldn't afford more No         10/19/2023     7:10 PM   Elimination   Bowel or bladder concerns? No concerns         10/19/2023     7:10 PM   Media Use   Hours per day of screen time (for entertainment) TV is on in the background, music mostly or cocomelon.         10/19/2023     7:10 PM   Sleep   Do you have any concerns about your child's sleep? (!) WAKING AT  "NIGHT    (!) NIGHTTIME FEEDING         10/19/2023     7:10 PM   Vision/Hearing   Vision or hearing concerns No concerns         10/19/2023     7:10 PM   Development/ Social-Emotional Screen   Developmental concerns No   Does your child receive any special services? No     Development     Screening tool used, reviewed with parent/guardian:   ASQ 12 M Communication Gross Motor Fine Motor Problem Solving Personal-social   Score 55 50 60 60 60   Cutoff 15.64 21.49 34.50 27.32 21.73   Result Passed Passed Passed Passed Passed              Objective     Exam  Pulse 128   Temp 98.9  F (37.2  C) (Tympanic)   Resp 28   Ht 0.794 m (2' 7.25\")   Wt 9.497 kg (20 lb 15 oz)   HC 45.1 cm (17.75\")   SpO2 97%   BMI 15.07 kg/m    54 %ile (Z= 0.11) based on WHO (Girls, 0-2 years) head circumference-for-age based on Head Circumference recorded on 10/26/2023.  67 %ile (Z= 0.45) based on WHO (Girls, 0-2 years) weight-for-age data using vitals from 10/26/2023.  98 %ile (Z= 2.00) based on WHO (Girls, 0-2 years) Length-for-age data based on Length recorded on 10/26/2023.  29 %ile (Z= -0.54) based on WHO (Girls, 0-2 years) weight-for-recumbent length data based on body measurements available as of 10/26/2023.    Physical Exam  GENERAL: Active, alert,  no  distress.  SKIN: Small shiny patch erythematous patch in the fold of the right axilla.   HEAD: Normocephalic. Normal fontanels and sutures.  EYES: Conjunctivae and cornea normal. Red reflexes present bilaterally. Symmetric light reflex and no eye movement on cover/uncover test  EARS: normal: no effusions, no erythema, normal landmarks  NOSE: Normal without discharge.  MOUTH/THROAT: Clear. No oral lesions.  LUNGS: Clear. No rales, rhonchi, wheezing or retractions  HEART: Regular rate and rhythm. Normal S1/S2. No murmurs. Normal femoral pulses.  ABDOMEN: Soft, non-tender, not distended, no masses or hepatosplenomegaly. Normal umbilicus and bowel sounds.   GENITALIA: Normal female " external genitalia. Vic stage I,  No inguinal herniae are present.    Prior to immunization administration, verified patients identity using patient s name and date of birth. Please see Immunization Activity for additional information.     Screening Questionnaire for Pediatric Immunization    Is the child sick today?   No   Does the child have allergies to medications, food, a vaccine component, or latex?   No   Has the child had a serious reaction to a vaccine in the past?   No   Does the child have a long-term health problem with lung, heart, kidney or metabolic disease (e.g., diabetes), asthma, a blood disorder, no spleen, complement component deficiency, a cochlear implant, or a spinal fluid leak?  Is he/she on long-term aspirin therapy?   No   If the child to be vaccinated is 2 through 4 years of age, has a healthcare provider told you that the child had wheezing or asthma in the  past 12 months?   No   If your child is a baby, have you ever been told he or she has had intussusception?   No   Has the child, sibling or parent had a seizure, has the child had brain or other nervous system problems?   No   Does the child have cancer, leukemia, AIDS, or any immune system         problem?   No   Does the child have a parent, brother, or sister with an immune system problem?   No   In the past 3 months, has the child taken medications that affect the immune system such as prednisone, other steroids, or anticancer drugs; drugs for the treatment of rheumatoid arthritis, Crohn s disease, or psoriasis; or had radiation treatments?   No   In the past year, has the child received a transfusion of blood or blood products, or been given immune (gamma) globulin or an antiviral drug?   No   Is the child/teen pregnant or is there a chance that she could become       pregnant during the next month?   No   Has the child received any vaccinations in the past 4 weeks?   No               Immunization questionnaire answers were  all negative.      Patient instructed to remain in clinic for 15 minutes afterwards, and to report any adverse reactions.     Screening performed by Maria D Bernardo LPN on 10/26/2023 at 10:04 AM.  MARGARET WALLACE MD  [facility] - [employer] Student, [employer]   I was present with the student who participated in the service and in the documentation of the note. I have verified the history and personally performed the physical exam and medical decision-making. I agree with the assessment and plan of care as documented in the note.  Margaret Wallace MD    [employer]

## 2024-04-02 NOTE — ED PROVIDER NOTE - OBJECTIVE STATEMENT
63 yr old male, history of  HTN, HLD, DM, PAD, s/p multiple L toe amputations, s/p stent of SFA/pop (2/2023), s/p Left TMA w/ residual OM (VRE) , presents to the Emergency Department w shortness of breath. pt reports new / worsening SOB over last two days in setting of running out of "water pill."   per admission note from 2024, no hx of chf and no diuretic. states he was told this at another hospital. this is the second time this has happened. has med bottle - 40mg lasix daily. compliant until running out.   shortness of breath, now at rest. worse when laying flat. coughing fits when laying flat. some chest tightness that he attributes to difficulty breathing.   no fever, uri sx, abd pain, n/v/d, urinary symptoms, leg swelling. no headache, dizziness, near-syncope / syncope.

## 2024-04-02 NOTE — PATIENT PROFILE ADULT - FUNCTIONAL ASSESSMENT - BASIC MOBILITY 6.
4-calculated by average/Not able to assess (calculate score using [facility] averaging method)

## 2024-04-02 NOTE — H&P ADULT - HISTORY OF PRESENT ILLNESS
62 yo M with PMHx of HTN, HLD, uncontrolled T2DM, PAD s/p SFA/pop stent (2/2023), OM s/p LT TMA w/ residual OM (VRE), genital herpes (on valcyclovir), recent admission to [facility] 2/7/24-2/9/24 for PNA tx w Vanc/Zosyn/augmentin who presented to [facility] ED complaining of worsening SOB, orthopnea, and cough. Pt states that he has recently run out of his "water pill" which he was prescribed during a recent admissoin in [facility]. He endorses some associated mild pleuritic chest pain but otherwise denies any fever, chills recent sick contacts, urinary sx, abd pian, n/v/d, LE edema, dizziness, syncope.     In the ED:  VS: 180/79 mmHg, T 97.4 F, HR 96 bpm, RR 20, spO2 94% on RA.   Labs significant for: WBC 11.99, H&h 9.3/29.4, BUN 25, Cr 1.43, GFR 55, Trop T 82, BNP 7127  CXR w congestion. EKG NSR 93, Q waves V1-2,  TWI inferolateral leads, poor baseline w artifact. Similar to prior.   Interventions: Given Duoneb x1, Lasix 80mg IVP x1    Pt now admitted to cardiac tele for HF exacerbation.  64 yo M with PMHx of HTN, HLD, T2DM, PAD s/p SFA/pop stent (2/2023), OM s/p LT TMA w/ residual OM (VRE), genital herpes (on valacyclovir PRN for flares), recent admission to [facility] 2/7/24-2/9/24 for PNA tx w Vanc/Zosyn/augmentin who presented to [facility] ED complaining of worsening SOB, orthopnea, and cough. Pt states that he has recently run out of his "water pill" which he was prescribed during a recent admission in [facility]. He endorses some associated mild pleuritic chest pain but otherwise denies any fever, chills recent sick contacts, urinary sx, abd pian, n/v/d, LE edema, dizziness, syncope.     In the ED:  VS: 180/79 mmHg, T 97.4 F, HR 96 bpm, RR 20, spO2 94% on RA.   Labs significant for: WBC 11.99, H&h 9.3/29.4, BUN 25, Cr 1.43, GFR 55, Trop T 82, BNP 7127  CXR w congestion. EKG NSR 93, Q waves V1-2,  TWI inferolateral leads, poor baseline w artifact. Similar to prior.   Interventions: Given Duoneb x1, Lasix 80mg IVP x1    Pt now admitted to cardiac tele for HF exacerbation.  62 yo M with PMHx of HTN, HLD, T2DM, PAD s/p SFA/pop stent (2/2023), OM s/p LT TMA w/ residual OM (VRE), genital herpes (on valacyclovir for flares), recent admission to [facility] 2/7/24-2/9/24 for PNA tx w Vanc/Zosyn/augmentin who presented to [facility] ED complaining of worsening SOB, orthopnea, and cough. Pt states that he has recently run out of his "water pill" which he was prescribed during a recent admission in [facility]. He endorses some associated mild pleuritic chest pain but otherwise denies any fever, chills recent sick contacts, urinary sx, abd pian, n/v/d, LE edema, dizziness, syncope. Has otherwise been taking all medications as prescribed. Recent echocardiogram at OSH with a reported EF of 40% per pt; does not otherwise follow with a cardiologist.     In the ED:  VS: 180/79 mmHg, T 97.4 F, HR 96 bpm, RR 20, spO2 94% on RA.   Labs significant for: WBC 11.99, H&h 9.3/29.4, BUN 25, Cr 1.43, GFR 55, Trop T 82, BNP 7127  CXR w congestion. EKG NSR 93, Q waves V1-2, TWI inferolateral leads, poor baseline w artifact. Similar to prior.   Interventions: Duoneb x1, Lasix 80mg IVP x1    Pt now admitted to cardiac tele for HF exacerbation.

## 2024-04-02 NOTE — H&P ADULT - PROBLEM SELECTOR PLAN 3
S/p SFA/pop stent (2/2023), s/p LT TMA w/ residual OM (VRE), follows w wound care outpt   - Continue aspirin 81mg and atorva 40mg   - Continue Gabapentin 100mg Q8  - Podiatry consult in AM S/p SFA/pop stent (2/2023), s/p LT TMA w/ residual OM (VRE), follows w wound care outpt   - Continue aspirin 81mg and atorva 40mg   - Podiatry consult in AM

## 2024-04-02 NOTE — H&P ADULT - PROBLEM SELECTOR PLAN 1
P/w worsening DOMINGUEZ/orthopnea. 94% on RA. WWP. Was recently dx with HF at OSH  - Trop T 82, f/u CK/CKMB. CP free.   - EKG NSR 93, Q waves V1-2, TWI inferolateral leads, poor baseline w artifact. Similar to prior.   - CXR w congestion  - GDMT: Continue ___  - Diuresis: IV Lasix 40mg BID  - HOLDING __  - Core measures, daily weights, strict I&Os P/w worsening DOMINGUEZ/orthopnea iso running out of Lasix. 94% on RA. WWP. Was recently dx with HF at OSH  - Trop T 82, , CKMB normal. CP free.   - EKG NSR 93, Q waves V1-2, TWI inferolateral leads, poor baseline w artifact. Similar to prior.   - CXR w congestion  - GDMT: Continue Coreg 6.25mg BID, Losartan 100mg QD  - Diuresis: IV Lasix 40mg BID  - Core measures, daily weights, strict I&Os P/w worsening DOMINGUEZ/orthopnea iso running out of Lasix. 94% on RA. WWP. Was recently dx with HF at OSH  - Trop T 82, , CKMB normal. CP free.   - EKG NSR 93, Q waves V1-2, TWI inferolateral leads, poor baseline w artifact. Similar to prior.   - CXR w congestion  - GDMT: Continue Coreg 6.25mg BID, Losartan 100mg QD  - Diuresis: IV Lasix 40mg BID  - TTE in AM  - Core measures, daily weights, strict I&Os P/w worsening DOMINGUEZ/orthopnea i/s/o running out of Lasix. 94% on RA. WWP. Was recently dx with HF at OSH  - Trop T 82, , CKMB normal. CP free.   - EKG NSR 93, Q waves V1-2, TWI inferolateral leads, poor baseline w artifact. Similar to prior.   - CXR w congestion  - GDMT: Continue Coreg 6.25mg BID, Losartan 100mg QD  - Diuresis: IV Lasix 40mg BID  - TTE in AM  - Core measures, daily weights, strict I&Os

## 2024-04-02 NOTE — H&P ADULT - ASSESSMENT
64 yo M with PMHx of HTN, HLD, uncontrolled T2DM, PAD s/p SFA/pop stent (2/2023), OM s/p LT TMA w/ residual OM (VRE), CKD (baseline 1.3-1.4), genital herpes (on valcyclovir), recent admission to [facility] 2/7/24-2/9/24 for PNA tx w Vanc/Zosyn/augmentin who presented to [facility] ED complaining of worsening SOB, orthopnea, and cough, reportedly recent dx with HF at OSH. Now admitted for further management of HF exacerbation on IV diuresis  64 yo M with PMHx of HTN, HLD, T2DM, PAD s/p SFA/pop stent (2/2023), OM s/p LT TMA w/ residual OM (VRE), CKD (baseline 1.3-1.4), genital herpes (on valacyclovir PRN during flares) recent admission to [facility] 2/7/24-2/9/24 for PNA tx w Vanc/Zosyn/Augmentin, recently dx with heart failure at OSH (EF unknown) who presented to [facility] ED complaining of worsening SOB, orthopnea, and cough i/s/o running out of Lasix x2 days. Now admitted for further management of HF exacerbation, on IV diuresis.  62 yo M with PMHx of HTN, HLD, T2DM, PAD s/p SFA/pop stent (2/2023), OM s/p LT TMA w/ residual OM (VRE) & nonhealing wound (follows outpt w wound clinic), CKD (baseline 1.3-1.4), anemia (baseline Hgb 9-10), genital herpes (on valacyclovir PRN during flares) recent admission to [facility] 2/7/24-2/9/24 for PNA tx w Vanc/Zosyn/Augmentin, recently dx with heart failure at OSH (EF unknown) who presented to [facility] ED complaining of worsening SOB, orthopnea, and cough i/s/o running out of Lasix x2 days. Now admitted for further management of HF exacerbation, on IV diuresis.  62 yo M with PMHx of HTN, HLD, T2DM, PAD s/p SFA/pop stent (2/2023), OM s/p LT TMA w/ residual OM (VRE) & nonhealing wound (follows outpt w wound clinic), CKD (baseline 1.3-1.4), anemia (baseline Hgb 9-10), genital herpes (on valacyclovir PRN during flares) recent admission to [facility] 2/7/24-2/9/24 for PNA, recently dx with heart failure at OSH (EF reportedly 40%) who presented to [facility] ED complaining of worsening SOB, orthopnea, and cough i/s/o running out of Lasix x2 days. Now admitted for further management of HF exacerbation, on IV diuresis.

## 2024-04-02 NOTE — ED PROVIDER NOTE - PROGRESS NOTE DETAILS
wbc count 12. labs w baseline anemia.   creatinine 1.43, similar to prior.   HS trop 82, BNP 7100.   ecg similar to prior, no acute ischemic, no chest pain  cxr w pulm congestion    given 80mg IV lasix, good urine output. vitals stable on room air. given breathing treatment at pts request but no wheezing on exam  admitted to [facility] for chf exacerbation  mild leukocytosis but clinical picture not concerning for pna - no productive cough, uri sx, fever. no other infectious sx. will send ua.

## 2024-04-02 NOTE — H&P ADULT - PROBLEM SELECTOR PLAN 6
/79 on admission  - Continue meds as above /79 on admission, hadn't taken any BP meds today  - Continue meds as above F/u AM a1c  - Home regimen: Lantus 8u QHS, Lispo 10u TID  - Continue MISS, Lantus 6u QHS

## 2024-04-02 NOTE — ED ADULT TRIAGE NOTE - CHIEF COMPLAINT QUOTE
Pt presents to ED C/O SOB, difficulty sleeping when laying flat starting last night. Pt states, "I know what is wrong, I have fluid in my lungs I've had the same symptoms before they say I have heart failure but I don't think there is anything wrong with my heart". Pt protecting own airway, denies peripheral edema. Denies CP. EKG in progress.

## 2024-04-02 NOTE — ED PROVIDER NOTE - PHYSICAL EXAMINATION
Constitutional  non-toxic, no acute distress. awake, alert, oriented to person, place, time/situation.  Head : head normocephalic, atraumatic  EENMT : eyes clear bilaterally, PERRL, EOMI. airway patent. moist mucous membranes. neck supple.  Cardiac : Normal rate, regular rhythm. No murmur appreciated, no LE edema.  Resp : tachypenic with conversation, ok at rest. crackles at bilateral bases. no rales, rhonchi, wheezing.   Gastro : abdomen soft, nontender, nondistended. no rebound or guarding.  MSK :  s/p L TMA  extremities otherwise - range of motion is not limited, no muscle or joint tenderness  Back : No evidence of trauma. No spinal or CVA tenderness.  Vasc : Extremities warm and well perfused. 2+ radial and DP pulses. cap refill <2 seconds  Neuro : Alert and oriented, CNII-XII grossly intact, no motor or sensory deficits.  Skin : Skin normal color for race, warm, dry and intact. No evidence of rash.  Psych : Alert and oriented to person, place, time/situation. normal mood and affect. no apparent risk to self or others.

## 2024-04-02 NOTE — PATIENT PROFILE ADULT - FUNCTIONAL ASSESSMENT - BASIC MOBILITY 5.
PATRICIO KENDALL  : 1954  ACCOUNT:  [id number]  [phone]  PCP: Dr. Hussain Aggarwal     TODAY'S DATE: 10/23/2018  DICTATED BY:  [Dr. King Hughes]      CHIEF COMPLAINT: [Followup of Carotid artery stenosis, bilateral, Followup of Hypercholesteremia and pure.]    HPI:    [On 10/23/2018, Patricio Kendall, a 64 year old male, presented with no interim cardiac complaints.]    [This is an office follow-up visit for this 64-year-old male with a prior history of a right carotid endarterectomy.  Negative stress test in .  He has a negative abdominal ultrasound.  He returns to the office today for follow-up.  He is having no symptoms of exertional chest pain or dyspnea.  He is due for his yearly lipids and also carotid ultrasound.  He plays golf on a regular basis.  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 EYES: denies significant visual changes. ENMT: denies difficulties with hearing, otherwise negative. CV: see HPI; denies claudication. RESP: denies chronic cough, hemoptysis. GI: denies melena, hematochezia. : no hematuria. INTEG: no new rashes, lesions. MS: no limiting arthritis. NEURO: no localized deficits. HEM/LYMPH: denies easy bruising. ALL: no new food or enviornmental allergies.      PAST HISTORY:     PAST CV HISTORY: carotid artery stenosis, carotid endarterectomy 11/10 and dyslipidemia    FAMILY HISTORY: Negative for premature CAD. Negative for AAA.  SOCIAL HISTORY: SMOKING: Former tobacco use. used to smoke but quit, 2006 and hx 1/2 to  1 ppd x 6 yrs.. CAFFEINE: none. ALCOHOL: drinks socially, 6 - 10 per week and x 35 yr. hx. EXERCISE: no regular  exercise. DIET: no special diet. MARITAL STATUS: . OCCUPATION: [employer].     ALLERGIES: No Known Allergies    MEDICATIONS: Selected prescriptions see below    VITAL SIGNS: [B/P - 126/70 , Pulse - 58, Weight -  229, Height -   72 , BMI - 31.1 ]    CONS: well developed, well nourished. WEIGHT: BMI parameters reviewed and discussed. HEAD/FACE: no trauma and normocephalic. EYES: conjunctivae not injected and no xanthelasma. ENT: mucosa pink and moist. NECK: jugular venous pressure not elevated. RESP: respirations with normal rate and rhythm, clear to auscultation. GI: no masses, tenderness or hepatosplenomegaly, rectal deferred. MS: adequate gait for exercise/testing. EXT: no clubbing or cyanosis.  SKIN: no rashes, lesions, ulcers.  NEURO/PSYCH: alert and oriented to time, place and person and normal affect.      CV: PALP: PMI not displaced, no lifts and thrills or rub. AUSC:  regular rhythm, normal S1, S2 without S3; no pathologic murmurs. CAROTIDS: carotid endarterectomy scar right. ABD AORTA: abdominal aorta not enlarged. FEM: femoral pulses intact. PEDAL: pedal pulses intact. EXT: no peripheral edema.     DECISION MAKING:    [1.  Carotid disease  2.  Hyperlipidemia]    ASSESSMENT:  1. Abnormal UFCT  2. Carotid artery stenosis, bilateral  3. Hypercholesteremia, pure      PLAN:  [Plan 1.  Lipids  2.  Carotid ultrasound  3.  Follow-up with me on a yearly basis however he knows to call me with any exertional symptoms of chest pain or dyspnea]    PRESCRIPTIONS:   12/22/17 *Atorvastatin Calcium 20MG      ONE TABLET DAILY                         09/15/16 Aspirin               81MG      1 po daily                                        4 = No assist / stand by assistance

## 2024-04-02 NOTE — H&P ADULT - NS ATTEND AMEND GEN_ALL_CORE FT
Initial attending contact date 4.3.24 on morning bedside rounds. See attending addendum to HPI here for initial attending contact documentation.  63M with Essential HTN, HLD, Type II DM, CKD III, Chronic Anemia, PAD s/p toe amputations and stents with chronic L foot wound followed by outpatient podiatry, and Chronic Systolic HFrEF who presents with acute on chronic CHFrEF exacerbation after running out of lasix for a few days prior to admission. Patient with hypertensive urgency upon arrival, wet crackles on pulmonary exam, conversational dyspnea, CXR with pulmonary edema, CKD at baseline Cr 1.4-1.5, Anemia at baseline 9, and BNP elevated at 7K.  Patient given Lasix 80IV in ED and admitted to cardiac telemetry. TTE performed and notable for reduced LVEF 25% and pericardial effusion with e/o tamponade physiology with intermittent RA invagination. Patient not clinically in tamponade and refusing elective pericardiocentesis.   Plan for:  Diurese with IV Lasix  TID, goal net neg 2-3L/24h  CHF GDMT regimen: Cont home Coreg 6.25 BID, Losartan 100 daily, Spironolactone 25 daily.   Initiate Hydralazine 25 TID/Isordil 10 TID for additional afterload reduction given stage II HTN currently; if SBP>100 on 4/4 AM augment to 50/20 TID respectively  Send ARNI and SGLT2i to [facility] for price check   Obtain LTD TTE on 4/4 AM for serial monitoring of pericardial effusion  Case discussed with IC attending and plan for serial LTD TTE to monitor effusion and continued IV diuresis.   BIPAP overnight for NIVPPV  Core Measure CHF orders  Bedside CHF Education, supplies to be given to patient  Podiatry consult for chronic L foot wound, patient known to service  PT consult, patient to be referred to cardiac rehab upon discharge  Dietician/Nutrition consult for CHF/cardiac diet reinforcement  Future planning: patient to have 1wk f/u appt made with Cardiologist for quality improvement CHF readmission risk reduction  Kostas Selby M.D.  Cardiology Attending  During non face-to-face time, I reviewed relevant portions of the patient’s medical record; including vitals, labs, medications, cardiac studies, remaining additional imaging and consultant recommendations. During face-to-face time, I took a relevant history and examined the patient. I also explained to the patient their diagnoses, and specific cardiopulmonary management plan, which required a high level of medical decision making.  I answered all questions related to the patient's medical conditions. I spent 80 minutes on total encounter; more than 50% of the visit was spent counseling and/or coordinating care by the attending physician, with plan of care discussed with the patient, consultants and cardiac care team.

## 2024-04-02 NOTE — ED PROVIDER NOTE - CCCP TRG CHIEF CMPLNT
Patient stated it is okay to speak to Balaji.  Advised her that it is okay to eat and drink before treatment.  She verbalized understanding.    shortness of breath

## 2024-04-03 ENCOUNTER — RESULT REVIEW (OUTPATIENT)
Age: 64
End: 2024-04-03

## 2024-04-03 ENCOUNTER — TRANSCRIPTION ENCOUNTER (OUTPATIENT)
Age: 64
End: 2024-04-03

## 2024-04-03 DIAGNOSIS — I50.23 ACUTE ON CHRONIC SYSTOLIC (CONGESTIVE) HEART FAILURE: ICD-10-CM

## 2024-04-03 DIAGNOSIS — I31.39 OTHER PERICARDIAL EFFUSION (NONINFLAMMATORY): ICD-10-CM

## 2024-04-03 DIAGNOSIS — D64.9 ANEMIA, UNSPECIFIED: ICD-10-CM

## 2024-04-03 LAB
ALBUMIN SERPL ELPH-MCNC: 3.1 G/DL — LOW (ref 3.3–5)
ALP SERPL-CCNC: 111 U/L — SIGNIFICANT CHANGE UP (ref 40–120)
ALT FLD-CCNC: <5 U/L — LOW (ref 10–45)
ANION GAP SERPL CALC-SCNC: 9 MMOL/L — SIGNIFICANT CHANGE UP (ref 5–17)
AST SERPL-CCNC: 11 U/L — SIGNIFICANT CHANGE UP (ref 10–40)
BASOPHILS # BLD AUTO: 0.08 K/UL — SIGNIFICANT CHANGE UP (ref 0–0.2)
BASOPHILS NFR BLD AUTO: 0.8 % — SIGNIFICANT CHANGE UP (ref 0–2)
BILIRUB SERPL-MCNC: 0.6 MG/DL — SIGNIFICANT CHANGE UP (ref 0.2–1.2)
BUN SERPL-MCNC: 28 MG/DL — HIGH (ref 7–23)
CALCIUM SERPL-MCNC: 9.2 MG/DL — SIGNIFICANT CHANGE UP (ref 8.4–10.5)
CHLORIDE SERPL-SCNC: 105 MMOL/L — SIGNIFICANT CHANGE UP (ref 96–108)
CO2 SERPL-SCNC: 24 MMOL/L — SIGNIFICANT CHANGE UP (ref 22–31)
CREAT SERPL-MCNC: 1.5 MG/DL — HIGH (ref 0.5–1.3)
CRP SERPL-MCNC: 18 MG/L — HIGH (ref 0–4)
EGFR: 52 ML/MIN/1.73M2 — LOW
EOSINOPHIL # BLD AUTO: 0.24 K/UL — SIGNIFICANT CHANGE UP (ref 0–0.5)
EOSINOPHIL NFR BLD AUTO: 2.5 % — SIGNIFICANT CHANGE UP (ref 0–6)
ERYTHROCYTE [SEDIMENTATION RATE] IN BLOOD: 83 MM/HR — HIGH
GLUCOSE BLDC GLUCOMTR-MCNC: 174 MG/DL — HIGH (ref 70–99)
GLUCOSE BLDC GLUCOMTR-MCNC: 189 MG/DL — HIGH (ref 70–99)
GLUCOSE BLDC GLUCOMTR-MCNC: 222 MG/DL — HIGH (ref 70–99)
GLUCOSE BLDC GLUCOMTR-MCNC: 235 MG/DL — HIGH (ref 70–99)
GLUCOSE BLDC GLUCOMTR-MCNC: 283 MG/DL — HIGH (ref 70–99)
GLUCOSE SERPL-MCNC: 192 MG/DL — HIGH (ref 70–99)
HCT VFR BLD CALC: 27.1 % — LOW (ref 39–50)
HGB BLD-MCNC: 8.5 G/DL — LOW (ref 13–17)
IMM GRANULOCYTES NFR BLD AUTO: 0.4 % — SIGNIFICANT CHANGE UP (ref 0–0.9)
LYMPHOCYTES # BLD AUTO: 1.9 K/UL — SIGNIFICANT CHANGE UP (ref 1–3.3)
LYMPHOCYTES # BLD AUTO: 19.5 % — SIGNIFICANT CHANGE UP (ref 13–44)
MAGNESIUM SERPL-MCNC: 2.1 MG/DL — SIGNIFICANT CHANGE UP (ref 1.6–2.6)
MCHC RBC-ENTMCNC: 26.2 PG — LOW (ref 27–34)
MCHC RBC-ENTMCNC: 31.4 GM/DL — LOW (ref 32–36)
MCV RBC AUTO: 83.4 FL — SIGNIFICANT CHANGE UP (ref 80–100)
MONOCYTES # BLD AUTO: 0.95 K/UL — HIGH (ref 0–0.9)
MONOCYTES NFR BLD AUTO: 9.7 % — SIGNIFICANT CHANGE UP (ref 2–14)
NEUTROPHILS # BLD AUTO: 6.54 K/UL — SIGNIFICANT CHANGE UP (ref 1.8–7.4)
NEUTROPHILS NFR BLD AUTO: 67.1 % — SIGNIFICANT CHANGE UP (ref 43–77)
NRBC # BLD: 0 /100 WBCS — SIGNIFICANT CHANGE UP (ref 0–0)
PLATELET # BLD AUTO: 615 K/UL — HIGH (ref 150–400)
POTASSIUM SERPL-MCNC: 4.5 MMOL/L — SIGNIFICANT CHANGE UP (ref 3.5–5.3)
POTASSIUM SERPL-SCNC: 4.5 MMOL/L — SIGNIFICANT CHANGE UP (ref 3.5–5.3)
PROT SERPL-MCNC: 6.4 G/DL — SIGNIFICANT CHANGE UP (ref 6–8.3)
RBC # BLD: 3.25 M/UL — LOW (ref 4.2–5.8)
RBC # FLD: 20.3 % — HIGH (ref 10.3–14.5)
SODIUM SERPL-SCNC: 138 MMOL/L — SIGNIFICANT CHANGE UP (ref 135–145)
WBC # BLD: 9.75 K/UL — SIGNIFICANT CHANGE UP (ref 3.8–10.5)
WBC # FLD AUTO: 9.75 K/UL — SIGNIFICANT CHANGE UP (ref 3.8–10.5)

## 2024-04-03 PROCEDURE — 99221 1ST HOSP IP/OBS SF/LOW 40: CPT | Mod: GC

## 2024-04-03 PROCEDURE — 93308 TTE F-UP OR LMTD: CPT | Mod: 26

## 2024-04-03 PROCEDURE — 73630 X-RAY EXAM OF FOOT: CPT | Mod: 26,LT

## 2024-04-03 PROCEDURE — 93306 TTE W/DOPPLER COMPLETE: CPT | Mod: 26

## 2024-04-03 PROCEDURE — 99223 1ST HOSP IP/OBS HIGH 75: CPT

## 2024-04-03 RX ORDER — VALACYCLOVIR 500 MG/1
1 TABLET, FILM COATED ORAL
Refills: 0 | DISCHARGE

## 2024-04-03 RX ORDER — HYDRALAZINE HCL 50 MG
25 TABLET ORAL EVERY 8 HOURS
Refills: 0 | Status: DISCONTINUED | OUTPATIENT
Start: 2024-04-03 | End: 2024-04-05

## 2024-04-03 RX ORDER — FUROSEMIDE 40 MG
40 TABLET ORAL EVERY 8 HOURS
Refills: 0 | Status: DISCONTINUED | OUTPATIENT
Start: 2024-04-03 | End: 2024-04-05

## 2024-04-03 RX ORDER — SPIRONOLACTONE 25 MG/1
25 TABLET, FILM COATED ORAL DAILY
Refills: 0 | Status: DISCONTINUED | OUTPATIENT
Start: 2024-04-03 | End: 2024-04-06

## 2024-04-03 RX ORDER — SACUBITRIL AND VALSARTAN 24; 26 MG/1; MG/1
1 TABLET, FILM COATED ORAL
Qty: 60 | Refills: 0
Start: 2024-04-03 | End: 2024-05-02

## 2024-04-03 RX ORDER — INSULIN LISPRO 100/ML
10 VIAL (ML) SUBCUTANEOUS
Refills: 0 | DISCHARGE

## 2024-04-03 RX ORDER — DAPAGLIFLOZIN 10 MG/1
1 TABLET, FILM COATED ORAL
Qty: 30 | Refills: 0
Start: 2024-04-03 | End: 2024-05-02

## 2024-04-03 RX ORDER — INSULIN GLARGINE 100 [IU]/ML
8 INJECTION, SOLUTION SUBCUTANEOUS
Refills: 0 | DISCHARGE

## 2024-04-03 RX ORDER — ASPIRIN/CALCIUM CARB/MAGNESIUM 324 MG
1 TABLET ORAL
Qty: 0 | Refills: 0 | DISCHARGE

## 2024-04-03 RX ORDER — ROSUVASTATIN CALCIUM 5 MG/1
1 TABLET ORAL
Refills: 0 | DISCHARGE

## 2024-04-03 RX ORDER — LOSARTAN POTASSIUM 100 MG/1
1 TABLET, FILM COATED ORAL
Refills: 0 | DISCHARGE

## 2024-04-03 RX ORDER — MUPIROCIN 20 MG/G
1 OINTMENT TOPICAL
Refills: 0 | Status: DISCONTINUED | OUTPATIENT
Start: 2024-04-03 | End: 2024-04-07

## 2024-04-03 RX ORDER — ISOSORBIDE DINITRATE 5 MG/1
10 TABLET ORAL THREE TIMES A DAY
Refills: 0 | Status: DISCONTINUED | OUTPATIENT
Start: 2024-04-03 | End: 2024-04-05

## 2024-04-03 RX ORDER — HYDRALAZINE HCL 50 MG
25 TABLET ORAL ONCE
Refills: 0 | Status: COMPLETED | OUTPATIENT
Start: 2024-04-03 | End: 2024-04-03

## 2024-04-03 RX ORDER — FUROSEMIDE 40 MG
1 TABLET ORAL
Refills: 0 | DISCHARGE

## 2024-04-03 RX ORDER — ISOSORBIDE DINITRATE 5 MG/1
10 TABLET ORAL ONCE
Refills: 0 | Status: COMPLETED | OUTPATIENT
Start: 2024-04-03 | End: 2024-04-03

## 2024-04-03 RX ADMIN — INSULIN GLARGINE 6 UNIT(S): 100 INJECTION, SOLUTION SUBCUTANEOUS at 22:11

## 2024-04-03 RX ADMIN — ISOSORBIDE DINITRATE 10 MILLIGRAM(S): 5 TABLET ORAL at 17:13

## 2024-04-03 RX ADMIN — Medication 81 MILLIGRAM(S): at 11:53

## 2024-04-03 RX ADMIN — Medication 2: at 11:54

## 2024-04-03 RX ADMIN — Medication 40 MILLIGRAM(S): at 06:38

## 2024-04-03 RX ADMIN — CARVEDILOL PHOSPHATE 6.25 MILLIGRAM(S): 80 CAPSULE, EXTENDED RELEASE ORAL at 22:11

## 2024-04-03 RX ADMIN — ISOSORBIDE DINITRATE 10 MILLIGRAM(S): 5 TABLET ORAL at 22:10

## 2024-04-03 RX ADMIN — SPIRONOLACTONE 25 MILLIGRAM(S): 25 TABLET, FILM COATED ORAL at 13:17

## 2024-04-03 RX ADMIN — LOSARTAN POTASSIUM 100 MILLIGRAM(S): 100 TABLET, FILM COATED ORAL at 08:53

## 2024-04-03 RX ADMIN — LOSARTAN POTASSIUM 100 MILLIGRAM(S): 100 TABLET, FILM COATED ORAL at 00:24

## 2024-04-03 RX ADMIN — Medication 25 MILLIGRAM(S): at 17:13

## 2024-04-03 RX ADMIN — ATORVASTATIN CALCIUM 40 MILLIGRAM(S): 80 TABLET, FILM COATED ORAL at 22:10

## 2024-04-03 RX ADMIN — INSULIN GLARGINE 6 UNIT(S): 100 INJECTION, SOLUTION SUBCUTANEOUS at 00:25

## 2024-04-03 RX ADMIN — Medication 25 MILLIGRAM(S): at 22:11

## 2024-04-03 RX ADMIN — CARVEDILOL PHOSPHATE 6.25 MILLIGRAM(S): 80 CAPSULE, EXTENDED RELEASE ORAL at 00:24

## 2024-04-03 RX ADMIN — MUPIROCIN 1 APPLICATION(S): 20 OINTMENT TOPICAL at 17:17

## 2024-04-03 RX ADMIN — Medication 4: at 07:45

## 2024-04-03 RX ADMIN — Medication 2: at 22:10

## 2024-04-03 RX ADMIN — CARVEDILOL PHOSPHATE 6.25 MILLIGRAM(S): 80 CAPSULE, EXTENDED RELEASE ORAL at 11:54

## 2024-04-03 RX ADMIN — Medication 40 MILLIGRAM(S): at 13:28

## 2024-04-03 RX ADMIN — Medication 40 MILLIGRAM(S): at 22:10

## 2024-04-03 RX ADMIN — Medication 4: at 17:13

## 2024-04-03 NOTE — PROGRESS NOTE ADULT - SUBJECTIVE AND OBJECTIVE BOX
INTERVAL HPI/OVERNIGHT EVENTS:  Patient well known to me ;  Spoke with him yesterday and sent him to ED;  Events noted;  Pericardial  effusion noted  He agrees to drain the fluid       MEDICATIONS  (STANDING):  aspirin enteric coated 81 milliGRAM(s) Oral daily  atorvastatin 40 milliGRAM(s) Oral at bedtime  carvedilol 6.25 milliGRAM(s) Oral every 12 hours  dextrose 5%. 1000 milliLiter(s) (50 mL/Hr) IV Continuous <Continuous>  dextrose 5%. 1000 milliLiter(s) (100 mL/Hr) IV Continuous <Continuous>  dextrose 50% Injectable 25 Gram(s) IV Push once  dextrose 50% Injectable 25 Gram(s) IV Push once  dextrose 50% Injectable 12.5 Gram(s) IV Push once  furosemide   Injectable 40 milliGRAM(s) IV Push every 8 hours  glucagon  Injectable 1 milliGRAM(s) IntraMuscular once  heparin   Injectable 5000 Unit(s) SubCutaneous every 8 hours  hydrALAZINE 25 milliGRAM(s) Oral every 8 hours  insulin glargine Injectable (LANTUS) 6 Unit(s) SubCutaneous at bedtime  insulin lispro (ADMELOG) corrective regimen sliding scale   SubCutaneous Before meals and at bedtime  isosorbide   dinitrate Tablet (ISORDIL) 10 milliGRAM(s) Oral three times a day  losartan 100 milliGRAM(s) Oral every 24 hours  mupirocin 2% Ointment 1 Application(s) Topical two times a day  spironolactone 25 milliGRAM(s) Oral daily    MEDICATIONS  (PRN):  dextrose Oral Gel 15 Gram(s) Oral once PRN Blood Glucose LESS THAN 70 milliGRAM(s)/deciliter      Allergies    predniSONE (Swelling)  ciprofloxacin (Hives; Rash)  cefepime (Rash; Hives)    Intolerances        Vital Signs Last 24 Hrs  T(C): 36.5 (03 Apr 2024 17:02), Max: 36.9 (03 Apr 2024 06:37)  T(F): 97.7 (03 Apr 2024 17:02), Max: 98.5 (03 Apr 2024 06:37)  HR: 77 (03 Apr 2024 17:02) (77 - 101)  BP: 153/70 (03 Apr 2024 17:02) (136/65 - 180/79)  BP(mean): 100 (03 Apr 2024 17:02) (93 - 113)  RR: 18 (03 Apr 2024 17:02) (16 - 24)  SpO2: 96% (03 Apr 2024 17:02) (92% - 96%)    Parameters below as of 03 Apr 2024 17:02  Patient On (Oxygen Delivery Method): room air              Constitutional:  Awake and no shortness of breath; On no Oxygen     Eyes: BO    ENMT: Negative    Neck: Supple    Back:  no tenderness     Respiratory:  clear    Cardiovascular: S1 S2    Gastrointestinal:  soft     Genitourinary:    Extremities:  Left foot in bandage     Vascular:    Neurological:    Skin:    Lymph Nodes:            04-02 @ 07:01  -  04-03 @ 07:00  --------------------------------------------------------  IN: 0 mL / OUT: 600 mL / NET: -600 mL    04-03 @ 07:01  -  04-03 @ 18:22  --------------------------------------------------------  IN: 180 mL / OUT: 1050 mL / NET: -870 mL      LABS:                        8.5    9.75  )-----------( 615      ( 03 Apr 2024 12:44 )             27.1     04-03    138  |  105  |  28<H>  ----------------------------<  192<H>  4.5   |  24  |  1.50<H>    Ca    9.2      03 Apr 2024 12:44  Mg     2.1     04-03    TPro  6.4  /  Alb  3.1<L>  /  TBili  0.6  /  DBili  x   /  AST  11  /  ALT  <5<L>  /  AlkPhos  111  04-03      Urinalysis Basic - ( 03 Apr 2024 12:44 )    Color: x / Appearance: x / SG: x / pH: x  Gluc: 192 mg/dL / Ketone: x  / Bili: x / Urobili: x   Blood: x / Protein: x / Nitrite: x   Leuk Esterase: x / RBC: x / WBC x   Sq Epi: x / Non Sq Epi: x / Bacteria: x        RADIOLOGY & ADDITIONAL TESTS:

## 2024-04-03 NOTE — DISCHARGE NOTE PROVIDER - HOSPITAL COURSE
64 yo M with PMHx of HTN, HLD, T2DM, PAD s/p SFA/pop stent (2/2023), OM s/p LT TMA w/ residual OM (VRE) & nonhealing wound (follows outpt w wound clinic), CKD (baseline 1.3-1.4), anemia (baseline Hgb 9-10), genital herpes (on valacyclovir prn) recent admission to [facility] 2/7/24-2/9/24 for PNA, recently dx with CHF at OSH (EF reportedly 40%) admitted for acute on chronic HFrEF exacerbation secondary to lasix nonadherence, on IV diuretics. TTE reveals EF 25-30%, large pericardial effusion w/ initial concern for tamponade physiology. Upon further review of echo w/ interventional cardiology team there is no clinical tamponade. Given normal hemodynamics, close monitoring and surveillance.   63M with hx of HTN, HLD, DM2, PAD s/p SFA/pop stent (2/2023), OM s/p LT TMA w/ residual OM (VRE) & non-healing wound, Stage 3b CKD (b/l Cr 1.3-1.4), AOCD, genital herpes (on Valgancyclovir PRN), and recent HFrEF dx at OSH with EF 40%, presenting with volume overload 2/2 acute decompensated HF 2/2 medication non-compliance.    Labs on admission notable for elevated BNP 7127 with CXR noting pulmonary edema. Pt was then admitted to cardiology service for CHF management and IV diuresis.     While at 5 UR, pt was diuresed with Lasix IV and GDMT was started by adding Spironolactone 25mg and Farxiga 10mg QD. Pt underwent TTE (4/03/24): EF 25-30%, global LV hypokinesis. Mild LVH, G2DD. Normal RV size and systolic function. Aortic sclerosis w/o stenosis. mildly dilated LA. large pericardial effusion. Course notable for HTN with -170s -- pt was initially on Hydralazine/Isordil for afterload reduction and was later transitioned to Entresto 49-51mg BID for further GDMT optimization.     Hospital course c/b large pericardial effusion w/o evidence of tamponade on TTE 4/03/24 -- repeat Limited TTE (4/05/24): Small pericardial effusion w/o evidence of tamponade. Further course also notable for worsening LEANNE on CKD with Cr up-trend beyond baseline -- likely 2/2 overdiuresis. Pt's Lasix, Spironolactone, and Farxiga were therefore held iso of worsening LEANNE on CKD.     Additionally, pt also noted to have thromobocytosis with -800s. Heme was therefore consulted and recommended the following work-up: JAK2, CALR, and MPLN1 labs with outpatient follow-up in 2 weeks upon discharge.     Pt seen and examined at bedside this AM without any complaints or events overnight, VSS, labs and telemetry reviewed and pt stable for discharge on 4/07/24 as discussed with Dr. Arredondo.     Pt has received appropriate discharge instructions, including medication regimen and follow up with Dr. Lang on 4/11/24.    GLP-1 receptor antagonist/SGLT2 inhibitor meds discussed with patient and encouraged to discuss further with PMD or Endocrinologist at next visit.     Pt's discharge copies detailed cardiovascular history, medications, testing/treatments, OR pt has created a patient portal account and instructed to provide their records at their 1st appointment.    Discharge Cr: 1.61 (baseline Cr 1.3-1.4)   Discharge Wt: 67.6kg     Active Issues:  LEANNE on CKD -- monitor Cr   Thrombocytosis -- f/up JAK2, CALR, and MPLN1 labs   Consider ischemic and non-ischemic work-up for HFrEF etiology     CHF REGIMEN UPON DISCHARGE:  Beta-blocker: Coreg 6.25mg BID   ACE/ARB/ARNI: Entresto 49-51mg BID   MRA: Spironolactone 25mg QD  SGLT2-I: Farxiga 10mg QD     CV REGIMEN UPON DISCHARGE:  ASA 81mg QD  Rosuvastatin 10mg QD    DIABETIC REGIMEN UPON DISCHARGE: -- HbA1c 8.5   Humalog 10 units TID   Lantus 8 units QHS     OTHER MEDICATIONS UPON DISCHARGE:  Valacyclovir 500mg PRN

## 2024-04-03 NOTE — PROGRESS NOTE ADULT - PROBLEM SELECTOR PLAN 3
S/p SFA/pop stent (2/2023), s/p LT TMA w/ residual OM (VRE), follows w wound care outpt   - Continue aspirin 81mg and atorva 40mg   - Podiatry consult in AM WBC on admission 11.99 -> 9.75 Recent admission to [facility] 2/2024 for PNA s/p abx  - No infectious sx, Afebrile.

## 2024-04-03 NOTE — CONSULT NOTE ADULT - SUBJECTIVE AND OBJECTIVE BOX
Attending: Dr. Lamar    Patient is a 63y old  Male who presents with a chief complaint of HF exacerbation (03 Apr 2024 09:42)      HPI:  62 yo M with PMHx of HTN, HLD, T2DM, PAD s/p SFA/pop stent (2/2023), OM s/p LT TMA w/ residual OM (VRE), genital herpes (on valacyclovir for flares), recent admission to [facility] 2/7/24-2/9/24 for PNA tx w Vanc/Zosyn/augmentin who presented to [facility] ED complaining of worsening SOB, orthopnea, and cough. Pt states that he has recently run out of his "water pill" which he was prescribed during a recent admission in [facility]. He endorses some associated mild pleuritic chest pain but otherwise denies any fever, chills recent sick contacts, urinary sx, abd pian, n/v/d, LE edema, dizziness, syncope. Has otherwise been taking all medications as prescribed. Recent echocardiogram at OSH with a reported EF of 40% per pt; does not otherwise follow with a cardiologist.   Podiatry addendum: 64 y/o M with PMHx of HTN, HLD, DM, PAD (s/p stent of SFA/pop (2/2023)), OM (s/p Left TMA w/ residual OM (VRE)) admitted to [facility] for CHF exacerbation Podiatry consulted for left foot wound. Wound has been present for >8 months. Had right foot sub met 1 that has closed since his last admission in 2/2024. Denies history of infection in the wound. Last saw Dr. Lamar a couple of weeks ago. States he has been changing dressing daily with Mupirocin. Denies any purulence, malodor, or signs of infection. Denies any pain in the area as he is neuropathic. Has been ambulating with a crow boot to his left foot.      Review of systems negative except per HPI and as stated below  General:	 no weakness; no fevers, no chills  Skin/Breast: no rash  Respiratory and Thorax: no SOB, no cough  Cardiovascular:	No chest pain  Gastrointestinal:	 no nausea, vomiting , diarrhea  Genitourinary:	no dysuria, no difficulty urinating, no hematuria  Musculoskeletal:	no weakness, no joint swelling/pain  Neurological:	no focal weakness/numbness  Endocrine:	no polyuria, no polydipsia    PAST MEDICAL & SURGICAL HISTORY:  HTN (hypertension)  HLD (hyperlipidemia)  DM (diabetes mellitus)  PAD (peripheral artery disease)        Home Medications:  Aspirin Enteric Coated 81 mg oral delayed release tablet: 1 tab(s) orally once a day (02 Apr 2024 23:41)  carvedilol 6.25 mg oral tablet: 1 tab(s) orally every 12 hours (02 Apr 2024 23:41)  HumaLOG 100 units/mL injectable solution: 10 unit(s) injectable 3 times a day (before meals) (02 Apr 2024 23:41)  Lantus 100 units/mL subcutaneous solution: 8 subcutaneous once a day (at bedtime) (02 Apr 2024 22:26)  Lasix 40 mg oral tablet: 1 tab(s) orally once a day (02 Apr 2024 23:40)  losartan 100 mg oral tablet: 1 tab(s) orally once a day (02 Apr 2024 22:25)  rosuvastatin 10 mg oral capsule: 1 cap(s) orally once a day (at bedtime) (02 Apr 2024 23:40)  valACYclovir 500 mg oral tablet: 1 tab(s) orally once a day during flares (02 Apr 2024 23:38)    Allergies    predniSONE (Swelling)  ciprofloxacin (Hives; Rash)  cefepime (Rash; Hives)    Intolerances      FAMILY HISTORY:    Social History:       LABS                        9.3    11.99 )-----------( 784      ( 02 Apr 2024 20:26 )             29.4     04-02    138  |  105  |  25<H>  ----------------------------<  166<H>  4.8   |  21<L>  |  1.43<H>    Ca    9.0      02 Apr 2024 20:26  Mg     2.2     04-02    TPro  8.0  /  Alb  3.8  /  TBili  0.7  /  DBili  0.2  /  AST  11  /  ALT  7<L>  /  AlkPhos  139<H>  04-02        Vital Signs Last 24 Hrs  T(C): 36.6 (03 Apr 2024 11:06), Max: 36.9 (03 Apr 2024 06:37)  T(F): 97.9 (03 Apr 2024 11:06), Max: 98.5 (03 Apr 2024 06:37)  HR: 80 (03 Apr 2024 11:06) (80 - 101)  BP: 158/73 (03 Apr 2024 11:06) (136/65 - 180/79)  BP(mean): 105 (03 Apr 2024 11:06) (93 - 113)  RR: 18 (03 Apr 2024 11:06) (16 - 24)  SpO2: 95% (03 Apr 2024 11:06) (92% - 96%)    Parameters below as of 03 Apr 2024 11:06  Patient On (Oxygen Delivery Method): room air        PHYSICAL EXAM  General: NAD, AA0x3    Lower Extremity Focused:  Vasc: DP/PT 2/4 b/l, No edema or erythema  Derm: L foot sub 5th met shaft wound with granular base, hyperkeratotic border, serous drainage, no tracking or tunneling, no purulence or malodor, -PTB; Right foot with hyperkeratotic lesion sub met 1 - no open wounds right.   Neuro: Protective sensation in tact  MSK: L TMA      RADIOLOGY  X-rays: pending

## 2024-04-03 NOTE — PROGRESS NOTE ADULT - PROBLEM SELECTOR PLAN 8
f/u AM lipid panel  - Continue atorva 40mg QHS (TIC for Rosuvastatin 10mg)    F: None  E: Replete if K<4 or Mag<2  N: DASH Diet  VTEppx:   Dispo: Pending euvolemia -/79 on admission. Now -150s  - Continue meds as above

## 2024-04-03 NOTE — DISCHARGE NOTE PROVIDER - NSDCFUSCHEDAPPT_GEN_ALL_CORE_FT
[facility]  INTMED [address]  Scheduled Appointment: 04/18/2024     Roddy Lang  [facility]  HEARTVASC [address]  Scheduled Appointment: 04/11/2024    [facility]  [facility]  INTMED [address]  Scheduled Appointment: 04/18/2024     Isaiah Shelton  [facility] [address]  Scheduled Appointment: 04/25/2024

## 2024-04-03 NOTE — CONSULT NOTE ADULT - ASSESSMENT
62 y/o M with PMHx of HTN, HLD, DM, PAD (s/p stent of SFA/pop (2/2023)), OM (s/p Left TMA w/ residual OM (VRE)) admitted to [facility] for CHF exacerbation Pt with chronic left foot wound for which he follows with Dr. Lamar regularly with no recent infections. At time of consult, VSS, WBC 8.49.    Plan:   - Excisional debridement performed of L sub met 5 down to and including level of dermis with #15 blade scalpel  - Excisional debridement performed of R sub 5th met hyperkeratotic lesion down to and including level of epidermis with #15 blade scalpel  - Local wound care: wound cleansed with normal saline. Dressed with WTD dressing.   - F/u left foot x-rays   - F/u ESR/CRP   - Pt can ambulate in a Crow boot ot the Left LE  - Rest of care per primary team    Plan d/w Attending. Podiatry following.       Discharge dressing instructions:   - Cleanse left foot wound with normal saline daily, pat dry with sterile guaze, apply mupirocin to wound base and cover with dry sterile gauze, wrap with Kerlix and secure with tape.    Patient should follow up with Dr. Aldair Lamar within 1-2 weeks of discharge.    Office information:          [place] Address- [address] [place] [address]. [address], [place] [address] Phone: [phone] 64 y/o M with PMHx of HTN, HLD, DM, PAD (s/p stent of SFA/pop (2/2023)), OM (s/p Left TMA w/ residual OM (VRE)) admitted to [facility] for CHF exacerbation Pt with chronic left foot wound for which he follows with Dr. Lamar regularly with no recent infections. At time of consult, VSS, WBC 11.99. Left foot wound with no clinical signs of infection    Plan:   - Excisional debridement performed of L sub met 5 down to and including level of dermis with #15 blade scalpel  - Excisional debridement performed of R sub 5th met hyperkeratotic lesion down to and including level of epidermis with #15 blade scalpel  - Local wound care: wound cleansed with normal saline. Dressed with WTD dressing.   - F/u left foot x-rays   - F/u ESR/CRP   - Pt can ambulate in a Crow boot ot the Left LE  - Rest of care per primary team    Plan d/w Attending. Podiatry following.       Discharge dressing instructions:   - Cleanse left foot wound with normal saline daily, pat dry with sterile guaze, apply mupirocin to wound base and cover with dry sterile gauze, wrap with Kerlix and secure with tape.    Patient should follow up with Dr. Aldair Lamar within 1-2 weeks of discharge.    Office information:          [place] Address- [address], [place] [address] Phone: [phone] 62 y/o M with PMHx of HTN, HLD, DM, PAD (s/p stent of SFA/pop (2/2023)), OM (s/p Left TMA w/ residual OM (VRE)) admitted to [facility] for CHF exacerbation Pt with chronic left foot wound for which he follows with Dr. Lamar regularly with no recent infections. At time of consult, VSS, WBC 11.99. Left foot wound with no clinical signs of infection    Plan:   - Excisional debridement performed of L sub met 5 down to and including level of dermis with #15 blade scalpel  - Local wound care: wound cleansed with normal saline. Dressed with WTD dressing.   - F/u left foot x-rays   - F/u ESR/CRP   - Pt can ambulate in a Crow boot ot the Left LE  - Rest of care per primary team    Plan d/w Attending. Podiatry following.       Discharge dressing instructions:   - Cleanse left foot wound with normal saline daily, pat dry with sterile guaze, apply mupirocin to wound base and cover with dry sterile gauze, wrap with Kerlix and secure with tape.    Patient should follow up with Dr. Aldair Lamar within 1-2 weeks of discharge.    Office information:          [place] Address- [address] [place] [address]. [address], [place] [address] Phone: [phone]

## 2024-04-03 NOTE — PROGRESS NOTE ADULT - PROBLEM SELECTOR PLAN 1
P/w worsening DOMINGUEZ/orthopnea i/s/o running out of Lasix. 94% on RA. WWP. Was recently dx with HF at OSH  - Trop T 82, , CKMB normal. CP free.   - EKG NSR 93, Q waves V1-2, TWI inferolateral leads, poor baseline w artifact. Similar to prior.   - CXR w congestion  - GDMT: Continue Coreg 6.25mg BID, Losartan 100mg QD  - Diuresis: IV Lasix 40mg BID  - TTE in AM  - Core measures, daily weights, strict I&Os Hypervolemic on exam w/ warm extremities. 94% on RA. Was recently dx with HF at OSH (EF 40%)  - Trop T 82, , CKMB normal. CP free. EKG NSR 93, Q waves V1-2, TWI inferolateral leads (same as prior)  - CXR: Scattered increased lung markings/pulmonary vascular congestion. Mild hypoinflation. Small pleural effusions.   - GDMT: C/w Coreg 6.25mg BID, Losartan 100mg QD and Spironolactone 25 mg QD  - Diuresis: IV Lasix 40 mg TID  - TTE 4/3/24: severely reduced LVSF, EF 25-30%, global LV hypokinesis. Mild LVH, grade II DD. Normal RV size and systolic function. Aortic sclerosis w/o stenosis. mildly dilated LA. large pericardial effusion   - Core measures, daily weights, strict I&Os, fluid restriction Hypervolemic on exam w/ warm extremities. 94% on RA. Was recently dx with HF at OSH (EF 40%)  - Trop T 82, , CKMB normal. CP free. EKG NSR 93, Q waves V1-2, TWI inferolateral leads (same as prior)  - CXR: Scattered increased lung markings/pulmonary vascular congestion. Mild hypoinflation. Small pleural effusions.   - GDMT: C/w Coreg 6.25mg BID, Losartan 100mg QD. Start Spironolactone 25 mg QD, Hydral 25 mg TID, Isordil 10 mg TID  - Diuresis: IV Lasix 40 mg TID  - TTE 4/3/24: severely reduced LVSF, EF 25-30%, global LV hypokinesis. Mild LVH, grade II DD. Normal RV size and systolic function. Aortic sclerosis w/o stenosis. mildly dilated LA. large pericardial effusion   - Core measures, daily weights, strict I&Os, fluid restriction

## 2024-04-03 NOTE — PROGRESS NOTE ADULT - PROBLEM SELECTOR PLAN 6
F/u AM a1c  - Home regimen: Lantus 8u QHS, Lispo 10u TID  - Continue MISS, Lantus 6u QHS Baseline from prior admission Cr 1.3-1.4  - Currently Cr. 1.5. Monitor for now.

## 2024-04-03 NOTE — PROGRESS NOTE ADULT - PROBLEM SELECTOR PLAN 5
Baseline from prior admission 1.3-1.4  - Currently at baseline 1.44 Baseline 9-10. Hgb now 8.5   - Maintain active T&S  - F/u AM iron panel

## 2024-04-03 NOTE — PROGRESS NOTE ADULT - ASSESSMENT
64 yo M with PMHx of HTN, HLD, T2DM, PAD s/p SFA/pop stent (2/2023), OM s/p LT TMA w/ residual OM (VRE) & nonhealing wound (follows outpt w wound clinic), CKD (baseline 1.3-1.4), anemia (baseline Hgb 9-10), genital herpes (on valacyclovir PRN during flares) recent admission to [facility] 2/7/24-2/9/24 for PNA, recently dx with heart failure at OSH (EF reportedly 40%) who presented to [facility] ED complaining of worsening SOB, orthopnea, and cough i/s/o running out of Lasix x2 days. Now admitted for further management of HF exacerbation, on IV diuresis.  62 yo M with PMHx of HTN, HLD, T2DM, PAD s/p SFA/pop stent (2/2023), OM s/p LT TMA w/ residual OM (VRE) & nonhealing wound (follows outpt w wound clinic), CKD (baseline 1.3-1.4), anemia (baseline Hgb 9-10), genital herpes (on valacyclovir prn) recent admission to [facility] 2/7/24-2/9/24 for PNA, recently dx with CHF at OSH (EF reportedly 40%) admitted for acute on chronic HFrEF exacerbation secondary to lasix nonadherence, on IV diuretics. TTE reveals EF 25-30%, large pericardial effusion w/ initial concern for tamponade physiology. Upon further review of echo w/ interventional cardiology team there is no clinical tamponade. Given normal hemodynamics, close monitoring and surveillance.

## 2024-04-03 NOTE — PROGRESS NOTE ADULT - SUBJECTIVE AND OBJECTIVE BOX
Cardiology PA Adult Progress Note    SUBJECTIVE ASSESSMENT:  	  MEDICATIONS:  carvedilol 6.25 milliGRAM(s) Oral every 12 hours  furosemide   Injectable 40 milliGRAM(s) IV Push two times a day  losartan 100 milliGRAM(s) Oral every 24 hours            atorvastatin 40 milliGRAM(s) Oral at bedtime  dextrose 50% Injectable 25 Gram(s) IV Push once  dextrose 50% Injectable 25 Gram(s) IV Push once  dextrose 50% Injectable 12.5 Gram(s) IV Push once  dextrose Oral Gel 15 Gram(s) Oral once PRN  glucagon  Injectable 1 milliGRAM(s) IntraMuscular once  insulin glargine Injectable (LANTUS) 6 Unit(s) SubCutaneous at bedtime  insulin lispro (ADMELOG) corrective regimen sliding scale   SubCutaneous Before meals and at bedtime    aspirin enteric coated 81 milliGRAM(s) Oral daily  dextrose 5%. 1000 milliLiter(s) IV Continuous <Continuous>  dextrose 5%. 1000 milliLiter(s) IV Continuous <Continuous>  heparin   Injectable 5000 Unit(s) SubCutaneous every 8 hours    	  VITAL SIGNS:  T(C): 36.9 (04-03-24 @ 06:37), Max: 36.9 (04-03-24 @ 06:37)  HR: 84 (04-03-24 @ 08:49) (83 - 101)  BP: 136/65 (04-03-24 @ 08:49) (136/65 - 180/79)  RR: 18 (04-03-24 @ 08:49) (16 - 24)  SpO2: 96% (04-03-24 @ 08:49) (92% - 96%)  Wt(kg): --    I&O's Summary    02 Apr 2024 07:01  -  03 Apr 2024 07:00  --------------------------------------------------------  IN: 0 mL / OUT: 600 mL / NET: -600 mL    03 Apr 2024 07:01  -  03 Apr 2024 09:43  --------------------------------------------------------  IN: 180 mL / OUT: 500 mL / NET: -320 mL      Height (cm): 170.2 (04-02 @ 22:33)  Weight (kg): 76.2 (04-02 @ 22:33)  BMI (kg/m2): 26.3 (04-02 @ 22:33)  BSA (m2): 1.88 (04-02 @ 22:33)                                     PHYSICAL EXAM:  Appearance: Normal	  HEENT: Normal oral mucosa, PERRL, EOMI	  Neck: Supple, + JVD/ - JVD; ___ Carotid Bruit   Cardiovascular: Normal S1 S2, No murmurs  Respiratory: Lungs clear to auscultation/Decreased Breath Sounds/No Rales, Rhonchi, Wheezing	  Gastrointestinal:  Soft, Non-tender, + BS	  Skin: No rashes, No ecchymoses, No cyanosis  Extremities: Normal range of motion, No clubbing, cyanosis or edema  Vascular: Peripheral pulses palpable 2+ bilaterally  Neurologic: Non-focal  Psychiatry: A & O x 3, Mood & affect appropriate    LABS:	 	                                  9.3    11.99 )-----------( 784      ( 02 Apr 2024 20:26 )             29.4     04-02    138  |  105  |  25<H>  ----------------------------<  166<H>  4.8   |  21<L>  |  1.43<H>    Ca    9.0      02 Apr 2024 20:26  Mg     2.2     04-02    TPro  8.0  /  Alb  3.8  /  TBili  0.7  /  DBili  0.2  /  AST  11  /  ALT  7<L>  /  AlkPhos  139<H>  04-02    proBNP:   Lipid Profile:   HgA1c:   TSH:    Cardiology PA Adult Progress Note    SUBJECTIVE ASSESSMENT: Seen and examined at bedside. SOB has improved. VSS. Denies CP, palpitations or any other sx.  	  MEDICATIONS:  carvedilol 6.25 milliGRAM(s) Oral every 12 hours  furosemide   Injectable 40 milliGRAM(s) IV Push two times a day  losartan 100 milliGRAM(s) Oral every 24 hours  atorvastatin 40 milliGRAM(s) Oral at bedtime  dextrose 50% Injectable 25 Gram(s) IV Push once  dextrose 50% Injectable 25 Gram(s) IV Push once  dextrose 50% Injectable 12.5 Gram(s) IV Push once  dextrose Oral Gel 15 Gram(s) Oral once PRN  glucagon  Injectable 1 milliGRAM(s) IntraMuscular once  insulin glargine Injectable (LANTUS) 6 Unit(s) SubCutaneous at bedtime  insulin lispro (ADMELOG) corrective regimen sliding scale   SubCutaneous Before meals and at bedtime  aspirin enteric coated 81 milliGRAM(s) Oral daily  dextrose 5%. 1000 milliLiter(s) IV Continuous <Continuous>  dextrose 5%. 1000 milliLiter(s) IV Continuous <Continuous>  heparin   Injectable 5000 Unit(s) SubCutaneous every 8 hours    	  VITAL SIGNS:  T(C): 36.9 (04-03-24 @ 06:37), Max: 36.9 (04-03-24 @ 06:37)  HR: 84 (04-03-24 @ 08:49) (83 - 101)  BP: 136/65 (04-03-24 @ 08:49) (136/65 - 180/79)  RR: 18 (04-03-24 @ 08:49) (16 - 24)  SpO2: 96% (04-03-24 @ 08:49) (92% - 96%)  Wt(kg): --    I&O's Summary    02 Apr 2024 07:01  -  03 Apr 2024 07:00  --------------------------------------------------------  IN: 0 mL / OUT: 600 mL / NET: -600 mL    03 Apr 2024 07:01  -  03 Apr 2024 09:43  --------------------------------------------------------  IN: 180 mL / OUT: 500 mL / NET: -320 mL      Height (cm): 170.2 (04-02 @ 22:33)  Weight (kg): 76.2 (04-02 @ 22:33)  BMI (kg/m2): 26.3 (04-02 @ 22:33)  BSA (m2): 1.88 (04-02 @ 22:33)                                     PHYSICAL EXAM:  Appearance: Normal	  HEENT: Normal oral mucosa, PERRL, EOMI	  Neck: Supple, + JVD, No Carotid Bruit   Cardiovascular: Normal S1 S2, No murmurs  Respiratory: Diffuse crackles in lower lung fields  Gastrointestinal:  Soft, Non-tender, + BS	  Skin: No rashes, No ecchymoses, No cyanosis  Extremities: No edema  Vascular: Peripheral pulses palpable 2+ bilaterally  Neurologic: Non-focal  Psychiatry: A & O x 3, Mood & affect appropriate    LABS:	 	               9.3    11.99 )-----------( 784      ( 02 Apr 2024 20:26 )             29.4     04-02    138  |  105  |  25<H>  ----------------------------<  166<H>  4.8   |  21<L>  |  1.43<H>    Ca    9.0      02 Apr 2024 20:26  Mg     2.2     04-02    TPro  8.0  /  Alb  3.8  /  TBili  0.7  /  DBili  0.2  /  AST  11  /  ALT  7<L>  /  AlkPhos  139<H>  04-02

## 2024-04-03 NOTE — PROGRESS NOTE ADULT - PROBLEM SELECTOR PLAN 7
/79 on admission, hadn't taken any BP meds today  - Continue meds as above F/u AM a1c  - Home regimen: Lantus 8u QHS, Lispo 10u TID  - Continue MISS, Lantus 6u QHS

## 2024-04-03 NOTE — DISCHARGE NOTE PROVIDER - NSDCMRMEDTOKEN_GEN_ALL_CORE_FT
Entresto 49 mg-51 mg oral tablet: 1 tab(s) orally 2 times a day  Farxiga 10 mg oral tablet: 1 tab(s) orally once a day   aspirin 81 mg oral delayed release tablet: 1 tab(s) orally once a day  carvedilol 6.25 mg oral tablet: 1 tab(s) orally every 12 hours  Farxiga 10 mg oral tablet: 1 tab(s) orally once a day  HumaLOG 100 units/mL subcutaneous solution: 10 unit(s) subcutaneous 3 times a day  insulin glargine 100 units/mL subcutaneous solution: 8 unit(s) subcutaneous once a day (at bedtime)  rosuvastatin 10 mg oral capsule: 1 cap(s) orally once a day (at bedtime)  sacubitril-valsartan 49 mg-51 mg oral tablet: 1 tab(s) orally 2 times a day  spironolactone 25 mg oral tablet: 1 tab(s) orally once a day

## 2024-04-03 NOTE — PROGRESS NOTE ADULT - PROBLEM SELECTOR PLAN 2
No new care gaps identified.  [facility] Embedded Care Gaps. Reference number: [id number]. 10/03/2022   10:50:34 AM HERMELINDAT   WBC on admission 11.99 w left shift. Recent admission to [facility] 2/2024 for PNA s/p abx  - No infectious sx. Afebrile.   - F/u RVP, procal, UA, CXR    - Podiatry consult ?LT foot wound source of white count - TTE 4/3/24: large pericardial effusion w/ cardiac tamponade physiology  - Upon further evaluation of echo per interventional cardiology team, no evidence of tamponade  - Given normal hemodynamics, will monitor.  - Repeat limited TTE to assess effusion on 4/4  - Pt is refusing pericardiocentesis after long discussion regarding potential necessity of procedure if change in hemodynamics or clinical status

## 2024-04-03 NOTE — PROGRESS NOTE ADULT - PROBLEM SELECTOR PLAN 4
Hgb on admission 9.3, baseline 9-10  - Maintain active T&S (next 4/7)  - F/u AM iron panel SFA/pop stent (2/2023), s/p LT TMA w/ residual OM (VRE), follows w wound care outpt   - C/w aspirin 81 mg and atorvastatin 40 mg QD  - Per podiatry: F/u left foot XR. ESR 83, CRP 18  - Local wound care: wound cleansed with normal saline. Dressed with WTD dressing.

## 2024-04-03 NOTE — DISCHARGE NOTE PROVIDER - NSDCCPCAREPLAN_GEN_ALL_CORE_FT
PRINCIPAL DISCHARGE DIAGNOSIS  Diagnosis: Acute on chronic systolic congestive heart failure  Assessment and Plan of Treatment: You have a weak heart, also known as Congestive Heart Failure (CHF). Heart failure is a condition in which the heart does not pump or fill with blood well. As a result, the heart lags behind in its job of moving blood throughout the body. This can lead to symptoms such as swelling, trouble breathing, and feeling tired. Your Ejection Fraction (EF) is 25-30%, a normal EF is 55-60%.  -Please continue Carvedilol (Coreg) 6.25mg twice daily, Entresto 49-51mg twice daily. Spironolactone 25mg daily, and Farxiga 10mg daily to help strenghten your heart muscle  -Avoid drinking more than 1.5L of fluid daily and maintain a low salt diet (max 2grams daily).  -Please weigh yourself daily, for any significant increases in daily weight of 2lbs/day or 5lbs/week with associated swelling in the legs or abdomen and/or shortness of breath, please call your doctor or go to the emergency room.  -Follow up with Dr. Lucas on 4/11/24 at 3:30pm at [address] [place] [address]        SECONDARY DISCHARGE DIAGNOSES  Diagnosis: HTN (hypertension)  Assessment and Plan of Treatment: Please continue Carvedilol (Coreg) 6.25mg twice daily and Entresto 49-51mg twice daily as listed to keep your blood pressure controlled. For blood pressure that is too high or too low please see your doctor or go to the emergency room as necessary.      Diagnosis: CKD (chronic kidney disease)  Assessment and Plan of Treatment: You have a known history of chronic kidney disease and your baseline Creatinine (kidney function) is 1.3-1.4.   While you were in the hospital, your kidney function was elevated due to the heart failure and volume overload. Your kidney function has now improved back to baseline with the diuresis, prior to dicharge your Creatinine was 1.67.  Please follow up with your nephrologist as scheduled.      Diagnosis: PAD (peripheral artery disease)  Assessment and Plan of Treatment: Peripheral artery disease, or PAD, is an accumulation of plaque (fats and cholesterol) in the arteries in your legs or arms. This makes it harder for your blood to carry oxygen and nutrients to the tissues in those areas. PAD is a long-term disease, but you can improve it by exercising, eating less fat and giving up tobacco products.  Please continue to take  your Aspirin 81mg and Rosuvastatin 10mg daily.       Diagnosis: Type 2 diabetes mellitus  Assessment and Plan of Treatment: Your Hemoglobin A1c is 8.5 and your goal A1c is less than 7.0%. This number measures your average blood sugar level over the last three months.  Please continue Humalog 10 units 3x daily before meals and Lantus 8 units at bedtime as listed for diabetes.   Maintain a low carbohydrate, low sugar diet, exercise, monitor your fingerstick blood sugars regarly and follow up with your Endocrinologist/Primary Care Doctor.      Diagnosis: Thrombocytosis  Assessment and Plan of Treatment: Platelets are parts of the blood that help form blood clots. Thrombocytosis is a disorder in which your body produces too many platelets.  Please follow-up with the Hematology Clinic by calling [phone] to schedule an appointment in 2 weeks.

## 2024-04-04 ENCOUNTER — RESULT REVIEW (OUTPATIENT)
Age: 64
End: 2024-04-04

## 2024-04-04 LAB
A1C WITH ESTIMATED AVERAGE GLUCOSE RESULT: 8.5 % — HIGH (ref 4–5.6)
ALBUMIN SERPL ELPH-MCNC: 3.3 G/DL — SIGNIFICANT CHANGE UP (ref 3.3–5)
ALP SERPL-CCNC: 111 U/L — SIGNIFICANT CHANGE UP (ref 40–120)
ALT FLD-CCNC: <5 U/L — LOW (ref 10–45)
ANION GAP SERPL CALC-SCNC: 10 MMOL/L — SIGNIFICANT CHANGE UP (ref 5–17)
AST SERPL-CCNC: 9 U/L — LOW (ref 10–40)
BILIRUB SERPL-MCNC: 0.5 MG/DL — SIGNIFICANT CHANGE UP (ref 0.2–1.2)
BLD GP AB SCN SERPL QL: NEGATIVE — SIGNIFICANT CHANGE UP
BUN SERPL-MCNC: 32 MG/DL — HIGH (ref 7–23)
CALCIUM SERPL-MCNC: 9 MG/DL — SIGNIFICANT CHANGE UP (ref 8.4–10.5)
CHLORIDE SERPL-SCNC: 104 MMOL/L — SIGNIFICANT CHANGE UP (ref 96–108)
CHOLEST SERPL-MCNC: 123 MG/DL — SIGNIFICANT CHANGE UP
CO2 SERPL-SCNC: 26 MMOL/L — SIGNIFICANT CHANGE UP (ref 22–31)
CREAT SERPL-MCNC: 1.57 MG/DL — HIGH (ref 0.5–1.3)
EGFR: 49 ML/MIN/1.73M2 — LOW
ESTIMATED AVERAGE GLUCOSE: 197 MG/DL — HIGH (ref 68–114)
FERRITIN SERPL-MCNC: 87 NG/ML — SIGNIFICANT CHANGE UP (ref 30–400)
GLUCOSE BLDC GLUCOMTR-MCNC: 137 MG/DL — HIGH (ref 70–99)
GLUCOSE BLDC GLUCOMTR-MCNC: 189 MG/DL — HIGH (ref 70–99)
GLUCOSE BLDC GLUCOMTR-MCNC: 204 MG/DL — HIGH (ref 70–99)
GLUCOSE BLDC GLUCOMTR-MCNC: 247 MG/DL — HIGH (ref 70–99)
GLUCOSE SERPL-MCNC: 264 MG/DL — HIGH (ref 70–99)
HCT VFR BLD CALC: 26.1 % — LOW (ref 39–50)
HDLC SERPL-MCNC: 21 MG/DL — LOW
HGB BLD-MCNC: 8.4 G/DL — LOW (ref 13–17)
IRON SATN MFR SERPL: 12 % — LOW (ref 16–55)
IRON SATN MFR SERPL: 33 UG/DL — LOW (ref 45–165)
LIPID PNL WITH DIRECT LDL SERPL: 80 MG/DL — SIGNIFICANT CHANGE UP
MAGNESIUM SERPL-MCNC: 1.9 MG/DL — SIGNIFICANT CHANGE UP (ref 1.6–2.6)
MCHC RBC-ENTMCNC: 26.3 PG — LOW (ref 27–34)
MCHC RBC-ENTMCNC: 32.2 GM/DL — SIGNIFICANT CHANGE UP (ref 32–36)
MCV RBC AUTO: 81.6 FL — SIGNIFICANT CHANGE UP (ref 80–100)
NON HDL CHOLESTEROL: 102 MG/DL — SIGNIFICANT CHANGE UP
NRBC # BLD: 0 /100 WBCS — SIGNIFICANT CHANGE UP (ref 0–0)
PLATELET # BLD AUTO: 633 K/UL — HIGH (ref 150–400)
POTASSIUM SERPL-MCNC: 3.9 MMOL/L — SIGNIFICANT CHANGE UP (ref 3.5–5.3)
POTASSIUM SERPL-SCNC: 3.9 MMOL/L — SIGNIFICANT CHANGE UP (ref 3.5–5.3)
PROT SERPL-MCNC: 6.5 G/DL — SIGNIFICANT CHANGE UP (ref 6–8.3)
RBC # BLD: 3.2 M/UL — LOW (ref 4.2–5.8)
RBC # FLD: 20.3 % — HIGH (ref 10.3–14.5)
RH IG SCN BLD-IMP: POSITIVE — SIGNIFICANT CHANGE UP
SODIUM SERPL-SCNC: 140 MMOL/L — SIGNIFICANT CHANGE UP (ref 135–145)
TIBC SERPL-MCNC: 271 UG/DL — SIGNIFICANT CHANGE UP (ref 220–430)
TRANSFERRIN SERPL-MCNC: 217 MG/DL — SIGNIFICANT CHANGE UP (ref 200–360)
TRIGL SERPL-MCNC: 120 MG/DL — SIGNIFICANT CHANGE UP
UIBC SERPL-MCNC: 238 UG/DL — SIGNIFICANT CHANGE UP (ref 110–370)
WBC # BLD: 7.87 K/UL — SIGNIFICANT CHANGE UP (ref 3.8–10.5)
WBC # FLD AUTO: 7.87 K/UL — SIGNIFICANT CHANGE UP (ref 3.8–10.5)

## 2024-04-04 PROCEDURE — 99233 SBSQ HOSP IP/OBS HIGH 50: CPT

## 2024-04-04 PROCEDURE — 93308 TTE F-UP OR LMTD: CPT | Mod: 26

## 2024-04-04 RX ORDER — GLUCAGON INJECTION, SOLUTION 0.5 MG/.1ML
1 INJECTION, SOLUTION SUBCUTANEOUS ONCE
Refills: 0 | Status: DISCONTINUED | OUTPATIENT
Start: 2024-04-04 | End: 2024-04-07

## 2024-04-04 RX ORDER — SACUBITRIL AND VALSARTAN 24; 26 MG/1; MG/1
1 TABLET, FILM COATED ORAL
Refills: 0 | Status: DISCONTINUED | OUTPATIENT
Start: 2024-04-04 | End: 2024-04-07

## 2024-04-04 RX ORDER — DAPAGLIFLOZIN 10 MG/1
10 TABLET, FILM COATED ORAL EVERY 24 HOURS
Refills: 0 | Status: DISCONTINUED | OUTPATIENT
Start: 2024-04-04 | End: 2024-04-06

## 2024-04-04 RX ORDER — INSULIN LISPRO 100/ML
6 VIAL (ML) SUBCUTANEOUS
Refills: 0 | Status: DISCONTINUED | OUTPATIENT
Start: 2024-04-04 | End: 2024-04-07

## 2024-04-04 RX ORDER — MAGNESIUM OXIDE 400 MG ORAL TABLET 241.3 MG
400 TABLET ORAL ONCE
Refills: 0 | Status: COMPLETED | OUTPATIENT
Start: 2024-04-04 | End: 2024-04-04

## 2024-04-04 RX ORDER — DEXTROSE 10 % IN WATER 10 %
125 INTRAVENOUS SOLUTION INTRAVENOUS ONCE
Refills: 0 | Status: DISCONTINUED | OUTPATIENT
Start: 2024-04-04 | End: 2024-04-07

## 2024-04-04 RX ORDER — POTASSIUM CHLORIDE 20 MEQ
20 PACKET (EA) ORAL ONCE
Refills: 0 | Status: COMPLETED | OUTPATIENT
Start: 2024-04-04 | End: 2024-04-04

## 2024-04-04 RX ADMIN — Medication 6 UNIT(S): at 16:36

## 2024-04-04 RX ADMIN — Medication 25 MILLIGRAM(S): at 13:15

## 2024-04-04 RX ADMIN — DAPAGLIFLOZIN 10 MILLIGRAM(S): 10 TABLET, FILM COATED ORAL at 17:05

## 2024-04-04 RX ADMIN — Medication 40 MILLIGRAM(S): at 05:38

## 2024-04-04 RX ADMIN — Medication 4: at 12:00

## 2024-04-04 RX ADMIN — Medication 4: at 21:58

## 2024-04-04 RX ADMIN — Medication 40 MILLIGRAM(S): at 21:58

## 2024-04-04 RX ADMIN — Medication 25 MILLIGRAM(S): at 05:38

## 2024-04-04 RX ADMIN — Medication 81 MILLIGRAM(S): at 11:44

## 2024-04-04 RX ADMIN — MAGNESIUM OXIDE 400 MG ORAL TABLET 400 MILLIGRAM(S): 241.3 TABLET ORAL at 11:44

## 2024-04-04 RX ADMIN — Medication 2: at 08:18

## 2024-04-04 RX ADMIN — CARVEDILOL PHOSPHATE 6.25 MILLIGRAM(S): 80 CAPSULE, EXTENDED RELEASE ORAL at 09:02

## 2024-04-04 RX ADMIN — MUPIROCIN 1 APPLICATION(S): 20 OINTMENT TOPICAL at 06:00

## 2024-04-04 RX ADMIN — Medication 6 UNIT(S): at 12:02

## 2024-04-04 RX ADMIN — Medication 20 MILLIEQUIVALENT(S): at 11:44

## 2024-04-04 RX ADMIN — SACUBITRIL AND VALSARTAN 1 TABLET(S): 24; 26 TABLET, FILM COATED ORAL at 17:05

## 2024-04-04 RX ADMIN — CARVEDILOL PHOSPHATE 6.25 MILLIGRAM(S): 80 CAPSULE, EXTENDED RELEASE ORAL at 21:58

## 2024-04-04 RX ADMIN — Medication 25 MILLIGRAM(S): at 21:58

## 2024-04-04 RX ADMIN — ATORVASTATIN CALCIUM 40 MILLIGRAM(S): 80 TABLET, FILM COATED ORAL at 21:58

## 2024-04-04 RX ADMIN — SPIRONOLACTONE 25 MILLIGRAM(S): 25 TABLET, FILM COATED ORAL at 05:38

## 2024-04-04 RX ADMIN — ISOSORBIDE DINITRATE 10 MILLIGRAM(S): 5 TABLET ORAL at 17:05

## 2024-04-04 RX ADMIN — ISOSORBIDE DINITRATE 10 MILLIGRAM(S): 5 TABLET ORAL at 11:44

## 2024-04-04 RX ADMIN — INSULIN GLARGINE 6 UNIT(S): 100 INJECTION, SOLUTION SUBCUTANEOUS at 21:58

## 2024-04-04 RX ADMIN — LOSARTAN POTASSIUM 100 MILLIGRAM(S): 100 TABLET, FILM COATED ORAL at 09:03

## 2024-04-04 RX ADMIN — ISOSORBIDE DINITRATE 10 MILLIGRAM(S): 5 TABLET ORAL at 05:38

## 2024-04-04 RX ADMIN — Medication 40 MILLIGRAM(S): at 13:15

## 2024-04-04 NOTE — PROGRESS NOTE ADULT - PROBLEM SELECTOR PLAN 9
f/u AM lipid panel  - Continue atorva 40mg QHS (TIC for Rosuvastatin 10mg)    F: None  E: Replete if K<4 or Mag<2  N: DASH Diet  VTEppx:   Dispo: Pending euvolemia HDL 80 LDL 21  - Continue atorva 40mg QHS (TIC for Rosuvastatin 10mg)    F: None  E: Replete if K<4 or Mag<2  N: DASH Diet  VTEppx: refused hep. Pt is ambulating    Dispo: Pending euvolemia and gdmt optimization

## 2024-04-04 NOTE — PROGRESS NOTE ADULT - PROBLEM SELECTOR PLAN 2
- TTE 4/3/24: large pericardial effusion w/ cardiac tamponade physiology  - Upon further evaluation of echo per interventional cardiology team, no evidence of tamponade  - Given normal hemodynamics, will monitor.  - Repeat limited TTE to assess effusion on 4/4  - Pt is refusing pericardiocentesis after long discussion regarding potential necessity of procedure if change in hemodynamics or clinical status - TTE 4/4/24: Mod pericardial effusion w/o evidence of cardiac tamponade physiology. Compared to the previous TTE performed on 4/3/2024, no signficant change in pericardial effusion.  - Pt is now amendable to pericardiocentesis but it is not currently indicated

## 2024-04-04 NOTE — DIETITIAN INITIAL EVALUATION ADULT - PROBLEM SELECTOR PLAN 1
P/w worsening DOMINGUEZ/orthopnea i/s/o running out of Lasix. 94% on RA. WWP. Was recently dx with HF at OSH  - Trop T 82, , CKMB normal. CP free.   - EKG NSR 93, Q waves V1-2, TWI inferolateral leads, poor baseline w artifact. Similar to prior.   - CXR w congestion  - GDMT: Continue Coreg 6.25mg BID, Losartan 100mg QD  - Diuresis: IV Lasix 40mg BID  - TTE in AM  - Core measures, daily weights, strict I&Os

## 2024-04-04 NOTE — DIETITIAN INITIAL EVALUATION ADULT - PERTINENT MEDS FT
MEDICATIONS  (STANDING):  aspirin enteric coated 81 milliGRAM(s) Oral daily  atorvastatin 40 milliGRAM(s) Oral at bedtime  carvedilol 6.25 milliGRAM(s) Oral every 12 hours  dextrose 5%. 1000 milliLiter(s) (50 mL/Hr) IV Continuous <Continuous>  dextrose 5%. 1000 milliLiter(s) (100 mL/Hr) IV Continuous <Continuous>  dextrose 50% Injectable 25 Gram(s) IV Push once  dextrose 50% Injectable 25 Gram(s) IV Push once  dextrose 50% Injectable 12.5 Gram(s) IV Push once  furosemide   Injectable 40 milliGRAM(s) IV Push every 8 hours  glucagon  Injectable 1 milliGRAM(s) IntraMuscular once  heparin   Injectable 5000 Unit(s) SubCutaneous every 8 hours  hydrALAZINE 25 milliGRAM(s) Oral every 8 hours  insulin glargine Injectable (LANTUS) 6 Unit(s) SubCutaneous at bedtime  insulin lispro (ADMELOG) corrective regimen sliding scale   SubCutaneous Before meals and at bedtime  isosorbide   dinitrate Tablet (ISORDIL) 10 milliGRAM(s) Oral three times a day  losartan 100 milliGRAM(s) Oral every 24 hours  mupirocin 2% Ointment 1 Application(s) Topical two times a day  spironolactone 25 milliGRAM(s) Oral daily    MEDICATIONS  (PRN):  dextrose Oral Gel 15 Gram(s) Oral once PRN Blood Glucose LESS THAN 70 milliGRAM(s)/deciliter

## 2024-04-04 NOTE — PROGRESS NOTE ADULT - ASSESSMENT
62 yo M with PMHx of HTN, HLD, T2DM, PAD s/p SFA/pop stent (2/2023), OM s/p LT TMA w/ residual OM (VRE) & nonhealing wound (follows outpt w wound clinic), CKD (baseline 1.3-1.4), anemia (baseline Hgb 9-10), genital herpes (on valacyclovir prn) recent admission to [facility] 2/7/24-2/9/24 for PNA, recently dx with CHF at OSH (EF reportedly 40%) admitted for acute on chronic HFrEF exacerbation secondary to lasix nonadherence, on IV diuretics. TTE reveals EF 25-30%, large pericardial effusion w/ initial concern for tamponade physiology. Upon further review of echo w/ interventional cardiology team there is no clinical tamponade. Given normal hemodynamics, close monitoring and surveillance. 62 yo M with PMHx of HTN, HLD, T2DM, PAD s/p SFA/pop stent (2/2023), OM s/p LT TMA w/ residual OM (VRE) & nonhealing wound (follows outpt w wound clinic), CKD (baseline 1.3-1.4), anemia (baseline Hgb 9-10), genital herpes (on valacyclovir prn) recent admission to [facility] 2/7/24-2/9/24 for PNA, recently dx with CHF at OSH (EF reportedly 40%) admitted for acute on chronic HFrEF exacerbation secondary to lasix nonadherence, on IV diuretics. TTE reveals EF 25-30%, w/ mod sized pericardial effusion no clinical signs of tamponade. Given normal hemodynamics, close monitoring and surveillance.

## 2024-04-04 NOTE — PROGRESS NOTE ADULT - PROBLEM SELECTOR PLAN 6
Baseline from prior admission Cr 1.3-1.4  - Currently Cr. 1.5. Monitor for now. Baseline from prior admission Cr 1.3-1.4  - Currently Cr. 1.57. Monitor for now.

## 2024-04-04 NOTE — PROGRESS NOTE ADULT - PROBLEM SELECTOR PLAN 5
Baseline 9-10. Hgb now 8.5   - Maintain active T&S  - F/u AM iron panel Baseline 9-10. Hgb now 8.4  - Maintain active T&S  - F/u AM iron panel Baseline 9-10. Hgb now 8.4  - Maintain active T&S, last one 4/4  - Likely ACD

## 2024-04-04 NOTE — PROGRESS NOTE ADULT - PROBLEM SELECTOR PLAN 3
WBC on admission 11.99 -> 9.75 Recent admission to [facility] 2/2024 for PNA s/p abx  - No infectious sx, Afebrile. WBC on admission 11.99 -> 7.87 Recent admission to [facility] 2/2024 for PNA s/p abx  - No infectious sx, Afebrile.

## 2024-04-04 NOTE — DIETITIAN INITIAL EVALUATION ADULT - PROBLEM SELECTOR PLAN 3
S/p SFA/pop stent (2/2023), s/p LT TMA w/ residual OM (VRE), follows w wound care outpt   - Continue aspirin 81mg and atorva 40mg   - Podiatry consult in AM

## 2024-04-04 NOTE — PROGRESS NOTE ADULT - SUBJECTIVE AND OBJECTIVE BOX
Interventional Cardiology PA Adult Progress Note    Subjective Assessment: Pt evaluated at beside this AM. Pt appears well and NAD. Denies CP, SOB, palpitation, n/v/d, fever, LOC, or headache.   	  MEDICATIONS:  carvedilol 6.25 milliGRAM(s) Oral every 12 hours  furosemide   Injectable 40 milliGRAM(s) IV Push every 8 hours  hydrALAZINE 25 milliGRAM(s) Oral every 8 hours  isosorbide   dinitrate Tablet (ISORDIL) 10 milliGRAM(s) Oral three times a day  losartan 100 milliGRAM(s) Oral every 24 hours  spironolactone 25 milliGRAM(s) Oral daily  atorvastatin 40 milliGRAM(s) Oral at bedtime  dextrose 50% Injectable 25 Gram(s) IV Push once  dextrose 50% Injectable 25 Gram(s) IV Push once  dextrose 50% Injectable 12.5 Gram(s) IV Push once  dextrose Oral Gel 15 Gram(s) Oral once PRN  glucagon  Injectable 1 milliGRAM(s) IntraMuscular once  insulin glargine Injectable (LANTUS) 6 Unit(s) SubCutaneous at bedtime  insulin lispro (ADMELOG) corrective regimen sliding scale   SubCutaneous Before meals and at bedtime  aspirin enteric coated 81 milliGRAM(s) Oral daily  dextrose 5%. 1000 milliLiter(s) IV Continuous <Continuous>  dextrose 5%. 1000 milliLiter(s) IV Continuous <Continuous>  heparin   Injectable 5000 Unit(s) SubCutaneous every 8 hours  magnesium oxide 400 milliGRAM(s) Oral once  mupirocin 2% Ointment 1 Application(s) Topical two times a day  potassium chloride    Tablet ER 20 milliEquivalent(s) Oral once      	    [PHYSICAL EXAM:  TELEMETRY:  T(C): 36.7 (04-04-24 @ 08:14), Max: 36.7 (04-04-24 @ 08:14)  HR: 70 (04-04-24 @ 08:14) (70 - 83)  BP: 141/64 (04-04-24 @ 08:14) (129/63 - 158/73)  RR: 17 (04-04-24 @ 08:14) (17 - 19)  SpO2: 97% (04-04-24 @ 08:14) (92% - 97%)  Wt(kg): --  I&O's Summary    03 Apr 2024 07:01  -  04 Apr 2024 07:00  --------------------------------------------------------  IN: 180 mL / OUT: 2150 mL / NET: -1970 mL    04 Apr 2024 07:01  -  04 Apr 2024 10:37  --------------------------------------------------------  IN: 0 mL / OUT: 1030 mL / NET: -1030 mL        Verde:  Central/PICC/Mid Line:                                         Appearance: Normal	  HEENT:   Normal oral mucosa, PERRL, EOMI	  Neck: Supple, + JVD/ - JVD; Carotid Bruit   Cardiovascular: Normal S1 S2, No JVD, No murmurs,   Respiratory: Lungs clear to auscultation/Decreased Breath Sounds/No Rales, Rhonchi, Wheezing	  Gastrointestinal:  Soft, Non-tender, + BS	  Skin: No rashes, No ecchymoses, No cyanosis  Extremities: Normal range of motion, No clubbing, cyanosis or edema  Vascular: Peripheral pulses palpable 2+ bilaterally  Neurologic: Non-focal  Psychiatry: A & O x 3, Mood & affect appropriate      	                          8.4    7.87  )-----------( 633      ( 04 Apr 2024 09:13 )             26.1     04-04    140  |  104  |  32<H>  ----------------------------<  264<H>  3.9   |  26  |  1.57<H>    Ca    9.0      04 Apr 2024 09:13  Mg     1.9     04-04    TPro  6.5  /  Alb  3.3  /  TBili  0.5  /  DBili  x   /  AST  9<L>  /  ALT  <5<L>  /  AlkPhos  111  04-04       Interventional Cardiology PA Adult Progress Note    Subjective Assessment: Pt evaluated at beside this AM. Pt appears well and NAD. Denies CP, SOB, palpitation, n/v/d, fever, LOC, or headache.   	  MEDICATIONS:  carvedilol 6.25 milliGRAM(s) Oral every 12 hours  furosemide   Injectable 40 milliGRAM(s) IV Push every 8 hours  hydrALAZINE 25 milliGRAM(s) Oral every 8 hours  isosorbide   dinitrate Tablet (ISORDIL) 10 milliGRAM(s) Oral three times a day  losartan 100 milliGRAM(s) Oral every 24 hours  spironolactone 25 milliGRAM(s) Oral daily  atorvastatin 40 milliGRAM(s) Oral at bedtime  dextrose 50% Injectable 25 Gram(s) IV Push once  dextrose 50% Injectable 25 Gram(s) IV Push once  dextrose 50% Injectable 12.5 Gram(s) IV Push once  dextrose Oral Gel 15 Gram(s) Oral once PRN  glucagon  Injectable 1 milliGRAM(s) IntraMuscular once  insulin glargine Injectable (LANTUS) 6 Unit(s) SubCutaneous at bedtime  insulin lispro (ADMELOG) corrective regimen sliding scale   SubCutaneous Before meals and at bedtime  aspirin enteric coated 81 milliGRAM(s) Oral daily  dextrose 5%. 1000 milliLiter(s) IV Continuous <Continuous>  dextrose 5%. 1000 milliLiter(s) IV Continuous <Continuous>  heparin   Injectable 5000 Unit(s) SubCutaneous every 8 hours  magnesium oxide 400 milliGRAM(s) Oral once  mupirocin 2% Ointment 1 Application(s) Topical two times a day  potassium chloride    Tablet ER 20 milliEquivalent(s) Oral once      	    [PHYSICAL EXAM:  TELEMETRY:  T(C): 36.7 (04-04-24 @ 08:14), Max: 36.7 (04-04-24 @ 08:14)  HR: 70 (04-04-24 @ 08:14) (70 - 83)  BP: 141/64 (04-04-24 @ 08:14) (129/63 - 158/73)  RR: 17 (04-04-24 @ 08:14) (17 - 19)  SpO2: 97% (04-04-24 @ 08:14) (92% - 97%)  Wt(kg): --  I&O's Summary    03 Apr 2024 07:01  -  04 Apr 2024 07:00  --------------------------------------------------------  IN: 180 mL / OUT: 2150 mL / NET: -1970 mL    04 Apr 2024 07:01  -  04 Apr 2024 10:37  --------------------------------------------------------  IN: 0 mL / OUT: 1030 mL / NET: -1030 mL        Verde:  Central/PICC/Mid Line:                                         Appearance: Normal	  HEENT:   Normal oral mucosa, PERRL, EOMI	  Neck: Supple, + JVD; Carotid Bruit   Cardiovascular: Normal S1 S2, No JVD, No murmurs,   Respiratory: Lungs clear to auscultation. No Rales, Rhonchi, Wheezing	  Gastrointestinal:  Soft, Non-tender, + BS	  Skin: No rashes, No ecchymoses, No cyanosis  Extremities: Normal range of motion, No clubbing, cyanosis or edema  Vascular: Peripheral pulses palpable 2+ bilaterally  Neurologic: Non-focal  Psychiatry: A & O x 3, Mood & affect appropriate      	                          8.4    7.87  )-----------( 633      ( 04 Apr 2024 09:13 )             26.1     04-04    140  |  104  |  32<H>  ----------------------------<  264<H>  3.9   |  26  |  1.57<H>    Ca    9.0      04 Apr 2024 09:13  Mg     1.9     04-04    TPro  6.5  /  Alb  3.3  /  TBili  0.5  /  DBili  x   /  AST  9<L>  /  ALT  <5<L>  /  AlkPhos  111  04-04

## 2024-04-04 NOTE — PROGRESS NOTE ADULT - PROBLEM SELECTOR PLAN 4
SFA/pop stent (2/2023), s/p LT TMA w/ residual OM (VRE), follows w wound care outpt   - C/w aspirin 81 mg and atorvastatin 40 mg QD  - Per podiatry: F/u left foot XR. ESR 83, CRP 18  - Local wound care: wound cleansed with normal saline. Dressed with WTD dressing.

## 2024-04-04 NOTE — DIETITIAN INITIAL EVALUATION ADULT - PERTINENT LABORATORY DATA
04-04    140  |  104  |  32<H>  ----------------------------<  264<H>  3.9   |  26  |  1.57<H>    Ca    9.0      04 Apr 2024 09:13  Mg     1.9     04-04    TPro  6.5  /  Alb  3.3  /  TBili  0.5  /  DBili  x   /  AST  9<L>  /  ALT  <5<L>  /  AlkPhos  111  04-04  POCT Blood Glucose.: 189 mg/dL (04-04-24 @ 08:00)  A1C with Estimated Average Glucose Result: 8.5 % (04-04-24 @ 09:13)  A1C with Estimated Average Glucose Result: 7.8 % (02-08-24 @ 05:30)  A1C with Estimated Average Glucose Result: 11.0 % (06-12-23 @ 05:30)   04-04    140  |  104  |  32<H>  ----------------------------<  264<H>  3.9   |  26  |  1.57<H>    Ca    9.0      04 Apr 2024 09:13  Mg     1.9     04-04    TPro  6.5  /  Alb  3.3  /  TBili  0.5  /  DBili  x   /  AST  9<L>  /  ALT  <5<L>  /  AlkPhos  111  04-04  POCT Blood Glucose.: 189 mg/dL (04-04-24 @ 08:00)  A1C with Estimated Average Glucose Result: 8.5 % (04-04-24 @ 09:13)  A1C with Estimated Average Glucose Result: 7.8 % (02-08-24 @ 05:30)  A1C with Estimated Average Glucose Result: 11.0 % (06-12-23 @ 05:30)    TTE 4/3/24: severely reduced LVSF, EF 25-30%, global LV hypokinesis. Mild LVH, grade II DD. Normal RV size and systolic function. Aortic sclerosis w/o stenosis. mildly dilated LA. large pericardial effusion

## 2024-04-04 NOTE — PROGRESS NOTE ADULT - PROBLEM SELECTOR PLAN 1
Hypervolemic on exam w/ warm extremities. 94% on RA. Was recently dx with HF at OSH (EF 40%)  - Trop T 82, , CKMB normal. CP free. EKG NSR 93, Q waves V1-2, TWI inferolateral leads (same as prior)  - CXR: Scattered increased lung markings/pulmonary vascular congestion. Mild hypoinflation. Small pleural effusions.   - GDMT: C/w Coreg 6.25mg BID, Losartan 100mg QD. Start Spironolactone 25 mg QD, Hydral 25 mg TID, Isordil 10 mg TID  - Diuresis: IV Lasix 40 mg TID  - TTE 4/3/24: severely reduced LVSF, EF 25-30%, global LV hypokinesis. Mild LVH, grade II DD. Normal RV size and systolic function. Aortic sclerosis w/o stenosis. mildly dilated LA. large pericardial effusion   - Core measures, daily weights, strict I&Os, fluid restriction Hypervolemic on exam w/ warm extremities. 97% on RA.  - EKG NSR 93, Q waves V1-2, TWI inferolateral leads (same as prior)  - CXR: Scattered increased lung markings/pulmonary vascular congestion. Mild hypoinflation. Small pleural effusions.   - GDMT: C/w Coreg 6.25mg BID, Spironolactone 25 mg QD, Hydral 25 mg TID, Isordil 10 mg TID. Start Entresto 49-51mg BID and farxiga 10mg qd  - Diuresis: IV Lasix 40 mg TID  - TTE 4/3/24: severely reduced LVSF, EF 25-30%, global LV hypokinesis. Mild LVH, grade II DD. Normal RV size and systolic function. Aortic sclerosis w/o stenosis. mildly dilated LA. large pericardial effusion   - Dc losartan  - Core measures, daily weights, strict I&Os, fluid restriction Hypervolemic on exam w/ warm extremities. 97% on RA.  - EKG NSR 93, Q waves V1-2, TWI inferolateral leads (same as prior)  - CXR: Scattered increased lung markings/pulm congestion. Mild hypoinflation. Small pleural effusions.   - GDMT: C/w Coreg 6.25mg BID, Spironolactone 25 mg QD, Hydral 25 mg TID, Isordil 10 mg TID. Start Entresto 49-51mg BID and farxiga 10mg qd  - Diuresis: IV Lasix 40 mg TID  - TTE 4/3/24: severely reduced LVSF, EF 25-30%, global LV hypokinesis. Mild LVH, grade II DD. Normal RV size and systolic function. Aortic sclerosis w/o stenosis. mildly dilated LA. large pericardial effusion   - Dc losartan  - Core measures, daily weights, strict I&Os, fluid restriction

## 2024-04-04 NOTE — DIETITIAN INITIAL EVALUATION ADULT - NSFNSPHYEXAMSKINFT_GEN_A_CORE
Pt with chronic left foot wound; Left foot wound with no clinical signs of infection. Podiatry following. Plan for: Excisional debridement performed of L sub met 5 down to and including level of dermis.

## 2024-04-04 NOTE — PROGRESS NOTE ADULT - PROBLEM SELECTOR PLAN 7
F/u AM a1c  - Home regimen: Lantus 8u QHS, Lispo 10u TID  - Continue MISS, Lantus 6u QHS AM a1c 8.5  - Home regimen: Lantus 8u QHS, Lispo 10u TID  - Continue MISS, lispro 6u pre meal, Lantus 6u QHS

## 2024-04-04 NOTE — DIETITIAN INITIAL EVALUATION ADULT - OTHER INFO
64 yo M, PMHx HTN, HLD, T2DM, PAD s/p SFA/pop stent (2/2023), OM s/p LT TMA/residual OM (VRE), nonhealing wound, CKD (baseline 1.3-1.4), anemia (baseline Hgb 9-10), genital herpes (on valacyclovir prn) recent admission to [facility] 2/7/24-2/9/24 for PNA, recently dx with CHF at OSH (EF reportedly 40%) admitted for acute on chronic HFrEF exacerbation secondary to lasix nonadherence, on IV diuretics. TTE reveals EF 25-30%, large pericardial effusion, initial concern for tamponade physiology. Upon further review of echo with  interventional cardiology team there is no clinical tamponade. Given normal hemodynamics, close monitoring and surveillance.    Pt seen on 5ur. Reports limited intake in house 2/2 not liking [facility] meals. Assume meals in house are lower in sodium compared to meals consumed PTA; pt does own cooking and may use a seasoning that is high in salt (could not recall name, was not [employer]). Pt also likes turkey sandwiches, soup from takeout restaurant, pickles and “larsen and beans.” Was unaware of high salt content of these items. Agreeable for diet education today on low salt diet. NKFA. No issues chewing/swallowing. Per rounds c/o "ABD burning" - plan for MAALOX order. POCT 189 174 222, , A1c 7.8%. No lipid profile. Korey 20. 1+L Foot Edema. No pressure ulcers, noted with DM ulcer.   Please see below for nutritions recommendations.

## 2024-04-04 NOTE — DIETITIAN INITIAL EVALUATION ADULT - PROBLEM SELECTOR PLAN 2
WBC on admission 11.99 w left shift. Recent admission to [facility] 2/2024 for PNA s/p abx  - No infectious sx. Afebrile.   - F/u RVP, procal, UA, CXR    - Podiatry consult ?LT foot wound source of white count

## 2024-04-04 NOTE — DIETITIAN INITIAL EVALUATION ADULT - OTHER CALCULATIONS
5'7''  pounds +-10%  Wt 168 pounds BMI 26.3 %AEF=574  IBW for EER D/t Edema in CHF pt  Adjust for age, CHF, CKD/DM - fluids per team  5'7''  pounds +-10%  Wt 168 pounds BMI 26.3 %FHO=537  IBW for EER D/t Edema in CHF pt  Adjust for age, CHF, CKD/DM, SKIN - fluids per team

## 2024-04-05 ENCOUNTER — RESULT REVIEW (OUTPATIENT)
Age: 64
End: 2024-04-05

## 2024-04-05 LAB
ALBUMIN SERPL ELPH-MCNC: 3.2 G/DL — LOW (ref 3.3–5)
ALP SERPL-CCNC: 113 U/L — SIGNIFICANT CHANGE UP (ref 40–120)
ALT FLD-CCNC: <5 U/L — LOW (ref 10–45)
ANION GAP SERPL CALC-SCNC: 7 MMOL/L — SIGNIFICANT CHANGE UP (ref 5–17)
AST SERPL-CCNC: 8 U/L — LOW (ref 10–40)
BILIRUB SERPL-MCNC: 0.4 MG/DL — SIGNIFICANT CHANGE UP (ref 0.2–1.2)
BUN SERPL-MCNC: 40 MG/DL — HIGH (ref 7–23)
CALCIUM SERPL-MCNC: 9.2 MG/DL — SIGNIFICANT CHANGE UP (ref 8.4–10.5)
CHLORIDE SERPL-SCNC: 98 MMOL/L — SIGNIFICANT CHANGE UP (ref 96–108)
CO2 SERPL-SCNC: 26 MMOL/L — SIGNIFICANT CHANGE UP (ref 22–31)
CREAT SERPL-MCNC: 1.82 MG/DL — HIGH (ref 0.5–1.3)
EGFR: 41 ML/MIN/1.73M2 — LOW
FOLATE SERPL-MCNC: 7.8 NG/ML — SIGNIFICANT CHANGE UP
GLUCOSE BLDC GLUCOMTR-MCNC: 169 MG/DL — HIGH (ref 70–99)
GLUCOSE BLDC GLUCOMTR-MCNC: 183 MG/DL — HIGH (ref 70–99)
GLUCOSE BLDC GLUCOMTR-MCNC: 260 MG/DL — HIGH (ref 70–99)
GLUCOSE BLDC GLUCOMTR-MCNC: 284 MG/DL — HIGH (ref 70–99)
GLUCOSE BLDC GLUCOMTR-MCNC: 338 MG/DL — HIGH (ref 70–99)
GLUCOSE SERPL-MCNC: 329 MG/DL — HIGH (ref 70–99)
HCT VFR BLD CALC: 32.4 % — LOW (ref 39–50)
HGB BLD-MCNC: 10.1 G/DL — LOW (ref 13–17)
MAGNESIUM SERPL-MCNC: 1.9 MG/DL — SIGNIFICANT CHANGE UP (ref 1.6–2.6)
MCHC RBC-ENTMCNC: 26.1 PG — LOW (ref 27–34)
MCHC RBC-ENTMCNC: 31.2 GM/DL — LOW (ref 32–36)
MCV RBC AUTO: 83.7 FL — SIGNIFICANT CHANGE UP (ref 80–100)
NRBC # BLD: 0 /100 WBCS — SIGNIFICANT CHANGE UP (ref 0–0)
PLATELET # BLD AUTO: 751 K/UL — HIGH (ref 150–400)
POTASSIUM SERPL-MCNC: 4.4 MMOL/L — SIGNIFICANT CHANGE UP (ref 3.5–5.3)
POTASSIUM SERPL-SCNC: 4.4 MMOL/L — SIGNIFICANT CHANGE UP (ref 3.5–5.3)
PROT SERPL-MCNC: 7.1 G/DL — SIGNIFICANT CHANGE UP (ref 6–8.3)
RBC # BLD: 3.87 M/UL — LOW (ref 4.2–5.8)
RBC # FLD: 20.2 % — HIGH (ref 10.3–14.5)
SODIUM SERPL-SCNC: 131 MMOL/L — LOW (ref 135–145)
VIT B12 SERPL-MCNC: 521 PG/ML — SIGNIFICANT CHANGE UP (ref 232–1245)
WBC # BLD: 8.48 K/UL — SIGNIFICANT CHANGE UP (ref 3.8–10.5)
WBC # FLD AUTO: 8.48 K/UL — SIGNIFICANT CHANGE UP (ref 3.8–10.5)

## 2024-04-05 PROCEDURE — 99233 SBSQ HOSP IP/OBS HIGH 50: CPT

## 2024-04-05 PROCEDURE — 93308 TTE F-UP OR LMTD: CPT | Mod: 26

## 2024-04-05 RX ORDER — FUROSEMIDE 40 MG
40 TABLET ORAL EVERY 12 HOURS
Refills: 0 | Status: DISCONTINUED | OUTPATIENT
Start: 2024-04-05 | End: 2024-04-06

## 2024-04-05 RX ORDER — ONDANSETRON 8 MG/1
4 TABLET, FILM COATED ORAL ONCE
Refills: 0 | Status: COMPLETED | OUTPATIENT
Start: 2024-04-05 | End: 2024-04-05

## 2024-04-05 RX ORDER — MAGNESIUM OXIDE 400 MG ORAL TABLET 241.3 MG
400 TABLET ORAL ONCE
Refills: 0 | Status: COMPLETED | OUTPATIENT
Start: 2024-04-05 | End: 2024-04-05

## 2024-04-05 RX ADMIN — ATORVASTATIN CALCIUM 40 MILLIGRAM(S): 80 TABLET, FILM COATED ORAL at 21:28

## 2024-04-05 RX ADMIN — Medication 40 MILLIGRAM(S): at 20:39

## 2024-04-05 RX ADMIN — SPIRONOLACTONE 25 MILLIGRAM(S): 25 TABLET, FILM COATED ORAL at 06:13

## 2024-04-05 RX ADMIN — MUPIROCIN 1 APPLICATION(S): 20 OINTMENT TOPICAL at 17:33

## 2024-04-05 RX ADMIN — INSULIN GLARGINE 6 UNIT(S): 100 INJECTION, SOLUTION SUBCUTANEOUS at 22:56

## 2024-04-05 RX ADMIN — Medication 2: at 22:54

## 2024-04-05 RX ADMIN — Medication 81 MILLIGRAM(S): at 12:24

## 2024-04-05 RX ADMIN — ONDANSETRON 4 MILLIGRAM(S): 8 TABLET, FILM COATED ORAL at 07:10

## 2024-04-05 RX ADMIN — SACUBITRIL AND VALSARTAN 1 TABLET(S): 24; 26 TABLET, FILM COATED ORAL at 17:26

## 2024-04-05 RX ADMIN — DAPAGLIFLOZIN 10 MILLIGRAM(S): 10 TABLET, FILM COATED ORAL at 17:26

## 2024-04-05 RX ADMIN — Medication 25 MILLIGRAM(S): at 06:13

## 2024-04-05 RX ADMIN — Medication 6 UNIT(S): at 12:30

## 2024-04-05 RX ADMIN — Medication 40 MILLIGRAM(S): at 06:13

## 2024-04-05 RX ADMIN — MUPIROCIN 1 APPLICATION(S): 20 OINTMENT TOPICAL at 06:42

## 2024-04-05 RX ADMIN — SACUBITRIL AND VALSARTAN 1 TABLET(S): 24; 26 TABLET, FILM COATED ORAL at 06:13

## 2024-04-05 RX ADMIN — CARVEDILOL PHOSPHATE 6.25 MILLIGRAM(S): 80 CAPSULE, EXTENDED RELEASE ORAL at 10:25

## 2024-04-05 RX ADMIN — Medication 6: at 08:49

## 2024-04-05 RX ADMIN — ISOSORBIDE DINITRATE 10 MILLIGRAM(S): 5 TABLET ORAL at 06:13

## 2024-04-05 RX ADMIN — MAGNESIUM OXIDE 400 MG ORAL TABLET 400 MILLIGRAM(S): 241.3 TABLET ORAL at 17:26

## 2024-04-05 RX ADMIN — Medication 8: at 12:29

## 2024-04-05 RX ADMIN — Medication 6 UNIT(S): at 08:49

## 2024-04-05 RX ADMIN — CARVEDILOL PHOSPHATE 6.25 MILLIGRAM(S): 80 CAPSULE, EXTENDED RELEASE ORAL at 21:28

## 2024-04-05 RX ADMIN — Medication 2: at 17:27

## 2024-04-05 RX ADMIN — Medication 6 UNIT(S): at 17:27

## 2024-04-05 NOTE — PROGRESS NOTE ADULT - PROBLEM SELECTOR PLAN 6
Baseline from prior admission Cr 1.3-1.4  - Currently Cr. 1.57. Monitor for now. Baseline from prior admission Cr 1.3-1.4  - Currently Cr. 1.84. Monitor for now.

## 2024-04-05 NOTE — PROGRESS NOTE ADULT - SUBJECTIVE AND OBJECTIVE BOX
Patient is a 63y old  Male who presents with a chief complaint of HF exacerbation (05 Apr 2024 08:04)      INTERVAL HPI/ OVERNIGHT EVENTS. Pt seen and examined bedside. States wound dressing was just changed by nurse with mupirocin.       LABS                        8.4    7.87  )-----------( 633      ( 04 Apr 2024 09:13 )             26.1     04-04    140  |  104  |  32<H>  ----------------------------<  264<H>  3.9   |  26  |  1.57<H>    Ca    9.0      04 Apr 2024 09:13  Mg     1.9     04-04    TPro  6.5  /  Alb  3.3  /  TBili  0.5  /  DBili  x   /  AST  9<L>  /  ALT  <5<L>  /  AlkPhos  111  04-04        ICU Vital Signs Last 24 Hrs  T(C): 36.6 (05 Apr 2024 08:48), Max: 36.7 (04 Apr 2024 11:20)  T(F): 97.9 (05 Apr 2024 08:48), Max: 98.1 (05 Apr 2024 06:07)  HR: 68 (05 Apr 2024 08:48) (63 - 81)  BP: 85/50 (05 Apr 2024 08:48) (85/50 - 149/68)  BP(mean): 62 (05 Apr 2024 08:48) (62 - 62)  ABP: --  ABP(mean): --  RR: 18 (05 Apr 2024 08:48) (16 - 18)  SpO2: 95% (05 Apr 2024 08:48) (95% - 100%)    O2 Parameters below as of 05 Apr 2024 08:48  Patient On (Oxygen Delivery Method): room air        RADIOLOGY  < from: Xray Foot AP + Lateral + Oblique, Left (04.03.24 @ 14:05) >  IMPRESSION:  Redemonstrated TMA defect with smooth corticated stump margins. Overlying   soft tissue contour irregularities with focal cleft-like defect over   lateral aspect of the stump. No underlying tracking gas collections or   gross radiographic evidence for osteomyelitis however if concern remains   MRI suggested to further assess.    Normal configuration and alignment of remaining visualized midfoot and   hindfoot osteoarticular structures with preserved joint spaces and no   joint margin erosions.    No calcaneal spurring and unremarkable distal Achilles tendon shadows.    No discrete lytic or blastic lesions.    Scant scattered vascular calcifications.  < end of copied text >          PHYSICAL EXAM from 4/3  Lower Extremity Focused  Vasc: DP/PT 2/4 b/l, No edema or erythema  Derm: L foot sub 5th met shaft wound with granular base, hyperkeratotic border, serous drainage, no tracking or tunneling, no purulence or malodor, -PTB; Right foot with hyperkeratotic lesion sub met 1 - no open wounds right.   Neuro: Protective sensation in tact  MSK: L TMA

## 2024-04-05 NOTE — PROGRESS NOTE ADULT - ASSESSMENT
64 y/o M with PMHx of HTN, HLD, DM, PAD (s/p stent of SFA/pop (2/2023)), OM (s/p Left TMA w/ residual OM (VRE)) admitted to [facility] for CHF exacerbation Pt with chronic left foot wound for which he follows with Dr. Lamar regularly with no recent infections. At time of consult, VSS, WBC 11.99. Left foot wound with no clinical signs of infection    Plan:   - Will continue local wound care with Mupirocin  - X-rays reviewed   - Pt can ambulate in a Crow boot ot the Left LE  - Rest of care per primary team    Plan d/w Attending. Podiatry following.       Discharge dressing instructions:   - Cleanse left foot wound with normal saline daily, pat dry with sterile guaze, apply mupirocin to wound base and cover with dry sterile gauze, wrap with Kerlix and secure with tape.    Patient should follow up with Dr. Aldair Lamar within 1-2 weeks of discharge.    Office information:          [place] Address- [address] [place] [address]. [address], [place] [address] Phone: [phone]

## 2024-04-05 NOTE — PROGRESS NOTE ADULT - PROBLEM SELECTOR PLAN 1
Hypervolemic on exam w/ warm extremities. 97% on RA.  - EKG NSR 93, Q waves V1-2, TWI inferolateral leads (same as prior)  - CXR: Scattered increased lung markings/pulm congestion. Mild hypoinflation. Small pleural effusions.   - GDMT: C/w Coreg 6.25mg BID, Spironolactone 25 mg QD, Hydral 25 mg TID, Isordil 10 mg TID. Start Entresto 49-51mg BID and farxiga 10mg qd  - Diuresis: IV Lasix 40 mg TID  - TTE 4/3/24: severely reduced LVSF, EF 25-30%, global LV hypokinesis. Mild LVH, grade II DD. Normal RV size and systolic function. Aortic sclerosis w/o stenosis. mildly dilated LA. large pericardial effusion   - Dc losartan  - Core measures, daily weights, strict I&Os, fluid restriction Euvolemic on exam w/ warm extremities. 97% on RA.  - GDMT: C/w Coreg 6.25mg BID, Spironolactone 25 mg QD, Entresto 49-51mg BID and farxiga 10mg qd  - Dc Hydral 25 mg TID, Isordil 10 mg TID  - Diuresis: Lasix 40mg PO BID  - F/u repeat limited echo  - TTE 4/3/24: severely reduced LVSF, EF 25-30%, global LV hypokinesis. Mild LVH, grade II DD. Normal RV size and systolic function. Aortic sclerosis w/o stenosis. mildly dilated LA. large pericardial effusion   - Core measures, daily weights, strict I&Os, fluid restriction

## 2024-04-05 NOTE — PROGRESS NOTE ADULT - PROBLEM SELECTOR PLAN 3
WBC on admission 11.99 -> 7.87 Recent admission to [facility] 2/2024 for PNA s/p abx  - No infectious sx, Afebrile.

## 2024-04-05 NOTE — PROGRESS NOTE ADULT - PROBLEM SELECTOR PLAN 9
HDL 80 LDL 21  - Continue atorva 40mg QHS (TIC for Rosuvastatin 10mg)    F: None  E: Replete if K<4 or Mag<2  N: DASH Diet  VTEppx: refused hep. Pt is ambulating    Dispo: Pending euvolemia and gdmt optimization HDL 80 LDL 21  - Continue atorva 40mg QHS (TIC for Rosuvastatin 10mg)    F: None  E: Replete if K<4 or Mag<2  N: DASH Diet  VTEppx: refused hep. Pt is ambulating    Dispo: Likely dc 4/6/24

## 2024-04-05 NOTE — PROGRESS NOTE ADULT - ASSESSMENT
64 yo M with PMHx of HTN, HLD, T2DM, PAD s/p SFA/pop stent (2/2023), OM s/p LT TMA w/ residual OM (VRE) & nonhealing wound (follows outpt w wound clinic), CKD (baseline 1.3-1.4), anemia (baseline Hgb 9-10), genital herpes (on valacyclovir prn) recent admission to [facility] 2/7/24-2/9/24 for PNA, recently dx with CHF at OSH (EF reportedly 40%) admitted for acute on chronic HFrEF exacerbation secondary to lasix nonadherence, on IV diuretics. TTE reveals EF 25-30%, w/ mod sized pericardial effusion no clinical signs of tamponade. Given normal hemodynamics, close monitoring and surveillance.

## 2024-04-05 NOTE — PROGRESS NOTE ADULT - PROBLEM SELECTOR PLAN 7
AM a1c 8.5  - Home regimen: Lantus 8u QHS, Lispo 10u TID  - Continue MISS, lispro 6u pre meal, Lantus 6u QHS

## 2024-04-05 NOTE — PROGRESS NOTE ADULT - SUBJECTIVE AND OBJECTIVE BOX
Interventional Cardiology PA Adult Progress Note    Subjective Assessment:  	  MEDICATIONS:  carvedilol 6.25 milliGRAM(s) Oral every 12 hours  furosemide   Injectable 40 milliGRAM(s) IV Push every 8 hours  hydrALAZINE 25 milliGRAM(s) Oral every 8 hours  isosorbide   dinitrate Tablet (ISORDIL) 10 milliGRAM(s) Oral three times a day  sacubitril 49 mG/valsartan 51 mG 1 Tablet(s) Oral two times a day  spironolactone 25 milliGRAM(s) Oral daily  atorvastatin 40 milliGRAM(s) Oral at bedtime  dapagliflozin 10 milliGRAM(s) Oral every 24 hours  dextrose 50% Injectable 25 Gram(s) IV Push once  dextrose 50% Injectable 25 Gram(s) IV Push once  dextrose 50% Injectable 12.5 Gram(s) IV Push once  dextrose Oral Gel 15 Gram(s) Oral once PRN  glucagon  Injectable 1 milliGRAM(s) IntraMuscular once  glucagon  Injectable 1 milliGRAM(s) IntraMuscular once  insulin glargine Injectable (LANTUS) 6 Unit(s) SubCutaneous at bedtime  insulin lispro (ADMELOG) corrective regimen sliding scale   SubCutaneous Before meals and at bedtime  insulin lispro Injectable (ADMELOG) 6 Unit(s) SubCutaneous three times a day before meals  aspirin enteric coated 81 milliGRAM(s) Oral daily  dextrose 10% Bolus 125 milliLiter(s) IV Bolus once  dextrose 5%. 1000 milliLiter(s) IV Continuous <Continuous>  dextrose 5%. 1000 milliLiter(s) IV Continuous <Continuous>  heparin   Injectable 5000 Unit(s) SubCutaneous every 8 hours  mupirocin 2% Ointment 1 Application(s) Topical two times a day      	    [PHYSICAL EXAM:  TELEMETRY:  T(C): 36.7 (04-05-24 @ 06:07), Max: 36.7 (04-04-24 @ 08:14)  HR: 72 (04-05-24 @ 06:45) (63 - 81)  BP: 94/53 (04-05-24 @ 06:45) (94/53 - 149/68)  RR: 16 (04-05-24 @ 06:07) (16 - 17)  SpO2: 95% (04-05-24 @ 06:07) (95% - 100%)  Wt(kg): --  I&O's Summary    04 Apr 2024 07:01  -  05 Apr 2024 07:00  --------------------------------------------------------  IN: 900 mL / OUT: 4630 mL / NET: -3730 mL        Verde:  Central/PICC/Mid Line:                                         Appearance: Normal	  HEENT:   Normal oral mucosa, PERRL, EOMI	  Neck: Supple, + JVD/ - JVD; Carotid Bruit   Cardiovascular: Normal S1 S2, No JVD, No murmurs,   Respiratory: Lungs clear to auscultation/Decreased Breath Sounds/No Rales, Rhonchi, Wheezing	  Gastrointestinal:  Soft, Non-tender, + BS	  Skin: No rashes, No ecchymoses, No cyanosis  Extremities: Normal range of motion, No clubbing, cyanosis or edema  Vascular: Peripheral pulses palpable 2+ bilaterally  Neurologic: Non-focal  Psychiatry: A & O x 3, Mood & affect appropriate      	                        8.4    7.87  )-----------( 633      ( 04 Apr 2024 09:13 )             26.1     04-04    140  |  104  |  32<H>  ----------------------------<  264<H>  3.9   |  26  |  1.57<H>    Ca    9.0      04 Apr 2024 09:13  Mg     1.9     04-04    TPro  6.5  /  Alb  3.3  /  TBili  0.5  /  DBili  x   /  AST  9<L>  /  ALT  <5<L>  /  AlkPhos  111  04-04       Interventional Cardiology PA Adult Progress Note    Subjective Assessment: Pt evaluated at beside this AM. Pt states he is slightly dizzy and nauseous but NAD. Pt BP was 110/70 when I saw him. PO intake encouraged. Denies CP, SOB, palpitation, n/v/d, fever, LOC, or headache.   	  MEDICATIONS:  carvedilol 6.25 milliGRAM(s) Oral every 12 hours  furosemide   Injectable 40 milliGRAM(s) IV Push every 8 hours  hydrALAZINE 25 milliGRAM(s) Oral every 8 hours  isosorbide   dinitrate Tablet (ISORDIL) 10 milliGRAM(s) Oral three times a day  sacubitril 49 mG/valsartan 51 mG 1 Tablet(s) Oral two times a day  spironolactone 25 milliGRAM(s) Oral daily  atorvastatin 40 milliGRAM(s) Oral at bedtime  dapagliflozin 10 milliGRAM(s) Oral every 24 hours  dextrose 50% Injectable 25 Gram(s) IV Push once  dextrose 50% Injectable 25 Gram(s) IV Push once  dextrose 50% Injectable 12.5 Gram(s) IV Push once  dextrose Oral Gel 15 Gram(s) Oral once PRN  glucagon  Injectable 1 milliGRAM(s) IntraMuscular once  glucagon  Injectable 1 milliGRAM(s) IntraMuscular once  insulin glargine Injectable (LANTUS) 6 Unit(s) SubCutaneous at bedtime  insulin lispro (ADMELOG) corrective regimen sliding scale   SubCutaneous Before meals and at bedtime  insulin lispro Injectable (ADMELOG) 6 Unit(s) SubCutaneous three times a day before meals  aspirin enteric coated 81 milliGRAM(s) Oral daily  dextrose 10% Bolus 125 milliLiter(s) IV Bolus once  dextrose 5%. 1000 milliLiter(s) IV Continuous <Continuous>  dextrose 5%. 1000 milliLiter(s) IV Continuous <Continuous>  heparin   Injectable 5000 Unit(s) SubCutaneous every 8 hours  mupirocin 2% Ointment 1 Application(s) Topical two times a day      	    [PHYSICAL EXAM:  TELEMETRY:  T(C): 36.7 (04-05-24 @ 06:07), Max: 36.7 (04-04-24 @ 08:14)  HR: 72 (04-05-24 @ 06:45) (63 - 81)  BP: 94/53 (04-05-24 @ 06:45) (94/53 - 149/68)  RR: 16 (04-05-24 @ 06:07) (16 - 17)  SpO2: 95% (04-05-24 @ 06:07) (95% - 100%)  Wt(kg): --  I&O's Summary    04 Apr 2024 07:01  -  05 Apr 2024 07:00  --------------------------------------------------------  IN: 900 mL / OUT: 4630 mL / NET: -3730 mL        Verde:  Central/PICC/Mid Line:                                         Appearance: Normal	  HEENT:   Normal oral mucosa, PERRL, EOMI	  Neck: Supple, - JVD; Carotid Bruit   Cardiovascular: Normal S1 S2, No JVD, No murmurs,   Respiratory: Lungs clear to auscultation. No Rales, Rhonchi, Wheezing	  Gastrointestinal:  Soft, Non-tender, + BS	  Skin: No rashes, No ecchymoses, No cyanosis  Extremities: Normal range of motion, No clubbing, cyanosis or edema  Vascular: Peripheral pulses palpable 2+ bilaterally  Neurologic: Non-focal  Psychiatry: A & O x 3, Mood & affect appropriate      	                        8.4    7.87  )-----------( 633      ( 04 Apr 2024 09:13 )             26.1     04-04    140  |  104  |  32<H>  ----------------------------<  264<H>  3.9   |  26  |  1.57<H>    Ca    9.0      04 Apr 2024 09:13  Mg     1.9     04-04    TPro  6.5  /  Alb  3.3  /  TBili  0.5  /  DBili  x   /  AST  9<L>  /  ALT  <5<L>  /  AlkPhos  111  04-04

## 2024-04-05 NOTE — PROGRESS NOTE ADULT - PROBLEM SELECTOR PLAN 2
- TTE 4/4/24: Mod pericardial effusion w/o evidence of cardiac tamponade physiology. Compared to the previous TTE performed on 4/3/2024, no signficant change in pericardial effusion.  - Pt is now amendable to pericardiocentesis but it is not currently indicated - TTE 4/4/24: Mod pericardial effusion w/o evidence of cardiac tamponade physiology. Compared to the previous TTE performed on 4/3/2024, no signficant change in pericardial effusion.  - F/u repeat limited echo

## 2024-04-06 DIAGNOSIS — D75.839 THROMBOCYTOSIS, UNSPECIFIED: ICD-10-CM

## 2024-04-06 LAB
ALBUMIN SERPL ELPH-MCNC: 3.2 G/DL — LOW (ref 3.3–5)
ALP SERPL-CCNC: 112 U/L — SIGNIFICANT CHANGE UP (ref 40–120)
ALT FLD-CCNC: 6 U/L — LOW (ref 10–45)
ANION GAP SERPL CALC-SCNC: 11 MMOL/L — SIGNIFICANT CHANGE UP (ref 5–17)
ANION GAP SERPL CALC-SCNC: 12 MMOL/L — SIGNIFICANT CHANGE UP (ref 5–17)
AST SERPL-CCNC: 8 U/L — LOW (ref 10–40)
BILIRUB SERPL-MCNC: 0.4 MG/DL — SIGNIFICANT CHANGE UP (ref 0.2–1.2)
BUN SERPL-MCNC: 42 MG/DL — HIGH (ref 7–23)
BUN SERPL-MCNC: 42 MG/DL — HIGH (ref 7–23)
CALCIUM SERPL-MCNC: 8.8 MG/DL — SIGNIFICANT CHANGE UP (ref 8.4–10.5)
CALCIUM SERPL-MCNC: 9 MG/DL — SIGNIFICANT CHANGE UP (ref 8.4–10.5)
CHLORIDE SERPL-SCNC: 103 MMOL/L — SIGNIFICANT CHANGE UP (ref 96–108)
CHLORIDE SERPL-SCNC: 96 MMOL/L — SIGNIFICANT CHANGE UP (ref 96–108)
CO2 SERPL-SCNC: 22 MMOL/L — SIGNIFICANT CHANGE UP (ref 22–31)
CO2 SERPL-SCNC: 22 MMOL/L — SIGNIFICANT CHANGE UP (ref 22–31)
CREAT SERPL-MCNC: 1.82 MG/DL — HIGH (ref 0.5–1.3)
CREAT SERPL-MCNC: 1.9 MG/DL — HIGH (ref 0.5–1.3)
EGFR: 39 ML/MIN/1.73M2 — LOW
EGFR: 41 ML/MIN/1.73M2 — LOW
GLUCOSE BLDC GLUCOMTR-MCNC: 154 MG/DL — HIGH (ref 70–99)
GLUCOSE BLDC GLUCOMTR-MCNC: 194 MG/DL — HIGH (ref 70–99)
GLUCOSE BLDC GLUCOMTR-MCNC: 266 MG/DL — HIGH (ref 70–99)
GLUCOSE BLDC GLUCOMTR-MCNC: 333 MG/DL — HIGH (ref 70–99)
GLUCOSE SERPL-MCNC: 177 MG/DL — HIGH (ref 70–99)
GLUCOSE SERPL-MCNC: 337 MG/DL — HIGH (ref 70–99)
HCT VFR BLD CALC: 36 % — LOW (ref 39–50)
HGB BLD-MCNC: 11 G/DL — LOW (ref 13–17)
HIV 1+2 AB+HIV1 P24 AG SERPL QL IA: SIGNIFICANT CHANGE UP
MAGNESIUM SERPL-MCNC: 2.2 MG/DL — SIGNIFICANT CHANGE UP (ref 1.6–2.6)
MCHC RBC-ENTMCNC: 25.9 PG — LOW (ref 27–34)
MCHC RBC-ENTMCNC: 30.6 GM/DL — LOW (ref 32–36)
MCV RBC AUTO: 84.7 FL — SIGNIFICANT CHANGE UP (ref 80–100)
NRBC # BLD: 0 /100 WBCS — SIGNIFICANT CHANGE UP (ref 0–0)
PHOSPHATE SERPL-MCNC: 4.3 MG/DL — SIGNIFICANT CHANGE UP (ref 2.5–4.5)
PLATELET # BLD AUTO: 858 K/UL — HIGH (ref 150–400)
POTASSIUM SERPL-MCNC: 4.2 MMOL/L — SIGNIFICANT CHANGE UP (ref 3.5–5.3)
POTASSIUM SERPL-MCNC: 4.6 MMOL/L — SIGNIFICANT CHANGE UP (ref 3.5–5.3)
POTASSIUM SERPL-SCNC: 4.2 MMOL/L — SIGNIFICANT CHANGE UP (ref 3.5–5.3)
POTASSIUM SERPL-SCNC: 4.6 MMOL/L — SIGNIFICANT CHANGE UP (ref 3.5–5.3)
PROT SERPL-MCNC: 7.2 G/DL — SIGNIFICANT CHANGE UP (ref 6–8.3)
RBC # BLD: 4.25 M/UL — SIGNIFICANT CHANGE UP (ref 4.2–5.8)
RBC # FLD: 20.2 % — HIGH (ref 10.3–14.5)
SODIUM SERPL-SCNC: 130 MMOL/L — LOW (ref 135–145)
SODIUM SERPL-SCNC: 136 MMOL/L — SIGNIFICANT CHANGE UP (ref 135–145)
WBC # BLD: 10.18 K/UL — SIGNIFICANT CHANGE UP (ref 3.8–10.5)
WBC # FLD AUTO: 10.18 K/UL — SIGNIFICANT CHANGE UP (ref 3.8–10.5)

## 2024-04-06 PROCEDURE — 99233 SBSQ HOSP IP/OBS HIGH 50: CPT

## 2024-04-06 PROCEDURE — 99223 1ST HOSP IP/OBS HIGH 75: CPT

## 2024-04-06 RX ORDER — SODIUM CHLORIDE 9 MG/ML
1000 INJECTION INTRAMUSCULAR; INTRAVENOUS; SUBCUTANEOUS
Refills: 0 | Status: DISCONTINUED | OUTPATIENT
Start: 2024-04-06 | End: 2024-04-07

## 2024-04-06 RX ADMIN — SODIUM CHLORIDE 250 MILLILITER(S): 9 INJECTION INTRAMUSCULAR; INTRAVENOUS; SUBCUTANEOUS at 12:14

## 2024-04-06 RX ADMIN — MUPIROCIN 1 APPLICATION(S): 20 OINTMENT TOPICAL at 06:21

## 2024-04-06 RX ADMIN — CARVEDILOL PHOSPHATE 6.25 MILLIGRAM(S): 80 CAPSULE, EXTENDED RELEASE ORAL at 09:20

## 2024-04-06 RX ADMIN — Medication 81 MILLIGRAM(S): at 11:53

## 2024-04-06 RX ADMIN — INSULIN GLARGINE 6 UNIT(S): 100 INJECTION, SOLUTION SUBCUTANEOUS at 22:03

## 2024-04-06 RX ADMIN — Medication 6 UNIT(S): at 11:52

## 2024-04-06 RX ADMIN — CARVEDILOL PHOSPHATE 6.25 MILLIGRAM(S): 80 CAPSULE, EXTENDED RELEASE ORAL at 21:41

## 2024-04-06 RX ADMIN — Medication 2: at 17:20

## 2024-04-06 RX ADMIN — Medication 2: at 22:02

## 2024-04-06 RX ADMIN — Medication 8: at 11:51

## 2024-04-06 RX ADMIN — SACUBITRIL AND VALSARTAN 1 TABLET(S): 24; 26 TABLET, FILM COATED ORAL at 17:17

## 2024-04-06 RX ADMIN — MUPIROCIN 1 APPLICATION(S): 20 OINTMENT TOPICAL at 17:31

## 2024-04-06 RX ADMIN — ATORVASTATIN CALCIUM 40 MILLIGRAM(S): 80 TABLET, FILM COATED ORAL at 21:41

## 2024-04-06 RX ADMIN — HEPARIN SODIUM 5000 UNIT(S): 5000 INJECTION INTRAVENOUS; SUBCUTANEOUS at 21:41

## 2024-04-06 RX ADMIN — SACUBITRIL AND VALSARTAN 1 TABLET(S): 24; 26 TABLET, FILM COATED ORAL at 06:03

## 2024-04-06 NOTE — CONSULT NOTE ADULT - ASSESSMENT
63M with essential HTN, HLD, T2DM, CKD3, PAD with toe amputations admitted for hypertensive urgency. Hematology consulted for thrombocytosis.    #anemia  #thrombocytosis   Plt 784 on admission, 858 today, HIE reviewed, elevated above the normal range even 1 year ago    **Peripheral smear shows ________________________________________________________________  Iron studies consistent with iron deficiency (%sat 12, serum iron 33, ferritin 87) - JUNIOR is a common etiology of thrombocytosis  B12 and folate within normal limits    -Patient can benefit from IV iron sucrose 300mg x 3days  -Given his anemia and renal dysfunction, it is reasonable to screen for MM (serum immunoelectrophoresis, immunofixation, FLC, LDH)  -Check ESR, CRP    To be discussed with service attending Dr. Amador   63M with essential HTN, HLD, T2DM, CKD3, PAD with toe amputations admitted for hypertensive urgency. Hematology consulted for thrombocytosis.    #anemia  #thrombocytosis   Plt 784 on admission, 858 today, HIE reviewed, elevated above the normal range even 1 year ago    **Peripheral smear shows RBC anisocytosis, some acanthocytes, rare schistocytes 2/hpf, Many giant platelets, leukocytes with toxic granulation  Iron studies consistent with iron deficiency (%sat 12, serum iron 33, ferritin 87) - JUNIOR is a common etiology of thrombocytosis, but not to this degree.  B12 and folate within normal limits. His persistent thrombocytosis raise the suspicion of a myeloproliferative neoplasm which would need further workup.    -Patient can benefit from IV iron sucrose 300mg x 3days  -Given his anemia and renal dysfunction, it is reasonable to screen for MM (serum immunoelectrophoresis, immunofixation, FLC, LDH)  -Check ESR, CRP  -Please test for JAK2, CALR, MPL mutations (these are done via peripheral blood collection which can be obtained with am labs, however require a separate consent form to be signed by the patient since these are genetic tests. Please call the lab for the necessary forms).  -Evaluate for splenomegaly  -He would need follow-up with Hematology 2 weeks from discharge (to ensure enough time for all the tests to result). Can call [phone] to schedule follow-up.    Patient seen and discussed with Dr. Amador

## 2024-04-06 NOTE — CONSULT NOTE ADULT - SUBJECTIVE AND OBJECTIVE BOX
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 2/7/24-2/9/24 for PNA tx w Vanc/Zosyn/augmentin who presented to [facility] ED complaining of worsening SOB, orthopnea, and cough. Pt states that he has recently run out of his "water pill" which he was prescribed during a recent admission in [facility] [place]. He endorses some associated mild pleuritic chest pain but otherwise denies any fever, chills recent sick contacts, urinary sx, abd pian, n/v/d, LE edema, dizziness, syncope. Has otherwise been taking all medications as prescribed. Recent echocardiogram at OSH with a reported EF of 40% per pt; does not otherwise follow with a cardiologist.     In the ED:  VS: 180/79 mmHg, T 97.4 F, HR 96 bpm, RR 20, spO2 94% on RA.   Labs significant for: WBC 11.99, H&h 9.3/29.4, BUN 25, Cr 1.43, GFR 55, Trop T 82, BNP 7127  CXR w congestion. EKG NSR 93, Q waves V1-2, TWI inferolateral leads, poor baseline w artifact. Similar to prior.   Interventions: Duoneb x1, Lasix 80mg IVP x1    Pt now admitted to cardiac tele for HF exacerbation.  (02 Apr 2024 22:25)      Allergies    predniSONE (Swelling)  ciprofloxacin (Hives; Rash)  cefepime (Rash; Hives)    Intolerances        MEDICATIONS  (STANDING):  aspirin enteric coated 81 milliGRAM(s) Oral daily  atorvastatin 40 milliGRAM(s) Oral at bedtime  carvedilol 6.25 milliGRAM(s) Oral every 12 hours  dextrose 10% Bolus 125 milliLiter(s) IV Bolus once  dextrose 5%. 1000 milliLiter(s) (50 mL/Hr) IV Continuous <Continuous>  dextrose 5%. 1000 milliLiter(s) (100 mL/Hr) IV Continuous <Continuous>  dextrose 50% Injectable 25 Gram(s) IV Push once  dextrose 50% Injectable 25 Gram(s) IV Push once  dextrose 50% Injectable 12.5 Gram(s) IV Push once  glucagon  Injectable 1 milliGRAM(s) IntraMuscular once  glucagon  Injectable 1 milliGRAM(s) IntraMuscular once  heparin   Injectable 5000 Unit(s) SubCutaneous every 8 hours  insulin glargine Injectable (LANTUS) 6 Unit(s) SubCutaneous at bedtime  insulin lispro (ADMELOG) corrective regimen sliding scale   SubCutaneous Before meals and at bedtime  insulin lispro Injectable (ADMELOG) 6 Unit(s) SubCutaneous three times a day before meals  mupirocin 2% Ointment 1 Application(s) Topical two times a day  sacubitril 49 mG/valsartan 51 mG 1 Tablet(s) Oral two times a day  sodium chloride 0.9%. 1000 milliLiter(s) (250 mL/Hr) IV Continuous <Continuous>    MEDICATIONS  (PRN):  dextrose Oral Gel 15 Gram(s) Oral once PRN Blood Glucose LESS THAN 70 milliGRAM(s)/deciliter      PAST MEDICAL & SURGICAL HISTORY:  HTN (hypertension)      HLD (hyperlipidemia)      DM (diabetes mellitus)      PAD (peripheral artery disease)          FAMILY HISTORY:      SOCIAL HISTORY: No EtOH, no tobacco    REVIEW OF SYSTEMS:    CONSTITUTIONAL: No weakness, fevers or chills  EYES/ENT: No visual changes;  No vertigo or throat pain   NECK: No pain or stiffness  RESPIRATORY: No cough, wheezing, hemoptysis; No shortness of breath  CARDIOVASCULAR: No chest pain or palpitations  GASTROINTESTINAL: No abdominal or epigastric pain. No nausea, vomiting, or hematemesis; No diarrhea or constipation. No melena or hematochezia.  GENITOURINARY: No dysuria, frequency or hematuria  NEUROLOGICAL: No numbness or weakness  SKIN: No itching, burning, rashes, or lesions   All other review of systems is negative unless indicated above.        T(F): 97.3 (04-06-24 @ 12:00), Max: 98.4 (04-06-24 @ 00:10)  HR: 66 (04-06-24 @ 12:00)  BP: 117/59 (04-06-24 @ 12:00)  RR: 18 (04-06-24 @ 12:00)  SpO2: 98% (04-06-24 @ 12:00)  Wt(kg): --    GENERAL: NAD, well-developed  HEAD:  Atraumatic, Normocephalic  EYES: EOMI, PERRLA, conjunctiva and sclera clear  NECK: Supple, No JVD  CHEST/LUNG: Clear to auscultation bilaterally; No wheeze  HEART: Regular rate and rhythm; No murmurs, rubs, or gallops  ABDOMEN: Soft, Nontender, Nondistended; Bowel sounds present  EXTREMITIES:  2+ Peripheral Pulses, No clubbing, cyanosis, or edema  NEUROLOGY: non-focal  SKIN: No rashes or lesions                          11.0   10.18 )-----------( 858      ( 06 Apr 2024 09:59 )             36.0       04-06    130<L>  |  96  |  42<H>  ----------------------------<  337<H>  4.6   |  22  |  1.90<H>    Ca    8.8      06 Apr 2024 09:59  Phos  4.3     04-06  Mg     2.2     04-06    TPro  7.2  /  Alb  3.2<L>  /  TBili  0.4  /  DBili  x   /  AST  8<L>  /  ALT  6<L>  /  AlkPhos  112  04-06      Magnesium: 2.2 mg/dL (04-06 @ 09:59)  Phosphorus: 4.3 mg/dL (04-06 @ 09:59)       Hematology Consult Note    HPI:  62 yo M with PMHx of HTN, HLD, T2DM, PAD s/p SFA/pop stent (2/2023), 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 Pt states that he has recently run out of his "water pill" which he was prescribed during a recent admission in [facility]. He endorses some associated mild pleuritic chest pain but otherwise denies any fever, chills recent sick contacts, urinary sx, abd pian, n/v/d, LE edema, dizziness, syncope. Has otherwise been taking all medications as prescribed. Recent echocardiogram at OSH with a reported EF of 40% per pt; does not otherwise follow with a cardiologist.     In the ED:  VS: 180/79 mmHg, T 97.4 F, HR 96 bpm, RR 20, spO2 94% on RA.   Labs significant for: WBC 11.99, H&h 9.3/29.4, BUN 25, Cr 1.43, GFR 55, Trop T 82, BNP 7127  CXR w congestion. EKG NSR 93, Q waves V1-2, TWI inferolateral leads, poor baseline w artifact. Similar to prior.   Interventions: Duoneb x1, Lasix 80mg IVP x1    Pt now admitted to cardiac tele for HF exacerbation.  (02 Apr 2024 22:25)      Allergies    predniSONE (Swelling)  ciprofloxacin (Hives; Rash)  cefepime (Rash; Hives)    Intolerances        MEDICATIONS  (STANDING):  aspirin enteric coated 81 milliGRAM(s) Oral daily  atorvastatin 40 milliGRAM(s) Oral at bedtime  carvedilol 6.25 milliGRAM(s) Oral every 12 hours  dextrose 10% Bolus 125 milliLiter(s) IV Bolus once  dextrose 5%. 1000 milliLiter(s) (50 mL/Hr) IV Continuous <Continuous>  dextrose 5%. 1000 milliLiter(s) (100 mL/Hr) IV Continuous <Continuous>  dextrose 50% Injectable 25 Gram(s) IV Push once  dextrose 50% Injectable 25 Gram(s) IV Push once  dextrose 50% Injectable 12.5 Gram(s) IV Push once  glucagon  Injectable 1 milliGRAM(s) IntraMuscular once  glucagon  Injectable 1 milliGRAM(s) IntraMuscular once  heparin   Injectable 5000 Unit(s) SubCutaneous every 8 hours  insulin glargine Injectable (LANTUS) 6 Unit(s) SubCutaneous at bedtime  insulin lispro (ADMELOG) corrective regimen sliding scale   SubCutaneous Before meals and at bedtime  insulin lispro Injectable (ADMELOG) 6 Unit(s) SubCutaneous three times a day before meals  mupirocin 2% Ointment 1 Application(s) Topical two times a day  sacubitril 49 mG/valsartan 51 mG 1 Tablet(s) Oral two times a day  sodium chloride 0.9%. 1000 milliLiter(s) (250 mL/Hr) IV Continuous <Continuous>    MEDICATIONS  (PRN):  dextrose Oral Gel 15 Gram(s) Oral once PRN Blood Glucose LESS THAN 70 milliGRAM(s)/deciliter      PAST MEDICAL & SURGICAL HISTORY:  HTN (hypertension)      HLD (hyperlipidemia)      DM (diabetes mellitus)      PAD (peripheral artery disease)          FAMILY HISTORY:      SOCIAL HISTORY: No EtOH, no tobacco    REVIEW OF SYSTEMS:    CONSTITUTIONAL: No weakness, fevers or chills  EYES/ENT: No visual changes;  No vertigo or throat pain   NECK: No pain or stiffness  RESPIRATORY: No cough, wheezing, hemoptysis; No shortness of breath  CARDIOVASCULAR: No chest pain or palpitations  GASTROINTESTINAL: No abdominal or epigastric pain. No nausea, vomiting, or hematemesis; No diarrhea or constipation. No melena or hematochezia.  GENITOURINARY: No dysuria, frequency or hematuria  NEUROLOGICAL: No numbness or weakness  SKIN: No itching, burning, rashes, or lesions   All other review of systems is negative unless indicated above.        T(F): 97.3 (04-06-24 @ 12:00), Max: 98.4 (04-06-24 @ 00:10)  HR: 66 (04-06-24 @ 12:00)  BP: 117/59 (04-06-24 @ 12:00)  RR: 18 (04-06-24 @ 12:00)  SpO2: 98% (04-06-24 @ 12:00)  Wt(kg): --    GENERAL: NAD  HEAD:  Atraumatic, Normocephalic  EYES: EOMI, PERRLA, conjunctiva and sclera clear  NECK: Supple, No JVD  CHEST/LUNG: Comfortable on room air  HEART: Regular rate and rhythm; No murmurs, rubs, or gallops  EXTREMITIES: No clubbing, cyanosis, or edema  NEUROLOGY: non-focal                            11.0   10.18 )-----------( 858      ( 06 Apr 2024 09:59 )             36.0       04-06    130<L>  |  96  |  42<H>  ----------------------------<  337<H>  4.6   |  22  |  1.90<H>    Ca    8.8      06 Apr 2024 09:59  Phos  4.3     04-06  Mg     2.2     04-06    TPro  7.2  /  Alb  3.2<L>  /  TBili  0.4  /  DBili  x   /  AST  8<L>  /  ALT  6<L>  /  AlkPhos  112  04-06      Magnesium: 2.2 mg/dL (04-06 @ 09:59)  Phosphorus: 4.3 mg/dL (04-06 @ 09:59)

## 2024-04-06 NOTE — PROGRESS NOTE ADULT - TIME BILLING
Patient seen and examined;  Patient agrees to drainage of pericardial fluid   Will discuss plans with Dr Carson
acute on chronic systolic HF

## 2024-04-06 NOTE — CONSULT NOTE ADULT - ATTENDING COMMENTS
64 yo M with extensive PMH as noted above.  Hematology consulted for thrombocytosis.  Noted giant platelets on peripheral smear. Thrombocytosis dates back to several yrs ago, raising suspicion for MPN.  Check JAK2, CALR and MPLN1.  He will need further hematological work up and outpatient follow up.    Please call [phone] to schedule a f/u within 2 wks of dc.    Total time spent on encounter, including but not limited to counseling/coordination of care: 75 mis.

## 2024-04-06 NOTE — PROGRESS NOTE ADULT - PROBLEM SELECTOR PLAN 7
- Monitor FS while on ISS and Lantus 6u    - Fluids: not indicated  - Replete Lytes PRN to K>4, Mg>2  - Diet: DASH   - DVT ppx: SQH  - PT eval: N/A, independent   - SW/Dispo: DC planning 4/07 if Cr stabilizes

## 2024-04-06 NOTE — PROGRESS NOTE ADULT - PROBLEM SELECTOR PLAN 1
- Diuresis: Lasix 40mg PO BID -- consider diuretic holiday given worsening LEANNE on CKD likely from overdiuresis  - GDMT: Coreg 6.25mg BID, Entresto 49-51mg BID, Spironolactone 25mg, and Farxiga 10mg -- consider DC Spironolactone and Farxiga given up-trending Cr and CKD hx  - TTE (4/03/24): EF 25-30%, global LV hypokinesis. Mild LVH, G2DD. Normal RV size and systolic function. Aortic sclerosis w/o stenosis. mildly dilated LA. large pericardial effusion - Diuresis: Lasix 40mg PO BID -- consider diuretic holiday given worsening LEANNE on CKD likely from overdiuresis  - GDMT: Coreg 6.25mg BID, Entresto 49-51mg BID, Spironolactone 25mg, and Farxiga 10mg -- consider DC Spironolactone and Farxiga given up-trending Cr and CKD hx  - TTE (4/03/24): EF 25-30%, global LV hypokinesis. Mild LVH, G2DD. Normal RV size and systolic function. Aortic sclerosis w/o stenosis. mildly dilated LA. large pericardial effusion  - Consider ischemic/nonischemic eval as outpatient for further heart failure work-up

## 2024-04-06 NOTE — PROGRESS NOTE ADULT - NS ATTEND AMEND GEN_ALL_CORE FT
No JVD  clear lungs  no edema    john on ckd  stop diuretic  will need to give gentle IVF   continue entresto aldactone and farxiga  stop lasix
63M with Essential HTN, HLD, Type II DM, CKD III, Chronic Anemia, PAD s/p toe amputations and stents with chronic L foot wound followed by outpatient podiatry, and Chronic Systolic HFrEF who presents with acute on chronic CHFrEF exacerbation after running out of lasix for a few days prior to admission. Patient with hypertensive urgency upon arrival, wet crackles on pulmonary exam, conversational dyspnea, CXR with pulmonary edema, CKD at baseline Cr 1.4-1.5, Anemia at baseline 9, and BNP elevated at 7K.  Patient given Lasix 80IV in ED and admitted to cardiac telemetry. TTE performed and notable for reduced LVEF 25% and pericardial effusion with e/o tamponade physiology with intermittent RA invagination. Patient not clinically in tamponade. Subsequent LTD TTEs with moderate pericardial effusion with no e/o tamponade. Pericardiocentesis is not clinically indicated. Patient volume status euvolemic today, metabolic contraction on labs, patient symptomatically significantly improved.   Plan for:  Transition to Lasix 40mg po q12h (9A9P) to maintain euvolemia  Obtain ambulatory saturation test on RA  CHF GDMT regimen: Cont home Coreg 6.25 BID, ARB transitioned to Entresto 49/51 BID (coverage confirmed today) -- augment to 97/103 BID if SBPs remain >110, Spironolactone 25 daily, Farxiga 10mg daily - (coverage confirmed).   Obtain LTD TTE prior to discharge for serial monitoring of pericardial effusion  BIPAP overnight for NIVPPV  Core Measure CHF orders  Bedside CHF Education, supplies to be given to patient  Podiatry consult for chronic L foot wound, s/p debridement, patient known to service  PT consult, patient to be referred to cardiac rehab upon discharge  SW/CM patient refusing home care and wound care services in home  Future planning: patient to have 1wk f/u appt made with Cardiologist for quality improvement CHF readmission risk reduction  Kostas Selby M.D.  Cardiology Attending  During non face-to-face time, I reviewed relevant portions of the patient’s medical record; including vitals, labs, medications, cardiac studies, remaining additional imaging and consultant recommendations. During face-to-face time, I took a relevant history and examined the patient. I also explained to the patient their diagnoses, and specific cardiopulmonary management plan, which required a high level of medical decision making.  I answered all questions related to the patient's medical conditions. I spent 55minutes on total encounter; more than 50% of the visit was spent counseling and/or coordinating care by the attending physician, with plan of care discussed with the patient, consultants and cardiac care team.
62 yo man PMHx of PAD, HFrEF, DM2, and CKD who was admitted for acute on chronic decompensated HFrEF exacerbation and pericardial effusion.    Assessment  1. Acute on chronic decompensated HFrEF exacerbation  Mildly decompensated on exam with +JVD  2. Moderate pericardial effusion  Stable in size without clinical or echocardiographic evidence of tamponade  3. CKD  4. PAD    Plan  1. IV lasix 40mg TID for one more day w/ strict I/O and daily standing weight  2. GDMT: start Entresto 49/51mg BID and Farxiga 10mg PO QD (both covered by insurance); c/w carvedilol and spironolactone at current dose  3. ASA 81mg PO QD and high intensity statin for PAD  4. Hydralazine 25mg TID and Isordil 10mg TID for additional afterload reduction; will plan to decrease and eventually decrease as ARNI and BB doses are maximized  5. Appreciate podiatry management recs for chronic L foot wound  6. BiPAP overnight for NIVPPV    During non face-to-face time, I reviewed relevant portions of the patient’s medical record. During face-to-face time, I took a relevant history and examined the patient. I also explained differential diagnoses, relevant cardiac diagnoses, workup, and management plan, which required a high level of medical decision making. I answered all questions related to the patient's medical conditions.     Vineet Melendez M.D.  Attending Cardiologist  [facility]

## 2024-04-06 NOTE — PROGRESS NOTE ADULT - PROBLEM SELECTOR PLAN 6
- Monitor FS while on ISS and Lantus 6u    - Fluids: not indicated  - Replete Lytes PRN to K>4, Mg>2  - Diet: DASH   - DVT ppx: SQH  - PT eval: N/A, independent   - SW/Dispo: DC planning 4/07 if Cr stabilizes - PLT ranging 600-850s during hospital course   - Heme consulted, appreciate recs

## 2024-04-06 NOTE — PROGRESS NOTE ADULT - PROBLEM SELECTOR PLAN 3
- Hx of SFA/pop stent (2/2023) with OM s/p Lt TMA with residual VRE OM and non-healing wounds -- currently without any active open wounds  - Continue ASA 81mg and Atorvastatin 40mg

## 2024-04-06 NOTE — PROGRESS NOTE ADULT - ASSESSMENT
63M with hx of HTN, HLD, DM2, PAD s/p SFA/pop stent (2/2023), OM s/p LT TMA w/ residual OM (VRE) & non-healing wound, Stage 3b CKD (b/l Cr 1.3-1.4), AOCD, genital herpes (on Valgancyclovir PRN), and recent HFrEF dx at OSH with EF 40%, presenting with volume overload 2/2 acute decompensated HF 2/2 medication non-compliance. Course c/b worsening EF from 40% to 25-30% with large pericardial effusion w/o evidence of tamponade. Undergoing GDMT optimization.

## 2024-04-06 NOTE — PROGRESS NOTE ADULT - SUBJECTIVE AND OBJECTIVE BOX
CARDIOLOGY PA PROGRESS NOTE    Interval Events:  - Transitioned to Lasix 40mg PO BID   - Discontinued Hydralazine and Isordil given hypotensive episode in AM   - TTE LTD (4/05/24): Small pericardial effusion w/o evidence of tamponade    Overnight Events: NILAY    Subjective:  - Pt seen and examined this AM sitting comfortably in bed. Feels well; no acute complaints of CP, palpitations, SOB, abdominal discomfort, N/V/D      ROS: Negative except per HPI and Subjective     Tele: NSR    MEDICATIONS:  carvedilol 6.25 milliGRAM(s) Oral every 12 hours  sacubitril 49 mG/valsartan 51 mG 1 Tablet(s) Oral two times a day  atorvastatin 40 milliGRAM(s) Oral at bedtime  dextrose 50% Injectable 25 Gram(s) IV Push once  dextrose 50% Injectable 25 Gram(s) IV Push once  dextrose 50% Injectable 12.5 Gram(s) IV Push once  dextrose Oral Gel 15 Gram(s) Oral once PRN  glucagon  Injectable 1 milliGRAM(s) IntraMuscular once  glucagon  Injectable 1 milliGRAM(s) IntraMuscular once  insulin glargine Injectable (LANTUS) 6 Unit(s) SubCutaneous at bedtime  insulin lispro (ADMELOG) corrective regimen sliding scale   SubCutaneous Before meals and at bedtime  insulin lispro Injectable (ADMELOG) 6 Unit(s) SubCutaneous three times a day before meals    aspirin enteric coated 81 milliGRAM(s) Oral daily  dextrose 10% Bolus 125 milliLiter(s) IV Bolus once  dextrose 5%. 1000 milliLiter(s) IV Continuous <Continuous>  dextrose 5%. 1000 milliLiter(s) IV Continuous <Continuous>  heparin   Injectable 5000 Unit(s) SubCutaneous every 8 hours  mupirocin 2% Ointment 1 Application(s) Topical two times a day  sodium chloride 0.9%. 1000 milliLiter(s) IV Continuous <Continuous>    	  VITAL SIGNS:  T(C): 36.3 (04-06-24 @ 12:00), Max: 36.9 (04-06-24 @ 00:10)  HR: 66 (04-06-24 @ 12:00) (64 - 71)  BP: 117/59 (04-06-24 @ 12:00) (107/52 - 127/60)  RR: 18 (04-06-24 @ 12:00) (16 - 18)  SpO2: 98% (04-06-24 @ 12:00) (95% - 99%)  Wt(kg): --    I&O's Summary    05 Apr 2024 07:01  -  06 Apr 2024 07:00  --------------------------------------------------------  IN: 358 mL / OUT: 1725 mL / NET: -1367 mL    06 Apr 2024 07:01  -  06 Apr 2024 16:06  --------------------------------------------------------  IN: 800 mL / OUT: 300 mL / NET: 500 mL                                PHYSICAL EXAM:  HENT: NCAT, EOMI, PEERLA  CV: RRR, S1/S2. No JVD. No murmurs, rubs or gallops   PULM: CTA B/L. No wheezing, rales, or rhonchi   ABD: Soft, NT/ND, +BS throughout   EXT: Warm, no LE edema  NEURO: AOx3; no focal neuro deficits      LABS:	 	      - Thrombocytosis with -850s   - Worsening LEANNE on CKD likely iso overdiuresis: Cr 1.32 --> 1.65 --> 1.9                          11.0   10.18 )-----------( 858      ( 06 Apr 2024 09:59 )             36.0     04-06    130<L>  |  96  |  42<H>  ----------------------------<  337<H>  4.6   |  22  |  1.90<H>    Ca    8.8      06 Apr 2024 09:59  Phos  4.3     04-06  Mg     2.2     04-06    TPro  7.2  /  Alb  3.2<L>  /  TBili  0.4  /  DBili  x   /  AST  8<L>  /  ALT  6<L>  /  AlkPhos  112  04-06

## 2024-04-07 ENCOUNTER — TRANSCRIPTION ENCOUNTER (OUTPATIENT)
Age: 64
End: 2024-04-07

## 2024-04-07 VITALS
RESPIRATION RATE: 16 BRPM | HEART RATE: 75 BPM | SYSTOLIC BLOOD PRESSURE: 153 MMHG | OXYGEN SATURATION: 96 % | DIASTOLIC BLOOD PRESSURE: 67 MMHG

## 2024-04-07 LAB
ALBUMIN SERPL ELPH-MCNC: 3 G/DL — LOW (ref 3.3–5)
ALP SERPL-CCNC: 106 U/L — SIGNIFICANT CHANGE UP (ref 40–120)
ALT FLD-CCNC: <5 U/L — LOW (ref 10–45)
ANION GAP SERPL CALC-SCNC: 8 MMOL/L — SIGNIFICANT CHANGE UP (ref 5–17)
AST SERPL-CCNC: 7 U/L — LOW (ref 10–40)
BILIRUB SERPL-MCNC: 0.3 MG/DL — SIGNIFICANT CHANGE UP (ref 0.2–1.2)
BUN SERPL-MCNC: 41 MG/DL — HIGH (ref 7–23)
CALCIUM SERPL-MCNC: 8.7 MG/DL — SIGNIFICANT CHANGE UP (ref 8.4–10.5)
CHLORIDE SERPL-SCNC: 102 MMOL/L — SIGNIFICANT CHANGE UP (ref 96–108)
CO2 SERPL-SCNC: 23 MMOL/L — SIGNIFICANT CHANGE UP (ref 22–31)
CREAT SERPL-MCNC: 1.61 MG/DL — HIGH (ref 0.5–1.3)
CRP SERPL-MCNC: <3 MG/L — SIGNIFICANT CHANGE UP (ref 0–4)
EGFR: 47 ML/MIN/1.73M2 — LOW
ERYTHROCYTE [SEDIMENTATION RATE] IN BLOOD: 67 MM/HR — HIGH
GLUCOSE BLDC GLUCOMTR-MCNC: 214 MG/DL — HIGH (ref 70–99)
GLUCOSE BLDC GLUCOMTR-MCNC: 261 MG/DL — HIGH (ref 70–99)
GLUCOSE BLDC GLUCOMTR-MCNC: 275 MG/DL — HIGH (ref 70–99)
GLUCOSE BLDC GLUCOMTR-MCNC: 325 MG/DL — HIGH (ref 70–99)
GLUCOSE SERPL-MCNC: 287 MG/DL — HIGH (ref 70–99)
HCT VFR BLD CALC: 32.2 % — LOW (ref 39–50)
HGB BLD-MCNC: 10.1 G/DL — LOW (ref 13–17)
LDH SERPL L TO P-CCNC: 207 U/L — SIGNIFICANT CHANGE UP (ref 50–242)
MAGNESIUM SERPL-MCNC: 2.2 MG/DL — SIGNIFICANT CHANGE UP (ref 1.6–2.6)
MCHC RBC-ENTMCNC: 25.9 PG — LOW (ref 27–34)
MCHC RBC-ENTMCNC: 31.4 GM/DL — LOW (ref 32–36)
MCV RBC AUTO: 82.6 FL — SIGNIFICANT CHANGE UP (ref 80–100)
NRBC # BLD: 0 /100 WBCS — SIGNIFICANT CHANGE UP (ref 0–0)
PHOSPHATE SERPL-MCNC: 3.3 MG/DL — SIGNIFICANT CHANGE UP (ref 2.5–4.5)
PLATELET # BLD AUTO: 713 K/UL — HIGH (ref 150–400)
POTASSIUM SERPL-MCNC: 4.6 MMOL/L — SIGNIFICANT CHANGE UP (ref 3.5–5.3)
POTASSIUM SERPL-SCNC: 4.6 MMOL/L — SIGNIFICANT CHANGE UP (ref 3.5–5.3)
PROT SERPL-MCNC: 6.5 G/DL — SIGNIFICANT CHANGE UP (ref 6–8.3)
RBC # BLD: 3.9 M/UL — LOW (ref 4.2–5.8)
RBC # FLD: 20.2 % — HIGH (ref 10.3–14.5)
SODIUM SERPL-SCNC: 133 MMOL/L — LOW (ref 135–145)
WBC # BLD: 8.15 K/UL — SIGNIFICANT CHANGE UP (ref 3.8–10.5)
WBC # FLD AUTO: 8.15 K/UL — SIGNIFICANT CHANGE UP (ref 3.8–10.5)

## 2024-04-07 PROCEDURE — 86140 C-REACTIVE PROTEIN: CPT

## 2024-04-07 PROCEDURE — 84466 ASSAY OF TRANSFERRIN: CPT

## 2024-04-07 PROCEDURE — 81270 JAK2 GENE: CPT

## 2024-04-07 PROCEDURE — 71046 X-RAY EXAM CHEST 2 VIEWS: CPT

## 2024-04-07 PROCEDURE — 93306 TTE W/DOPPLER COMPLETE: CPT

## 2024-04-07 PROCEDURE — 81219 CALR GENE COM VARIANTS: CPT

## 2024-04-07 PROCEDURE — 86334 IMMUNOFIX E-PHORESIS SERUM: CPT

## 2024-04-07 PROCEDURE — 84100 ASSAY OF PHOSPHORUS: CPT

## 2024-04-07 PROCEDURE — 85025 COMPLETE CBC W/AUTO DIFF WBC: CPT

## 2024-04-07 PROCEDURE — 93005 ELECTROCARDIOGRAM TRACING: CPT

## 2024-04-07 PROCEDURE — 96374 THER/PROPH/DIAG INJ IV PUSH: CPT

## 2024-04-07 PROCEDURE — 36415 COLL VENOUS BLD VENIPUNCTURE: CPT

## 2024-04-07 PROCEDURE — 82962 GLUCOSE BLOOD TEST: CPT

## 2024-04-07 PROCEDURE — 81001 URINALYSIS AUTO W/SCOPE: CPT

## 2024-04-07 PROCEDURE — 73630 X-RAY EXAM OF FOOT: CPT

## 2024-04-07 PROCEDURE — 83880 ASSAY OF NATRIURETIC PEPTIDE: CPT

## 2024-04-07 PROCEDURE — 86900 BLOOD TYPING SEROLOGIC ABO: CPT

## 2024-04-07 PROCEDURE — 80048 BASIC METABOLIC PNL TOTAL CA: CPT

## 2024-04-07 PROCEDURE — 82746 ASSAY OF FOLIC ACID SERUM: CPT

## 2024-04-07 PROCEDURE — 80053 COMPREHEN METABOLIC PANEL: CPT

## 2024-04-07 PROCEDURE — 83036 HEMOGLOBIN GLYCOSYLATED A1C: CPT

## 2024-04-07 PROCEDURE — 84165 PROTEIN E-PHORESIS SERUM: CPT

## 2024-04-07 PROCEDURE — 83550 IRON BINDING TEST: CPT

## 2024-04-07 PROCEDURE — 82550 ASSAY OF CK (CPK): CPT

## 2024-04-07 PROCEDURE — 82728 ASSAY OF FERRITIN: CPT

## 2024-04-07 PROCEDURE — 85652 RBC SED RATE AUTOMATED: CPT

## 2024-04-07 PROCEDURE — 84484 ASSAY OF TROPONIN QUANT: CPT

## 2024-04-07 PROCEDURE — 93308 TTE F-UP OR LMTD: CPT

## 2024-04-07 PROCEDURE — 94660 CPAP INITIATION&MGMT: CPT

## 2024-04-07 PROCEDURE — 99239 HOSP IP/OBS DSCHRG MGMT >30: CPT

## 2024-04-07 PROCEDURE — 82553 CREATINE MB FRACTION: CPT

## 2024-04-07 PROCEDURE — 94640 AIRWAY INHALATION TREATMENT: CPT

## 2024-04-07 PROCEDURE — 99285 EMERGENCY DEPT VISIT HI MDM: CPT

## 2024-04-07 PROCEDURE — 82607 VITAMIN B-12: CPT

## 2024-04-07 PROCEDURE — 84155 ASSAY OF PROTEIN SERUM: CPT

## 2024-04-07 PROCEDURE — 80061 LIPID PANEL: CPT

## 2024-04-07 PROCEDURE — 83735 ASSAY OF MAGNESIUM: CPT

## 2024-04-07 PROCEDURE — 83540 ASSAY OF IRON: CPT

## 2024-04-07 PROCEDURE — 86850 RBC ANTIBODY SCREEN: CPT

## 2024-04-07 PROCEDURE — 87389 HIV-1 AG W/HIV-1&-2 AB AG IA: CPT

## 2024-04-07 PROCEDURE — 80076 HEPATIC FUNCTION PANEL: CPT

## 2024-04-07 PROCEDURE — 83615 LACTATE (LD) (LDH) ENZYME: CPT

## 2024-04-07 PROCEDURE — 86901 BLOOD TYPING SEROLOGIC RH(D): CPT

## 2024-04-07 PROCEDURE — 85027 COMPLETE CBC AUTOMATED: CPT

## 2024-04-07 RX ORDER — SPIRONOLACTONE 25 MG/1
1 TABLET, FILM COATED ORAL
Qty: 60 | Refills: 3
Start: 2024-04-07 | End: 2024-12-02

## 2024-04-07 RX ORDER — ASPIRIN/CALCIUM CARB/MAGNESIUM 324 MG
1 TABLET ORAL
Qty: 0 | Refills: 0 | DISCHARGE
Start: 2024-04-07

## 2024-04-07 RX ORDER — SACUBITRIL AND VALSARTAN 24; 26 MG/1; MG/1
1 TABLET, FILM COATED ORAL
Qty: 0 | Refills: 0 | DISCHARGE
Start: 2024-04-07

## 2024-04-07 RX ORDER — INSULIN GLARGINE 100 [IU]/ML
8 INJECTION, SOLUTION SUBCUTANEOUS
Qty: 0 | Refills: 0 | DISCHARGE
Start: 2024-04-07

## 2024-04-07 RX ORDER — INSULIN LISPRO 100/ML
10 VIAL (ML) SUBCUTANEOUS
Qty: 0 | Refills: 0 | DISCHARGE

## 2024-04-07 RX ORDER — DAPAGLIFLOZIN 10 MG/1
1 TABLET, FILM COATED ORAL
Qty: 60 | Refills: 3
Start: 2024-04-07 | End: 2024-12-02

## 2024-04-07 RX ORDER — CARVEDILOL PHOSPHATE 80 MG/1
1 CAPSULE, EXTENDED RELEASE ORAL
Qty: 0 | Refills: 0 | DISCHARGE
Start: 2024-04-07

## 2024-04-07 RX ORDER — CARVEDILOL PHOSPHATE 80 MG/1
1 CAPSULE, EXTENDED RELEASE ORAL
Qty: 120 | Refills: 3
Start: 2024-04-07 | End: 2024-12-02

## 2024-04-07 RX ORDER — ROSUVASTATIN CALCIUM 5 MG/1
1 TABLET ORAL
Qty: 60 | Refills: 3
Start: 2024-04-07 | End: 2024-12-02

## 2024-04-07 RX ORDER — SACUBITRIL AND VALSARTAN 24; 26 MG/1; MG/1
1 TABLET, FILM COATED ORAL
Qty: 120 | Refills: 3
Start: 2024-04-07 | End: 2024-12-02

## 2024-04-07 RX ORDER — ASPIRIN/CALCIUM CARB/MAGNESIUM 324 MG
1 TABLET ORAL
Qty: 60 | Refills: 3
Start: 2024-04-07 | End: 2024-12-02

## 2024-04-07 RX ADMIN — Medication 4: at 08:41

## 2024-04-07 RX ADMIN — Medication 6: at 12:55

## 2024-04-07 RX ADMIN — Medication 6 UNIT(S): at 12:56

## 2024-04-07 RX ADMIN — Medication 81 MILLIGRAM(S): at 10:52

## 2024-04-07 RX ADMIN — MUPIROCIN 1 APPLICATION(S): 20 OINTMENT TOPICAL at 08:21

## 2024-04-07 RX ADMIN — SACUBITRIL AND VALSARTAN 1 TABLET(S): 24; 26 TABLET, FILM COATED ORAL at 06:16

## 2024-04-07 RX ADMIN — Medication 6 UNIT(S): at 08:42

## 2024-04-07 RX ADMIN — CARVEDILOL PHOSPHATE 6.25 MILLIGRAM(S): 80 CAPSULE, EXTENDED RELEASE ORAL at 10:52

## 2024-04-07 NOTE — DISCHARGE NOTE NURSING/CASE MANAGEMENT/SOCIAL WORK - NSDCPEFALRISK_GEN_ALL_CORE
For information on Fall & Injury Prevention, visit: [email or web address] OR  [email or web address] OR  https://www.cdc.gov/steadi/patient.html

## 2024-04-07 NOTE — PROGRESS NOTE ADULT - REASON FOR ADMISSION
HF exacerbation

## 2024-04-07 NOTE — PROGRESS NOTE ADULT - ASSESSMENT
64 y/o M with PMHx of HTN, HLD, DM, PAD (s/p stent of SFA/pop (2/2023)), OM (s/p Left TMA w/ residual OM (VRE)) admitted to [facility] for CHF exacerbation Pt with chronic left foot wound for which he follows with Dr. Lamar regularly with no recent infections. At time of consult, VSS, WBC 11.99. Left foot wound with no clinical signs of infection    Plan:   - Will continue local wound care with Mupirocin  - Pt can ambulate in a Crow boot ot the Left LE  - Rest of care per primary team    Plan d/w Attending. Podiatry following.    Patient should follow up with Dr. Aldair Lamar within 1 week of discharge.    Office information:          [place] Address- [address], [place] [address] Phone: [phone]         [place] Address- [address], [place] [address] Phone: [phone]    Discharge dressing instructions: Cleanse wound with normal sailine daily, pat dry with sterile guaze, apply  Mupirocin to wound base and  gauze to wound base, cover with dry sterile gauze, ABD pad, wrap with Kerlix and light ACE bandage.

## 2024-04-07 NOTE — DISCHARGE NOTE NURSING/CASE MANAGEMENT/SOCIAL WORK - PATIENT PORTAL LINK FT
You can access the FollowMyHealth Patient Portal offered by [facility] by registering at the following website: [email or web address]. By joining [employer]’s FollowMyHealth portal, you will also be able to view your health information using other applications (apps) compatible with our system.

## 2024-04-07 NOTE — PROGRESS NOTE ADULT - SUBJECTIVE AND OBJECTIVE BOX
Patient is a 63y old  Male who presents with a chief complaint of HF exacerbation (06 Apr 2024 16:05)      INTERVAL HPI/ OVERNIGHT EVENTS  Dressing c/d/i. Pt has no complaints.       LABS                        11.0   10.18 )-----------( 858      ( 06 Apr 2024 09:59 )             36.0     04-06    136  |  103  |  42<H>  ----------------------------<  177<H>  4.2   |  22  |  1.82<H>    Ca    9.0      06 Apr 2024 16:00  Phos  4.3     04-06  Mg     2.2     04-06    TPro  7.2  /  Alb  3.2<L>  /  TBili  0.4  /  DBili  x   /  AST  8<L>  /  ALT  6<L>  /  AlkPhos  112  04-06        ICU Vital Signs Last 24 Hrs  T(C): 36.5 (07 Apr 2024 08:20), Max: 36.7 (07 Apr 2024 06:14)  T(F): 97.7 (07 Apr 2024 08:20), Max: 98.1 (07 Apr 2024 06:14)  HR: 75 (07 Apr 2024 08:20) (66 - 78)  BP: 150/70 (07 Apr 2024 08:20) (117/59 - 167/77)  BP(mean): 101 (07 Apr 2024 08:20) (68 - 101)  ABP: --  ABP(mean): --  RR: 16 (07 Apr 2024 08:20) (16 - 18)  SpO2: 96% (07 Apr 2024 08:20) (96% - 98%)    O2 Parameters below as of 07 Apr 2024 08:20  Patient On (Oxygen Delivery Method): room air            RADIOLOGY  < from: Xray Foot AP + Lateral + Oblique, Left (04.03.24 @ 14:05) >  IMPRESSION:  Redemonstrated TMA defect with smooth corticated stump margins. Overlying   soft tissue contour irregularities with focal cleft-like defect over   lateral aspect of the stump. No underlying tracking gas collections or   gross radiographic evidence for osteomyelitis however if concern remains   MRI suggested to further assess.    Normal configuration and alignment of remaining visualized midfoot and   hindfoot osteoarticular structures with preserved joint spaces and no   joint margin erosions.    No calcaneal spurring and unremarkable distal Achilles tendon shadows.    No discrete lytic or blastic lesions.    Scant scattered vascular calcifications.  < end of copied text >    MICROBIOLOGY    PHYSICAL EXAM  Lower Extremity Focused  Vasc: DP/PT 2/4 b/l, No edema or erythema  Derm: L foot sub 5th met shaft wound with granular base, hyperkeratotic border, serous drainage, no tracking or tunneling, no purulence or malodor, -PTB; Right foot with hyperkeratotic lesion sub met 1 - no open wounds right.   Neuro: Protective sensation in tact  MSK: L TMA

## 2024-04-08 ENCOUNTER — NON-APPOINTMENT (OUTPATIENT)
Age: 64
End: 2024-04-08

## 2024-04-08 LAB — PROT SERPL-MCNC: 6.3 G/DL — SIGNIFICANT CHANGE UP (ref 6–8.3)

## 2024-04-09 LAB — JAK2 P.V617F BLD/T QL: SIGNIFICANT CHANGE UP

## 2024-04-10 LAB
% ALBUMIN: 45.2 % — SIGNIFICANT CHANGE UP
% ALPHA 1: 5.4 % — SIGNIFICANT CHANGE UP
% ALPHA 2: 15.7 % — SIGNIFICANT CHANGE UP
% BETA: 14.8 % — SIGNIFICANT CHANGE UP
% GAMMA: 18.9 % — SIGNIFICANT CHANGE UP
ALBUMIN SERPL ELPH-MCNC: 2.8 G/DL — LOW (ref 3.6–5.5)
ALBUMIN/GLOB SERPL ELPH: 0.8 RATIO — SIGNIFICANT CHANGE UP
ALPHA1 GLOB SERPL ELPH-MCNC: 0.3 G/DL — SIGNIFICANT CHANGE UP (ref 0.1–0.4)
ALPHA2 GLOB SERPL ELPH-MCNC: 1 G/DL — SIGNIFICANT CHANGE UP (ref 0.5–1)
B-GLOBULIN SERPL ELPH-MCNC: 0.9 G/DL — SIGNIFICANT CHANGE UP (ref 0.5–1)
CALRETICULIN INTERPRETATION: SIGNIFICANT CHANGE UP
GAMMA GLOBULIN: 1.2 G/DL — SIGNIFICANT CHANGE UP (ref 0.6–1.6)
INTERPRETATION SERPL IFE-IMP: SIGNIFICANT CHANGE UP
PROT PATTERN SERPL ELPH-IMP: SIGNIFICANT CHANGE UP
PROT SERPL-MCNC: 6.3 G/DL — SIGNIFICANT CHANGE UP (ref 6–8.3)

## 2024-04-11 ENCOUNTER — APPOINTMENT (OUTPATIENT)
Dept: HEART AND VASCULAR | Facility: CLINIC | Age: 64
End: 2024-04-11

## 2024-04-15 RX ORDER — HUMAN INSULIN 100 [IU]/ML
100 INJECTION, SUSPENSION SUBCUTANEOUS
Qty: 2 | Refills: 0 | Status: ACTIVE | COMMUNITY
Start: 2023-10-02 | End: 1900-01-01

## 2024-04-15 RX ORDER — INSULIN GLARGINE 100 [IU]/ML
100 INJECTION, SOLUTION SUBCUTANEOUS
Qty: 2 | Refills: 5 | Status: ACTIVE | COMMUNITY
Start: 1900-01-01 | End: 1900-01-01

## 2024-04-15 RX ORDER — INSULIN LISPRO 100 [IU]/ML
100 INJECTION, SOLUTION INTRAVENOUS; SUBCUTANEOUS
Qty: 1 | Refills: 0 | Status: ACTIVE | COMMUNITY
Start: 2023-10-03 | End: 1900-01-01

## 2024-04-16 DIAGNOSIS — D63.1 ANEMIA IN CHRONIC KIDNEY DISEASE: ICD-10-CM

## 2024-04-16 DIAGNOSIS — I50.23 ACUTE ON CHRONIC SYSTOLIC (CONGESTIVE) HEART FAILURE: ICD-10-CM

## 2024-04-16 DIAGNOSIS — Z89.422 ACQUIRED ABSENCE OF OTHER LEFT TOE(S): ICD-10-CM

## 2024-04-16 DIAGNOSIS — I11.0 HYPERTENSIVE HEART DISEASE WITH HEART FAILURE: ICD-10-CM

## 2024-04-16 DIAGNOSIS — Z79.82 LONG TERM (CURRENT) USE OF ASPIRIN: ICD-10-CM

## 2024-04-16 DIAGNOSIS — N17.9 ACUTE KIDNEY FAILURE, UNSPECIFIED: ICD-10-CM

## 2024-04-16 DIAGNOSIS — I13.0 HYPERTENSIVE HEART AND CHRONIC KIDNEY DISEASE WITH HEART FAILURE AND STAGE 1 THROUGH STAGE 4 CHRONIC KIDNEY DISEASE, OR UNSPECIFIED CHRONIC KIDNEY DISEASE: ICD-10-CM

## 2024-04-16 DIAGNOSIS — Z87.891 PERSONAL HISTORY OF NICOTINE DEPENDENCE: ICD-10-CM

## 2024-04-16 DIAGNOSIS — E11.51 TYPE 2 DIABETES MELLITUS WITH DIABETIC PERIPHERAL ANGIOPATHY WITHOUT GANGRENE: ICD-10-CM

## 2024-04-16 DIAGNOSIS — E78.5 HYPERLIPIDEMIA, UNSPECIFIED: ICD-10-CM

## 2024-04-16 DIAGNOSIS — E11.22 TYPE 2 DIABETES MELLITUS WITH DIABETIC CHRONIC KIDNEY DISEASE: ICD-10-CM

## 2024-04-16 DIAGNOSIS — N18.30 CHRONIC KIDNEY DISEASE, STAGE 3 UNSPECIFIED: ICD-10-CM

## 2024-04-16 DIAGNOSIS — Z95.820 PERIPHERAL VASCULAR ANGIOPLASTY STATUS WITH IMPLANTS AND GRAFTS: ICD-10-CM

## 2024-04-16 DIAGNOSIS — Z79.4 LONG TERM (CURRENT) USE OF INSULIN: ICD-10-CM

## 2024-04-16 DIAGNOSIS — Z88.1 ALLERGY STATUS TO OTHER ANTIBIOTIC AGENTS: ICD-10-CM

## 2024-04-16 DIAGNOSIS — D75.839 THROMBOCYTOSIS, UNSPECIFIED: ICD-10-CM

## 2024-04-18 ENCOUNTER — APPOINTMENT (OUTPATIENT)
Dept: INTERNAL MEDICINE | Facility: CLINIC | Age: 64
End: 2024-04-18

## 2024-04-22 ENCOUNTER — APPOINTMENT (OUTPATIENT)
Dept: INTERNAL MEDICINE | Facility: CLINIC | Age: 64
End: 2024-04-22
Payer: MEDICARE

## 2024-04-22 VITALS
SYSTOLIC BLOOD PRESSURE: 153 MMHG | OXYGEN SATURATION: 98 % | DIASTOLIC BLOOD PRESSURE: 67 MMHG | HEART RATE: 76 BPM | WEIGHT: 160 LBS | TEMPERATURE: 97.2 F | BODY MASS INDEX: 25.11 KG/M2 | HEIGHT: 67 IN

## 2024-04-22 DIAGNOSIS — I10 ESSENTIAL (PRIMARY) HYPERTENSION: ICD-10-CM

## 2024-04-22 DIAGNOSIS — I50.9 HEART FAILURE, UNSPECIFIED: ICD-10-CM

## 2024-04-22 DIAGNOSIS — E11.621 TYPE 2 DIABETES MELLITUS WITH FOOT ULCER: ICD-10-CM

## 2024-04-22 DIAGNOSIS — L97.526 TYPE 2 DIABETES MELLITUS WITH FOOT ULCER: ICD-10-CM

## 2024-04-22 DIAGNOSIS — E11.628 TYPE 2 DIABETES MELLITUS WITH OTHER SKIN COMPLICATIONS: ICD-10-CM

## 2024-04-22 PROCEDURE — 99213 OFFICE O/P EST LOW 20 MIN: CPT

## 2024-04-22 RX ORDER — LOSARTAN POTASSIUM 100 MG/1
100 TABLET, FILM COATED ORAL
Refills: 0 | Status: DISCONTINUED | COMMUNITY
End: 2024-04-22

## 2024-04-22 RX ORDER — DAPAGLIFLOZIN 10 MG/1
10 TABLET, FILM COATED ORAL
Qty: 30 | Refills: 5 | Status: ACTIVE | COMMUNITY

## 2024-04-22 RX ORDER — CARVEDILOL 6.25 MG/1
6.25 TABLET, FILM COATED ORAL
Qty: 180 | Refills: 3 | Status: ACTIVE | COMMUNITY

## 2024-04-22 RX ORDER — BLOOD SUGAR DIAGNOSTIC
STRIP MISCELLANEOUS
Qty: 1 | Refills: 0 | Status: DISCONTINUED | COMMUNITY
Start: 2024-02-12 | End: 2024-04-22

## 2024-04-22 RX ORDER — SPIRONOLACTONE 25 MG/1
25 TABLET ORAL
Qty: 30 | Refills: 5 | Status: ACTIVE | COMMUNITY

## 2024-04-22 RX ORDER — ASPIRIN ENTERIC COATED TABLETS 81 MG 81 MG/1
81 TABLET, DELAYED RELEASE ORAL
Refills: 0 | Status: ACTIVE | COMMUNITY

## 2024-04-22 RX ORDER — AMLODIPINE BESYLATE AND BENAZEPRIL HYDROCHLORIDE 10; 40 MG/1; MG/1
10-40 CAPSULE ORAL
Refills: 0 | Status: DISCONTINUED | COMMUNITY
End: 2024-04-22

## 2024-04-22 RX ORDER — ROSUVASTATIN CALCIUM 10 MG/1
10 TABLET, FILM COATED ORAL
Refills: 0 | Status: DISCONTINUED | COMMUNITY
End: 2024-04-22

## 2024-04-22 RX ORDER — ASPIRIN 81 MG
81 TABLET,CHEWABLE ORAL
Refills: 0 | Status: ACTIVE | COMMUNITY

## 2024-04-22 RX ORDER — AZITHROMYCIN 250 MG/1
250 TABLET, FILM COATED ORAL
Qty: 6 | Refills: 1 | Status: DISCONTINUED | COMMUNITY
Start: 2024-01-19 | End: 2024-04-22

## 2024-04-22 RX ORDER — SACUBITRIL AND VALSARTAN 49; 51 MG/1; MG/1
49-51 TABLET, FILM COATED ORAL
Qty: 60 | Refills: 2 | Status: ACTIVE | COMMUNITY

## 2024-04-22 RX ORDER — ROSUVASTATIN CALCIUM 10 MG/1
10 TABLET, FILM COATED ORAL
Qty: 90 | Refills: 3 | Status: ACTIVE | COMMUNITY

## 2024-04-22 RX ORDER — METFORMIN HYDROCHLORIDE 1000 MG/1
1000 TABLET, COATED ORAL
Refills: 0 | Status: DISCONTINUED | COMMUNITY
End: 2024-04-22

## 2024-04-22 RX ORDER — FUROSEMIDE 40 MG/1
40 TABLET ORAL
Qty: 30 | Refills: 5 | Status: DISCONTINUED | COMMUNITY
Start: 2024-03-07 | End: 2024-04-22

## 2024-04-22 RX ORDER — BLOOD SUGAR DIAGNOSTIC
STRIP MISCELLANEOUS
Qty: 1 | Refills: 0 | Status: ACTIVE | COMMUNITY
Start: 2024-02-09 | End: 1900-01-01

## 2024-04-22 RX ORDER — GABAPENTIN 100 MG/1
100 CAPSULE ORAL
Refills: 0 | Status: DISCONTINUED | COMMUNITY
End: 2024-04-22

## 2024-04-22 NOTE — HISTORY OF PRESENT ILLNESS
[FreeTextEntry1] : Recent in hospital for shortness of breath. CHF  and multiple meds started; Had small pericardial effusion  [de-identified] : No shortness of breath;  Wound being followed by Podiatry.  Wound reviewed by  picture; Wound debrided today.  All medication reviewed and placed on chart. Insulin regimen remains the same

## 2024-04-22 NOTE — ASSESSMENT
[FreeTextEntry1] : Stable examination. Multiple med changes as outlined Will follow up with Podiatry regarding the wound  Appears to be well compensated ;

## 2024-04-25 ENCOUNTER — APPOINTMENT (OUTPATIENT)
Dept: HEMATOLOGY ONCOLOGY | Facility: CLINIC | Age: 64
End: 2024-04-25

## 2024-04-25 NOTE — HISTORY OF PRESENT ILLNESS
[de-identified] : Aldair Zhao is a 63-year-old male who presents to the clinic for an initial consultation.

## 2024-05-07 ENCOUNTER — APPOINTMENT (OUTPATIENT)
Dept: INTERNAL MEDICINE | Facility: CLINIC | Age: 64
End: 2024-05-07

## 2024-05-28 ENCOUNTER — APPOINTMENT (OUTPATIENT)
Dept: UROLOGY | Facility: CLINIC | Age: 64
End: 2024-05-28

## 2024-06-06 NOTE — HISTORY OF PRESENT ILLNESS
[FreeTextEntry1] : 63yoM w/ [id number] and PVD, s/p PTA at an outside facility,  s/p LLE PTA/stent of SFA w/Carroccio, s/p L TMA by Dr. Lamar (podiatry), returning today for reevaluation of his L plantar wound that has not been significantly healing over the past 3mos despite daily mupirocin use; he was instructed by Spielfogel to wear an ankle immobilizer but has not been offloading pressure from the wound.  He denies increased drainage, pain, fevers/chills, but states the wound is not making steady progress.  Previous LLE arterial duplex performed in our office to evaluate LE perfusion demonstrated widely patent L SFA stent, triphasic flow to the foot via L CESILIA and peroneal artery, DPA patent, PTA occluded.

## 2024-06-06 NOTE — PHYSICAL EXAM
[JVD] : no jugular venous distention  [Normal Thyroid] : the thyroid was normal [Carotid Bruits] : no carotid bruits [Normal Breath Sounds] : Normal breath sounds [Respiratory Effort] : normal respiratory effort [Normal Heart Sounds] : normal heart sounds [Normal Rate and Rhythm] : normal rate and rhythm [Right Carotid Bruit] : no bruit heard over the right carotid [Left Carotid Bruit] : no bruit heard over the left carotid [2+] : right 2+ [0] : left 0 [1+] : left 1+ [Ankle Swelling (On Exam)] : present [Ankle Swelling On The Left] : of the left ankle [Ankle Swelling On The Right] : mild [Varicose Veins Of Lower Extremities] : not present [] : not present [Abdomen Masses] : No abdominal masses [Abdomen Tenderness] : ~T ~M No abdominal tenderness [Purpura] : no purpura  [Petechiae] : no petechiae [Skin Ulcer] : ulcer [Skin Induration] : no induration [Alert] : alert [Calm] : calm [de-identified] : Healthy appearance, NAD [de-identified] : NC/AT, anicteric [de-identified] : FROM throughout, strength 5/5x4, no palpable cords in LEs, no atrophy noted [de-identified] : L TMA site healed well, dry skin over entire foot, +2x1 cm superficial wound with good granulation tissue over lateral sole with surrounding callus, no erythema/drainage/infection

## 2024-06-06 NOTE — PROCEDURE
[FreeTextEntry1] : Previous LLE arterial duplex performed to evaluate LE perfusion demonstrated widely patent L SFA stent, triphasic flow to the foot via L CESILIA and peroneal artery, DPA patent, PTA occluded.

## 2024-06-06 NOTE — ASSESSMENT
[FreeTextEntry1] : 63yoM w/ polc1CB and PVD, s/p PTA at an outside facility,  s/p LLE PTA/stent of SFA w/Juan Alberto, previously followed by ALEXANDRA VOGEL by Dr. Lamar (podiatry) for serial debridements and off-loading, more recently seen by Dr. Pope. Pt returns today for evaluation of his L plantar wound that has not been significantly healing over the past 3mos despite daily mupirocin use; he was instructed by Niko to wear an ankle immobilizer but has not been offloading pressure from the wound.  He denies increased drainage, pain, fevers/chills, but states the wound is not making steady progress.  L plantar foot ulcer granulating well w/good color, surrounding callus noted and debrided at bedside.  LLE arterial duplex performed to evaluate LE perfusion demonstrates widely patent L SFA stent, triphasic flow to the foot via L CESILIA and peroneal artery, DPA patent, PTA occluded.  Wound stable, granulating well, no evidence of infection or arterial insufficiency noted, will need better off-loading and glycemic control to heal.  Recommended pt return to see podiatry for an improved orthotic, and Rx for medihoney provided to his pharmacy.  Pt will RTO in 6mos for surveillance of his stent, recommend regular exercise to maintain LE perfusion. [Arterial/Venous Disease] : arterial/venous disease [Foot care/Footwear] : foot care/footwear [Ulcer Care] : ulcer care

## 2024-06-07 ENCOUNTER — APPOINTMENT (OUTPATIENT)
Dept: VASCULAR SURGERY | Facility: CLINIC | Age: 64
End: 2024-06-07

## 2024-07-11 ENCOUNTER — APPOINTMENT (OUTPATIENT)
Dept: INTERNAL MEDICINE | Facility: CLINIC | Age: 64
End: 2024-07-11
Payer: MEDICARE

## 2024-07-11 VITALS
OXYGEN SATURATION: 100 % | SYSTOLIC BLOOD PRESSURE: 174 MMHG | HEART RATE: 68 BPM | TEMPERATURE: 97.9 F | DIASTOLIC BLOOD PRESSURE: 58 MMHG

## 2024-07-11 DIAGNOSIS — E11.628 TYPE 2 DIABETES MELLITUS WITH OTHER SKIN COMPLICATIONS: ICD-10-CM

## 2024-07-11 DIAGNOSIS — Z98.890 PERIPHERAL VASCULAR DISEASE, UNSPECIFIED: ICD-10-CM

## 2024-07-11 DIAGNOSIS — I73.9 PERIPHERAL VASCULAR DISEASE, UNSPECIFIED: ICD-10-CM

## 2024-07-11 DIAGNOSIS — I10 ESSENTIAL (PRIMARY) HYPERTENSION: ICD-10-CM

## 2024-07-11 DIAGNOSIS — S88.111D COMPLETE TRAUMATIC AMPUTATION AT LVL BETWEEN KNEE AND ANKLE, RIGHT LOWER LEG, SUBSEQUENT ENCOUNTER: ICD-10-CM

## 2024-07-11 DIAGNOSIS — E55.9 VITAMIN D DEFICIENCY, UNSPECIFIED: ICD-10-CM

## 2024-07-11 PROCEDURE — 99214 OFFICE O/P EST MOD 30 MIN: CPT

## 2024-07-11 RX ORDER — GABAPENTIN 300 MG
300 TABLET ORAL AT BEDTIME
Refills: 0 | Status: ACTIVE | COMMUNITY

## 2024-07-11 RX ORDER — GABAPENTIN 400 MG/1
400 CAPSULE ORAL TWICE DAILY
Qty: 60 | Refills: 0 | Status: ACTIVE | COMMUNITY
Start: 2024-07-11

## 2024-07-11 RX ORDER — FUROSEMIDE 20 MG/1
20 TABLET ORAL
Refills: 0 | Status: ACTIVE | COMMUNITY

## 2024-07-11 RX ORDER — DAPAGLIFLOZIN 10 MG/1
10 TABLET, FILM COATED ORAL
Refills: 0 | Status: DISCONTINUED | COMMUNITY
End: 2024-07-11

## 2024-07-11 RX ORDER — AMLODIPINE BESYLATE 10 MG/1
10 TABLET ORAL DAILY
Qty: 30 | Refills: 5 | Status: ACTIVE | COMMUNITY

## 2024-07-11 RX ORDER — ATORVASTATIN CALCIUM 80 MG/1
80 TABLET, FILM COATED ORAL
Refills: 0 | Status: ACTIVE | COMMUNITY

## 2024-07-11 RX ORDER — CARVEDILOL 12.5 MG/1
12.5 TABLET, FILM COATED ORAL
Qty: 60 | Refills: 5 | Status: ACTIVE | COMMUNITY

## 2024-07-11 RX ORDER — SACUBITRIL AND VALSARTAN 49; 51 MG/1; MG/1
49-51 TABLET, FILM COATED ORAL
Refills: 0 | Status: DISCONTINUED | COMMUNITY
End: 2024-07-11

## 2024-07-11 RX ORDER — ASPIRIN ENTERIC COATED TABLETS 81 MG 81 MG/1
81 TABLET, DELAYED RELEASE ORAL
Refills: 0 | Status: DISCONTINUED | COMMUNITY
End: 2024-07-11

## 2024-07-17 ENCOUNTER — NON-APPOINTMENT (OUTPATIENT)
Age: 64
End: 2024-07-17

## 2024-07-19 ENCOUNTER — RX RENEWAL (OUTPATIENT)
Age: 64
End: 2024-07-19

## 2024-08-13 ENCOUNTER — APPOINTMENT (OUTPATIENT)
Dept: INTERNAL MEDICINE | Facility: CLINIC | Age: 64
End: 2024-08-13

## 2024-08-20 ENCOUNTER — APPOINTMENT (OUTPATIENT)
Dept: INTERNAL MEDICINE | Facility: CLINIC | Age: 64
End: 2024-08-20
Payer: MEDICARE

## 2024-08-20 VITALS
SYSTOLIC BLOOD PRESSURE: 178 MMHG | TEMPERATURE: 97.7 F | DIASTOLIC BLOOD PRESSURE: 76 MMHG | HEART RATE: 70 BPM | OXYGEN SATURATION: 100 %

## 2024-08-20 DIAGNOSIS — E11.628 TYPE 2 DIABETES MELLITUS WITH OTHER SKIN COMPLICATIONS: ICD-10-CM

## 2024-08-20 DIAGNOSIS — I10 ESSENTIAL (PRIMARY) HYPERTENSION: ICD-10-CM

## 2024-08-20 DIAGNOSIS — I50.9 HEART FAILURE, UNSPECIFIED: ICD-10-CM

## 2024-08-20 PROCEDURE — 36415 COLL VENOUS BLD VENIPUNCTURE: CPT

## 2024-08-20 PROCEDURE — 99214 OFFICE O/P EST MOD 30 MIN: CPT | Mod: 25

## 2024-08-20 RX ORDER — ASPIRIN ENTERIC COATED TABLETS 81 MG 81 MG/1
81 TABLET, DELAYED RELEASE ORAL
Qty: 90 | Refills: 0 | Status: ACTIVE | COMMUNITY
Start: 2024-08-20 | End: 1900-01-01

## 2024-08-22 NOTE — HISTORY OF PRESENT ILLNESS
[FreeTextEntry1] : follow up on diabetes & med refills  [de-identified] : Discharged from [facility] in June for right BKA amputation  Followed up with wound care yesterday - told wound was healing well  Supposed to be followed by cardio - does not think he needs to see a cardiologist   States he just needs to see his PCP & refill the meds  Taking humalog 10 units before meals & Lantus 8 units QHS  Not taking Mounjaro  States his medication got all confusing after he got discharged  Followed up with a new podiatrist in [place] for left foot  Previous podiatrist no longer taking his insurance  No recent optho visit   BP elevated in office; took all medications in office today   Denies any cp, sob, palpitations, and pain Denies any acute concerns currently

## 2024-08-22 NOTE — PHYSICAL EXAM
[Normal Sclera/Conjunctiva] : normal sclera/conjunctiva [EOMI] : extraocular movements intact [Normal Outer Ear/Nose] : the outer ears and nose were normal in appearance [Supple] : supple [Normal Rate] : normal rate  [Regular Rhythm] : with a regular rhythm [Normal S1, S2] : normal S1 and S2 [No Edema] : there was no peripheral edema [Grossly Normal Strength/Tone] : grossly normal strength/tone [Normal] : affect was normal and insight and judgment were intact [de-identified] : right leg bka in bandages  [de-identified] : in wheelchair

## 2024-08-22 NOTE — PHYSICAL EXAM
[Normal Sclera/Conjunctiva] : normal sclera/conjunctiva [EOMI] : extraocular movements intact [Normal Outer Ear/Nose] : the outer ears and nose were normal in appearance [Supple] : supple [Normal Rate] : normal rate  [Regular Rhythm] : with a regular rhythm [Normal S1, S2] : normal S1 and S2 [No Edema] : there was no peripheral edema [Grossly Normal Strength/Tone] : grossly normal strength/tone [Normal] : affect was normal and insight and judgment were intact [de-identified] : right leg bka in bandages  [de-identified] : in wheelchair

## 2024-08-22 NOTE — HISTORY OF PRESENT ILLNESS
[FreeTextEntry1] : follow up on diabetes & med refills  [de-identified] : Discharged from [facility] in June for right BKA amputation  Followed up with wound care yesterday - told wound was healing well  Supposed to be followed by cardio - does not think he needs to see a cardiologist   States he just needs to see his PCP & refill the meds  Taking humalog 10 units before meals & Lantus 8 units QHS  Not taking Mounjaro  States his medication got all confusing after he got discharged  Followed up with a new podiatrist in [place] for left foot  Previous podiatrist no longer taking his insurance  No recent optho visit   BP elevated in office; took all medications in office today   Denies any cp, sob, palpitations, and pain Denies any acute concerns currently

## 2024-08-22 NOTE — PLAN
[FreeTextEntry1] : labs drawn in office   discussed the need for cardiology management for chf & bp    bp  discussed optimal bp management  continue with bp regiment   dm a1c drawn  c/w insulin  established care with podiatry  encouraged optho visit

## 2024-08-29 LAB
ALBUMIN SERPL ELPH-MCNC: 3.5 G/DL
ALP BLD-CCNC: 101 U/L
ALT SERPL-CCNC: 10 U/L
ANION GAP SERPL CALC-SCNC: 15 MMOL/L
AST SERPL-CCNC: 19 U/L
BASOPHILS # BLD AUTO: 0.08 K/UL
BASOPHILS NFR BLD AUTO: 0.8 %
BILIRUB SERPL-MCNC: 0.5 MG/DL
BUN SERPL-MCNC: 20 MG/DL
CALCIUM SERPL-MCNC: 9.1 MG/DL
CHLORIDE SERPL-SCNC: 109 MMOL/L
CO2 SERPL-SCNC: 17 MMOL/L
CREAT SERPL-MCNC: 1.14 MG/DL
EGFR: 71 ML/MIN/1.73M2
EOSINOPHIL # BLD AUTO: 0.22 K/UL
EOSINOPHIL NFR BLD AUTO: 2.2 %
ESTIMATED AVERAGE GLUCOSE: 174 MG/DL
GLUCOSE SERPL-MCNC: 157 MG/DL
HBA1C MFR BLD HPLC: 7.7 %
HCT VFR BLD CALC: 36.4 %
HGB BLD-MCNC: 10.7 G/DL
IMM GRANULOCYTES NFR BLD AUTO: 0.8 %
LYMPHOCYTES # BLD AUTO: 2.39 K/UL
LYMPHOCYTES NFR BLD AUTO: 23.4 %
MAN DIFF?: NORMAL
MCHC RBC-ENTMCNC: 27.6 PG
MCHC RBC-ENTMCNC: 29.4 GM/DL
MCV RBC AUTO: 93.8 FL
MONOCYTES # BLD AUTO: 0.79 K/UL
MONOCYTES NFR BLD AUTO: 7.7 %
NEUTROPHILS # BLD AUTO: 6.67 K/UL
NEUTROPHILS NFR BLD AUTO: 65.1 %
NT-PROBNP SERPL-MCNC: 3921 PG/ML
PLATELET # BLD AUTO: 489 K/UL
POTASSIUM SERPL-SCNC: 5.3 MMOL/L
PROT SERPL-MCNC: 6.2 G/DL
RBC # BLD: 3.88 M/UL
RBC # FLD: 17.7 %
SODIUM SERPL-SCNC: 140 MMOL/L
WBC # FLD AUTO: 10.23 K/UL

## 2024-09-03 ENCOUNTER — APPOINTMENT (OUTPATIENT)
Dept: INTERNAL MEDICINE | Facility: CLINIC | Age: 64
End: 2024-09-03

## 2024-09-18 ENCOUNTER — APPOINTMENT (OUTPATIENT)
Dept: INTERNAL MEDICINE | Facility: CLINIC | Age: 64
End: 2024-09-18

## 2024-09-18 VITALS
SYSTOLIC BLOOD PRESSURE: 210 MMHG | DIASTOLIC BLOOD PRESSURE: 85 MMHG | HEART RATE: 75 BPM | OXYGEN SATURATION: 100 % | TEMPERATURE: 97.7 F

## 2024-10-10 ENCOUNTER — RX RENEWAL (OUTPATIENT)
Age: 64
End: 2024-10-10

## 2024-10-10 RX ORDER — INSULIN ASPART 100 [IU]/ML
100 INJECTION, SOLUTION INTRAVENOUS; SUBCUTANEOUS
Qty: 30 | Refills: 0 | Status: ACTIVE | COMMUNITY
Start: 2024-10-10 | End: 1900-01-01

## 2024-10-16 ENCOUNTER — APPOINTMENT (OUTPATIENT)
Dept: INTERNAL MEDICINE | Facility: CLINIC | Age: 64
End: 2024-10-16

## 2024-10-17 ENCOUNTER — RX RENEWAL (OUTPATIENT)
Age: 64
End: 2024-10-17

## 2024-11-01 ENCOUNTER — APPOINTMENT (OUTPATIENT)
Dept: INTERNAL MEDICINE | Facility: CLINIC | Age: 64
End: 2024-11-01

## 2024-11-15 ENCOUNTER — APPOINTMENT (OUTPATIENT)
Dept: INTERNAL MEDICINE | Facility: CLINIC | Age: 64
End: 2024-11-15

## 2024-12-26 ENCOUNTER — RX RENEWAL (OUTPATIENT)
Age: 64
End: 2024-12-26

## 2024-12-26 RX ORDER — INSULIN GLARGINE-YFGN 100 [IU]/ML
100 INJECTION, SOLUTION SUBCUTANEOUS
Qty: 2 | Refills: 4 | Status: ACTIVE | COMMUNITY
Start: 2024-12-26 | End: 1900-01-01

## 2025-01-07 ENCOUNTER — NON-APPOINTMENT (OUTPATIENT)
Age: 65
End: 2025-01-07

## 2025-01-07 ENCOUNTER — APPOINTMENT (OUTPATIENT)
Dept: INTERNAL MEDICINE | Facility: CLINIC | Age: 65
End: 2025-01-07
Payer: MEDICARE

## 2025-01-07 VITALS
BODY MASS INDEX: 25.43 KG/M2 | SYSTOLIC BLOOD PRESSURE: 183 MMHG | TEMPERATURE: 97.6 F | HEIGHT: 67 IN | DIASTOLIC BLOOD PRESSURE: 80 MMHG | WEIGHT: 162 LBS | OXYGEN SATURATION: 96 % | HEART RATE: 85 BPM

## 2025-01-07 DIAGNOSIS — S88.111D COMPLETE TRAUMATIC AMPUTATION AT LVL BETWEEN KNEE AND ANKLE, RIGHT LOWER LEG, SUBSEQUENT ENCOUNTER: ICD-10-CM

## 2025-01-07 DIAGNOSIS — E55.9 VITAMIN D DEFICIENCY, UNSPECIFIED: ICD-10-CM

## 2025-01-07 DIAGNOSIS — I50.9 HEART FAILURE, UNSPECIFIED: ICD-10-CM

## 2025-01-07 DIAGNOSIS — E11.628 TYPE 2 DIABETES MELLITUS WITH OTHER SKIN COMPLICATIONS: ICD-10-CM

## 2025-01-07 DIAGNOSIS — I10 ESSENTIAL (PRIMARY) HYPERTENSION: ICD-10-CM

## 2025-01-07 PROCEDURE — 99213 OFFICE O/P EST LOW 20 MIN: CPT | Mod: 25

## 2025-01-07 PROCEDURE — 36415 COLL VENOUS BLD VENIPUNCTURE: CPT

## 2025-01-10 LAB
25(OH)D3 SERPL-MCNC: 8.1 NG/ML
ALBUMIN SERPL ELPH-MCNC: 3.1 G/DL
ALP BLD-CCNC: 115 U/L
ALT SERPL-CCNC: 12 U/L
ANION GAP SERPL CALC-SCNC: 12 MMOL/L
APPEARANCE: CLEAR
AST SERPL-CCNC: 9 U/L
BACTERIA: NEGATIVE /HPF
BILIRUB SERPL-MCNC: 0.5 MG/DL
BILIRUBIN URINE: NEGATIVE
BLOOD URINE: ABNORMAL
BUN SERPL-MCNC: 27 MG/DL
CALCIUM SERPL-MCNC: 8.9 MG/DL
CAST: 5 /LPF
CHLORIDE SERPL-SCNC: 104 MMOL/L
CHOLEST SERPL-MCNC: 193 MG/DL
CO2 SERPL-SCNC: 22 MMOL/L
COLOR: YELLOW
CREAT SERPL-MCNC: 1.62 MG/DL
EGFR: 47 ML/MIN/1.73M2
EPITHELIAL CELLS: 2 /HPF
ESTIMATED AVERAGE GLUCOSE: 275 MG/DL
GLUCOSE QUALITATIVE U: >=1000 MG/DL
GLUCOSE SERPL-MCNC: 354 MG/DL
HBA1C MFR BLD HPLC: 11.2 %
HCT VFR BLD CALC: 30.6 %
HDLC SERPL-MCNC: 24 MG/DL
HGB BLD-MCNC: 9.3 G/DL
HYALINE CASTS: PRESENT
KETONES URINE: NEGATIVE MG/DL
LDLC SERPL CALC-MCNC: 127 MG/DL
LEUKOCYTE ESTERASE URINE: NEGATIVE
MCHC RBC-ENTMCNC: 27.4 PG
MCHC RBC-ENTMCNC: 30.4 G/DL
MCV RBC AUTO: 90.3 FL
MICROSCOPIC-UA: NORMAL
NITRITE URINE: NEGATIVE
NONHDLC SERPL-MCNC: 170 MG/DL
PH URINE: 6
PLATELET # BLD AUTO: 555 K/UL
POTASSIUM SERPL-SCNC: 4.7 MMOL/L
PROT SERPL-MCNC: 6.2 G/DL
PROTEIN URINE: >=1000 MG/DL
PSA SERPL-MCNC: 2.99 NG/ML
RBC # BLD: 3.39 M/UL
RBC # FLD: 17.4 %
RED BLOOD CELLS URINE: 3 /HPF
REVIEW: NORMAL
SODIUM SERPL-SCNC: 137 MMOL/L
SPECIFIC GRAVITY URINE: 1.02
T4 FREE SERPL-MCNC: 0.9 NG/DL
TRIGL SERPL-MCNC: 236 MG/DL
TSH SERPL-ACNC: 1.13 UIU/ML
UROBILINOGEN URINE: 0.2 MG/DL
WBC # FLD AUTO: 11.43 K/UL
WHITE BLOOD CELLS URINE: 0 /HPF

## 2025-01-10 RX ORDER — ADHESIVE TAPE 3"X 2.3 YD
50 MCG TAPE, NON-MEDICATED TOPICAL
Qty: 90 | Refills: 2 | Status: ACTIVE | COMMUNITY
Start: 2025-01-10 | End: 1900-01-01

## 2025-01-30 NOTE — PATIENT PROFILE ADULT - FUNCTIONAL ASSESSMENT - BASIC MOBILITY 1.
[FreeTextEntry1] : 09/21/21 52 year old male with hypertension & hyperlipidemia w mod obesity here for cardiac f/u  no sscp poor eating habits no exercise whatsoever    03/31/23 Patient is a 53-year-old male last seen in 2021 he is on current medications for hypertension and hyperlipidemia.  Since his vas visit about a year and a half ago he has had about a 15 pound weight gain with decrease in activity and unfortunately poor eating habits.  Patient has no specific symptoms of chest pain or shortness of breath.  1/30/25 on meds for BP and LDL  seen by PMD  has periodic HR elevation racing  lasting < 10 -20 sec  occurs 2x week  mod caffeine inatke 2 large cups qd  mod wgt gain and snoring  No HA in am   
4 = No assist / stand by assistance

## 2025-03-18 ENCOUNTER — APPOINTMENT (OUTPATIENT)
Dept: INTERNAL MEDICINE | Facility: CLINIC | Age: 65
End: 2025-03-18
Payer: MEDICARE

## 2025-03-18 VITALS
BODY MASS INDEX: 23.39 KG/M2 | TEMPERATURE: 97.3 F | HEIGHT: 67 IN | SYSTOLIC BLOOD PRESSURE: 129 MMHG | OXYGEN SATURATION: 98 % | WEIGHT: 149 LBS | HEART RATE: 78 BPM | DIASTOLIC BLOOD PRESSURE: 66 MMHG

## 2025-03-18 DIAGNOSIS — S88.111D COMPLETE TRAUMATIC AMPUTATION AT LVL BETWEEN KNEE AND ANKLE, RIGHT LOWER LEG, SUBSEQUENT ENCOUNTER: ICD-10-CM

## 2025-03-18 DIAGNOSIS — E11.628 TYPE 2 DIABETES MELLITUS WITH OTHER SKIN COMPLICATIONS: ICD-10-CM

## 2025-03-18 DIAGNOSIS — I50.9 HEART FAILURE, UNSPECIFIED: ICD-10-CM

## 2025-03-18 DIAGNOSIS — I10 ESSENTIAL (PRIMARY) HYPERTENSION: ICD-10-CM

## 2025-03-18 PROCEDURE — 99214 OFFICE O/P EST MOD 30 MIN: CPT

## 2025-03-18 RX ORDER — HYDRALAZINE HYDROCHLORIDE 25 MG/1
25 TABLET ORAL TWICE DAILY
Refills: 0 | Status: ACTIVE | COMMUNITY

## 2025-03-18 RX ORDER — FUROSEMIDE 40 MG/1
40 TABLET ORAL
Refills: 0 | Status: ACTIVE | COMMUNITY

## 2025-03-18 RX ORDER — ISOSORBIDE DINITRATE 20 MG
20 TABLET ORAL TWICE DAILY
Refills: 0 | Status: ACTIVE | COMMUNITY

## 2025-04-07 ENCOUNTER — NON-APPOINTMENT (OUTPATIENT)
Age: 65
End: 2025-04-07

## 2025-04-07 ENCOUNTER — APPOINTMENT (OUTPATIENT)
Dept: INTERNAL MEDICINE | Facility: CLINIC | Age: 65
End: 2025-04-07
Payer: MEDICARE

## 2025-04-07 VITALS
SYSTOLIC BLOOD PRESSURE: 188 MMHG | DIASTOLIC BLOOD PRESSURE: 87 MMHG | OXYGEN SATURATION: 98 % | HEART RATE: 68 BPM | BODY MASS INDEX: 24.64 KG/M2 | HEIGHT: 67 IN | WEIGHT: 157 LBS

## 2025-04-07 DIAGNOSIS — Z00.00 ENCOUNTER FOR GENERAL ADULT MEDICAL EXAMINATION W/OUT ABNORMAL FINDINGS: ICD-10-CM

## 2025-04-07 DIAGNOSIS — E11.628 TYPE 2 DIABETES MELLITUS WITH OTHER SKIN COMPLICATIONS: ICD-10-CM

## 2025-04-07 DIAGNOSIS — E55.9 VITAMIN D DEFICIENCY, UNSPECIFIED: ICD-10-CM

## 2025-04-07 DIAGNOSIS — I50.9 HEART FAILURE, UNSPECIFIED: ICD-10-CM

## 2025-04-07 DIAGNOSIS — S88.111D COMPLETE TRAUMATIC AMPUTATION AT LVL BETWEEN KNEE AND ANKLE, RIGHT LOWER LEG, SUBSEQUENT ENCOUNTER: ICD-10-CM

## 2025-04-07 PROCEDURE — G0439: CPT

## 2025-04-07 PROCEDURE — 36415 COLL VENOUS BLD VENIPUNCTURE: CPT

## 2025-04-07 RX ORDER — ATORVASTATIN CALCIUM 80 MG/1
80 TABLET, FILM COATED ORAL
Qty: 90 | Refills: 3 | Status: ACTIVE | COMMUNITY
Start: 2025-04-07 | End: 1900-01-01

## 2025-04-11 LAB
25(OH)D3 SERPL-MCNC: <6 NG/ML
ALBUMIN SERPL ELPH-MCNC: 3.9 G/DL
ALP BLD-CCNC: 79 U/L
ALT SERPL-CCNC: 11 U/L
ANION GAP SERPL CALC-SCNC: 13 MMOL/L
APPEARANCE: CLEAR
AST SERPL-CCNC: 13 U/L
BACTERIA: NEGATIVE /HPF
BILIRUB SERPL-MCNC: 0.4 MG/DL
BILIRUBIN URINE: NEGATIVE
BLOOD URINE: NEGATIVE
BUN SERPL-MCNC: 43 MG/DL
CALCIUM SERPL-MCNC: 9.3 MG/DL
CAST: 1 /LPF
CHLORIDE SERPL-SCNC: 102 MMOL/L
CHOLEST SERPL-MCNC: 148 MG/DL
CO2 SERPL-SCNC: 21 MMOL/L
COLOR: YELLOW
CREAT SERPL-MCNC: 1.91 MG/DL
EGFRCR SERPLBLD CKD-EPI 2021: 38 ML/MIN/1.73M2
EPITHELIAL CELLS: 0 /HPF
ESTIMATED AVERAGE GLUCOSE: 283 MG/DL
GLUCOSE QUALITATIVE U: >=1000 MG/DL
GLUCOSE SERPL-MCNC: 325 MG/DL
HBA1C MFR BLD HPLC: 11.5 %
HCT VFR BLD CALC: 33.3 %
HDLC SERPL-MCNC: 26 MG/DL
HGB BLD-MCNC: 10.5 G/DL
KETONES URINE: NEGATIVE MG/DL
LDLC SERPL-MCNC: 84 MG/DL
LEUKOCYTE ESTERASE URINE: NEGATIVE
MCHC RBC-ENTMCNC: 28.2 PG
MCHC RBC-ENTMCNC: 31.5 G/DL
MCV RBC AUTO: 89.3 FL
MICROSCOPIC-UA: NORMAL
NITRITE URINE: NEGATIVE
NONHDLC SERPL-MCNC: 122 MG/DL
PH URINE: 5.5
PLATELET # BLD AUTO: 499 K/UL
POTASSIUM SERPL-SCNC: 4.9 MMOL/L
PROT SERPL-MCNC: 6.5 G/DL
PROTEIN URINE: 100 MG/DL
PSA SERPL-MCNC: 2.97 NG/ML
RBC # BLD: 3.73 M/UL
RBC # FLD: 16.2 %
RED BLOOD CELLS URINE: 1 /HPF
SODIUM SERPL-SCNC: 136 MMOL/L
SPECIFIC GRAVITY URINE: 1.02
T4 FREE SERPL-MCNC: 1.2 NG/DL
TRIGL SERPL-MCNC: 228 MG/DL
TSH SERPL-ACNC: 0.75 UIU/ML
UROBILINOGEN URINE: 0.2 MG/DL
WBC # FLD AUTO: 7.95 K/UL
WHITE BLOOD CELLS URINE: 0 /HPF

## 2025-04-11 RX ORDER — DICLOFENAC SODIUM 10 MG/G
1 GEL TOPICAL
Qty: 100 | Refills: 0 | Status: ACTIVE | COMMUNITY
Start: 2025-04-07 | End: 1900-01-01

## 2025-04-18 ENCOUNTER — APPOINTMENT (OUTPATIENT)
Dept: HEART AND VASCULAR | Facility: CLINIC | Age: 65
End: 2025-04-18

## 2025-04-18 VITALS
WEIGHT: 157 LBS | OXYGEN SATURATION: 97 % | BODY MASS INDEX: 24.59 KG/M2 | SYSTOLIC BLOOD PRESSURE: 195 MMHG | HEART RATE: 71 BPM | DIASTOLIC BLOOD PRESSURE: 73 MMHG

## 2025-04-18 DIAGNOSIS — I10 ESSENTIAL (PRIMARY) HYPERTENSION: ICD-10-CM

## 2025-04-18 DIAGNOSIS — E11.628 TYPE 2 DIABETES MELLITUS WITH OTHER SKIN COMPLICATIONS: ICD-10-CM

## 2025-04-18 DIAGNOSIS — Z98.890 PERIPHERAL VASCULAR DISEASE, UNSPECIFIED: ICD-10-CM

## 2025-04-18 DIAGNOSIS — I50.9 HEART FAILURE, UNSPECIFIED: ICD-10-CM

## 2025-04-18 DIAGNOSIS — I73.9 PERIPHERAL VASCULAR DISEASE, UNSPECIFIED: ICD-10-CM

## 2025-04-18 PROCEDURE — 99203 OFFICE O/P NEW LOW 30 MIN: CPT

## 2025-05-16 ENCOUNTER — APPOINTMENT (OUTPATIENT)
Dept: HEART AND VASCULAR | Facility: CLINIC | Age: 65
End: 2025-05-16

## 2025-05-20 ENCOUNTER — APPOINTMENT (OUTPATIENT)
Dept: INTERNAL MEDICINE | Facility: CLINIC | Age: 65
End: 2025-05-20
Payer: MEDICAID

## 2025-05-20 VITALS
TEMPERATURE: 97.3 F | BODY MASS INDEX: 24.17 KG/M2 | OXYGEN SATURATION: 98 % | HEIGHT: 67 IN | HEART RATE: 74 BPM | DIASTOLIC BLOOD PRESSURE: 51 MMHG | WEIGHT: 154 LBS | SYSTOLIC BLOOD PRESSURE: 164 MMHG

## 2025-05-20 DIAGNOSIS — S88.111D COMPLETE TRAUMATIC AMPUTATION AT LVL BETWEEN KNEE AND ANKLE, RIGHT LOWER LEG, SUBSEQUENT ENCOUNTER: ICD-10-CM

## 2025-05-20 DIAGNOSIS — Z98.890 PERIPHERAL VASCULAR DISEASE, UNSPECIFIED: ICD-10-CM

## 2025-05-20 DIAGNOSIS — I73.9 PERIPHERAL VASCULAR DISEASE, UNSPECIFIED: ICD-10-CM

## 2025-05-20 PROCEDURE — 99213 OFFICE O/P EST LOW 20 MIN: CPT

## 2025-05-20 PROCEDURE — 99203 OFFICE O/P NEW LOW 30 MIN: CPT | Mod: 25

## 2025-05-23 ENCOUNTER — NON-APPOINTMENT (OUTPATIENT)
Age: 65
End: 2025-05-23

## 2025-05-23 LAB
ALBUMIN SERPL ELPH-MCNC: 3.6 G/DL
ALP BLD-CCNC: 73 U/L
ALT SERPL-CCNC: 17 U/L
ANION GAP SERPL CALC-SCNC: 18 MMOL/L
AST SERPL-CCNC: 26 U/L
BILIRUB SERPL-MCNC: 0.4 MG/DL
BUN SERPL-MCNC: 40 MG/DL
CALCIUM SERPL-MCNC: 8.8 MG/DL
CHLORIDE SERPL-SCNC: 103 MMOL/L
CHOLEST SERPL-MCNC: 131 MG/DL
CO2 SERPL-SCNC: 13 MMOL/L
CREAT SERPL-MCNC: 2.01 MG/DL
EGFRCR SERPLBLD CKD-EPI 2021: 36 ML/MIN/1.73M2
ESTIMATED AVERAGE GLUCOSE: 252 MG/DL
GLUCOSE SERPL-MCNC: 380 MG/DL
HBA1C MFR BLD HPLC: 10.4 %
HDLC SERPL-MCNC: 22 MG/DL
LDLC SERPL-MCNC: 66 MG/DL
NONHDLC SERPL-MCNC: 109 MG/DL
POTASSIUM SERPL-SCNC: 5.1 MMOL/L
PROT SERPL-MCNC: 6.2 G/DL
SODIUM SERPL-SCNC: 135 MMOL/L
TRIGL SERPL-MCNC: 267 MG/DL

## 2025-05-27 ENCOUNTER — APPOINTMENT (OUTPATIENT)
Dept: INTERNAL MEDICINE | Facility: CLINIC | Age: 65
End: 2025-05-27
Payer: MEDICAID

## 2025-05-27 VITALS
HEIGHT: 67 IN | DIASTOLIC BLOOD PRESSURE: 54 MMHG | OXYGEN SATURATION: 97 % | BODY MASS INDEX: 24.17 KG/M2 | WEIGHT: 154 LBS | TEMPERATURE: 97.3 F | SYSTOLIC BLOOD PRESSURE: 185 MMHG | HEART RATE: 92 BPM

## 2025-05-27 DIAGNOSIS — E11.628 TYPE 2 DIABETES MELLITUS WITH OTHER SKIN COMPLICATIONS: ICD-10-CM

## 2025-05-27 DIAGNOSIS — I10 ESSENTIAL (PRIMARY) HYPERTENSION: ICD-10-CM

## 2025-05-27 DIAGNOSIS — E55.9 VITAMIN D DEFICIENCY, UNSPECIFIED: ICD-10-CM

## 2025-05-27 DIAGNOSIS — I50.9 HEART FAILURE, UNSPECIFIED: ICD-10-CM

## 2025-05-27 PROCEDURE — 99213 OFFICE O/P EST LOW 20 MIN: CPT

## 2025-05-27 RX ORDER — TOBRAMYCIN 3 MG/ML
0.3 SOLUTION/ DROPS OPHTHALMIC 4 TIMES DAILY
Qty: 1 | Refills: 1 | Status: ACTIVE | COMMUNITY
Start: 2025-05-27 | End: 1900-01-01

## 2025-06-10 ENCOUNTER — APPOINTMENT (OUTPATIENT)
Dept: INTERNAL MEDICINE | Facility: CLINIC | Age: 65
End: 2025-06-10
Payer: MEDICARE

## 2025-06-10 VITALS
TEMPERATURE: 97.3 F | SYSTOLIC BLOOD PRESSURE: 210 MMHG | DIASTOLIC BLOOD PRESSURE: 82 MMHG | OXYGEN SATURATION: 99 % | HEART RATE: 83 BPM

## 2025-06-10 PROCEDURE — 99213 OFFICE O/P EST LOW 20 MIN: CPT

## 2025-07-16 ENCOUNTER — APPOINTMENT (OUTPATIENT)
Dept: INTERNAL MEDICINE | Facility: CLINIC | Age: 65
End: 2025-07-16

## 2025-07-29 RX ORDER — AZITHROMYCIN 250 MG/1
250 TABLET, FILM COATED ORAL
Qty: 1 | Refills: 1 | Status: ACTIVE | COMMUNITY
Start: 2025-07-29 | End: 1900-01-01

## 2025-07-30 ENCOUNTER — INPATIENT (INPATIENT)
Facility: HOSPITAL | Age: 65
LOS: 1 days | Discharge: HOME HEALTH | End: 2025-08-01
Attending: INTERNAL MEDICINE | Admitting: INTERNAL MEDICINE
Payer: MEDICARE

## 2025-07-30 ENCOUNTER — NON-APPOINTMENT (OUTPATIENT)
Age: 65
End: 2025-07-30

## 2025-07-30 VITALS
WEIGHT: 156.97 LBS | TEMPERATURE: 98 F | HEART RATE: 71 BPM | DIASTOLIC BLOOD PRESSURE: 84 MMHG | OXYGEN SATURATION: 96 % | RESPIRATION RATE: 17 BRPM | SYSTOLIC BLOOD PRESSURE: 177 MMHG | HEIGHT: 69 IN

## 2025-07-30 DIAGNOSIS — I50.9 HEART FAILURE, UNSPECIFIED: ICD-10-CM

## 2025-07-30 DIAGNOSIS — E11.9 TYPE 2 DIABETES MELLITUS WITHOUT COMPLICATIONS: ICD-10-CM

## 2025-07-30 DIAGNOSIS — J18.9 PNEUMONIA, UNSPECIFIED ORGANISM: ICD-10-CM

## 2025-07-30 DIAGNOSIS — Z89.511 ACQUIRED ABSENCE OF RIGHT LEG BELOW KNEE: Chronic | ICD-10-CM

## 2025-07-30 DIAGNOSIS — Z29.9 ENCOUNTER FOR PROPHYLACTIC MEASURES, UNSPECIFIED: ICD-10-CM

## 2025-07-30 DIAGNOSIS — E78.5 HYPERLIPIDEMIA, UNSPECIFIED: ICD-10-CM

## 2025-07-30 DIAGNOSIS — I73.9 PERIPHERAL VASCULAR DISEASE, UNSPECIFIED: ICD-10-CM

## 2025-07-30 DIAGNOSIS — I10 ESSENTIAL (PRIMARY) HYPERTENSION: ICD-10-CM

## 2025-07-30 LAB
ALBUMIN SERPL ELPH-MCNC: 3.4 G/DL — SIGNIFICANT CHANGE UP (ref 3.3–5)
ALP SERPL-CCNC: 129 U/L — HIGH (ref 40–120)
ALT FLD-CCNC: 22 U/L — SIGNIFICANT CHANGE UP (ref 10–45)
ANION GAP SERPL CALC-SCNC: 7 MMOL/L — SIGNIFICANT CHANGE UP (ref 5–17)
AST SERPL-CCNC: 18 U/L — SIGNIFICANT CHANGE UP (ref 10–40)
BASOPHILS # BLD AUTO: 0.06 K/UL — SIGNIFICANT CHANGE UP (ref 0–0.2)
BASOPHILS NFR BLD AUTO: 0.6 % — SIGNIFICANT CHANGE UP (ref 0–2)
BILIRUB SERPL-MCNC: 0.5 MG/DL — SIGNIFICANT CHANGE UP (ref 0.2–1.2)
BUN SERPL-MCNC: 35 MG/DL — HIGH (ref 7–23)
CALCIUM SERPL-MCNC: 8.7 MG/DL — SIGNIFICANT CHANGE UP (ref 8.4–10.5)
CHLORIDE SERPL-SCNC: 104 MMOL/L — SIGNIFICANT CHANGE UP (ref 96–108)
CO2 SERPL-SCNC: 23 MMOL/L — SIGNIFICANT CHANGE UP (ref 22–31)
CREAT SERPL-MCNC: 1.96 MG/DL — HIGH (ref 0.5–1.3)
EGFR: 37 ML/MIN/1.73M2 — LOW
EGFR: 37 ML/MIN/1.73M2 — LOW
EOSINOPHIL # BLD AUTO: 0.33 K/UL — SIGNIFICANT CHANGE UP (ref 0–0.5)
EOSINOPHIL NFR BLD AUTO: 3.5 % — SIGNIFICANT CHANGE UP (ref 0–6)
FLUAV AG NPH QL: SIGNIFICANT CHANGE UP
FLUBV AG NPH QL: SIGNIFICANT CHANGE UP
GLUCOSE SERPL-MCNC: 309 MG/DL — HIGH (ref 70–99)
HCT VFR BLD CALC: 27.3 % — LOW (ref 39–50)
HGB BLD-MCNC: 8.8 G/DL — LOW (ref 13–17)
IMM GRANULOCYTES # BLD AUTO: 0.05 K/UL — SIGNIFICANT CHANGE UP (ref 0–0.07)
IMM GRANULOCYTES NFR BLD AUTO: 0.5 % — SIGNIFICANT CHANGE UP (ref 0–0.9)
LACTATE SERPL-SCNC: 0.8 MMOL/L — SIGNIFICANT CHANGE UP (ref 0.5–2)
LYMPHOCYTES # BLD AUTO: 2.26 K/UL — SIGNIFICANT CHANGE UP (ref 1–3.3)
LYMPHOCYTES NFR BLD AUTO: 24.1 % — SIGNIFICANT CHANGE UP (ref 13–44)
MCHC RBC-ENTMCNC: 28.9 PG — SIGNIFICANT CHANGE UP (ref 27–34)
MCHC RBC-ENTMCNC: 32.2 G/DL — SIGNIFICANT CHANGE UP (ref 32–36)
MCV RBC AUTO: 89.8 FL — SIGNIFICANT CHANGE UP (ref 80–100)
MONOCYTES # BLD AUTO: 0.8 K/UL — SIGNIFICANT CHANGE UP (ref 0–0.9)
MONOCYTES NFR BLD AUTO: 8.5 % — SIGNIFICANT CHANGE UP (ref 2–14)
NEUTROPHILS # BLD AUTO: 5.88 K/UL — SIGNIFICANT CHANGE UP (ref 1.8–7.4)
NEUTROPHILS NFR BLD AUTO: 62.8 % — SIGNIFICANT CHANGE UP (ref 43–77)
NRBC # BLD AUTO: 0.02 K/UL — HIGH (ref 0–0)
NRBC # FLD: 0.02 K/UL — HIGH (ref 0–0)
NRBC BLD AUTO-RTO: 0 /100 WBCS — SIGNIFICANT CHANGE UP (ref 0–0)
NT-PROBNP SERPL-SCNC: 5114 PG/ML — HIGH (ref 0–300)
PLATELET # BLD AUTO: 570 K/UL — HIGH (ref 150–400)
PMV BLD: 11.1 FL — SIGNIFICANT CHANGE UP (ref 7–13)
POTASSIUM SERPL-MCNC: 4.3 MMOL/L — SIGNIFICANT CHANGE UP (ref 3.5–5.3)
POTASSIUM SERPL-SCNC: 4.3 MMOL/L — SIGNIFICANT CHANGE UP (ref 3.5–5.3)
PROT SERPL-MCNC: 6.5 G/DL — SIGNIFICANT CHANGE UP (ref 6–8.3)
RBC # BLD: 3.04 M/UL — LOW (ref 4.2–5.8)
RBC # FLD: 17.1 % — HIGH (ref 10.3–14.5)
RSV RNA NPH QL NAA+NON-PROBE: SIGNIFICANT CHANGE UP
SARS-COV-2 RNA SPEC QL NAA+PROBE: SIGNIFICANT CHANGE UP
SODIUM SERPL-SCNC: 134 MMOL/L — LOW (ref 135–145)
SOURCE RESPIRATORY: SIGNIFICANT CHANGE UP
TROPONIN T, HIGH SENSITIVITY RESULT: 116 NG/L — CRITICAL HIGH (ref 0–51)
TROPONIN T, HIGH SENSITIVITY RESULT: 123 NG/L — CRITICAL HIGH (ref 0–51)
WBC # BLD: 9.38 K/UL — SIGNIFICANT CHANGE UP (ref 3.8–10.5)
WBC # FLD AUTO: 9.38 K/UL — SIGNIFICANT CHANGE UP (ref 3.8–10.5)

## 2025-07-30 PROCEDURE — 36415 COLL VENOUS BLD VENIPUNCTURE: CPT

## 2025-07-30 PROCEDURE — 83605 ASSAY OF LACTIC ACID: CPT

## 2025-07-30 PROCEDURE — 80053 COMPREHEN METABOLIC PANEL: CPT

## 2025-07-30 PROCEDURE — 93010 ELECTROCARDIOGRAM REPORT: CPT

## 2025-07-30 PROCEDURE — 87637 SARSCOV2&INF A&B&RSV AMP PRB: CPT

## 2025-07-30 PROCEDURE — 99285 EMERGENCY DEPT VISIT HI MDM: CPT

## 2025-07-30 PROCEDURE — 83880 ASSAY OF NATRIURETIC PEPTIDE: CPT

## 2025-07-30 PROCEDURE — 0225U NFCT DS DNA&RNA 21 SARSCOV2: CPT

## 2025-07-30 PROCEDURE — 71045 X-RAY EXAM CHEST 1 VIEW: CPT | Mod: 26

## 2025-07-30 PROCEDURE — 82962 GLUCOSE BLOOD TEST: CPT

## 2025-07-30 PROCEDURE — 84484 ASSAY OF TROPONIN QUANT: CPT

## 2025-07-30 PROCEDURE — 87040 BLOOD CULTURE FOR BACTERIA: CPT

## 2025-07-30 PROCEDURE — 85025 COMPLETE CBC W/AUTO DIFF WBC: CPT

## 2025-07-30 PROCEDURE — 93308 TTE F-UP OR LMTD: CPT | Mod: 26

## 2025-07-30 RX ORDER — ASPIRIN 325 MG
81 TABLET ORAL EVERY 24 HOURS
Refills: 0 | Status: DISCONTINUED | OUTPATIENT
Start: 2025-07-30 | End: 2025-08-01

## 2025-07-30 RX ORDER — INSULIN GLARGINE-YFGN 100 [IU]/ML
20 INJECTION, SOLUTION SUBCUTANEOUS AT BEDTIME
Refills: 0 | Status: DISCONTINUED | OUTPATIENT
Start: 2025-07-30 | End: 2025-07-31

## 2025-07-30 RX ORDER — INSULIN LISPRO 100 U/ML
10 INJECTION, SOLUTION INTRAVENOUS; SUBCUTANEOUS
Refills: 0 | Status: DISCONTINUED | OUTPATIENT
Start: 2025-07-30 | End: 2025-07-31

## 2025-07-30 RX ORDER — PIPERACILLIN-TAZO-DEXTROSE,ISO 3.375G/5
3.38 IV SOLUTION, PIGGYBACK PREMIX FROZEN(ML) INTRAVENOUS ONCE
Refills: 0 | Status: COMPLETED | OUTPATIENT
Start: 2025-07-30 | End: 2025-07-30

## 2025-07-30 RX ORDER — PIPERACILLIN-TAZO-DEXTROSE,ISO 3.375G/5
3.38 IV SOLUTION, PIGGYBACK PREMIX FROZEN(ML) INTRAVENOUS EVERY 8 HOURS
Refills: 0 | Status: DISCONTINUED | OUTPATIENT
Start: 2025-07-30 | End: 2025-08-01

## 2025-07-30 RX ORDER — CARVEDILOL 3.12 MG/1
50 TABLET, FILM COATED ORAL EVERY 12 HOURS
Refills: 0 | Status: DISCONTINUED | OUTPATIENT
Start: 2025-07-30 | End: 2025-08-01

## 2025-07-30 RX ORDER — ATORVASTATIN CALCIUM 80 MG/1
10 TABLET, FILM COATED ORAL AT BEDTIME
Refills: 0 | Status: DISCONTINUED | OUTPATIENT
Start: 2025-07-30 | End: 2025-08-01

## 2025-07-30 RX ORDER — INSULIN LISPRO 100 U/ML
INJECTION, SOLUTION INTRAVENOUS; SUBCUTANEOUS
Refills: 0 | Status: DISCONTINUED | OUTPATIENT
Start: 2025-07-30 | End: 2025-08-01

## 2025-07-30 RX ORDER — AZITHROMYCIN 250 MG
500 CAPSULE ORAL EVERY 24 HOURS
Refills: 0 | Status: DISCONTINUED | OUTPATIENT
Start: 2025-07-31 | End: 2025-08-01

## 2025-07-30 RX ORDER — IPRATROPIUM BROMIDE AND ALBUTEROL SULFATE .5; 2.5 MG/3ML; MG/3ML
3 SOLUTION RESPIRATORY (INHALATION) EVERY 6 HOURS
Refills: 0 | Status: DISCONTINUED | OUTPATIENT
Start: 2025-07-30 | End: 2025-08-01

## 2025-07-30 RX ORDER — CARVEDILOL 3.12 MG/1
2 TABLET, FILM COATED ORAL
Refills: 0 | DISCHARGE

## 2025-07-30 RX ORDER — AZITHROMYCIN 250 MG
500 CAPSULE ORAL ONCE
Refills: 0 | Status: COMPLETED | OUTPATIENT
Start: 2025-07-30 | End: 2025-07-30

## 2025-07-30 RX ADMIN — INSULIN GLARGINE-YFGN 20 UNIT(S): 100 INJECTION, SOLUTION SUBCUTANEOUS at 22:19

## 2025-07-30 RX ADMIN — CARVEDILOL 50 MILLIGRAM(S): 3.12 TABLET, FILM COATED ORAL at 19:26

## 2025-07-30 RX ADMIN — INSULIN LISPRO 4: 100 INJECTION, SOLUTION INTRAVENOUS; SUBCUTANEOUS at 22:18

## 2025-07-30 RX ADMIN — Medication 255 MILLIGRAM(S): at 15:14

## 2025-07-30 RX ADMIN — ATORVASTATIN CALCIUM 10 MILLIGRAM(S): 80 TABLET, FILM COATED ORAL at 22:18

## 2025-07-30 RX ADMIN — Medication 25 GRAM(S): at 22:19

## 2025-07-30 RX ADMIN — Medication 200 GRAM(S): at 14:39

## 2025-07-30 RX ADMIN — Medication 100 MILLIGRAM(S): at 19:44

## 2025-07-31 LAB
A1C WITH ESTIMATED AVERAGE GLUCOSE RESULT: 10.3 % — HIGH (ref 4–5.6)
ANION GAP SERPL CALC-SCNC: 14 MMOL/L — SIGNIFICANT CHANGE UP (ref 5–17)
BUN SERPL-MCNC: 37 MG/DL — HIGH (ref 7–23)
CALCIUM SERPL-MCNC: 8.5 MG/DL — SIGNIFICANT CHANGE UP (ref 8.4–10.5)
CHLORIDE SERPL-SCNC: 108 MMOL/L — SIGNIFICANT CHANGE UP (ref 96–108)
CO2 SERPL-SCNC: 17 MMOL/L — LOW (ref 22–31)
CREAT SERPL-MCNC: 2.28 MG/DL — HIGH (ref 0.5–1.3)
EGFR: 31 ML/MIN/1.73M2 — LOW
EGFR: 31 ML/MIN/1.73M2 — LOW
ESTIMATED AVERAGE GLUCOSE: 249 MG/DL — HIGH (ref 68–114)
GLUCOSE SERPL-MCNC: 285 MG/DL — HIGH (ref 70–99)
MAGNESIUM SERPL-MCNC: 2.1 MG/DL — SIGNIFICANT CHANGE UP (ref 1.6–2.6)
PHOSPHATE SERPL-MCNC: 3.6 MG/DL — SIGNIFICANT CHANGE UP (ref 2.5–4.5)
POTASSIUM SERPL-MCNC: 4.6 MMOL/L — SIGNIFICANT CHANGE UP (ref 3.5–5.3)
POTASSIUM SERPL-SCNC: 4.6 MMOL/L — SIGNIFICANT CHANGE UP (ref 3.5–5.3)
SODIUM SERPL-SCNC: 139 MMOL/L — SIGNIFICANT CHANGE UP (ref 135–145)

## 2025-07-31 PROCEDURE — 99232 SBSQ HOSP IP/OBS MODERATE 35: CPT | Mod: GC

## 2025-07-31 PROCEDURE — 83880 ASSAY OF NATRIURETIC PEPTIDE: CPT

## 2025-07-31 PROCEDURE — 80053 COMPREHEN METABOLIC PANEL: CPT

## 2025-07-31 PROCEDURE — 94640 AIRWAY INHALATION TREATMENT: CPT

## 2025-07-31 PROCEDURE — 0225U NFCT DS DNA&RNA 21 SARSCOV2: CPT

## 2025-07-31 PROCEDURE — 87040 BLOOD CULTURE FOR BACTERIA: CPT

## 2025-07-31 PROCEDURE — 99222 1ST HOSP IP/OBS MODERATE 55: CPT | Mod: GC

## 2025-07-31 PROCEDURE — 83605 ASSAY OF LACTIC ACID: CPT

## 2025-07-31 PROCEDURE — 83735 ASSAY OF MAGNESIUM: CPT

## 2025-07-31 PROCEDURE — 80048 BASIC METABOLIC PNL TOTAL CA: CPT

## 2025-07-31 PROCEDURE — 87637 SARSCOV2&INF A&B&RSV AMP PRB: CPT

## 2025-07-31 PROCEDURE — 84484 ASSAY OF TROPONIN QUANT: CPT

## 2025-07-31 PROCEDURE — 83036 HEMOGLOBIN GLYCOSYLATED A1C: CPT

## 2025-07-31 PROCEDURE — 36415 COLL VENOUS BLD VENIPUNCTURE: CPT

## 2025-07-31 PROCEDURE — 84100 ASSAY OF PHOSPHORUS: CPT

## 2025-07-31 PROCEDURE — 82962 GLUCOSE BLOOD TEST: CPT

## 2025-07-31 PROCEDURE — 85025 COMPLETE CBC W/AUTO DIFF WBC: CPT

## 2025-07-31 RX ORDER — SODIUM CHLORIDE 9 G/1000ML
1000 INJECTION, SOLUTION INTRAVENOUS
Refills: 0 | Status: DISCONTINUED | OUTPATIENT
Start: 2025-07-31 | End: 2025-08-01

## 2025-07-31 RX ORDER — INSULIN GLARGINE-YFGN 100 [IU]/ML
30 INJECTION, SOLUTION SUBCUTANEOUS AT BEDTIME
Refills: 0 | Status: DISCONTINUED | OUTPATIENT
Start: 2025-07-31 | End: 2025-08-01

## 2025-07-31 RX ORDER — DEXTROSE 50 % IN WATER 50 %
12.5 SYRINGE (ML) INTRAVENOUS ONCE
Refills: 0 | Status: DISCONTINUED | OUTPATIENT
Start: 2025-07-31 | End: 2025-08-01

## 2025-07-31 RX ORDER — INSULIN LISPRO 100 U/ML
14 INJECTION, SOLUTION INTRAVENOUS; SUBCUTANEOUS
Refills: 0 | Status: DISCONTINUED | OUTPATIENT
Start: 2025-07-31 | End: 2025-08-01

## 2025-07-31 RX ORDER — DEXTROSE 50 % IN WATER 50 %
25 SYRINGE (ML) INTRAVENOUS ONCE
Refills: 0 | Status: DISCONTINUED | OUTPATIENT
Start: 2025-07-31 | End: 2025-08-01

## 2025-07-31 RX ORDER — DEXTROSE 50 % IN WATER 50 %
15 SYRINGE (ML) INTRAVENOUS ONCE
Refills: 0 | Status: DISCONTINUED | OUTPATIENT
Start: 2025-07-31 | End: 2025-08-01

## 2025-07-31 RX ORDER — GLUCAGON 3 MG/1
1 POWDER NASAL ONCE
Refills: 0 | Status: DISCONTINUED | OUTPATIENT
Start: 2025-07-31 | End: 2025-08-01

## 2025-07-31 RX ADMIN — IPRATROPIUM BROMIDE AND ALBUTEROL SULFATE 3 MILLILITER(S): .5; 2.5 SOLUTION RESPIRATORY (INHALATION) at 18:09

## 2025-07-31 RX ADMIN — INSULIN LISPRO 3: 100 INJECTION, SOLUTION INTRAVENOUS; SUBCUTANEOUS at 13:19

## 2025-07-31 RX ADMIN — INSULIN LISPRO 4: 100 INJECTION, SOLUTION INTRAVENOUS; SUBCUTANEOUS at 09:38

## 2025-07-31 RX ADMIN — INSULIN LISPRO 10 UNIT(S): 100 INJECTION, SOLUTION INTRAVENOUS; SUBCUTANEOUS at 13:19

## 2025-07-31 RX ADMIN — Medication 10 UNIT(S): at 11:54

## 2025-07-31 RX ADMIN — INSULIN LISPRO 1: 100 INJECTION, SOLUTION INTRAVENOUS; SUBCUTANEOUS at 22:16

## 2025-07-31 RX ADMIN — CARVEDILOL 50 MILLIGRAM(S): 3.12 TABLET, FILM COATED ORAL at 18:09

## 2025-07-31 RX ADMIN — Medication 255 MILLIGRAM(S): at 11:54

## 2025-07-31 RX ADMIN — Medication 25 GRAM(S): at 22:24

## 2025-07-31 RX ADMIN — Medication 100 MILLIGRAM(S): at 18:08

## 2025-07-31 RX ADMIN — INSULIN GLARGINE-YFGN 30 UNIT(S): 100 INJECTION, SOLUTION SUBCUTANEOUS at 22:16

## 2025-07-31 RX ADMIN — ATORVASTATIN CALCIUM 10 MILLIGRAM(S): 80 TABLET, FILM COATED ORAL at 22:17

## 2025-07-31 RX ADMIN — Medication 25 GRAM(S): at 15:38

## 2025-07-31 RX ADMIN — INSULIN LISPRO 10 UNIT(S): 100 INJECTION, SOLUTION INTRAVENOUS; SUBCUTANEOUS at 09:38

## 2025-07-31 RX ADMIN — Medication 81 MILLIGRAM(S): at 11:54

## 2025-08-01 ENCOUNTER — TRANSCRIPTION ENCOUNTER (OUTPATIENT)
Age: 65
End: 2025-08-01

## 2025-08-01 VITALS
RESPIRATION RATE: 18 BRPM | SYSTOLIC BLOOD PRESSURE: 135 MMHG | HEART RATE: 70 BPM | TEMPERATURE: 98 F | DIASTOLIC BLOOD PRESSURE: 67 MMHG | OXYGEN SATURATION: 96 %

## 2025-08-01 PROCEDURE — 96374 THER/PROPH/DIAG INJ IV PUSH: CPT

## 2025-08-01 PROCEDURE — 87637 SARSCOV2&INF A&B&RSV AMP PRB: CPT

## 2025-08-01 PROCEDURE — 84100 ASSAY OF PHOSPHORUS: CPT

## 2025-08-01 PROCEDURE — 80048 BASIC METABOLIC PNL TOTAL CA: CPT

## 2025-08-01 PROCEDURE — 93308 TTE F-UP OR LMTD: CPT

## 2025-08-01 PROCEDURE — 83735 ASSAY OF MAGNESIUM: CPT

## 2025-08-01 PROCEDURE — 71045 X-RAY EXAM CHEST 1 VIEW: CPT

## 2025-08-01 PROCEDURE — 36415 COLL VENOUS BLD VENIPUNCTURE: CPT

## 2025-08-01 PROCEDURE — 94640 AIRWAY INHALATION TREATMENT: CPT

## 2025-08-01 PROCEDURE — 87040 BLOOD CULTURE FOR BACTERIA: CPT

## 2025-08-01 PROCEDURE — 99285 EMERGENCY DEPT VISIT HI MDM: CPT | Mod: 25

## 2025-08-01 PROCEDURE — 84484 ASSAY OF TROPONIN QUANT: CPT

## 2025-08-01 PROCEDURE — 96375 TX/PRO/DX INJ NEW DRUG ADDON: CPT

## 2025-08-01 PROCEDURE — 80053 COMPREHEN METABOLIC PANEL: CPT

## 2025-08-01 PROCEDURE — 83880 ASSAY OF NATRIURETIC PEPTIDE: CPT

## 2025-08-01 PROCEDURE — 85025 COMPLETE CBC W/AUTO DIFF WBC: CPT

## 2025-08-01 PROCEDURE — 93005 ELECTROCARDIOGRAM TRACING: CPT

## 2025-08-01 PROCEDURE — 0225U NFCT DS DNA&RNA 21 SARSCOV2: CPT

## 2025-08-01 PROCEDURE — 83605 ASSAY OF LACTIC ACID: CPT

## 2025-08-01 PROCEDURE — 99232 SBSQ HOSP IP/OBS MODERATE 35: CPT | Mod: GC

## 2025-08-01 PROCEDURE — 83036 HEMOGLOBIN GLYCOSYLATED A1C: CPT

## 2025-08-01 PROCEDURE — 82962 GLUCOSE BLOOD TEST: CPT

## 2025-08-01 RX ORDER — ATORVASTATIN CALCIUM 80 MG/1
1 TABLET, FILM COATED ORAL
Refills: 0 | DISCHARGE

## 2025-08-01 RX ORDER — DAPAGLIFLOZIN 5 MG/1
1 TABLET, FILM COATED ORAL
Refills: 0 | DISCHARGE

## 2025-08-01 RX ORDER — INSULIN GLARGINE-YFGN 100 [IU]/ML
36 INJECTION, SOLUTION SUBCUTANEOUS
Qty: 0 | Refills: 0 | DISCHARGE
Start: 2025-08-01

## 2025-08-01 RX ORDER — AMOXICILLIN AND CLAVULANATE POTASSIUM 500; 125 MG/1; MG/1
1 TABLET, FILM COATED ORAL
Refills: 0
Start: 2025-08-01

## 2025-08-01 RX ORDER — INSULIN LISPRO 100 U/ML
16 INJECTION, SOLUTION INTRAVENOUS; SUBCUTANEOUS
Refills: 0 | Status: DISCONTINUED | OUTPATIENT
Start: 2025-08-01 | End: 2025-08-01

## 2025-08-01 RX ORDER — INSULIN LISPRO 100 U/ML
16 INJECTION, SOLUTION INTRAVENOUS; SUBCUTANEOUS
Qty: 0 | Refills: 0 | DISCHARGE
Start: 2025-08-01

## 2025-08-01 RX ORDER — AMOXICILLIN AND CLAVULANATE POTASSIUM 500; 125 MG/1; MG/1
1 TABLET, FILM COATED ORAL
Qty: 6 | Refills: 0
Start: 2025-08-01 | End: 2025-08-03

## 2025-08-01 RX ORDER — INSULIN GLARGINE-YFGN 100 [IU]/ML
36 INJECTION, SOLUTION SUBCUTANEOUS AT BEDTIME
Refills: 0 | Status: DISCONTINUED | OUTPATIENT
Start: 2025-08-01 | End: 2025-08-01

## 2025-08-01 RX ORDER — CEFPODOXIME PROXETIL 200 MG/1
1 TABLET, FILM COATED ORAL
Qty: 10 | Refills: 0
Start: 2025-08-01 | End: 2025-08-05

## 2025-08-01 RX ADMIN — INSULIN LISPRO 14 UNIT(S): 100 INJECTION, SOLUTION INTRAVENOUS; SUBCUTANEOUS at 09:29

## 2025-08-01 RX ADMIN — CARVEDILOL 50 MILLIGRAM(S): 3.12 TABLET, FILM COATED ORAL at 06:17

## 2025-08-01 RX ADMIN — Medication 100 MILLIGRAM(S): at 06:16

## 2025-08-01 RX ADMIN — Medication 25 GRAM(S): at 06:17

## 2025-08-01 RX ADMIN — INSULIN LISPRO 2: 100 INJECTION, SOLUTION INTRAVENOUS; SUBCUTANEOUS at 09:29

## 2025-08-01 RX ADMIN — Medication 81 MILLIGRAM(S): at 12:05

## 2025-08-01 RX ADMIN — Medication 255 MILLIGRAM(S): at 12:05

## 2025-08-13 ENCOUNTER — APPOINTMENT (OUTPATIENT)
Dept: INTERNAL MEDICINE | Facility: CLINIC | Age: 65
End: 2025-08-13

## 2025-08-14 DIAGNOSIS — E78.5 HYPERLIPIDEMIA, UNSPECIFIED: ICD-10-CM

## 2025-08-14 DIAGNOSIS — E11.51 TYPE 2 DIABETES MELLITUS WITH DIABETIC PERIPHERAL ANGIOPATHY WITHOUT GANGRENE: ICD-10-CM

## 2025-08-14 DIAGNOSIS — Z86.19 PERSONAL HISTORY OF OTHER INFECTIOUS AND PARASITIC DISEASES: ICD-10-CM

## 2025-08-14 DIAGNOSIS — Z79.02 LONG TERM (CURRENT) USE OF ANTITHROMBOTICS/ANTIPLATELETS: ICD-10-CM

## 2025-08-14 DIAGNOSIS — J18.9 PNEUMONIA, UNSPECIFIED ORGANISM: ICD-10-CM

## 2025-08-14 DIAGNOSIS — N18.32 CHRONIC KIDNEY DISEASE, STAGE 3B: ICD-10-CM

## 2025-08-14 DIAGNOSIS — E11.65 TYPE 2 DIABETES MELLITUS WITH HYPERGLYCEMIA: ICD-10-CM

## 2025-08-14 DIAGNOSIS — E11.22 TYPE 2 DIABETES MELLITUS WITH DIABETIC CHRONIC KIDNEY DISEASE: ICD-10-CM

## 2025-08-14 DIAGNOSIS — Z95.820 PERIPHERAL VASCULAR ANGIOPLASTY STATUS WITH IMPLANTS AND GRAFTS: ICD-10-CM

## 2025-08-14 DIAGNOSIS — B00.9 HERPESVIRAL INFECTION, UNSPECIFIED: ICD-10-CM

## 2025-08-14 DIAGNOSIS — Z88.1 ALLERGY STATUS TO OTHER ANTIBIOTIC AGENTS: ICD-10-CM

## 2025-08-14 DIAGNOSIS — Z79.82 LONG TERM (CURRENT) USE OF ASPIRIN: ICD-10-CM

## 2025-08-14 DIAGNOSIS — Z79.4 LONG TERM (CURRENT) USE OF INSULIN: ICD-10-CM

## 2025-08-14 DIAGNOSIS — Z88.8 ALLERGY STATUS TO OTHER DRUGS, MEDICAMENTS AND BIOLOGICAL SUBSTANCES: ICD-10-CM

## 2025-08-14 DIAGNOSIS — Z87.891 PERSONAL HISTORY OF NICOTINE DEPENDENCE: ICD-10-CM

## 2025-08-14 DIAGNOSIS — Z79.899 OTHER LONG TERM (CURRENT) DRUG THERAPY: ICD-10-CM

## 2025-08-14 DIAGNOSIS — Z89.511 ACQUIRED ABSENCE OF RIGHT LEG BELOW KNEE: ICD-10-CM

## 2025-08-14 DIAGNOSIS — I50.22 CHRONIC SYSTOLIC (CONGESTIVE) HEART FAILURE: ICD-10-CM

## 2025-08-15 ENCOUNTER — APPOINTMENT (OUTPATIENT)
Dept: INTERNAL MEDICINE | Facility: CLINIC | Age: 65
End: 2025-08-15
Payer: MEDICARE

## 2025-08-15 VITALS
OXYGEN SATURATION: 97 % | TEMPERATURE: 97.6 F | SYSTOLIC BLOOD PRESSURE: 208 MMHG | HEART RATE: 85 BPM | DIASTOLIC BLOOD PRESSURE: 91 MMHG

## 2025-08-15 VITALS — SYSTOLIC BLOOD PRESSURE: 196 MMHG | DIASTOLIC BLOOD PRESSURE: 89 MMHG

## 2025-08-15 DIAGNOSIS — S88.111D COMPLETE TRAUMATIC AMPUTATION AT LVL BETWEEN KNEE AND ANKLE, RIGHT LOWER LEG, SUBSEQUENT ENCOUNTER: ICD-10-CM

## 2025-08-15 DIAGNOSIS — I50.9 HEART FAILURE, UNSPECIFIED: ICD-10-CM

## 2025-08-15 DIAGNOSIS — I10 ESSENTIAL (PRIMARY) HYPERTENSION: ICD-10-CM

## 2025-08-15 DIAGNOSIS — J40 BRONCHITIS, NOT SPECIFIED AS ACUTE OR CHRONIC: ICD-10-CM

## 2025-08-15 DIAGNOSIS — E11.65 TYPE 2 DIABETES MELLITUS WITH HYPERGLYCEMIA: ICD-10-CM

## 2025-08-15 DIAGNOSIS — E11.628 TYPE 2 DIABETES MELLITUS WITH OTHER SKIN COMPLICATIONS: ICD-10-CM

## 2025-08-15 PROCEDURE — 99214 OFFICE O/P EST MOD 30 MIN: CPT

## 2025-08-15 PROCEDURE — 36415 COLL VENOUS BLD VENIPUNCTURE: CPT

## 2025-08-15 RX ORDER — ALBUTEROL SULFATE 1.25 MG/3ML
1.25 SOLUTION RESPIRATORY (INHALATION)
Qty: 1 | Refills: 0 | Status: ACTIVE | COMMUNITY
Start: 2025-08-15 | End: 1900-01-01

## 2025-08-21 LAB
ALBUMIN SERPL ELPH-MCNC: 3.8 G/DL
ALP BLD-CCNC: 104 U/L
ALT SERPL-CCNC: 20 U/L
ANION GAP SERPL CALC-SCNC: 12 MMOL/L
AST SERPL-CCNC: 15 U/L
BILIRUB SERPL-MCNC: 0.6 MG/DL
BUN SERPL-MCNC: 32 MG/DL
CALCIUM SERPL-MCNC: 9.2 MG/DL
CHLORIDE SERPL-SCNC: 108 MMOL/L
CO2 SERPL-SCNC: 20 MMOL/L
CREAT SERPL-MCNC: 1.87 MG/DL
EGFRCR SERPLBLD CKD-EPI 2021: 39 ML/MIN/1.73M2
ESTIMATED AVERAGE GLUCOSE: 223 MG/DL
GLUCOSE SERPL-MCNC: 210 MG/DL
HBA1C MFR BLD HPLC: 9.4 %
POTASSIUM SERPL-SCNC: 5.4 MMOL/L
PROT SERPL-MCNC: 6.4 G/DL
SODIUM SERPL-SCNC: 140 MMOL/L

## 2025-09-08 DIAGNOSIS — R05.1 ACUTE COUGH: ICD-10-CM

## 2025-09-08 RX ORDER — BENZONATATE 100 MG/1
100 CAPSULE ORAL 3 TIMES DAILY
Qty: 21 | Refills: 0 | Status: ACTIVE | COMMUNITY
Start: 2025-09-08 | End: 1900-01-01

## (undated) DEVICE — DRAPE C ARM MINI

## (undated) DEVICE — VENODYNE/SCD SLEEVE CALF MEDIUM

## (undated) DEVICE — PACK ORTHO FOOT ANKLE

## (undated) DEVICE — WARMING BLANKET UPPER ADULT

## (undated) DEVICE — GLV 7.5 PROTEXIS (WHITE)